# Patient Record
Sex: FEMALE | Race: WHITE | Employment: OTHER | URBAN - METROPOLITAN AREA
[De-identification: names, ages, dates, MRNs, and addresses within clinical notes are randomized per-mention and may not be internally consistent; named-entity substitution may affect disease eponyms.]

---

## 2017-10-06 ENCOUNTER — APPOINTMENT (OUTPATIENT)
Dept: GENERAL RADIOLOGY | Age: 72
DRG: 194 | End: 2017-10-06
Attending: NURSE PRACTITIONER
Payer: MEDICARE

## 2017-10-06 ENCOUNTER — HOSPITAL ENCOUNTER (INPATIENT)
Age: 72
LOS: 1 days | Discharge: HOME | DRG: 194 | End: 2017-10-08
Attending: EMERGENCY MEDICINE | Admitting: INTERNAL MEDICINE
Payer: MEDICARE

## 2017-10-06 ENCOUNTER — APPOINTMENT (OUTPATIENT)
Dept: CT IMAGING | Age: 72
DRG: 194 | End: 2017-10-06
Attending: EMERGENCY MEDICINE
Payer: MEDICARE

## 2017-10-06 DIAGNOSIS — N17.9 AKI (ACUTE KIDNEY INJURY) (HCC): ICD-10-CM

## 2017-10-06 DIAGNOSIS — K44.9 HIATAL HERNIA WITH GERD AND ESOPHAGITIS: ICD-10-CM

## 2017-10-06 DIAGNOSIS — H10.9 CONJUNCTIVITIS, UNSPECIFIED CONJUNCTIVITIS TYPE, UNSPECIFIED LATERALITY: ICD-10-CM

## 2017-10-06 DIAGNOSIS — K21.00 HIATAL HERNIA WITH GERD AND ESOPHAGITIS: ICD-10-CM

## 2017-10-06 DIAGNOSIS — E87.6 HYPOKALEMIA: ICD-10-CM

## 2017-10-06 DIAGNOSIS — R11.0 NAUSEA: ICD-10-CM

## 2017-10-06 DIAGNOSIS — J18.9 COMMUNITY ACQUIRED PNEUMONIA, UNSPECIFIED LATERALITY: ICD-10-CM

## 2017-10-06 DIAGNOSIS — R19.7 DIARRHEA OF PRESUMED INFECTIOUS ORIGIN: ICD-10-CM

## 2017-10-06 DIAGNOSIS — R53.1 WEAKNESS: ICD-10-CM

## 2017-10-06 DIAGNOSIS — J45.20 MILD INTERMITTENT ASTHMA WITHOUT COMPLICATION: ICD-10-CM

## 2017-10-06 DIAGNOSIS — R09.02 HYPOXIA: ICD-10-CM

## 2017-10-06 DIAGNOSIS — E83.39 HYPOPHOSPHATEMIA: ICD-10-CM

## 2017-10-06 LAB
ALBUMIN SERPL-MCNC: 2.8 G/DL (ref 3.2–4.6)
ALBUMIN/GLOB SERPL: 0.6 {RATIO} (ref 1.2–3.5)
ALP SERPL-CCNC: 118 U/L (ref 50–136)
ALT SERPL-CCNC: 21 U/L (ref 12–65)
ANION GAP SERPL CALC-SCNC: 11 MMOL/L (ref 7–16)
ARTERIAL PATENCY WRIST A: POSITIVE
AST SERPL-CCNC: 28 U/L (ref 15–37)
BACTERIA URNS QL MICRO: ABNORMAL /HPF
BASE DEFICIT BLDA-SCNC: 1.2 MMOL/L (ref 0–2)
BASOPHILS # BLD: 0 K/UL (ref 0–0.2)
BASOPHILS NFR BLD: 0 % (ref 0–2)
BDY SITE: ABNORMAL
BILIRUB SERPL-MCNC: 1 MG/DL (ref 0.2–1.1)
BNP SERPL-MCNC: 7 PG/ML
BUN SERPL-MCNC: 12 MG/DL (ref 8–23)
CALCIUM SERPL-MCNC: 9.2 MG/DL (ref 8.3–10.4)
CASTS URNS QL MICRO: ABNORMAL /LPF
CHLORIDE SERPL-SCNC: 98 MMOL/L (ref 98–107)
CO2 SERPL-SCNC: 27 MMOL/L (ref 21–32)
COHGB MFR BLD: 0.5 % (ref 0.5–1.5)
CREAT SERPL-MCNC: 1.15 MG/DL (ref 0.6–1)
CRYSTALS URNS QL MICRO: 0 /LPF
DIFFERENTIAL METHOD BLD: ABNORMAL
DO-HGB BLD-MCNC: 7 % (ref 0–5)
EOSINOPHIL # BLD: 0 K/UL (ref 0–0.8)
EOSINOPHIL NFR BLD: 0 % (ref 0.5–7.8)
EPI CELLS #/AREA URNS HPF: ABNORMAL /HPF
ERYTHROCYTE [DISTWIDTH] IN BLOOD BY AUTOMATED COUNT: 13.6 % (ref 11.9–14.6)
GLOBULIN SER CALC-MCNC: 4.8 G/DL (ref 2.3–3.5)
GLUCOSE SERPL-MCNC: 107 MG/DL (ref 65–100)
HCO3 BLDA-SCNC: 22 MMOL/L (ref 22–26)
HCT VFR BLD AUTO: 40.8 % (ref 35.8–46.3)
HGB BLD-MCNC: 14.1 G/DL (ref 11.7–15.4)
HGB BLDMV-MCNC: 12.6 GM/DL (ref 11.7–15)
IMM GRANULOCYTES # BLD: 0.1 K/UL (ref 0–0.5)
IMM GRANULOCYTES NFR BLD: 0.5 % (ref 0–5)
LACTATE BLD-SCNC: 2 MMOL/L (ref 0.5–1.9)
LYMPHOCYTES # BLD: 1.1 K/UL (ref 0.5–4.6)
LYMPHOCYTES NFR BLD: 10 % (ref 13–44)
MAGNESIUM SERPL-MCNC: 2 MG/DL (ref 1.8–2.4)
MCH RBC QN AUTO: 28.8 PG (ref 26.1–32.9)
MCHC RBC AUTO-ENTMCNC: 34.6 G/DL (ref 31.4–35)
MCV RBC AUTO: 83.4 FL (ref 79.6–97.8)
METHGB MFR BLD: 0.5 % (ref 0–1.5)
MONOCYTES # BLD: 1.6 K/UL (ref 0.1–1.3)
MONOCYTES NFR BLD: 14 % (ref 4–12)
MUCOUS THREADS URNS QL MICRO: ABNORMAL /LPF
NEUTS SEG # BLD: 8.8 K/UL (ref 1.7–8.2)
NEUTS SEG NFR BLD: 76 % (ref 43–78)
OXYHGB MFR BLDA: 91.6 % (ref 94–97)
PCO2 BLDA: 33 MMHG (ref 35–45)
PH BLDA: 7.44 [PH] (ref 7.35–7.45)
PHOSPHATE SERPL-MCNC: 1.6 MG/DL (ref 2.3–3.7)
PLATELET # BLD AUTO: 190 K/UL (ref 150–450)
PMV BLD AUTO: 10.7 FL (ref 10.8–14.1)
PO2 BLDA: 64 MMHG (ref 80–105)
POTASSIUM SERPL-SCNC: 3 MMOL/L (ref 3.5–5.1)
PROT SERPL-MCNC: 7.6 G/DL (ref 6.3–8.2)
RBC # BLD AUTO: 4.89 M/UL (ref 4.05–5.25)
RBC #/AREA URNS HPF: ABNORMAL /HPF
SAO2 % BLD: 93 % (ref 92–98.5)
SERVICE CMNT-IMP: ABNORMAL
SODIUM SERPL-SCNC: 136 MMOL/L (ref 136–145)
TROPONIN I SERPL-MCNC: <0.02 NG/ML (ref 0.02–0.05)
VENTILATION MODE VENT: ABNORMAL
WBC # BLD AUTO: 11.6 K/UL (ref 4.3–11.1)
WBC URNS QL MICRO: ABNORMAL /HPF

## 2017-10-06 PROCEDURE — 74011250636 HC RX REV CODE- 250/636: Performed by: INTERNAL MEDICINE

## 2017-10-06 PROCEDURE — 83735 ASSAY OF MAGNESIUM: CPT | Performed by: NURSE PRACTITIONER

## 2017-10-06 PROCEDURE — 74011000250 HC RX REV CODE- 250: Performed by: EMERGENCY MEDICINE

## 2017-10-06 PROCEDURE — 99285 EMERGENCY DEPT VISIT HI MDM: CPT | Performed by: EMERGENCY MEDICINE

## 2017-10-06 PROCEDURE — 71260 CT THORAX DX C+: CPT

## 2017-10-06 PROCEDURE — 84484 ASSAY OF TROPONIN QUANT: CPT | Performed by: NURSE PRACTITIONER

## 2017-10-06 PROCEDURE — 36600 WITHDRAWAL OF ARTERIAL BLOOD: CPT

## 2017-10-06 PROCEDURE — 96361 HYDRATE IV INFUSION ADD-ON: CPT | Performed by: EMERGENCY MEDICINE

## 2017-10-06 PROCEDURE — 82803 BLOOD GASES ANY COMBINATION: CPT

## 2017-10-06 PROCEDURE — 94640 AIRWAY INHALATION TREATMENT: CPT

## 2017-10-06 PROCEDURE — 74011000258 HC RX REV CODE- 258: Performed by: EMERGENCY MEDICINE

## 2017-10-06 PROCEDURE — 96375 TX/PRO/DX INJ NEW DRUG ADDON: CPT | Performed by: EMERGENCY MEDICINE

## 2017-10-06 PROCEDURE — 74011250636 HC RX REV CODE- 250/636: Performed by: NURSE PRACTITIONER

## 2017-10-06 PROCEDURE — 81003 URINALYSIS AUTO W/O SCOPE: CPT | Performed by: EMERGENCY MEDICINE

## 2017-10-06 PROCEDURE — 85025 COMPLETE CBC W/AUTO DIFF WBC: CPT | Performed by: NURSE PRACTITIONER

## 2017-10-06 PROCEDURE — 74011250636 HC RX REV CODE- 250/636: Performed by: EMERGENCY MEDICINE

## 2017-10-06 PROCEDURE — 80053 COMPREHEN METABOLIC PANEL: CPT | Performed by: NURSE PRACTITIONER

## 2017-10-06 PROCEDURE — 71010 XR CHEST PORT: CPT

## 2017-10-06 PROCEDURE — 74011636320 HC RX REV CODE- 636/320: Performed by: EMERGENCY MEDICINE

## 2017-10-06 PROCEDURE — 93005 ELECTROCARDIOGRAM TRACING: CPT | Performed by: NURSE PRACTITIONER

## 2017-10-06 PROCEDURE — 74011250637 HC RX REV CODE- 250/637: Performed by: EMERGENCY MEDICINE

## 2017-10-06 PROCEDURE — 83880 ASSAY OF NATRIURETIC PEPTIDE: CPT | Performed by: EMERGENCY MEDICINE

## 2017-10-06 PROCEDURE — 83605 ASSAY OF LACTIC ACID: CPT

## 2017-10-06 PROCEDURE — 81015 MICROSCOPIC EXAM OF URINE: CPT | Performed by: EMERGENCY MEDICINE

## 2017-10-06 PROCEDURE — 96365 THER/PROPH/DIAG IV INF INIT: CPT | Performed by: EMERGENCY MEDICINE

## 2017-10-06 PROCEDURE — 74011000250 HC RX REV CODE- 250: Performed by: INTERNAL MEDICINE

## 2017-10-06 PROCEDURE — 84100 ASSAY OF PHOSPHORUS: CPT | Performed by: NURSE PRACTITIONER

## 2017-10-06 PROCEDURE — 99223 1ST HOSP IP/OBS HIGH 75: CPT | Performed by: INTERNAL MEDICINE

## 2017-10-06 RX ORDER — SODIUM CHLORIDE 0.9 % (FLUSH) 0.9 %
10 SYRINGE (ML) INJECTION
Status: COMPLETED | OUTPATIENT
Start: 2017-10-06 | End: 2017-10-06

## 2017-10-06 RX ORDER — SODIUM CHLORIDE 9 MG/ML
1000 INJECTION, SOLUTION INTRAVENOUS CONTINUOUS
Status: DISCONTINUED | OUTPATIENT
Start: 2017-10-06 | End: 2017-10-08

## 2017-10-06 RX ORDER — LEVOFLOXACIN 5 MG/ML
750 INJECTION, SOLUTION INTRAVENOUS
Status: COMPLETED | OUTPATIENT
Start: 2017-10-06 | End: 2017-10-06

## 2017-10-06 RX ORDER — IPRATROPIUM BROMIDE AND ALBUTEROL SULFATE 2.5; .5 MG/3ML; MG/3ML
3 SOLUTION RESPIRATORY (INHALATION)
Status: COMPLETED | OUTPATIENT
Start: 2017-10-06 | End: 2017-10-06

## 2017-10-06 RX ORDER — ERYTHROMYCIN 5 MG/G
OINTMENT OPHTHALMIC
Status: COMPLETED | OUTPATIENT
Start: 2017-10-06 | End: 2017-10-06

## 2017-10-06 RX ADMIN — ERYTHROMYCIN: 5 OINTMENT OPHTHALMIC at 21:56

## 2017-10-06 RX ADMIN — SODIUM CHLORIDE 1000 ML/HR: 900 INJECTION, SOLUTION INTRAVENOUS at 16:48

## 2017-10-06 RX ADMIN — POTASSIUM PHOSPHATE, MONOBASIC AND POTASSIUM PHOSPHATE, DIBASIC: 224; 236 INJECTION, SOLUTION INTRAVENOUS at 23:08

## 2017-10-06 RX ADMIN — LEVOFLOXACIN 750 MG: 5 INJECTION, SOLUTION INTRAVENOUS at 21:06

## 2017-10-06 RX ADMIN — SODIUM CHLORIDE 1000 ML: 900 INJECTION, SOLUTION INTRAVENOUS at 23:54

## 2017-10-06 RX ADMIN — IPRATROPIUM BROMIDE AND ALBUTEROL SULFATE 3 ML: .5; 3 SOLUTION RESPIRATORY (INHALATION) at 19:58

## 2017-10-06 RX ADMIN — IOPAMIDOL 100 ML: 755 INJECTION, SOLUTION INTRAVENOUS at 21:25

## 2017-10-06 RX ADMIN — METHYLPREDNISOLONE SODIUM SUCCINATE 125 MG: 125 INJECTION, POWDER, FOR SOLUTION INTRAMUSCULAR; INTRAVENOUS at 21:06

## 2017-10-06 RX ADMIN — SODIUM CHLORIDE 100 ML: 900 INJECTION, SOLUTION INTRAVENOUS at 21:25

## 2017-10-06 RX ADMIN — Medication 10 ML: at 21:25

## 2017-10-06 NOTE — ED TRIAGE NOTES
Pt arrived ambulatory via POV with her daughter with c/o fatigue for 3 months. Pt was seen at her primary doctor today and was sent here.

## 2017-10-06 NOTE — IP AVS SNAPSHOT
Brian Shah 
 
 
 [address] [facility] [address] [facility] 
[phone] Patient: Dandre Raza MRN: [id number] LNY:4/0/[id number] You are allergic to the following Allergen Reactions Other Medication Rash Equate antibiotic ointment Immunizations Administered for This Admission Name Date Influenza Vaccine (Quad) PF  Deferred () Recent Documentation Height Weight BMI OB Status Smoking Status 1.575 m 56.2 kg 22.68 kg/m2 Postmenopausal Never Smoker Unresulted Labs Order Current Status CULTURE, RESPIRATORY/SPUTUM/BRONCH W GRAM STAIN In process Emergency Contacts Name Discharge Info Relation Home Work Mobile Carlota oDminguez DISCHARGE CAREGIVER [3] Daughter [21] [phone] About your hospitalization You were admitted on:  October 7, 2017 You last received care in the:  [facility] 8 MED SURG You were discharged on:  October 8, 2017 Unit phone number:  [phone] Why you were hospitalized Your primary diagnosis was:  Community Acquired Pneumonia Your diagnoses also included:  Hiatal Hernia With Ronald Lather And Esophagitis, Mild Intermittent Asthma, Hypoxia, Hypokalemia, Álvaro (Acute Kidney Injury) (Hcc), Hypophosphatemia, Weakness, Diarrhea Of Presumed Infectious Origin, Nausea, Cap (Community Acquired Pneumonia), Conjunctivitis Providers Seen During Your Hospitalizations Provider Role Specialty Primary office phone Ajay Mendez MD Attending Provider Emergency Medicine [phone] Amaya Reis MD Attending Provider Pulmonary Disease [phone] Your Primary Care Physician (PCP) Primary Care Physician Office Phone Office Fax Carlota Santillan [phone] Follow-up Information Follow up With Details Comments Contact Info Bert Pollard, [address] [facility] [address] 
[phone] [facility] Schedule an appointment as soon as possible for a visit in 1 day follow up with chest xray in 3 to 4 weeks. [address] [facility] [address] [place] [address] [id number] 
[phone] Talia Garay MD In 1 day Make follow up appt in 2 weeks [address] [facility] [address] [facility] [address] 
[phone] Current Discharge Medication List  
  
START taking these medications Dose & Instructions Dispensing Information Comments Morning Noon Evening Bedtime  
 azithromycin 500 mg Tab Commonly known as:  Harry Both Your last dose was: Your next dose is:    
   
   
 Dose:  500 mg Take 1 Tab by mouth daily for 3 doses. Quantity:  3 Tab Refills:  0  
     
   
   
   
  
 influenza vaccine 2017-18 (3 yrs+)(PF) Syrg injection Commonly known as:  FLUZONE QUAD/FLUARIX QUAD Your last dose was: Your next dose is:    
   
   
 Dose:  0.5 mL  
0.5 mL by IntraMUSCular route PRIOR TO DISCHARGE. Quantity:  0.5 mL Refills:  0 CONTINUE these medications which have NOT CHANGED Dose & Instructions Dispensing Information Comments Morning Noon Evening Bedtime  
 alendronate 70 mg tablet Commonly known as:  FOSAMAX Your last dose was: Your next dose is: Take one tab WEEKLY in AM on an empty stomach with a full glass of water. Do not eat, drink or lay down for 30 min. Quantity:  12 Tab Refills:  3  
     
   
   
   
  
 beclomethasone 80 mcg/actuation Aero Commonly known as:  QVAR Your last dose was: Your next dose is:    
   
   
 Dose:  2 Puff Take 2 Puffs by inhalation two (2) times a day. 1 puff bid. 90 day supply Quantity:  3 Inhaler Refills:  3 BREO ELLIPTA 200-25 mcg/dose inhaler Generic drug:  fluticasone-vilanterol Your last dose was: Your next dose is:    
   
   
 Dose:  1 Puff Take 1 Puff by inhalation daily. Refills:  0  
     
   
   
   
  
 cyanocobalamin 1,000 mcg/mL injection Commonly known as:  VITAMIN B12 Your last dose was: Your next dose is:    
   
   
 Dose:  1000 mcg  
1,000 mcg by IntraMUSCular route once. Refills:  0  
     
   
   
   
  
 FLONASE 50 mcg/actuation nasal spray Generic drug:  fluticasone Your last dose was: Your next dose is:    
   
   
 Dose:  2 Spray 2 Sprays by Both Nostrils route daily. Refills:  0  
     
   
   
   
  
 hydroCHLOROthiazide 25 mg tablet Commonly known as:  HYDRODIURIL Your last dose was: Your next dose is:    
   
   
 Dose:  12.5 mg Take 0.5 Tabs by mouth every morning. 1/2 tab Quantity:  45 Tab Refills:  3 LORazepam 1 mg tablet Commonly known as:  ATIVAN Your last dose was: Your next dose is: TAKE 1 TABLET BY MOUTH TWICE DAILY AS NEEDED FOR ANXIETY Quantity:  180 Tab Refills:  1  
     
   
   
   
  
 metoclopramide HCl 10 mg tablet Commonly known as:  REGLAN Your last dose was: Your next dose is:    
   
   
 1 po before each big meal  
 Quantity:  180 Tab Refills:  3  
     
   
   
   
  
 omeprazole 40 mg capsule Commonly known as:  PRILOSEC Your last dose was: Your next dose is:    
   
   
 Dose:  40 mg Take 1 Cap by mouth two (2) times a day. Quantity:  180 Cap Refills:  3  
     
   
   
   
  
 pravastatin 40 mg tablet Commonly known as:  PRAVACHOL Your last dose was: Your next dose is:    
   
   
 Dose:  40 mg Take 1 Tab by mouth nightly. Quantity:  90 Tab Refills:  3  
     
   
   
   
  
 * venlafaxine-SR 37.5 mg capsule Commonly known as:  EFFEXOR XR Your last dose was: Your next dose is:    
   
   
 Dose:  37.5 mg Take 1 Cap by mouth daily. Quantity:  30 Cap Refills:  11  
     
   
   
   
  
 * venlafaxine-SR 75 mg capsule Commonly known as:  EFFEXOR XR Your last dose was: Your next dose is:    
   
   
 Dose:  75 mg Take 1 Cap by mouth two (2) times a day. Quantity:  60 Cap Refills:  11  
     
   
   
   
  
 verapamil  mg CR tablet Commonly known as:  CALAN-SR Your last dose was: Your next dose is:    
   
   
 Dose:  240 mg Take 1 Tab by mouth every morning. Quantity:  90 Tab Refills:  3 VITAMIN D3 2,000 unit Tab Generic drug:  cholecalciferol (vitamin D3) Your last dose was: Your next dose is: Take  by mouth. Refills:  0  
     
   
   
   
  
 * Notice: This list has 2 medication(s) that are the same as other medications prescribed for you. Read the directions carefully, and ask your doctor or other care provider to review them with you. STOP taking these medications   
 citalopram 40 mg tablet Commonly known as:  Josephine Andrade Where to Get Your Medications These medications were sent to [address] [place] [address] Phone:  [phone]  
  azithromycin 500 mg Tab Information on where to get these meds will be given to you by the nurse or doctor. ! Ask your nurse or doctor about these medications  
  influenza vaccine 2017-18 (3 yrs+)(PF) Syrg injection Discharge Instructions Pinkeye: Care Instructions Your Care Instructions Pinkeye is redness and swelling of the eye surface and the conjunctiva (the lining of the eyelid and the covering of the white part of the eye). Pinkeye is also called conjunctivitis. Pinkeye is often caused by infection with bacteria or a virus. Dry air, allergies, smoke, and chemicals are other common causes. Pinkeye often clears on its own in 7 to 10 days. Antibiotics only help if the pinkeye is caused by bacteria. Pinkeye caused by infection spreads easily. If an allergy or chemical is causing pinkeye, it will not go away unless you can avoid whatever is causing it. Follow-up care is a key part of your treatment and safety. Be sure to make and go to all appointments, and call your doctor if you are having problems. It's also a good idea to know your test results and keep a list of the medicines you take. How can you care for yourself at home? · Wash your hands often. Always wash them before and after you treat pinkeye or touch your eyes or face. · Use moist cotton or a clean, wet cloth to remove crust. Wipe from the inside corner of the eye to the outside. Use a clean part of the cloth for each wipe. · Put cold or warm wet cloths on your eye a few times a day if the eye hurts. · Do not wear contact lenses or eye makeup until the pinkeye is gone. Throw away any eye makeup you were using when you got pinkeye. Clean your contacts and storage case. If you wear disposable contacts, use a new pair when your eye has cleared and it is safe to wear contacts again. · If the doctor gave you antibiotic ointment or eyedrops, use them as directed. Use the medicine for as long as instructed, even if your eye starts looking better soon. Keep the bottle tip clean, and do not let it touch the eye area. · To put in eyedrops or ointment: ¨ Tilt your head back, and pull your lower eyelid down with one finger. ¨ Drop or squirt the medicine inside the lower lid. ¨ Close your eye for 30 to 60 seconds to let the drops or ointment move around. ¨ Do not touch the ointment or dropper tip to your eyelashes or any other surface. · Do not share towels, pillows, or washcloths while you have pinkeye. When should you call for help? Call your doctor now or seek immediate medical care if: · You have pain in your eye, not just irritation on the surface. · You have a change in vision or loss of vision. · You have an increase in discharge from the eye. · Your eye has not started to improve or begins to get worse within 48 hours after you start using antibiotics. · Pinkeye lasts longer than 7 days. Watch closely for changes in your health, and be sure to contact your doctor if you have any problems. Where can you learn more? Go to http://hannah-terra.info/. Enter Y392 in the search box to learn more about \"Pinkeye: Care Instructions. \" Current as of: March 20, 2017 Content Version: 11.3 © 4221-4784 [employer]. Care instructions adapted under license by [employer] (which disclaims liability or warranty for this information). If you have questions about a medical condition or this instruction, always ask your healthcare professional. [place] [address] any warranty or liability for your use of this information. Discharge Orders None [employer] Announcement We are excited to announce that we are making your provider's discharge notes available to you in [employer]. You will see these notes when they are completed and signed by the physician that discharged you from your recent hospital stay. If you have any questions or concerns about any information you see in [employer], please call the Health Information Department where you were seen or reach out to your Primary Care Provider for more information about your plan of care. Introducing [facility]! [employer] introduces [employer] patient portal. Now you can access parts of your medical record, email your doctor's office, and request medication refills online. 1. In your internet browser, go to [email or web address] 2. Click on the First Time User? Click Here link in the Sign In box. You will see the New Member Sign Up page. 3. Enter your [employer] Access Code exactly as it appears below. You will not need to use this code after youve completed the sign-up process. If you do not sign up before the expiration date, you must request a new code. · [employer] Access Code: [facility] Expires: 10/15/2017  8:41 AM 
 
4. Enter the last four digits of your Social Security Number (xxxx) and Date of Birth (mm/dd/yyyy) as indicated and click Submit. You will be taken to the next sign-up page. 5. Create a [employer] ID. This will be your [employer] login ID and cannot be changed, so think of one that is secure and easy to remember. 6. Create a [employer] password. You can change your password at any time. 7. Enter your Password Reset Question and Answer. This can be used at a later time if you forget your password. 8. Enter your e-mail address. You will receive e-mail notification when new information is available in [address] 9. Click Sign Up. You can now view and download portions of your medical record. 10. Click the Download Summary menu link to download a portable copy of your medical information. If you have questions, please visit the Frequently Asked Questions section of the [employer] website. Remember, [employer] is NOT to be used for urgent needs. For medical emergencies, dial 911. Now available from your iPhone and Android! General Information Please provide this summary of care documentation to your next provider. Patient Signature:  ____________________________________________________________ Date:  ____________________________________________________________  
  
Westley Leary Provider Signature:  ____________________________________________________________ Date:  ____________________________________________________________

## 2017-10-06 NOTE — ED TRIAGE NOTES
El Mukherjee is a 71 y.o. female here for weakness. Rapid assessment performed. --- Orders were placed. --- Patient will be room  Signed By: Yuri Nazario NP     October 6, 2017          Ambulatory to ed with complaint of weakness and tired, gets tired after walking just several feet. Cough onset three mos ago. Saw her md today and sent her here. Has also been seen at doctors care twice, given steroids twice.     bp noted low - will start diagnostics at triage and to room

## 2017-10-06 NOTE — IP AVS SNAPSHOT
Argelia Petshilpi 
 
 
 [facility] [address] [facility] 
[phone] Patient: Marlen Davis MRN: [id number] Current Discharge Medication List  
  
START taking these medications Dose & Instructions Dispensing Information Comments Morning Noon Evening Bedtime  
 azithromycin 500 mg Tab Commonly known as:  Cyrena Haven Your last dose was: Your next dose is:    
   
   
 Dose:  500 mg Take 1 Tab by mouth daily for 3 doses. Quantity:  3 Tab Refills:  0  
     
   
   
   
  
 influenza vaccine 2017-18 (3 yrs+)(PF) Syrg injection Commonly known as:  FLUZONE QUAD/FLUARIX QUAD Your last dose was: Your next dose is:    
   
   
 Dose:  0.5 mL  
0.5 mL by IntraMUSCular route PRIOR TO DISCHARGE. Quantity:  0.5 mL Refills:  0 CONTINUE these medications which have NOT CHANGED Dose & Instructions Dispensing Information Comments Morning Noon Evening Bedtime  
 alendronate 70 mg tablet Commonly known as:  FOSAMAX Your last dose was: Your next dose is: Take one tab WEEKLY in AM on an empty stomach with a full glass of water. Do not eat, drink or lay down for 30 min. Quantity:  12 Tab Refills:  3  
     
   
   
   
  
 beclomethasone 80 mcg/actuation Aero Commonly known as:  QVAR Your last dose was: Your next dose is:    
   
   
 Dose:  2 Puff Take 2 Puffs by inhalation two (2) times a day. 1 puff bid. 90 day supply Quantity:  3 Inhaler Refills:  3 BREO ELLIPTA 200-25 mcg/dose inhaler Generic drug:  fluticasone-vilanterol Your last dose was: Your next dose is:    
   
   
 Dose:  1 Puff Take 1 Puff by inhalation daily. Refills:  0  
     
   
   
   
  
 cyanocobalamin 1,000 mcg/mL injection Commonly known as:  VITAMIN B12 Your last dose was: Your next dose is: Dose:  1000 mcg  
1,000 mcg by IntraMUSCular route once. Refills:  0  
     
   
   
   
  
 FLONASE 50 mcg/actuation nasal spray Generic drug:  fluticasone Your last dose was: Your next dose is:    
   
   
 Dose:  2 Spray 2 Sprays by Both Nostrils route daily. Refills:  0  
     
   
   
   
  
 hydroCHLOROthiazide 25 mg tablet Commonly known as:  HYDRODIURIL Your last dose was: Your next dose is:    
   
   
 Dose:  12.5 mg Take 0.5 Tabs by mouth every morning. 1/2 tab Quantity:  45 Tab Refills:  3 LORazepam 1 mg tablet Commonly known as:  ATIVAN Your last dose was: Your next dose is: TAKE 1 TABLET BY MOUTH TWICE DAILY AS NEEDED FOR ANXIETY Quantity:  180 Tab Refills:  1  
     
   
   
   
  
 metoclopramide HCl 10 mg tablet Commonly known as:  REGLAN Your last dose was: Your next dose is:    
   
   
 1 po before each big meal  
 Quantity:  180 Tab Refills:  3  
     
   
   
   
  
 omeprazole 40 mg capsule Commonly known as:  PRILOSEC Your last dose was: Your next dose is:    
   
   
 Dose:  40 mg Take 1 Cap by mouth two (2) times a day. Quantity:  180 Cap Refills:  3  
     
   
   
   
  
 pravastatin 40 mg tablet Commonly known as:  PRAVACHOL Your last dose was: Your next dose is:    
   
   
 Dose:  40 mg Take 1 Tab by mouth nightly. Quantity:  90 Tab Refills:  3  
     
   
   
   
  
 * venlafaxine-SR 37.5 mg capsule Commonly known as:  EFFEXOR XR Your last dose was: Your next dose is:    
   
   
 Dose:  37.5 mg Take 1 Cap by mouth daily. Quantity:  30 Cap Refills:  11  
     
   
   
   
  
 * venlafaxine-SR 75 mg capsule Commonly known as:  EFFEXOR XR Your last dose was: Your next dose is:    
   
   
 Dose:  75 mg Take 1 Cap by mouth two (2) times a day. Quantity:  60 Cap Refills:  11  
     
   
   
   
  
 verapamil  mg CR tablet Commonly known as:  CALAN-SR Your last dose was: Your next dose is:    
   
   
 Dose:  240 mg Take 1 Tab by mouth every morning. Quantity:  90 Tab Refills:  3 VITAMIN D3 2,000 unit Tab Generic drug:  cholecalciferol (vitamin D3) Your last dose was: Your next dose is: Take  by mouth. Refills:  0  
     
   
   
   
  
 * Notice: This list has 2 medication(s) that are the same as other medications prescribed for you. Read the directions carefully, and ask your doctor or other care provider to review them with you. STOP taking these medications   
 citalopram 40 mg tablet Commonly known as:  Celine Oh Where to Get Your Medications These medications were sent to [address] [place] [address] [address] [facility] [address] Phone:  [phone]  
  azithromycin 500 mg Tab Information on where to get these meds will be given to you by the nurse or doctor. ! Ask your nurse or doctor about these medications  
  influenza vaccine 2017-18 (3 yrs+)(PF) Syrg injection

## 2017-10-07 LAB
ANION GAP SERPL CALC-SCNC: 9 MMOL/L (ref 7–16)
BUN SERPL-MCNC: 11 MG/DL (ref 8–23)
CALCIUM SERPL-MCNC: 8.3 MG/DL (ref 8.3–10.4)
CHLORIDE SERPL-SCNC: 109 MMOL/L (ref 98–107)
CO2 SERPL-SCNC: 25 MMOL/L (ref 21–32)
CREAT SERPL-MCNC: 0.65 MG/DL (ref 0.6–1)
ERYTHROCYTE [DISTWIDTH] IN BLOOD BY AUTOMATED COUNT: 13.5 % (ref 11.9–14.6)
GLUCOSE SERPL-MCNC: 133 MG/DL (ref 65–100)
HCT VFR BLD AUTO: 35.1 % (ref 35.8–46.3)
HGB BLD-MCNC: 11.9 G/DL (ref 11.7–15.4)
MCH RBC QN AUTO: 28.1 PG (ref 26.1–32.9)
MCHC RBC AUTO-ENTMCNC: 33.9 G/DL (ref 31.4–35)
MCV RBC AUTO: 82.8 FL (ref 79.6–97.8)
PHOSPHATE SERPL-MCNC: 3.2 MG/DL (ref 2.3–3.7)
PLATELET # BLD AUTO: 170 K/UL (ref 150–450)
PMV BLD AUTO: 10.2 FL (ref 10.8–14.1)
POTASSIUM SERPL-SCNC: 3.3 MMOL/L (ref 3.5–5.1)
PROCALCITONIN SERPL-MCNC: 0.2 NG/ML
RBC # BLD AUTO: 4.24 M/UL (ref 4.05–5.25)
SODIUM SERPL-SCNC: 143 MMOL/L (ref 136–145)
WBC # BLD AUTO: 5.3 K/UL (ref 4.3–11.1)

## 2017-10-07 PROCEDURE — 74011250637 HC RX REV CODE- 250/637: Performed by: INTERNAL MEDICINE

## 2017-10-07 PROCEDURE — 74011250636 HC RX REV CODE- 250/636: Performed by: INTERNAL MEDICINE

## 2017-10-07 PROCEDURE — 85027 COMPLETE CBC AUTOMATED: CPT | Performed by: INTERNAL MEDICINE

## 2017-10-07 PROCEDURE — 80048 BASIC METABOLIC PNL TOTAL CA: CPT | Performed by: INTERNAL MEDICINE

## 2017-10-07 PROCEDURE — 36415 COLL VENOUS BLD VENIPUNCTURE: CPT | Performed by: INTERNAL MEDICINE

## 2017-10-07 PROCEDURE — 84145 PROCALCITONIN (PCT): CPT | Performed by: INTERNAL MEDICINE

## 2017-10-07 PROCEDURE — 74011000258 HC RX REV CODE- 258: Performed by: INTERNAL MEDICINE

## 2017-10-07 PROCEDURE — 65270000029 HC RM PRIVATE

## 2017-10-07 PROCEDURE — 74011000250 HC RX REV CODE- 250: Performed by: INTERNAL MEDICINE

## 2017-10-07 PROCEDURE — 99232 SBSQ HOSP IP/OBS MODERATE 35: CPT | Performed by: INTERNAL MEDICINE

## 2017-10-07 PROCEDURE — 84100 ASSAY OF PHOSPHORUS: CPT | Performed by: INTERNAL MEDICINE

## 2017-10-07 PROCEDURE — 87070 CULTURE OTHR SPECIMN AEROBIC: CPT | Performed by: INTERNAL MEDICINE

## 2017-10-07 RX ORDER — SODIUM CHLORIDE 0.9 % (FLUSH) 0.9 %
5-10 SYRINGE (ML) INJECTION EVERY 8 HOURS
Status: DISCONTINUED | OUTPATIENT
Start: 2017-10-07 | End: 2017-10-08 | Stop reason: HOSPADM

## 2017-10-07 RX ORDER — SODIUM CHLORIDE 9 MG/ML
100 INJECTION, SOLUTION INTRAVENOUS CONTINUOUS
Status: DISCONTINUED | OUTPATIENT
Start: 2017-10-07 | End: 2017-10-08

## 2017-10-07 RX ORDER — MORPHINE SULFATE 2 MG/ML
2 INJECTION, SOLUTION INTRAMUSCULAR; INTRAVENOUS
Status: DISCONTINUED | OUTPATIENT
Start: 2017-10-07 | End: 2017-10-08 | Stop reason: HOSPADM

## 2017-10-07 RX ORDER — PANTOPRAZOLE SODIUM 40 MG/1
40 TABLET, DELAYED RELEASE ORAL
Status: DISCONTINUED | OUTPATIENT
Start: 2017-10-07 | End: 2017-10-08 | Stop reason: HOSPADM

## 2017-10-07 RX ORDER — VENLAFAXINE HYDROCHLORIDE 75 MG/1
75 CAPSULE, EXTENDED RELEASE ORAL 2 TIMES DAILY
Status: DISCONTINUED | OUTPATIENT
Start: 2017-10-07 | End: 2017-10-08 | Stop reason: HOSPADM

## 2017-10-07 RX ORDER — ONDANSETRON 2 MG/ML
4 INJECTION INTRAMUSCULAR; INTRAVENOUS
Status: DISCONTINUED | OUTPATIENT
Start: 2017-10-07 | End: 2017-10-08 | Stop reason: HOSPADM

## 2017-10-07 RX ORDER — ACETAMINOPHEN 325 MG/1
650 TABLET ORAL
Status: DISCONTINUED | OUTPATIENT
Start: 2017-10-07 | End: 2017-10-08 | Stop reason: HOSPADM

## 2017-10-07 RX ORDER — LOPERAMIDE HYDROCHLORIDE 2 MG/1
2 CAPSULE ORAL
Status: DISCONTINUED | OUTPATIENT
Start: 2017-10-07 | End: 2017-10-08 | Stop reason: HOSPADM

## 2017-10-07 RX ORDER — SODIUM CHLORIDE 0.9 % (FLUSH) 0.9 %
5-10 SYRINGE (ML) INJECTION AS NEEDED
Status: DISCONTINUED | OUTPATIENT
Start: 2017-10-07 | End: 2017-10-08 | Stop reason: HOSPADM

## 2017-10-07 RX ORDER — LORAZEPAM 1 MG/1
1 TABLET ORAL 2 TIMES DAILY
Status: DISCONTINUED | OUTPATIENT
Start: 2017-10-07 | End: 2017-10-08 | Stop reason: HOSPADM

## 2017-10-07 RX ORDER — ALBUTEROL SULFATE 0.83 MG/ML
2.5 SOLUTION RESPIRATORY (INHALATION)
Status: DISCONTINUED | OUTPATIENT
Start: 2017-10-07 | End: 2017-10-08 | Stop reason: HOSPADM

## 2017-10-07 RX ORDER — ENOXAPARIN SODIUM 100 MG/ML
40 INJECTION SUBCUTANEOUS EVERY 24 HOURS
Status: DISCONTINUED | OUTPATIENT
Start: 2017-10-07 | End: 2017-10-08 | Stop reason: HOSPADM

## 2017-10-07 RX ORDER — VENLAFAXINE HYDROCHLORIDE 37.5 MG/1
37.5 CAPSULE, EXTENDED RELEASE ORAL
Status: DISCONTINUED | OUTPATIENT
Start: 2017-10-07 | End: 2017-10-08 | Stop reason: HOSPADM

## 2017-10-07 RX ORDER — METOCLOPRAMIDE 10 MG/1
10 TABLET ORAL
Status: DISCONTINUED | OUTPATIENT
Start: 2017-10-07 | End: 2017-10-08 | Stop reason: HOSPADM

## 2017-10-07 RX ORDER — CITALOPRAM 20 MG/1
40 TABLET, FILM COATED ORAL DAILY
Status: DISCONTINUED | OUTPATIENT
Start: 2017-10-07 | End: 2017-10-08 | Stop reason: HOSPADM

## 2017-10-07 RX ADMIN — ENOXAPARIN SODIUM 40 MG: 40 INJECTION SUBCUTANEOUS at 09:12

## 2017-10-07 RX ADMIN — AZITHROMYCIN MONOHYDRATE 500 MG: 500 INJECTION, POWDER, LYOPHILIZED, FOR SOLUTION INTRAVENOUS at 23:34

## 2017-10-07 RX ADMIN — PANTOPRAZOLE SODIUM 40 MG: 40 TABLET, DELAYED RELEASE ORAL at 16:58

## 2017-10-07 RX ADMIN — SULFACETAMIDE SODIUM AND PREDNISOLONE ACETATE 2 DROP: 100; 2 SUSPENSION/ DROPS OPHTHALMIC at 16:58

## 2017-10-07 RX ADMIN — SULFACETAMIDE SODIUM AND PREDNISOLONE ACETATE 2 DROP: 100; 2 SUSPENSION/ DROPS OPHTHALMIC at 22:11

## 2017-10-07 RX ADMIN — LORAZEPAM 1 MG: 1 TABLET ORAL at 09:12

## 2017-10-07 RX ADMIN — METOCLOPRAMIDE HYDROCHLORIDE 10 MG: 10 TABLET ORAL at 10:39

## 2017-10-07 RX ADMIN — Medication 10 ML: at 22:05

## 2017-10-07 RX ADMIN — SULFACETAMIDE SODIUM AND PREDNISOLONE ACETATE 2 DROP: 100; 2 SUSPENSION/ DROPS OPHTHALMIC at 10:38

## 2017-10-07 RX ADMIN — VENLAFAXINE HYDROCHLORIDE 75 MG: 75 CAPSULE, EXTENDED RELEASE ORAL at 09:12

## 2017-10-07 RX ADMIN — VENLAFAXINE HYDROCHLORIDE 37.5 MG: 37.5 CAPSULE, EXTENDED RELEASE ORAL at 03:10

## 2017-10-07 RX ADMIN — VENLAFAXINE HYDROCHLORIDE 37.5 MG: 37.5 CAPSULE, EXTENDED RELEASE ORAL at 22:11

## 2017-10-07 RX ADMIN — SODIUM CHLORIDE 100 ML/HR: 900 INJECTION, SOLUTION INTRAVENOUS at 14:15

## 2017-10-07 RX ADMIN — METOCLOPRAMIDE HYDROCHLORIDE 10 MG: 10 TABLET ORAL at 16:58

## 2017-10-07 RX ADMIN — METOCLOPRAMIDE HYDROCHLORIDE 10 MG: 10 TABLET ORAL at 05:31

## 2017-10-07 RX ADMIN — VENLAFAXINE HYDROCHLORIDE 75 MG: 75 CAPSULE, EXTENDED RELEASE ORAL at 22:11

## 2017-10-07 RX ADMIN — Medication 5 ML: at 15:00

## 2017-10-07 RX ADMIN — CITALOPRAM HYDROBROMIDE 40 MG: 20 TABLET ORAL at 09:12

## 2017-10-07 RX ADMIN — SULFACETAMIDE SODIUM AND PREDNISOLONE ACETATE 2 DROP: 100; 2 SUSPENSION/ DROPS OPHTHALMIC at 23:33

## 2017-10-07 RX ADMIN — PANTOPRAZOLE SODIUM 40 MG: 40 TABLET, DELAYED RELEASE ORAL at 05:31

## 2017-10-07 RX ADMIN — SODIUM CHLORIDE 100 ML/HR: 900 INJECTION, SOLUTION INTRAVENOUS at 03:12

## 2017-10-07 RX ADMIN — Medication 5 ML: at 05:31

## 2017-10-07 RX ADMIN — LORAZEPAM 1 MG: 1 TABLET ORAL at 16:58

## 2017-10-07 RX ADMIN — CEFTRIAXONE SODIUM 2 G: 2 INJECTION, POWDER, FOR SOLUTION INTRAMUSCULAR; INTRAVENOUS at 22:06

## 2017-10-07 NOTE — PROGRESS NOTES
Pt complaint of right eye irritation. Eye red with small amount of drainage. Dr. Delicia Galdamez notified. Eye drops ordered.

## 2017-10-07 NOTE — ED NOTES
TRANSFER - OUT REPORT:    Verbal report given to Kaylen Wilson RN (name) on Luis Enrique aYnes  being transferred to med/surg (unit) for routine progression of care       Report consisted of patients Situation, Background, Assessment and   Recommendations(SBAR). Information from the following report(s) SBAR, Kardex, ED Summary and MAR was reviewed with the receiving nurse. Lines:   Peripheral IV 10/06/17 Right Antecubital (Active)   Site Assessment Clean, dry, & intact 10/6/2017  5:31 PM   Phlebitis Assessment 0 10/6/2017  5:31 PM   Infiltration Assessment 0 10/6/2017  5:31 PM   Dressing Status Clean, dry, & intact 10/6/2017  5:31 PM        Opportunity for questions and clarification was provided.       Patient transported with:   [employer]

## 2017-10-07 NOTE — H&P
HISTORY AND PHYSICAL      Dandre Raza    10/6/2017    Date of Admission:  10/6/2017    PCP:  Dr. Monica Garcia    The patient's chart is reviewed and the patient is discussed with the staff. Dr. Abel Enrique of the [facility] emergency department has requested that [facility] Pulmonary evaluate Ms. Brumfield for admission to the hospital for hypoxia and possible pneumonia. HPI:     YULIANA Harris is a 69-year-old female with report of 3 months of cough, fatigue and shortness of breath despite 2 visits to urgent care for steroids and antibiotics (not clear what abx she took). She is a never smoker, and has previously been evaluated by Dr. Sharif Robbins at the [facility] center [facility]) for asthma, chronic cough. She was previously on Qvar and apparently ran out of this medication and was recently restarted on Breo ellipta LABA/ICS. She also has issues with allergic rhinitis for which she is on Flonase. The patient has a past medical history of hypertension, hyperlipidemia, history of melanoma status post resection, acid reflux with hiatal hernia. Today she reports 3 months of on/off coughing, sputum production, intermittent fevers. Her biggest complaint is profound fatigue which is severe for the last two weeks. Today she felt like she could hardly walk more than 4-5 feet. She also reports two weeks of worsened nausea (without vomiting) despite metoclopromide and diarrhea for the last several days. Yesterday she reports 4 episodes of loose stools and this recurred this morning as well. She has not been eating much for the last 2 weeks due to nausea. In the emergency room today, a room air ABG suggests a PO2 of 64, and O2 sat on room air was 89%. Her BNP is 7 and she does have a mild leukocytosis of 11.6K. Her renal function is slightly worsewith a creatinine of 1.15 up from 0.84. The patient reports subjective fevers, although her temperature today is 99°.   Her K and Phos are also low.    REVIEW OF SYSTEMS:  CONSTITUTIONAL:  There is no chills, night sweats, +weight loss,+ persistent fatigue, no lethargy/hypersomnolence. EYES:  Denies problems with eye pain, erythema, blurred vision, or visual field loss. Left eye redness noted by ER MD and erythromycin ointment placed on eye. ENTM:  Denies history of tinnitus, epistaxis, sore throat, hoarseness, or dysphonia. LYMPH:  Denies swollen glands. CARDIAC:  No chest pain, pressure, discomfort, palpitations, orthopnea, murmurs, or edema. GI:  No dysphagia, heartburn reflux, +nausea, no vomiting, + diarrhea, No abdominal pain, or bleeding. :Denies history of dysuria, hematuria, polyuria, or decreased urine output. MS:  No history of myalgias, arthralgias, bone pain, or muscle cramps. SKIN:  No history of rashes, jaundice, cyanosis, nodules, or ulcers. ENDO:  Negative for heat or cold intolerance. No history of DM. PSYCH:  Negative for nsomnia, hallucinations. + anxiety and has withdrawn from lorazepam in past,   NEURO:  There is no history of AMS, persistent headache, decreased level of consciousness, seizures, or motor or sensory deficits. Has had more frequent headaches lately. Patient Active Problem List   Diagnosis Code    Essential hypertension with goal blood pressure less than 140/90 I10    Hyperlipidemia, unspecified E78.5    Depression with anxiety F41.8    Mild intermittent asthma J45.20    B12 deficiency E53.8    Osteoarthritis of multiple joints M15.9    Hiatal hernia with GERD and esophagitis K44.9, K21.0    Osteoporosis M81.0    Community acquired pneumonia J18.9    Hypoxia R09.02    Hypokalemia E87.6    PEPITO (acute kidney injury) ([facility] [address] N17.9    Hypophosphatemia E83.39    Weakness R53.1    Diarrhea of presumed infectious origin A09    Nausea R11.0    CAP (community acquired pneumonia) J18.9       Prior to Admission Medications   Prescriptions Last Dose Informant Patient Reported? Taking?    LORazepam (ATIVAN) 1 mg tablet   No No   Sig: TAKE 1 TABLET BY MOUTH TWICE DAILY AS NEEDED FOR ANXIETY   alendronate (FOSAMAX) 70 mg tablet   No No   Sig: Take one tab WEEKLY in AM on an empty stomach with a full glass of water. Do not eat, drink or lay down for 30 min. beclomethasone (QVAR) 80 mcg/actuation aero   No No   Sig: Take 2 Puffs by inhalation two (2) times a day. 1 puff bid. 90 day supply   cholecalciferol, vitamin D3, (VITAMIN D3) 2,000 unit tab   Yes No   Sig: Take  by mouth.   citalopram (CELEXA) 40 mg tablet   Yes No   Sig: Take 40 mg by mouth daily. cyanocobalamin (VITAMIN B12) 1,000 mcg/mL injection   Yes No   Si,000 mcg by IntraMUSCular route once. fluticasone (FLONASE) 50 mcg/actuation nasal spray   Yes No   Si Sprays by Both Nostrils route daily. fluticasone-vilanterol (BREO ELLIPTA) 200-25 mcg/dose inhaler   Yes No   Sig: Take 1 Puff by inhalation daily. hydroCHLOROthiazide (HYDRODIURIL) 25 mg tablet   No No   Sig: Take 0.5 Tabs by mouth every morning. 1/2 tab   metoclopramide HCl (REGLAN) 10 mg tablet   No No   Si po before each big meal   omeprazole (PRILOSEC) 40 mg capsule   No No   Sig: Take 1 Cap by mouth two (2) times a day. pravastatin (PRAVACHOL) 40 mg tablet   No No   Sig: Take 1 Tab by mouth nightly. venlafaxine-SR (EFFEXOR XR) 37.5 mg capsule   No No   Sig: Take 1 Cap by mouth daily. venlafaxine-SR (EFFEXOR XR) 75 mg capsule   No No   Sig: Take 1 Cap by mouth two (2) times a day. verapamil ER (CALAN-SR) 240 mg CR tablet   No No   Sig: Take 1 Tab by mouth every morning.       Facility-Administered Medications: None       Past Medical History:   Diagnosis Date    Anemia  approx    2 units transfused    Arthritis     hands, hips    B12 deficiency 2016    Cancer ([facility] [address]     hx of melanoma 1986 R arm, and carcinoma x 2 forehead    Depression with anxiety 2016    Essential hypertension with goal blood pressure less than 140/90 2016    Gastrointestinal disorder     hiatel hernia    Hyperlipidemia, unspecified 8/16/2016    Mild intermittent asthma 8/16/2016    Osteoarthritis of multiple joints 8/16/2016    Osteoporosis 10/25/2016    Pulmonary emphysema ([address] 8/16/2016    Rectal bleed     hiatal hernia ulcerated     Past Surgical History:   Procedure Laterality Date    HX HERNIA REPAIR  2010    helped reflex    HX TUBAL LIGATION  1972     Social History     Social History    Marital status:      Spouse name: N/A    Number of children: N/A    Years of education: N/A     Occupational History    Not on file. Social History Main Topics    Smoking status: Never Smoker    Smokeless tobacco: Never Used    Alcohol use No    Drug use: No    Sexual activity: Yes     Partners: Male     Birth control/ protection: None     Other Topics Concern    Not on file     Social History Narrative    Lives with boyfriend of over 2 decades. She has 3 grown children, 3 grandchildren and 3 great grandchildren. She previously worked as an ; now on social security but was on disability for her nerves. Family History   Problem Relation Age of Onset    Stroke Mother     Diabetes Maternal Aunt     Diabetes Maternal Uncle     No Known Problems Father      didn't have contact with father    Breast Cancer Daughter 28     Allergies   Allergen Reactions    Other Medication Rash     Equate antibiotic ointment       Current Facility-Administered Medications   Medication Dose Route Frequency    0.9% sodium chloride infusion  1,000 mL/hr IntraVENous CONTINUOUS    potassium phosphate 30 mmol in 0.9% sodium chloride 250 mL infusion   IntraVENous ONCE     Current Outpatient Prescriptions   Medication Sig    fluticasone-vilanterol (BREO ELLIPTA) 200-25 mcg/dose inhaler Take 1 Puff by inhalation daily.  fluticasone (FLONASE) 50 mcg/actuation nasal spray 2 Sprays by Both Nostrils route daily.     LORazepam (ATIVAN) 1 mg tablet TAKE 1 TABLET BY MOUTH TWICE DAILY AS NEEDED FOR ANXIETY    omeprazole (PRILOSEC) 40 mg capsule Take 1 Cap by mouth two (2) times a day.  metoclopramide HCl (REGLAN) 10 mg tablet 1 po before each big meal    pravastatin (PRAVACHOL) 40 mg tablet Take 1 Tab by mouth nightly.  hydroCHLOROthiazide (HYDRODIURIL) 25 mg tablet Take 0.5 Tabs by mouth every morning. 1/2 tab    verapamil ER (CALAN-SR) 240 mg CR tablet Take 1 Tab by mouth every morning.  venlafaxine-SR (EFFEXOR XR) 37.5 mg capsule Take 1 Cap by mouth daily.  venlafaxine-SR (EFFEXOR XR) 75 mg capsule Take 1 Cap by mouth two (2) times a day.  cholecalciferol, vitamin D3, (VITAMIN D3) 2,000 unit tab Take  by mouth.  citalopram (CELEXA) 40 mg tablet Take 40 mg by mouth daily.  alendronate (FOSAMAX) 70 mg tablet Take one tab WEEKLY in AM on an empty stomach with a full glass of water. Do not eat, drink or lay down for 30 min.  beclomethasone (QVAR) 80 mcg/actuation aero Take 2 Puffs by inhalation two (2) times a day. 1 puff bid. 90 day supply    cyanocobalamin (VITAMIN B12) 1,000 mcg/mL injection 1,000 mcg by IntraMUSCular route once. Objective:     Vitals:    10/06/17 1912 10/06/17 1959 10/06/17 2057 10/06/17 2214   BP: 93/58  90/52 101/59   Pulse: 94  97 93   Resp:       Temp:       SpO2: 93% 93% 91% 92%   Weight:       Height:           PHYSICAL EXAM     Constitutional:  the patient is well developed and in no acute distress  EENMT:  Sclera clear, pupils equal, oral mucosa moist  Respiratory: CTA with bibasilar crackles. NO WHEEZING  Cardiovascular:  RRR without M,G,R  Gastrointestinal: soft and non-tender; with positive bowel sounds. Musculoskeletal: warm without cyanosis. There is no lower leg edema. Skin:  no jaundice or rashes, no wounds   Neurologic: no gross neuro deficits     Psychiatric:  alert and oriented x 3    CT chest October 6, 2017: Apical centrilobular emphysema, bibasilar infiltrates, hiatal hernia.   No evidence of PE        Recent Labs      10/06/17   1646   WBC  11.6*   HGB  14.1   HCT  40.8   PLT  190     Recent Labs      10/06/17   1646   NA  136   K  3.0*   CL  98   GLU  107*   CO2  27   BUN  12   CREA  1.15*   MG  2.0   PHOS  1.6*   CA  9.2   TROIQ  <0.02*   ALB  2.8*   TBILI  1.0   ALT  21   SGOT  28     Recent Labs      10/06/17   1945   PH  7.44   PCO2  33*   PO2  64*   HCO3  22     No results for input(s): LCAD, LAC in the last 72 hours. Assessment:  (Medical Decision Making)     Hospital Problems  Date Reviewed: 7/26/2017          Codes Class Noted POA    Community acquired pneumonia ICD-10-CM: J18.9  ICD-9-CM: 596  10/6/2017 Unknown    Bibasilar. Continue ceftriaxone/azithro and find out which abx were previously prescribed when the [facility] is open. Hypoxia ICD-10-CM: R09.02  ICD-9-CM: 799.02  10/6/2017 Unknown    Continue supplemental oxygen as needed    Hypokalemia ICD-10-CM: E87.6  ICD-9-CM: 276.8  10/6/2017 Unknown    repleting with K-phos now    PEPITO (acute kidney injury) ([facility] [address] ICD-10-CM: N17.9  ICD-9-CM: 584.9  10/6/2017 Unknown    IVF to continue s/p 1L in ER.  100/h for the next day. Hypophosphatemia ICD-10-CM: E83.39  ICD-9-CM: 275.3  10/6/2017 Unknown    Replete now and recheck tomorrow    Weakness ICD-10-CM: R53.1  ICD-9-CM: 780.79  10/6/2017 Unknown    Suspect due to dehydration, hypokalemia and hypophosphatemia. Diarrhea of presumed infectious origin ICD-10-CM: A09  ICD-9-CM: 009.3  10/6/2017 Unknown    Check c. diff    Nausea ICD-10-CM: R11.0  ICD-9-CM: 787.02  10/6/2017 Unknown    With history of dysmotility and esophagitis. Will increase PPI to bid and use metoclopramide and prn zosyn. If supportive measures not helpful, may need to involve GI.    CAP (community acquired pneumonia) ICD-10-CM: J18.9  ICD-9-CM: 486  10/6/2017 Unknown    Ceftriaxone/azithro as above    Mild intermittent asthma ICD-10-CM: J45.20  ICD-9-CM: 493.90  8/16/2016 Yes    Not wheezing.   Prn bronchodilators available. Holding off on steroids currently. Hiatal hernia with GERD and esophagitis ICD-10-CM: K44.9, K21.0  ICD-9-CM: 553.3, 530.11  8/16/2016 Yes    PPI. Plan:  (Medical Decision Making)     --Will admit for further medical management of CAP  --Supplemental O2   --Respiratory nebulizer treatments  --culture sputum and check c. diff  --ceftriaxone/azithro  --supportive care for nausea and diarrhea:  Send c. Diff and continue PPI/reglan/zofran  --continue lorazepam- patient known to withdraw in past  --will likely need PT to start tomorrow once patient is volume resuscitated. --home antihypertensives held since patient appears hypovolemic presently. May need to restart.  --DVT ppx  --Full code. More than 50% of the time documented was spent in face-to-face contact with the patient and in the care of the patient on the floor/unit where the patient is located.     Donya Herrera MD

## 2017-10-07 NOTE — PROGRESS NOTES
Shift assessment complete. Pt resting in bed. No complaints. Family at bedside. Safety measures in place. Call light in reach.

## 2017-10-07 NOTE — PROGRESS NOTES
Patient and family member oriented to room and call system. Patient alert and oriented, able to voice needs and concerns. Respirations even and unlabored, crackles to lower lobes with auscultation. Patient on room air. Denies any pain or discomfort. Primary Nurse Florencia Fields and Annabella Wise RN performed a dual skin assessment on this patient. No impairment noted. Skin warm dry and intact, no redness or skin breakdown noted. Elbows, sacrum and heels intact, no redness or skin breakdown noted.

## 2017-10-07 NOTE — PROGRESS NOTES
[facility]  Admission Date: 10/6/2017             Daily Progress Note: 10/7/2017    The patient's chart is reviewed and the patient is discussed with the staff. 70 yo WF with hx of asthma and anxiety depression admitted with dyspnea, cough, profound weakness, and loose stools. K and PO4 were very low. Subjective:     Feels  about the same.  States she usually coughs all morning from 3-7 AM but not this AM. Started coughing when asked to take deep breaths for exam.    Current Facility-Administered Medications   Medication Dose Route Frequency    citalopram (CELEXA) tablet 40 mg  40 mg Oral DAILY    LORazepam (ATIVAN) tablet 1 mg  1 mg Oral BID    metoclopramide HCl (REGLAN) tablet 10 mg  10 mg Oral TIDAC    venlafaxine-SR (EFFEXOR-XR) capsule 37.5 mg  37.5 mg Oral QHS    venlafaxine-SR (EFFEXOR-XR) capsule 75 mg  75 mg Oral BID    sodium chloride (NS) flush 5-10 mL  5-10 mL IntraVENous Q8H    sodium chloride (NS) flush 5-10 mL  5-10 mL IntraVENous PRN    cefTRIAXone (ROCEPHIN) 2 g in 0.9% sodium chloride (MBP/ADV) 50 mL  2 g IntraVENous Q24H    azithromycin (ZITHROMAX) 500 mg in 0.9% sodium chloride (MBP/ADV) 250 mL  500 mg IntraVENous Q24H    acetaminophen (TYLENOL) tablet 650 mg  650 mg Oral Q4H PRN    morphine injection 2 mg  2 mg IntraVENous Q4H PRN    albuterol (PROVENTIL VENTOLIN) nebulizer solution 2.5 mg  2.5 mg Nebulization Q4H PRN    enoxaparin (LOVENOX) injection 40 mg  40 mg SubCUTAneous Q24H    ondansetron (ZOFRAN) injection 4 mg  4 mg IntraVENous Q6H PRN    loperamide (IMODIUM) capsule 2 mg  2 mg Oral Q4H PRN    0.9% sodium chloride infusion  100 mL/hr IntraVENous CONTINUOUS    pantoprazole (PROTONIX) tablet 40 mg  40 mg Oral ACB&D    influenza vaccine 2017-18 (3 yrs+)(PF) (FLUZONE QUAD/FLUARIX QUAD) injection 0.5 mL  0.5 mL IntraMUSCular PRIOR TO DISCHARGE    0.9% sodium chloride infusion  1,000 mL/hr IntraVENous CONTINUOUS       Review of Systems    Constitutional: negative for fever, chills, sweats  Cardiovascular: negative for chest pain, palpitations, syncope, edema  Gastrointestinal:  negative for dysphagia, reflux, vomiting, diarrhea, abdominal pain, or melena  Neurologic:  negative for focal weakness, numbness, headache    Objective:     Vitals:    10/07/17 0118 10/07/17 0138 10/07/17 0216 10/07/17 0715   BP: 111/61 104/66 119/81 102/68   Pulse: 90 87 93 85   Resp:   18 18   Temp:   97.9 °F (36.6 °C) 97.7 °F (36.5 °C)   SpO2: 92% 91% 92% 92%   Weight:       Height:         Intake and Output:           Physical Exam:   Constitution:  the patient is well developed and in no acute distress  EENMT:  Sclera clear, pupils equal, oral mucosa moist  Respiratory: clear  Cardiovascular:  RRR without M,G,R  Gastrointestinal: soft and non-tender; with positive bowel sounds. Musculoskeletal: warm without cyanosis. There is no lower leg edema. Skin:  no jaundice or rashes  Neurologic: no gross neuro deficits     Psychiatric:  alert and oriented x 3    CXR:           LAB  No results for input(s): GLUCPOC in the last 72 hours. No lab exists for component: [place]   Recent Labs      10/06/17   1646   WBC  11.6*   HGB  14.1   HCT  40.8   PLT  190     Recent Labs      10/06/17   1646   NA  136   K  3.0*   CL  98   CO2  27   GLU  107*   BUN  12   CREA  1.15*   MG  2.0   CA  9.2   PHOS  1.6*   TROIQ  <0.02*   ALB  2.8*   TBILI  1.0   ALT  21   SGOT  28     Recent Labs      10/06/17   1945   PH  7.44   PCO2  33*   PO2  64*   HCO3  22     No results for input(s): LCAD, LAC in the last 72 hours. Assessment:  (Medical Decision Making)     Hospital Problems  Date Reviewed: 7/26/2017          Codes Class Noted POA    Community acquired pneumonia ICD-10-CM: J18.9  ICD-9-CM: 760  10/6/2017 Unknown    On ABx    Hypoxia ICD-10-CM: R09.02  ICD-9-CM: 799.02  10/6/2017 Unknown    Hyperventilating to have PO2 > 60.     Hypokalemia ICD-10-CM: E87.6  ICD-9-CM: 276.8 10/6/2017 Unknown    Repleting    PEPITO (acute kidney injury) ([facility] [address] ICD-10-CM: N17.9  ICD-9-CM: 584.9  10/6/2017 Unknown    Cr above baseline    Hypophosphatemia ICD-10-CM: E83.39  ICD-9-CM: 275.3  10/6/2017 Unknown    Repleting    Weakness ICD-10-CM: R53.1  ICD-9-CM: 780.79  10/6/2017 Unknown    Likely from electrolyte abnormalities    Diarrhea of presumed infectious origin ICD-10-CM: A09  ICD-9-CM: 009.3  10/6/2017 Unknown        Nausea ICD-10-CM: R11.0  ICD-9-CM: 787.02  10/6/2017 Unknown    Better at present    CAP (community acquired pneumonia) ICD-10-CM: J18.9  ICD-9-CM: 203  10/6/2017 Unknown        Mild intermittent asthma ICD-10-CM: J45.20  ICD-9-CM: 493.90  8/16/2016 Yes    No wheezing at present. Hiatal hernia with GERD and esophagitis ICD-10-CM: K44.9, K21.0  ICD-9-CM: 553.3, 530.11  8/16/2016 Yes              Plan:  (Medical Decision Making)     Continue IV hydration. Replete K/PO4. Check follow up labs. PT to see. Await c. Diff.    --    More than 50% of the time documented was spent in face-to-face contact with the patient and in the care of the patient on the floor/unit where the patient is located.     Cheryl Chavez MD

## 2017-10-08 VITALS
OXYGEN SATURATION: 93 % | SYSTOLIC BLOOD PRESSURE: 118 MMHG | DIASTOLIC BLOOD PRESSURE: 79 MMHG | TEMPERATURE: 97.5 F | HEIGHT: 62 IN | HEART RATE: 99 BPM | BODY MASS INDEX: 22.82 KG/M2 | WEIGHT: 124 LBS | RESPIRATION RATE: 18 BRPM

## 2017-10-08 PROBLEM — H10.9 CONJUNCTIVITIS: Status: ACTIVE | Noted: 2017-10-08

## 2017-10-08 LAB
ANION GAP SERPL CALC-SCNC: 9 MMOL/L (ref 7–16)
ATRIAL RATE: 90 BPM
BUN SERPL-MCNC: 12 MG/DL (ref 8–23)
CALCIUM SERPL-MCNC: 8 MG/DL (ref 8.3–10.4)
CALCULATED P AXIS, ECG09: 55 DEGREES
CALCULATED R AXIS, ECG10: 17 DEGREES
CALCULATED T AXIS, ECG11: 54 DEGREES
CHLORIDE SERPL-SCNC: 114 MMOL/L (ref 98–107)
CO2 SERPL-SCNC: 24 MMOL/L (ref 21–32)
CREAT SERPL-MCNC: 0.76 MG/DL (ref 0.6–1)
DIAGNOSIS, 93000: NORMAL
GLUCOSE SERPL-MCNC: 91 MG/DL (ref 65–100)
P-R INTERVAL, ECG05: 154 MS
PHOSPHATE SERPL-MCNC: 1.5 MG/DL (ref 2.3–3.7)
POTASSIUM SERPL-SCNC: 3.2 MMOL/L (ref 3.5–5.1)
Q-T INTERVAL, ECG07: 384 MS
QRS DURATION, ECG06: 66 MS
QTC CALCULATION (BEZET), ECG08: 469 MS
SODIUM SERPL-SCNC: 147 MMOL/L (ref 136–145)
VENTRICULAR RATE, ECG03: 90 BPM

## 2017-10-08 PROCEDURE — 36415 COLL VENOUS BLD VENIPUNCTURE: CPT | Performed by: INTERNAL MEDICINE

## 2017-10-08 PROCEDURE — 74011250637 HC RX REV CODE- 250/637: Performed by: INTERNAL MEDICINE

## 2017-10-08 PROCEDURE — 74011250636 HC RX REV CODE- 250/636: Performed by: INTERNAL MEDICINE

## 2017-10-08 PROCEDURE — 84100 ASSAY OF PHOSPHORUS: CPT | Performed by: INTERNAL MEDICINE

## 2017-10-08 PROCEDURE — 80048 BASIC METABOLIC PNL TOTAL CA: CPT | Performed by: INTERNAL MEDICINE

## 2017-10-08 PROCEDURE — 99238 HOSP IP/OBS DSCHRG MGMT 30/<: CPT | Performed by: INTERNAL MEDICINE

## 2017-10-08 PROCEDURE — 97161 PT EVAL LOW COMPLEX 20 MIN: CPT

## 2017-10-08 RX ORDER — AZITHROMYCIN 500 MG/1
500 TABLET, FILM COATED ORAL DAILY
Qty: 3 TAB | Refills: 0 | Status: SHIPPED | OUTPATIENT
Start: 2017-10-08 | End: 2017-10-11

## 2017-10-08 RX ORDER — SODIUM,POTASSIUM PHOSPHATES 280-250MG
2 POWDER IN PACKET (EA) ORAL DAILY
Status: DISCONTINUED | OUTPATIENT
Start: 2017-10-08 | End: 2017-10-08 | Stop reason: HOSPADM

## 2017-10-08 RX ORDER — POTASSIUM CHLORIDE 20 MEQ/1
40 TABLET, EXTENDED RELEASE ORAL
Status: COMPLETED | OUTPATIENT
Start: 2017-10-08 | End: 2017-10-08

## 2017-10-08 RX ADMIN — LORAZEPAM 1 MG: 1 TABLET ORAL at 08:23

## 2017-10-08 RX ADMIN — METOCLOPRAMIDE HYDROCHLORIDE 10 MG: 10 TABLET ORAL at 08:23

## 2017-10-08 RX ADMIN — POTASSIUM & SODIUM PHOSPHATES POWDER PACK 280-160-250 MG 2 PACKET: 280-160-250 PACK at 10:21

## 2017-10-08 RX ADMIN — PANTOPRAZOLE SODIUM 40 MG: 40 TABLET, DELAYED RELEASE ORAL at 05:17

## 2017-10-08 RX ADMIN — SULFACETAMIDE SODIUM AND PREDNISOLONE ACETATE 2 DROP: 100; 2 SUSPENSION/ DROPS OPHTHALMIC at 08:24

## 2017-10-08 RX ADMIN — SULFACETAMIDE SODIUM AND PREDNISOLONE ACETATE 2 DROP: 100; 2 SUSPENSION/ DROPS OPHTHALMIC at 03:49

## 2017-10-08 RX ADMIN — POTASSIUM CHLORIDE 40 MEQ: 20 TABLET, EXTENDED RELEASE ORAL at 08:23

## 2017-10-08 RX ADMIN — SODIUM CHLORIDE 100 ML/HR: 900 INJECTION, SOLUTION INTRAVENOUS at 03:49

## 2017-10-08 RX ADMIN — CITALOPRAM HYDROBROMIDE 40 MG: 20 TABLET ORAL at 08:23

## 2017-10-08 RX ADMIN — ENOXAPARIN SODIUM 40 MG: 40 INJECTION SUBCUTANEOUS at 08:24

## 2017-10-08 RX ADMIN — Medication 10 ML: at 05:18

## 2017-10-08 RX ADMIN — VENLAFAXINE HYDROCHLORIDE 75 MG: 75 CAPSULE, EXTENDED RELEASE ORAL at 08:23

## 2017-10-08 NOTE — DISCHARGE SUMMARY
DISCHARGE NOTE    Javy Corey  Admission date:  10/6/2017  Discharge date:  10/8/2017    Admitting Diagnosis:  CAP (community acquired pneumonia)    Discharge Diagnoses:    Hospital Problems  Date Reviewed: 7/26/2017          Codes Class Noted POA    Conjunctivitis ICD-10-CM: H10.9  ICD-9-CM: 372.30  10/8/2017 Unknown    Continue Sulfacetamide drops    * (Principal)Community acquired pneumonia ICD-10-CM: J18.9  ICD-9-CM: 486  10/6/2017 Unknown    Improved    Hypoxia ICD-10-CM: R09.02  ICD-9-CM: 799.02  10/6/2017 Unknown    Resolved    Hypokalemia ICD-10-CM: E87.6  ICD-9-CM: 276.8  10/6/2017 Unknown    Corrected    PEPITO (acute kidney injury) ([facility] [address] ICD-10-CM: N17.9  ICD-9-CM: 584.9  10/6/2017 Unknown    Resolved with IV hydration. Hypophosphatemia ICD-10-CM: E83.39  ICD-9-CM: 275.3  10/6/2017 Unknown    Corrected    Weakness ICD-10-CM: R53.1  ICD-9-CM: 780.79  10/6/2017 Unknown        Diarrhea of presumed infectious origin ICD-10-CM: A09  ICD-9-CM: 009.3  10/6/2017 Unknown    Resolved    Nausea ICD-10-CM: R11.0  ICD-9-CM: 787.02  10/6/2017 Unknown        CAP (community acquired pneumonia) ICD-10-CM: J18.9  ICD-9-CM: 486  10/6/2017 Unknown    Improved, likely from atypical organism    Mild intermittent asthma ICD-10-CM: J45.20  ICD-9-CM: 493.90  8/16/2016 Yes    Controlled    Hiatal hernia with GERD and esophagitis ICD-10-CM: K44.9, K21.0  ICD-9-CM: 553.3, 530.11  8/16/2016 Yes              Consultants: None    Studies/Procedures: CXR; IV hydration      Condition on Discharge:  Improved    Disposition:  Home      Presenting Illness: The pt is a 52-year-old female with report of 3 months of cough, fatigue and shortness of breath despite 2 visits to urgent care for steroids and antibiotics (not clear what abx she took). She is a never smoker, and has previously been evaluated by Dr. Castro Servant Dr. Crow Thomason at the Lung center [facility]) for asthma, chronic cough.   She was previously on Qvar and apparently ran out of this medication and was recently restarted on Breo ellipta LABA/ICS. She also has issues with allergic rhinitis for which she is on Flonase. The patient has a past medical history of hypertension, hyperlipidemia, history of melanoma status post resection, acid reflux with hiatal hernia.     Today she reports 3 months of on/off coughing, sputum production, intermittent fevers. Her biggest complaint is profound fatigue which is severe for the last two weeks. Today she felt like she could hardly walk more than 4-5 feet. She also reports two weeks of worsened nausea (without vomiting) despite metoclopromide and diarrhea for the last several days. Yesterday she reports 4 episodes of loose stools and this recurred this morning as well. She has not been eating much for the last 2 weeks due to nausea.     In the emergency room today, a room air ABG suggests a PO2 of 64, and O2 sat on room air was 89%. Her BNP is 7 and she does have a mild leukocytosis of 11.6K. Her renal function is slightly worsewith a creatinine of 1.15 up from 0.84. The patient reports subjective fevers, although her temperature today is 99°. Her K and Phos are also low. Hospital course: The pt was admitted to the floor and maintained on IV hydration with NSS with resolution of her elevated creatinine. K and PO4 were repleted with improvement in her muscle weakness. Rocephin and Zmax were continued. She produced minimal sputum and it is felt she likely has an atypical pneumonia for which she will complete a 5 day course of Zmax. She had evidence for conjunctivitis and was started on sulfacetamide drops with improvement. She will complete the bottle of medication started in the hospital. She is now improved and breathing comfortably on RA. She will see us back in 3-4 weeks with a CXR and f/u with her PCP in the next 2 weeks.        Physical Exam:   Constitution:  the patient is well developed and in no acute distress  EENMT: Sclera clear, pupils equal, oral mucosa moist  Respiratory: clear  Cardiovascular:  RRR without M,G,R  Gastrointestinal: soft and non-tender; with positive bowel sounds. Musculoskeletal: warm without cyanosis. There is no lower leg edema. Skin:  no jaundice or rashes   Neurologic: no gross neuro deficits     Psychiatric:  alert and oriented x 3      LAB  Recent Labs      10/07/17   0653  10/06/17   1646   WBC  5.3  11.6*   HGB  11.9  14.1   HCT  35.1*  40.8   PLT  170  190     Recent Labs      10/08/17   0704  10/07/17   0653  10/06/17   1646   NA  147*  143  136   K  3.2*  3.3*  3.0*   CL  114*  109*  98   CO2  24  25  27   BUN  12  11  12   CREA  0.76  0.65  1.15*   MG   --    --   2.0   PHOS  1.5*  3.2  1.6*   CA  8.0*  8.3  9.2   TROIQ   --    --   <0.02*   ALB   --    --   2.8*   TBILI   --    --   1.0   ALT   --    --   21   SGOT   --    --   28     Recent Labs      10/06/17   1945   PH  7.44   PCO2  33*   PO2  64*   HCO3  22       Discharge Medications:   Current Discharge Medication List      START taking these medications    Details   influenza vaccine 2017-18, 3 yrs+,,PF, (FLUZONE QUAD/FLUARIX QUAD) syrg injection 0.5 mL by IntraMUSCular route PRIOR TO DISCHARGE. Qty: 0.5 mL, Refills: 0      azithromycin (ZITHROMAX TRI-JEFF) 500 mg tab Take 1 Tab by mouth daily for 3 doses. Qty: 3 Tab, Refills: 0         CONTINUE these medications which have NOT CHANGED    Details   fluticasone-vilanterol (BREO ELLIPTA) 200-25 mcg/dose inhaler Take 1 Puff by inhalation daily. fluticasone (FLONASE) 50 mcg/actuation nasal spray 2 Sprays by Both Nostrils route daily. LORazepam (ATIVAN) 1 mg tablet TAKE 1 TABLET BY MOUTH TWICE DAILY AS NEEDED FOR ANXIETY  Qty: 180 Tab, Refills: 1    Associated Diagnoses: Depression with anxiety      omeprazole (PRILOSEC) 40 mg capsule Take 1 Cap by mouth two (2) times a day.   Qty: 180 Cap, Refills: 3    Associated Diagnoses: Hiatal hernia with GERD and esophagitis metoclopramide HCl (REGLAN) 10 mg tablet 1 po before each big meal  Qty: 180 Tab, Refills: 3    Associated Diagnoses: Hiatal hernia with GERD and esophagitis      pravastatin (PRAVACHOL) 40 mg tablet Take 1 Tab by mouth nightly. Qty: 90 Tab, Refills: 3    Associated Diagnoses: Hyperlipidemia, unspecified      hydroCHLOROthiazide (HYDRODIURIL) 25 mg tablet Take 0.5 Tabs by mouth every morning. 1/2 tab  Qty: 45 Tab, Refills: 3    Associated Diagnoses: Essential hypertension with goal blood pressure less than 140/90      verapamil ER (CALAN-SR) 240 mg CR tablet Take 1 Tab by mouth every morning. Qty: 90 Tab, Refills: 3    Associated Diagnoses: Essential hypertension with goal blood pressure less than 140/90      !! venlafaxine-SR (EFFEXOR XR) 37.5 mg capsule Take 1 Cap by mouth daily. Qty: 30 Cap, Refills: 11      !! venlafaxine-SR (EFFEXOR XR) 75 mg capsule Take 1 Cap by mouth two (2) times a day. Qty: 60 Cap, Refills: 11      cholecalciferol, vitamin D3, (VITAMIN D3) 2,000 unit tab Take  by mouth. alendronate (FOSAMAX) 70 mg tablet Take one tab WEEKLY in AM on an empty stomach with a full glass of water. Do not eat, drink or lay down for 30 min. Qty: 12 Tab, Refills: 3    Associated Diagnoses: Osteoporosis without current pathological fracture, unspecified osteoporosis type      beclomethasone (QVAR) 80 mcg/actuation aero Take 2 Puffs by inhalation two (2) times a day. 1 puff bid. 90 day supply  Qty: 3 Inhaler, Refills: 3    Associated Diagnoses: Mild intermittent asthma without complication      cyanocobalamin (VITAMIN B12) 1,000 mcg/mL injection 1,000 mcg by IntraMUSCular route once. !! - Potential duplicate medications found. Please discuss with provider.       STOP taking these medications       citalopram (CELEXA) 40 mg tablet Comments:   Reason for Stopping:                 Condition on Discharge:  Stable      Followup/Outpt Studies:    --Follow up appointment with [facility] in 3-4 weeks with a CXR. More than 50% of the time documented was spent in face-to-face contact with the patient and in the care of the patient on the floor/unit where the patient is located.     Ace Medrano MD

## 2017-10-08 NOTE — DISCHARGE INSTRUCTIONS
Pinkeye: Care Instructions  Your Care Instructions    Pinkeye is redness and swelling of the eye surface and the conjunctiva (the lining of the eyelid and the covering of the white part of the eye). Pinkeye is also called conjunctivitis. Pinkeye is often caused by infection with bacteria or a virus. Dry air, allergies, smoke, and chemicals are other common causes. Pinkeye often clears on its own in 7 to 10 days. Antibiotics only help if the pinkeye is caused by bacteria. Pinkeye caused by infection spreads easily. If an allergy or chemical is causing pinkeye, it will not go away unless you can avoid whatever is causing it. Follow-up care is a key part of your treatment and safety. Be sure to make and go to all appointments, and call your doctor if you are having problems. It's also a good idea to know your test results and keep a list of the medicines you take. How can you care for yourself at home? · Wash your hands often. Always wash them before and after you treat pinkeye or touch your eyes or face. · Use moist cotton or a clean, wet cloth to remove crust. Wipe from the inside corner of the eye to the outside. Use a clean part of the cloth for each wipe. · Put cold or warm wet cloths on your eye a few times a day if the eye hurts. · Do not wear contact lenses or eye makeup until the pinkeye is gone. Throw away any eye makeup you were using when you got pinkeye. Clean your contacts and storage case. If you wear disposable contacts, use a new pair when your eye has cleared and it is safe to wear contacts again. · If the doctor gave you antibiotic ointment or eyedrops, use them as directed. Use the medicine for as long as instructed, even if your eye starts looking better soon. Keep the bottle tip clean, and do not let it touch the eye area. · To put in eyedrops or ointment:  ¨ Tilt your head back, and pull your lower eyelid down with one finger.   ¨ Drop or squirt the medicine inside the lower lid.  ¨ Close your eye for 30 to 60 seconds to let the drops or ointment move around. ¨ Do not touch the ointment or dropper tip to your eyelashes or any other surface. · Do not share towels, pillows, or washcloths while you have pinkeye. When should you call for help? Call your doctor now or seek immediate medical care if:  · You have pain in your eye, not just irritation on the surface. · You have a change in vision or loss of vision. · You have an increase in discharge from the eye. · Your eye has not started to improve or begins to get worse within 48 hours after you start using antibiotics. · Pinkeye lasts longer than 7 days. Watch closely for changes in your health, and be sure to contact your doctor if you have any problems. Where can you learn more? Go to http://hannah-terra.info/. Enter Y392 in the search box to learn more about \"Pinkeye: Care Instructions. \"  Current as of: March 20, 2017  Content Version: 11.3  © 2691-5924 [employer]. Care instructions adapted under license by [employer] (which disclaims liability or warranty for this information). If you have questions about a medical condition or this instruction, always ask your healthcare professional. [address] any warranty or liability for your use of this information.

## 2017-10-08 NOTE — PROGRESS NOTES
Physical assessment completed, pt alert and oriented, resting in bed, denies pain or distress, IV infusing, family at bedside, call light within reach.

## 2017-10-08 NOTE — PROGRESS NOTES
Pt discharged. IV removed. No questions or concerns. All belongings taken home with patient. Pt taken down via wheelchair and assistant.

## 2017-10-08 NOTE — PROGRESS NOTES
Problem: Mobility Impaired (Adult and Pediatric)  Goal: *Acute Goals and Plan of Care (Insert Text)      PHYSICAL THERAPY: INITIAL ASSESSMENT, DISCHARGE 10/8/2017  INPATIENT: Hospital Day: 3  Payor: [employer] / Plan: [employer] HMO / Product Type: HMO /      NAME/AGE/GENDER: El Mukherjee is a 71 y.o. female              PRIMARY DIAGNOSIS: CAP (community acquired pneumonia) Community acquired pneumonia Community acquired pneumonia        ICD-10: Treatment Diagnosis:       · Generalized Muscle Weakness (M62.81)   Precaution/Allergies: Other medication       ASSESSMENT:      Ms. Carlos Jang presents supine in bed and is agreeable to work with PT. Patient reports she is leaving today and discharge orders are noted in connect care. Patient reports needing to be independent to discharge home. Daughter is concerned with patients balance and endurance. Patient was able to perform all functional mobility with modified independence. She ambulated 250' in the hallway with no assistive device, slower gait speed. She did not demonstrate any need for an assistive device. She is functional and safe for discharge home from PT standpoint. No further acute PT required. This section established at most recent assessment     1. RECOMMENDED REHABILITATION/EQUIPMENT: (at time of discharge pending progress): Due to the probability of continued deficits (see above) this patient will not likely need continued skilled physical therapy after discharge. Equipment:   · None at this time                   HISTORY:   History of Present Injury/Illness (Reason for Referral):  71-year-old female with report of 3 months of cough, fatigue and shortness of breath despite 2 visits to urgent care for steroids and antibiotics (not clear what abx she took). She is a never smoker, and has previously been evaluated by Dr. Hayder Hansen at the Lung center [facility]) for asthma, chronic cough.   She was previously on Qvar and apparently ran out of this medication and was recently restarted on Breo ellipta LABA/ICS. She also has issues with allergic rhinitis for which she is on Flonase. The patient has a past medical history of hypertension, hyperlipidemia, history of melanoma status post resection, acid reflux with hiatal hernia. Today she reports 3 months of on/off coughing, sputum production, intermittent fevers. Her biggest complaint is profound fatigue which is severe for the last two weeks. Today she felt like she could hardly walk more than 4-5 feet. She also reports two weeks of worsened nausea (without vomiting) despite metoclopromide and diarrhea for the last several days. Yesterday she reports 4 episodes of loose stools and this recurred this morning as well. She has not been eating much for the last 2 weeks due to nausea. In the emergency room today, a room air ABG suggests a PO2 of 64, and O2 sat on room air was 89%. Her BNP is 7 and she does have a mild leukocytosis of 11.6K. Her renal function is slightly worsewith a creatinine of 1.15 up from 0.84. The patient reports subjective fevers, although her temperature today is 99°. Her K and Phos are also low. Past Medical History/Comorbidities:   Ms. Jemima Amos  has a past medical history of Anemia (2005 approx); Arthritis; B12 deficiency (8/16/2016); Cancer ([facility] Depression with anxiety (8/16/2016); Essential hypertension with goal blood pressure less than 140/90 (8/16/2016); Gastrointestinal disorder; Hyperlipidemia, unspecified (8/16/2016); Mild intermittent asthma (8/16/2016); Osteoarthritis of multiple joints (8/16/2016); Osteoporosis (10/25/2016); Pulmonary emphysema ([facility] [address] (8/16/2016); and Rectal bleed. Ms. Jemima Amos  has a past surgical history that includes tubal ligation (1972) and hernia repair (2010).   Social History/Living Environment:   Home Environment: Private residence  # Steps to Enter: 4  Rails to Enter: Yes  One/Two Story Residence: One story  Living Alone: No  Support Systems: Spouse/Significant Other/Partner, Family member(s)  Patient Expects to be Discharged to[de-identified] Private residence  Current DME Used/Available at Home: None  Prior Level of Function/Work/Activity:  Independent, lives with boyfriend       Number of Personal Factors/Comorbidities that affect the Plan of Care: 0: LOW COMPLEXITY   EXAMINATION:   Most Recent Physical Functioning:   Gross Assessment:  AROM: Generally decreased, functional  PROM: Generally decreased, functional  Strength: Generally decreased, functional  Sensation: Intact               Posture:  Posture (WDL): Exceptions to WDL  Posture Assessment: Forward head, Rounded shoulders  Balance:  Sitting: Intact  Standing: Impaired  Standing - Static: Good  Standing - Dynamic : Fair Bed Mobility:  Rolling: Modified independent  Supine to Sit: Modified independent  Sit to Supine: Modified independent  Scooting: Modified independent  Wheelchair Mobility:     Transfers:  Sit to Stand: Modified independent  Stand to Sit: Modified independent  Bed to Chair: Modified independent  Gait:     Base of Support: Narrowed  Speed/Yu: Shuffled; Slow  Step Length: Left shortened;Right shortened  Gait Abnormalities: Decreased step clearance  Distance (ft): 250 Feet (ft)  Ambulation - Level of Assistance: Modified independent       Body Structures Involved:  1. Heart  2. Lungs  3. Muscles Body Functions Affected:  1. Cardio  2. Movement Related Activities and Participation Affected:  1. Domestic Life  2. Interpersonal Interactions and Relationships   Number of elements that affect the Plan of Care: 4+: HIGH COMPLEXITY   CLINICAL PRESENTATION:   Presentation: Stable and uncomplicated: LOW COMPLEXITY   CLINICAL DECISION MAKIN [facility] [address] [id number] AM-PAC 6 Clicks   Basic Mobility Inpatient Short Form  How much difficulty does the patient currently have. .. Unable A Lot A Little None   1.   Turning over in bed (including adjusting bedclothes, sheets and blankets)? [ ] 1   [ ] 2   [ ] 3   [X] 4   2. Sitting down on and standing up from a chair with arms ( e.g., wheelchair, bedside commode, etc.)   [ ] 1   [ ] 2   [ ] 3   [X] 4   3. Moving from lying on back to sitting on the side of the bed? [ ] 1   [ ] 2   [ ] 3   [X] 4   How much help from another person does the patient currently need. .. Total A Lot A Little None   4. Moving to and from a bed to a chair (including a wheelchair)? [ ] 1   [ ] 2   [ ] 3   [X] 4   5. Need to walk in hospital room? [ ] 1   [ ] 2   [ ] 3   [X] 4   6. Climbing 3-5 steps with a railing? [ ] 1   [ ] 2   [X] 3   [ ] 4   © 2007, Trustees of [address] [place] [address], under license to [employer]. All rights reserved    Score:  Initial: 23 Most Recent: X (Date: -- )     Interpretation of Tool:  Represents activities that are increasingly more difficult (i.e. Bed mobility, Transfers, Gait).        Score 24 23 22-20 19-15 14-10 9-7 6       Modifier CH CI CJ CK CL CM CN           Payor: [facility] / Plan: [email or web address] HMO / Product Type: HMO /           Use of outcome tool(s) and clinical judgement create a POC that gives a: Clear prediction of patient's progress: LOW COMPLEXITY                 TREATMENT:   (In addition to Assessment/Re-Assessment sessions the following treatments were rendered)   Pre-treatment Symptoms/Complaints:  Concerns about discharge  Pain: Initial:   Pain Intensity 1: 0  Post Session:  0/10      Assessment/Reassessment only, no treatment provided today     Braces/Orthotics/Lines/Etc:   · O2 Device: Room air  Treatment/Session Assessment:    · Response to Treatment:  No further acute PT needs  · Interdisciplinary Collaboration:  · Registered Nurse  · After treatment position/precautions:  · Supine in bed  · Bed/Chair-wheels locked  · Call light within reach  · Family at bedside  · Visitors at bedside  ·   Total Treatment Duration:  PT Patient Time In/Time Out  Time In: 1020  Time Out: [id number] [address] LYDIA Brock

## 2017-10-08 NOTE — PROGRESS NOTES
DISCHARGE SUMMARY from Nurse    The following personal items are in your possession at time of discharge:    Dental Appliances: None        Home Medications: None  Jewelry: Ring (3 yellow rings)  Clothing: At bedside  Other Valuables: None             PATIENT INSTRUCTIONS:    After general anesthesia or intravenous sedation, for 24 hours or while taking prescription Narcotics:  · Limit your activities  · Do not drive and operate hazardous machinery  · Do not make important personal or business decisions  · Do  not drink alcoholic beverages  · If you have not urinated within 8 hours after discharge, please contact your surgeon on call. Report the following to your surgeon:  · Excessive pain, swelling, redness or odor of or around the surgical area  · Temperature over 100.5  · Nausea and vomiting lasting longer than 4 hours or if unable to take medications  · Any signs of decreased circulation or nerve impairment to extremity: change in color, persistent  numbness, tingling, coldness or increase pain  · Any questions        What to do at Home:  Recommended activity: Activity as tolerated. *  Please give a list of your current medications to your Primary Care Provider. *  Please update this list whenever your medications are discontinued, doses are      changed, or new medications (including over-the-counter products) are added. *  Please carry medication information at all times in case of emergency situations. These are general instructions for a healthy lifestyle:    No smoking/ No tobacco products/ Avoid exposure to second hand smoke    Surgeon General's Warning:  Quitting smoking now greatly reduces serious risk to your health.     Obesity, smoking, and sedentary lifestyle greatly increases your risk for illness    A healthy diet, regular physical exercise & weight monitoring are important for maintaining a healthy lifestyle    You may be retaining fluid if you have a history of heart failure or if you experience any of the following symptoms:  Weight gain of 3 pounds or more overnight or 5 pounds in a week, increased swelling in our hands or feet or shortness of breath while lying flat in bed. Please call your doctor as soon as you notice any of these symptoms; do not wait until your next office visit. Recognize signs and symptoms of STROKE:    F-face looks uneven    A-arms unable to move or move unevenly    S-speech slurred or non-existent    T-time-call 911 as soon as signs and symptoms begin-DO NOT go       Back to bed or wait to see if you get better-TIME IS BRAIN. Warning Signs of HEART ATTACK     Call 911 if you have these symptoms:   Chest discomfort. Most heart attacks involve discomfort in the center of the chest that lasts more than a few minutes, or that goes away and comes back. It can feel like uncomfortable pressure, squeezing, fullness, or pain.  Discomfort in other areas of the upper body. Symptoms can include pain or discomfort in one or both arms, the back, neck, jaw, or stomach.  Shortness of breath with or without chest discomfort.  Other signs may include breaking out in a cold sweat, nausea, or lightheadedness. Don't wait more than five minutes to call [address]! Fast action can save your life. Calling 911 is almost always the fastest way to get lifesaving treatment. Emergency Medical Services staff can begin treatment when they arrive  up to an hour sooner than if someone gets to the hospital by car. The discharge information has been reviewed with the patient and daughter. The patient and daughter verbalized understanding. Discharge medications reviewed with the patient and daughter and appropriate educational materials and side effects teaching were provided.

## 2017-10-09 LAB
BACTERIA SPEC CULT: NORMAL
GRAM STN SPEC: NORMAL
SERVICE CMNT-IMP: NORMAL

## 2017-12-05 ENCOUNTER — HOSPITAL ENCOUNTER (OUTPATIENT)
Dept: GENERAL RADIOLOGY | Age: 72
Discharge: HOME | End: 2017-12-05
Payer: MEDICARE

## 2017-12-05 DIAGNOSIS — J18.9 PNEUMONIA DUE TO INFECTIOUS ORGANISM, UNSPECIFIED LATERALITY, UNSPECIFIED PART OF LUNG: ICD-10-CM

## 2017-12-05 PROCEDURE — 71020 XR CHEST PA LAT: CPT

## 2018-01-24 PROBLEM — J18.9 COMMUNITY ACQUIRED PNEUMONIA: Status: RESOLVED | Noted: 2017-10-06 | Resolved: 2018-01-24

## 2018-02-06 ENCOUNTER — HOSPITAL ENCOUNTER (OUTPATIENT)
Dept: CT IMAGING | Age: 73
Discharge: HOME | End: 2018-02-06
Attending: PHYSICIAN ASSISTANT
Payer: MEDICARE

## 2018-02-06 DIAGNOSIS — J18.9 COMMUNITY ACQUIRED PNEUMONIA, UNSPECIFIED LATERALITY: ICD-10-CM

## 2018-02-06 DIAGNOSIS — R93.89 ABNORMAL CHEST CT: ICD-10-CM

## 2018-02-06 PROCEDURE — 71250 CT THORAX DX C-: CPT

## 2018-02-07 NOTE — PROGRESS NOTES
Please call pt and let her know that her CT chest shows that her pneumonia is better. It is not completely cleared up. She will need repeat CT in 3 months. Thank you!

## 2018-09-06 ENCOUNTER — HOSPITAL ENCOUNTER (OUTPATIENT)
Dept: CT IMAGING | Age: 73
Discharge: HOME | End: 2018-09-06
Attending: INTERNAL MEDICINE
Payer: MEDICARE

## 2018-09-06 DIAGNOSIS — R91.8 PULMONARY NODULES: ICD-10-CM

## 2018-09-06 PROCEDURE — 71250 CT THORAX DX C-: CPT

## 2018-09-06 NOTE — PROGRESS NOTES
Please let patient know that the CT scan has normalized after treatment for pneumonia. No further imaging required.   Bing Lantigua MD

## 2018-09-06 NOTE — PROGRESS NOTES
Spoke with the patient in regards to their CT scan results, explained to the patient per Dr. Sabine Rick the CT scan has normalized after treatment for pneumonia and no further imaging is required. Patient understood and did not have any further questions or concerns at this time. // Gennaro UMANZOR

## 2018-09-28 ENCOUNTER — PATIENT OUTREACH (OUTPATIENT)
Dept: CASE MANAGEMENT | Age: 73
End: 2018-09-28

## 2018-09-28 NOTE — PROGRESS NOTES
Medication Adherence Outreach    Date/Time of Call:  9/28/2018   11:20 am    Name of medication and dosage:   * Pravastatin 40 mg 1 every day    Does the patient know the purpose and dosage of medication? * Yes    Are you getting a #30 day or #90 day supply of your medication? * 90 day supply    Are you still taking this medication? If not, why? * Yes    Transportation issues or any problems paying for the medication or other reason? * No   What pharmacy are you using to fill your medication and do you know the last time that you got your medication filled? * [facility]   * Last refill 7/24/2018      Are you having any side effects from taking your medication? * No          Any other questions or concerns expressed by the patient. * No    Comments:     * Discussed medication adherence with Mrs. Brumfield and she states she has no current barriers to prevent her from obtaining or taking her medication.        Call Completed By:  [id number]

## 2018-10-23 ENCOUNTER — HOSPITAL ENCOUNTER (OUTPATIENT)
Dept: MAMMOGRAPHY | Age: 73
Discharge: HOME | End: 2018-10-23
Attending: INTERNAL MEDICINE
Payer: MEDICARE

## 2018-10-23 DIAGNOSIS — Z12.31 ENCOUNTER FOR SCREENING MAMMOGRAM FOR BREAST CANCER: ICD-10-CM

## 2018-10-23 PROCEDURE — 77067 SCR MAMMO BI INCL CAD: CPT

## 2018-11-12 NOTE — PROGRESS NOTES
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 OD. Condition: Complex, unstable. Vision: vision affected. S/p PPV, MP, gas (8/30/18) for [address] by Dr. Giulia Ascencio in [place]. Exam today with total r/d with choroidals. Plan: Discussed diagnosis in great detail with patient. Explained the complexity of patients particular case. Discussed risks of progression. Surgical treatment is recommended to repair the retina PPVx. Discussed treatment options: Gas vs Oil. Solution used will be determined during surgery depending on what is found. Advised patient that he has a significant cataract in the right eye, this may need to be removed during surgery if the view is compromised. Leaving the patient without a lens for now. Patient will need a second surgery in the future when the retina is stable to remove the heavy liquid and to implant an IOL. All questions answered. Educational material provided to patient. RL1. Drops Erx to pharmacy on file. Schedule EMERGENCY surgery TODAY at [facility]. Explained to patient and wife that once the right eye is healed, will need to consider surgery on the left eye to repair the hole. Shift assessment complete. Pt resting in bed. Family at bedside. No complaints. Safety measures in place. Call light in reach.

## 2019-10-31 ENCOUNTER — HOSPITAL ENCOUNTER (OUTPATIENT)
Dept: MAMMOGRAPHY | Age: 74
Discharge: HOME | End: 2019-10-31
Attending: FAMILY MEDICINE
Payer: MEDICARE

## 2019-10-31 DIAGNOSIS — R91.8 PULMONARY NODULES: ICD-10-CM

## 2019-10-31 DIAGNOSIS — R93.89 ABNORMAL CHEST CT: ICD-10-CM

## 2019-10-31 DIAGNOSIS — F41.8 DEPRESSION WITH ANXIETY: ICD-10-CM

## 2019-10-31 DIAGNOSIS — E78.5 HYPERLIPIDEMIA, UNSPECIFIED HYPERLIPIDEMIA TYPE: ICD-10-CM

## 2019-10-31 DIAGNOSIS — K44.9 HIATAL HERNIA WITH GERD AND ESOPHAGITIS: ICD-10-CM

## 2019-10-31 DIAGNOSIS — M15.9 OSTEOARTHRITIS OF MULTIPLE JOINTS, UNSPECIFIED OSTEOARTHRITIS TYPE: ICD-10-CM

## 2019-10-31 DIAGNOSIS — Z12.31 VISIT FOR SCREENING MAMMOGRAM: ICD-10-CM

## 2019-10-31 DIAGNOSIS — E78.2 MIXED HYPERLIPIDEMIA: ICD-10-CM

## 2019-10-31 DIAGNOSIS — M81.0 OSTEOPOROSIS WITHOUT CURRENT PATHOLOGICAL FRACTURE, UNSPECIFIED OSTEOPOROSIS TYPE: ICD-10-CM

## 2019-10-31 DIAGNOSIS — I10 ESSENTIAL HYPERTENSION WITH GOAL BLOOD PRESSURE LESS THAN 140/90: ICD-10-CM

## 2019-10-31 DIAGNOSIS — N28.9 RENAL DYSFUNCTION: ICD-10-CM

## 2019-10-31 DIAGNOSIS — K21.00 HIATAL HERNIA WITH GERD AND ESOPHAGITIS: ICD-10-CM

## 2019-10-31 DIAGNOSIS — R79.89 ELEVATED LFTS: ICD-10-CM

## 2019-10-31 DIAGNOSIS — J45.20 MILD INTERMITTENT ASTHMA WITHOUT COMPLICATION: ICD-10-CM

## 2019-10-31 DIAGNOSIS — E55.9 VITAMIN D DEFICIENCY: ICD-10-CM

## 2019-10-31 DIAGNOSIS — E53.8 B12 DEFICIENCY: ICD-10-CM

## 2019-10-31 PROCEDURE — 77067 SCR MAMMO BI INCL CAD: CPT

## 2019-10-31 PROCEDURE — 77080 DXA BONE DENSITY AXIAL: CPT

## 2019-10-31 NOTE — PROGRESS NOTES
This is a relatively new patient to me; her updated DEXA/bone scan does reveal worsening osteoporosis; is she taking Fosamax at this time? Unfortunately, this patient has had a significant amount of unintentional weight loss since I saw her initially in July; she should have seen gastroenterology this week; please confirm that she saw gastroenterology this week; Does her Fosamax interfere with her swallowing? How long has she been taking Fosamax?   Please send this result note back to me with answers to the above questions; thank you, JF

## 2019-11-01 NOTE — PROGRESS NOTES
.Reviewed lab results and pt verbalized understanding.  Patient states she has not taking any fosamax in over 6 months, and she saw gastroenterology on wednesday

## 2019-11-03 NOTE — PROGRESS NOTES
William Wright, this patient does need to start an osteoporosis medication; however, most osteoporosis medications may interfere with a patient's swallowing; she has been evaluated by gastroenterology (with [facility]) last week; she has had unintentional weight loss; when you have time, please obtain those gastroenterology office/procedure notes for me so I can review; so, due to her worsening osteoporosis and her possible inability to take oral osteoporosis medications, I will place referral to rheumatology and she may benefit from a osteoporosis infusion; referral generated; thank you, JF

## 2020-05-29 ENCOUNTER — HOSPITAL ENCOUNTER (OUTPATIENT)
Dept: LAB | Age: 75
Discharge: HOME | End: 2020-05-29
Payer: MEDICARE

## 2020-05-29 LAB
ALBUMIN SERPL-MCNC: 3.3 G/DL (ref 3.2–4.6)
ALBUMIN/GLOB SERPL: 0.8 {RATIO} (ref 1.2–3.5)
ALP SERPL-CCNC: 125 U/L (ref 50–136)
ALT SERPL-CCNC: 52 U/L (ref 12–65)
ANION GAP SERPL CALC-SCNC: 4 MMOL/L (ref 7–16)
AST SERPL-CCNC: 54 U/L (ref 15–37)
BILIRUB SERPL-MCNC: 0.3 MG/DL (ref 0.2–1.1)
BUN SERPL-MCNC: 15 MG/DL (ref 8–23)
CALCIUM SERPL-MCNC: 9.3 MG/DL (ref 8.3–10.4)
CHLORIDE SERPL-SCNC: 105 MMOL/L (ref 98–107)
CHOLEST SERPL-MCNC: 193 MG/DL
CO2 SERPL-SCNC: 31 MMOL/L (ref 21–32)
CREAT SERPL-MCNC: 0.92 MG/DL (ref 0.6–1)
ERYTHROCYTE [DISTWIDTH] IN BLOOD BY AUTOMATED COUNT: 15.7 % (ref 11.9–14.6)
GLOBULIN SER CALC-MCNC: 4.1 G/DL (ref 2.3–3.5)
GLUCOSE SERPL-MCNC: 87 MG/DL (ref 65–100)
HCT VFR BLD AUTO: 40.7 % (ref 35.8–46.3)
HDLC SERPL-MCNC: 59 MG/DL (ref 40–60)
HDLC SERPL: 3.3 {RATIO}
HGB BLD-MCNC: 12.6 G/DL (ref 11.7–15.4)
LDLC SERPL CALC-MCNC: 92.6 MG/DL
LIPID PROFILE,FLP: ABNORMAL
MCH RBC QN AUTO: 25.6 PG (ref 26.1–32.9)
MCHC RBC AUTO-ENTMCNC: 31 G/DL (ref 31.4–35)
MCV RBC AUTO: 82.7 FL (ref 79.6–97.8)
NRBC # BLD: 0 K/UL (ref 0–0.2)
PLATELET # BLD AUTO: 247 K/UL (ref 150–450)
PMV BLD AUTO: 9.9 FL (ref 9.4–12.3)
POTASSIUM SERPL-SCNC: 3.7 MMOL/L (ref 3.5–5.1)
PROT SERPL-MCNC: 7.4 G/DL (ref 6.3–8.2)
RBC # BLD AUTO: 4.92 M/UL (ref 4.05–5.2)
SODIUM SERPL-SCNC: 140 MMOL/L (ref 136–145)
TRIGL SERPL-MCNC: 207 MG/DL (ref 35–150)
VLDLC SERPL CALC-MCNC: 41.4 MG/DL (ref 6–23)
WBC # BLD AUTO: 9.1 K/UL (ref 4.3–11.1)

## 2020-05-29 PROCEDURE — 85027 COMPLETE CBC AUTOMATED: CPT

## 2020-05-29 PROCEDURE — 82306 VITAMIN D 25 HYDROXY: CPT

## 2020-05-29 PROCEDURE — 80053 COMPREHEN METABOLIC PANEL: CPT

## 2020-05-29 PROCEDURE — 80061 LIPID PANEL: CPT

## 2020-05-29 PROCEDURE — 36415 COLL VENOUS BLD VENIPUNCTURE: CPT

## 2020-05-30 LAB — 25(OH)D3+25(OH)D2 SERPL-MCNC: 49.1 NG/ML (ref 30–100)

## 2020-05-30 NOTE — PROGRESS NOTES
Lab results: She has liver dysfunction; this is concerning; she needs to: Drink mostly water, eliminate all caffeinated drinks, eliminate any Tylenol or generic Tylenol products, and reduce or eliminate any alcohol use; I do recommend a recheck of her liver function in the next 7 to 10 days for a lab visit only; her triglyceride level has worsened and this could be the result of a poor diet/high carbohydrate diet; thank you, ROLANDO

## 2020-07-22 ENCOUNTER — APPOINTMENT (OUTPATIENT)
Dept: GENERAL RADIOLOGY | Age: 75
End: 2020-07-22
Attending: EMERGENCY MEDICINE
Payer: MEDICARE

## 2020-07-22 ENCOUNTER — HOSPITAL ENCOUNTER (EMERGENCY)
Age: 75
Discharge: HOME | End: 2020-07-22
Attending: EMERGENCY MEDICINE
Payer: MEDICARE

## 2020-07-22 VITALS
HEART RATE: 90 BPM | TEMPERATURE: 98.4 F | OXYGEN SATURATION: 97 % | RESPIRATION RATE: 20 BRPM | SYSTOLIC BLOOD PRESSURE: 145 MMHG | HEIGHT: 62 IN | WEIGHT: 127 LBS | BODY MASS INDEX: 23.37 KG/M2 | DIASTOLIC BLOOD PRESSURE: 63 MMHG

## 2020-07-22 DIAGNOSIS — R53.81 MALAISE AND FATIGUE: Primary | ICD-10-CM

## 2020-07-22 DIAGNOSIS — R53.83 MALAISE AND FATIGUE: Primary | ICD-10-CM

## 2020-07-22 LAB
ABO + RH BLD: NORMAL
ALBUMIN SERPL-MCNC: 3.4 G/DL (ref 3.2–4.6)
ALBUMIN/GLOB SERPL: 0.8 {RATIO} (ref 1.2–3.5)
ALP SERPL-CCNC: 102 U/L (ref 50–136)
ALT SERPL-CCNC: 33 U/L (ref 12–65)
ANION GAP SERPL CALC-SCNC: 5 MMOL/L (ref 7–16)
AST SERPL-CCNC: 42 U/L (ref 15–37)
ATRIAL RATE: 104 BPM
BASOPHILS # BLD: 0.1 K/UL (ref 0–0.2)
BASOPHILS NFR BLD: 1 % (ref 0–2)
BILIRUB SERPL-MCNC: 0.3 MG/DL (ref 0.2–1.1)
BLOOD GROUP ANTIBODIES SERPL: NORMAL
BUN SERPL-MCNC: 14 MG/DL (ref 8–23)
CALCIUM SERPL-MCNC: 9.1 MG/DL (ref 8.3–10.4)
CALCULATED P AXIS, ECG09: 67 DEGREES
CALCULATED R AXIS, ECG10: 9 DEGREES
CALCULATED T AXIS, ECG11: 78 DEGREES
CHLORIDE SERPL-SCNC: 109 MMOL/L (ref 98–107)
CO2 SERPL-SCNC: 29 MMOL/L (ref 21–32)
CREAT SERPL-MCNC: 1.11 MG/DL (ref 0.6–1)
DIAGNOSIS, 93000: NORMAL
DIFFERENTIAL METHOD BLD: ABNORMAL
EOSINOPHIL # BLD: 0.3 K/UL (ref 0–0.8)
EOSINOPHIL NFR BLD: 4 % (ref 0.5–7.8)
ERYTHROCYTE [DISTWIDTH] IN BLOOD BY AUTOMATED COUNT: 14.9 % (ref 11.9–14.6)
GLOBULIN SER CALC-MCNC: 4.2 G/DL (ref 2.3–3.5)
GLUCOSE SERPL-MCNC: 108 MG/DL (ref 65–100)
HCT VFR BLD AUTO: 46.7 % (ref 35.8–46.3)
HGB BLD-MCNC: 14.5 G/DL (ref 11.7–15.4)
IMM GRANULOCYTES # BLD AUTO: 0 K/UL (ref 0–0.5)
IMM GRANULOCYTES NFR BLD AUTO: 0 % (ref 0–5)
LIPASE SERPL-CCNC: 108 U/L (ref 73–393)
LYMPHOCYTES # BLD: 1.5 K/UL (ref 0.5–4.6)
LYMPHOCYTES NFR BLD: 21 % (ref 13–44)
MCH RBC QN AUTO: 25.4 PG (ref 26.1–32.9)
MCHC RBC AUTO-ENTMCNC: 31 G/DL (ref 31.4–35)
MCV RBC AUTO: 81.9 FL (ref 79.6–97.8)
MONOCYTES # BLD: 0.8 K/UL (ref 0.1–1.3)
MONOCYTES NFR BLD: 10 % (ref 4–12)
NEUTS SEG # BLD: 4.6 K/UL (ref 1.7–8.2)
NEUTS SEG NFR BLD: 63 % (ref 43–78)
NRBC # BLD: 0 K/UL (ref 0–0.2)
P-R INTERVAL, ECG05: 152 MS
PLATELET # BLD AUTO: 214 K/UL (ref 150–450)
PMV BLD AUTO: 9.7 FL (ref 9.4–12.3)
POTASSIUM SERPL-SCNC: 3.4 MMOL/L (ref 3.5–5.1)
PROT SERPL-MCNC: 7.6 G/DL (ref 6.3–8.2)
Q-T INTERVAL, ECG07: 352 MS
QRS DURATION, ECG06: 72 MS
QTC CALCULATION (BEZET), ECG08: 462 MS
RBC # BLD AUTO: 5.7 M/UL (ref 4.05–5.2)
SODIUM SERPL-SCNC: 143 MMOL/L (ref 136–145)
SPECIMEN EXP DATE BLD: NORMAL
TSH SERPL DL<=0.005 MIU/L-ACNC: 2.61 UIU/ML (ref 0.36–3.74)
VENTRICULAR RATE, ECG03: 104 BPM
WBC # BLD AUTO: 7.3 K/UL (ref 4.3–11.1)

## 2020-07-22 PROCEDURE — 81003 URINALYSIS AUTO W/O SCOPE: CPT

## 2020-07-22 PROCEDURE — 83690 ASSAY OF LIPASE: CPT

## 2020-07-22 PROCEDURE — 80053 COMPREHEN METABOLIC PANEL: CPT

## 2020-07-22 PROCEDURE — 99284 EMERGENCY DEPT VISIT MOD MDM: CPT

## 2020-07-22 PROCEDURE — 71045 X-RAY EXAM CHEST 1 VIEW: CPT

## 2020-07-22 PROCEDURE — 74011250636 HC RX REV CODE- 250/636: Performed by: EMERGENCY MEDICINE

## 2020-07-22 PROCEDURE — 86900 BLOOD TYPING SEROLOGIC ABO: CPT

## 2020-07-22 PROCEDURE — 85025 COMPLETE CBC W/AUTO DIFF WBC: CPT

## 2020-07-22 PROCEDURE — 93005 ELECTROCARDIOGRAM TRACING: CPT | Performed by: EMERGENCY MEDICINE

## 2020-07-22 PROCEDURE — 84443 ASSAY THYROID STIM HORMONE: CPT

## 2020-07-22 RX ORDER — SODIUM CHLORIDE 9 MG/ML
1000 INJECTION, SOLUTION INTRAVENOUS ONCE
Status: COMPLETED | OUTPATIENT
Start: 2020-07-22 | End: 2020-07-22

## 2020-07-22 RX ORDER — ONDANSETRON 8 MG/1
8 TABLET, ORALLY DISINTEGRATING ORAL
Qty: 12 TAB | Refills: 0 | Status: SHIPPED | OUTPATIENT
Start: 2020-07-22 | End: 2020-09-23 | Stop reason: ALTCHOICE

## 2020-07-22 RX ADMIN — SODIUM CHLORIDE 1000 ML: 9 INJECTION, SOLUTION INTRAVENOUS at 10:45

## 2020-07-22 NOTE — ED NOTES
I have reviewed discharge instructions with the patient. The patient verbalized understanding. Patient left ED via Discharge Method: ambulatory to Home with self. Opportunity for questions and clarification provided. Patient given 1 scripts. To continue your aftercare when you leave the hospital, you may receive an automated call from our care team to check in on how you are doing. This is a free service and part of our promise to provide the best care and service to meet your aftercare needs.  If you have questions, or wish to unsubscribe from this service please call [phone]. Thank you for Choosing our [facility] Emergency Department.

## 2020-07-22 NOTE — DISCHARGE INSTRUCTIONS

## 2020-07-22 NOTE — ED TRIAGE NOTES
Pt arrives with complaints of nausea and weakness. States hx of low Hgb and feels similar to when that was. Denies vomiting. States one episode of diarrhea. STates took OTC medications for diarrhea.

## 2020-07-22 NOTE — ED PROVIDER NOTES
Patient presents to the emerge department complaining of generalized malaise and fatigue that is been going on for the past 24 to 48 hours. She denies any fever or chills, she denies any dysuria or hematuria. She does report some nausea but no vomiting. She also reports one episode of diarrhea yesterday. The history is provided by the patient. Fatigue   This is a new problem. The current episode started 2 days ago. The problem has not changed since onset. There was no focality noted. Pertinent negatives include no focal weakness, no loss of sensation, no loss of balance, no slurred speech, no speech difficulty, no memory loss, no movement disorder, no agitation, no visual change, no auditory change, no mental status change, no unresponsiveness and no disorientation. There has been no fever. Pertinent negatives include no shortness of breath, no chest pain, no vomiting, no altered mental status, no confusion, no headaches, no choking, no nausea, no bowel incontinence and no bladder incontinence.  There were no medications administered prior to arrival.        Past Medical History:   Diagnosis Date    Anemia 2005 approx    2 units transfused    Arthritis     hands, hips    B12 deficiency 8/16/2016    Cancer ([facility] [address]     hx of melanoma 1986 R arm, and carcinoma x 2 forehead    Depression with anxiety 8/16/2016    Essential hypertension with goal blood pressure less than 140/90 8/16/2016    Gastrointestinal disorder     hiatel hernia    Hyperlipidemia, unspecified 8/16/2016    Mild intermittent asthma 8/16/2016    Osteoarthritis of multiple joints 8/16/2016    Osteoporosis 10/25/2016    Pulmonary emphysema ([facility] [address] 8/16/2016    Rectal bleed     hiatal hernia ulcerated       Past Surgical History:   Procedure Laterality Date    HX HERNIA REPAIR  2010    helped reflex    HX TUBAL LIGATION  1972         Family History:   Problem Relation Age of Onset    Stroke Mother         intracerebral aneurysm    Diabetes Maternal Aunt     Diabetes Maternal Uncle     No Known Problems Father         didn't have contact with father    Breast Cancer Daughter 28       Social History     Socioeconomic History    Marital status:      Spouse name: Not on file    Number of children: Not on file    Years of education: Not on file    Highest education level: Not on file   Occupational History    Not on file   Social Needs    Financial resource strain: Not on file    Food insecurity     Worry: Not on file     Inability: Not on file    Transportation needs     Medical: Not on file     Non-medical: Not on file   Tobacco Use    Smoking status: Never Smoker    Smokeless tobacco: Never Used   Substance and Sexual Activity    Alcohol use: No    Drug use: No    Sexual activity: Yes     Partners: Male     Birth control/protection: None   Lifestyle    Physical activity     Days per week: Not on file     Minutes per session: Not on file    Stress: Not on file   Relationships    Social connections     Talks on phone: Not on file     Gets together: Not on file     Attends Mandaen service: Not on file     Active member of club or organization: Not on file     Attends meetings of clubs or organizations: Not on file     Relationship status: Not on file    Intimate partner violence     Fear of current or ex partner: Not on file     Emotionally abused: Not on file     Physically abused: Not on file     Forced sexual activity: Not on file   Other Topics Concern    Not on file   Social History Narrative    Lives with boyfriend of over 2 decades. She has 3 grown children, 3 grandchildren and 3 great grandchildren. She previously worked as an ; now on social security but was on disability for her nerves. ALLERGIES: Other medication    Review of Systems   Constitutional: Positive for fatigue. Respiratory: Negative for choking and shortness of breath. Cardiovascular: Negative for chest pain. Gastrointestinal: Negative for bowel incontinence, nausea and vomiting. Genitourinary: Negative for bladder incontinence. Neurological: Negative for focal weakness, speech difficulty, headaches and loss of balance. Psychiatric/Behavioral: Negative for agitation, confusion and memory loss. All other systems reviewed and are negative. Vitals:    07/22/20 0950 07/22/20 1015 07/22/20 1020   BP: 107/77 114/81    Pulse: (!) 104  99   Resp: 18  15   Temp: 98.4 °F (36.9 °C)     SpO2: 95%  96%   Weight: 57.6 kg (127 lb)     Height: 5' 2\" (1.575 m)              Physical Exam  Vitals signs and nursing note reviewed. Constitutional:       General: She is not in acute distress. Appearance: Normal appearance. She is not ill-appearing, toxic-appearing or diaphoretic. HENT:      Head: Normocephalic and atraumatic. Nose: Nose normal.      Mouth/Throat:      Mouth: Mucous membranes are dry. Pharynx: Oropharynx is clear. Eyes:      Extraocular Movements: Extraocular movements intact. Conjunctiva/sclera: Conjunctivae normal.      Pupils: Pupils are equal, round, and reactive to light. Neck:      Musculoskeletal: Normal range of motion and neck supple. Cardiovascular:      Rate and Rhythm: Normal rate and regular rhythm. Pulmonary:      Effort: Pulmonary effort is normal.      Breath sounds: Normal breath sounds. Abdominal:      General: There is no distension. Palpations: Abdomen is soft. Tenderness: There is no abdominal tenderness. There is no guarding or rebound. Musculoskeletal: Normal range of motion. Skin:     General: Skin is warm and dry. Capillary Refill: Capillary refill takes less than 2 seconds. Neurological:      General: No focal deficit present. Mental Status: She is alert and oriented to person, place, and time. Mental status is at baseline.    Psychiatric:         Mood and Affect: Mood normal.         Behavior: Behavior normal.         Thought Content:  Thought content normal.          MDM  Number of Diagnoses or Management Options     Amount and/or Complexity of Data Reviewed  Clinical lab tests: ordered and reviewed  Tests in the radiology section of CPT®: reviewed  Independent visualization of images, tracings, or specimens: yes    Risk of Complications, Morbidity, and/or Mortality  Presenting problems: moderate  Diagnostic procedures: moderate  Management options: moderate    Patient Progress  Patient progress: stable         Procedures

## 2020-07-23 ENCOUNTER — PATIENT OUTREACH (OUTPATIENT)
Dept: CASE MANAGEMENT | Age: 75
End: 2020-07-23

## 2020-07-23 NOTE — PROGRESS NOTES
RNCM attempted to reach pt regarding ED RANDY, no answer. RNCM left vm w/ contact information. Will continue attempts to reach pt.

## 2020-07-24 ENCOUNTER — PATIENT OUTREACH (OUTPATIENT)
Dept: CASE MANAGEMENT | Age: 75
End: 2020-07-24

## 2020-07-24 NOTE — PROGRESS NOTES
RNCM 2nd unsuccessful attempt to reach pt, no answer RNCM left vm. Will close RANDY as pt is unable to be reached at this time.

## 2020-07-24 NOTE — PROGRESS NOTES
Patient contacted regarding recent discharge and COVID-19 risk. Ambulatory Care Manager contacted the patient by telephone to perform post discharge assessment. Verified name and  with patient as identifiers. Patient has following risk factors of: asthma. ACM reviewed discharge instructions, medical action plan and red flags related to discharge diagnosis. Reviewed and educated them on any new and changed medications related to discharge diagnosis. Advised obtaining a 90-day supply of all daily and as-needed medications. Advance Care Planning:   Does patient have an Advance Directive: decision makers updated     Education provided regarding infection prevention, and signs and symptoms of COVID-19 and when to seek medical attention with patient who verbalized understanding. Discussed exposure protocols and quarantine from [address] you at higher risk for severe illness  and given an opportunity for questions and concerns. The patient agrees to contact the COVID-19 hotline [phone] or PCP office for questions related to their healthcare. CTN/ACM provided contact information for future reference. From CDC: Are you at higher risk for severe illness?  Wash your hands often.  Avoid close contact (6 feet, which is about two arm lengths) with people who are sick.  Put distance between yourself and other people if COVID-19 is spreading in your community.  Clean and disinfect frequently touched surfaces.  Avoid all cruise travel and non-essential air travel.  Call your healthcare professional if you have concerns about COVID-19 and your underlying condition or if you are sick. For more information on steps you can take to protect yourself, see CDC's How to Protect Yourself      Patient/family/caregiver given information for [facility] and agrees to enroll no      Plan for follow-up call in 7-14 days based on severity of symptoms and risk factors.

## 2020-08-11 ENCOUNTER — PATIENT OUTREACH (OUTPATIENT)
Dept: CASE MANAGEMENT | Age: 75
End: 2020-08-11

## 2020-08-11 NOTE — PROGRESS NOTES
Patient resolved from Transition of Care episode on 8/11/20. Discussed COVID-19 related testing which was not done at this time. Test results were not done. Patient/family has been provided the following resources and education related to COVID-19:                         Signs, symptoms and red flags related to COVID-19            CDC exposure and quarantine guidelines            [facility] exposure contact - [phone]            Contact for their local Department of Health                 Patient currently reports that the following symptoms have improved:  pain or aching joints, nausea and diarrhea. No further outreach scheduled with this CTN/ACM/LPN/HC/ MA. Episode of Care resolved. Patient has this CTN/ACM/LPN/HC/MA contact information if future needs arise.

## 2020-09-28 ENCOUNTER — HOSPITAL ENCOUNTER (OUTPATIENT)
Dept: CT IMAGING | Age: 75
Discharge: HOME | End: 2020-09-28
Attending: FAMILY MEDICINE
Payer: MEDICARE

## 2020-09-28 DIAGNOSIS — Z12.31 SCREENING MAMMOGRAM, ENCOUNTER FOR: ICD-10-CM

## 2020-09-28 DIAGNOSIS — K21.00 HIATAL HERNIA WITH GERD AND ESOPHAGITIS: ICD-10-CM

## 2020-09-28 DIAGNOSIS — N28.9 RENAL DYSFUNCTION: ICD-10-CM

## 2020-09-28 DIAGNOSIS — R06.09 CHRONIC DYSPNEA: ICD-10-CM

## 2020-09-28 DIAGNOSIS — F41.8 DEPRESSION WITH ANXIETY: ICD-10-CM

## 2020-09-28 DIAGNOSIS — E78.5 HYPERLIPIDEMIA, UNSPECIFIED HYPERLIPIDEMIA TYPE: ICD-10-CM

## 2020-09-28 DIAGNOSIS — J45.20 MILD INTERMITTENT ASTHMA WITHOUT COMPLICATION: ICD-10-CM

## 2020-09-28 DIAGNOSIS — E78.2 MIXED HYPERLIPIDEMIA: ICD-10-CM

## 2020-09-28 DIAGNOSIS — K44.9 HIATAL HERNIA WITH GERD AND ESOPHAGITIS: ICD-10-CM

## 2020-09-28 DIAGNOSIS — H53.8 BLURRED VISION: ICD-10-CM

## 2020-09-28 DIAGNOSIS — E53.8 B12 DEFICIENCY: ICD-10-CM

## 2020-09-28 DIAGNOSIS — R79.89 ELEVATED LFTS: ICD-10-CM

## 2020-09-28 DIAGNOSIS — E55.9 VITAMIN D DEFICIENCY: ICD-10-CM

## 2020-09-28 DIAGNOSIS — M81.0 OSTEOPOROSIS WITHOUT CURRENT PATHOLOGICAL FRACTURE, UNSPECIFIED OSTEOPOROSIS TYPE: ICD-10-CM

## 2020-09-28 DIAGNOSIS — M15.9 OSTEOARTHRITIS OF MULTIPLE JOINTS, UNSPECIFIED OSTEOARTHRITIS TYPE: ICD-10-CM

## 2020-09-28 DIAGNOSIS — M81.0 OSTEOPOROSIS, UNSPECIFIED OSTEOPOROSIS TYPE, UNSPECIFIED PATHOLOGICAL FRACTURE PRESENCE: ICD-10-CM

## 2020-09-28 DIAGNOSIS — R91.8 PULMONARY NODULES: ICD-10-CM

## 2020-09-28 DIAGNOSIS — R05.3 CHRONIC COUGH: ICD-10-CM

## 2020-09-28 DIAGNOSIS — R93.89 ABNORMAL CHEST CT: ICD-10-CM

## 2020-09-28 DIAGNOSIS — Z12.4 ENCOUNTER FOR PAPANICOLAOU SMEAR FOR CERVICAL CANCER SCREENING: ICD-10-CM

## 2020-09-28 DIAGNOSIS — I10 ESSENTIAL HYPERTENSION WITH GOAL BLOOD PRESSURE LESS THAN 140/90: ICD-10-CM

## 2020-09-28 PROCEDURE — 71250 CT THORAX DX C-: CPT

## 2020-09-28 NOTE — PROGRESS NOTES
Her chest CT is negative per radiologist with exception of a hiatal hernia, also noted a gallstone;  the cause of her chronic cough for the past 6 months is unclear; she does have an upcoming appoint with pulmonology on October 12th; that is a very important appointment; also, when is her upcoming appointment with [facility]?   Please let me know if we need to re-refer her to GI; please send this result note back to me; thank you, Dr. Trevino

## 2020-10-12 PROBLEM — R05.3 CHRONIC COUGH: Status: ACTIVE | Noted: 2020-10-12

## 2021-05-22 ENCOUNTER — HOSPITAL ENCOUNTER (OUTPATIENT)
Dept: MAMMOGRAPHY | Age: 76
Discharge: HOME | End: 2021-05-22
Attending: FAMILY MEDICINE
Payer: MEDICARE

## 2021-05-22 DIAGNOSIS — M81.0 OSTEOPOROSIS WITHOUT CURRENT PATHOLOGICAL FRACTURE, UNSPECIFIED OSTEOPOROSIS TYPE: ICD-10-CM

## 2021-05-22 DIAGNOSIS — R91.8 PULMONARY NODULES: ICD-10-CM

## 2021-05-22 DIAGNOSIS — J45.20 MILD INTERMITTENT ASTHMA WITHOUT COMPLICATION: ICD-10-CM

## 2021-05-22 DIAGNOSIS — E55.9 VITAMIN D DEFICIENCY: ICD-10-CM

## 2021-05-22 DIAGNOSIS — F41.8 DEPRESSION WITH ANXIETY: ICD-10-CM

## 2021-05-22 DIAGNOSIS — E78.2 MIXED HYPERLIPIDEMIA: ICD-10-CM

## 2021-05-22 DIAGNOSIS — Z13.89 SCREENING FOR HEMATURIA OR PROTEINURIA: ICD-10-CM

## 2021-05-22 DIAGNOSIS — Z12.4 ENCOUNTER FOR PAPANICOLAOU SMEAR FOR CERVICAL CANCER SCREENING: ICD-10-CM

## 2021-05-22 DIAGNOSIS — N28.9 RENAL DYSFUNCTION: ICD-10-CM

## 2021-05-22 DIAGNOSIS — Z23 ENCOUNTER FOR IMMUNIZATION: ICD-10-CM

## 2021-05-22 DIAGNOSIS — Z12.31 SCREENING MAMMOGRAM, ENCOUNTER FOR: ICD-10-CM

## 2021-05-22 DIAGNOSIS — Z12.11 SCREEN FOR COLON CANCER: ICD-10-CM

## 2021-05-22 DIAGNOSIS — I10 ESSENTIAL HYPERTENSION WITH GOAL BLOOD PRESSURE LESS THAN 140/90: ICD-10-CM

## 2021-05-22 PROCEDURE — 77067 SCR MAMMO BI INCL CAD: CPT

## 2021-05-24 NOTE — PROGRESS NOTES
Mammogram results, per radiologist: \"IMPRESSION  1. Heterogeneously dense breasts without evolving changes suggestive of  malignancy. Therefore, routine followup is recommended with screening mammogram  in 1 year unless clinically indicated sooner. A reminder letter will be  Scheduled. \"  Thank you,  [facility]

## 2021-05-28 ENCOUNTER — HOSPITAL ENCOUNTER (OUTPATIENT)
Dept: LAB | Age: 76
Discharge: HOME | End: 2021-05-28
Payer: MEDICARE

## 2021-05-28 DIAGNOSIS — R05.3 CHRONIC COUGH: ICD-10-CM

## 2021-05-28 DIAGNOSIS — J45.41 MODERATE PERSISTENT ASTHMA WITH ACUTE EXACERBATION: ICD-10-CM

## 2021-05-28 LAB
BASOPHILS # BLD: 0.1 K/UL (ref 0–0.2)
BASOPHILS NFR BLD: 1 % (ref 0–2)
DIFFERENTIAL METHOD BLD: ABNORMAL
EOSINOPHIL # BLD: 2.8 K/UL (ref 0–0.8)
EOSINOPHIL NFR BLD: 31 % (ref 0.5–7.8)
ERYTHROCYTE [DISTWIDTH] IN BLOOD BY AUTOMATED COUNT: 15.5 % (ref 11.9–14.6)
HCT VFR BLD AUTO: 43.3 % (ref 35.8–46.3)
HGB BLD-MCNC: 13.4 G/DL (ref 11.7–15.4)
IMM GRANULOCYTES # BLD AUTO: 0 K/UL (ref 0–0.5)
IMM GRANULOCYTES NFR BLD AUTO: 0 % (ref 0–5)
LYMPHOCYTES # BLD: 2.2 K/UL (ref 0.5–4.6)
LYMPHOCYTES NFR BLD: 25 % (ref 13–44)
MCH RBC QN AUTO: 24.3 PG (ref 26.1–32.9)
MCHC RBC AUTO-ENTMCNC: 30.9 G/DL (ref 31.4–35)
MCV RBC AUTO: 78.4 FL (ref 79.6–97.8)
MONOCYTES # BLD: 0.8 K/UL (ref 0.1–1.3)
MONOCYTES NFR BLD: 10 % (ref 4–12)
NEUTS SEG # BLD: 2.9 K/UL (ref 1.7–8.2)
NEUTS SEG NFR BLD: 33 % (ref 43–78)
NRBC # BLD: 0 K/UL (ref 0–0.2)
PLATELET # BLD AUTO: 284 K/UL (ref 150–450)
PMV BLD AUTO: 10.1 FL (ref 9.4–12.3)
RBC # BLD AUTO: 5.52 M/UL (ref 4.05–5.2)
WBC # BLD AUTO: 8.8 K/UL (ref 4.3–11.1)

## 2021-05-28 PROCEDURE — 36415 COLL VENOUS BLD VENIPUNCTURE: CPT

## 2021-05-28 PROCEDURE — 85025 COMPLETE CBC W/AUTO DIFF WBC: CPT

## 2021-05-28 PROCEDURE — 82784 ASSAY IGA/IGD/IGG/IGM EACH: CPT

## 2021-05-28 PROCEDURE — 82785 ASSAY OF IGE: CPT

## 2021-05-31 LAB
IGA SERPL-MCNC: 150 MG/DL (ref 85–499)
IGG SERPL-MCNC: 972 MG/DL (ref 610–1616)
IGM SERPL-MCNC: 122 MG/DL (ref 35–242)

## 2021-06-02 LAB — IGE SERPL-ACNC: 794 IU/ML (ref 6–495)

## 2021-06-03 PROBLEM — J45.51 SEVERE PERSISTENT ASTHMA WITH ACUTE EXACERBATION: Status: ACTIVE | Noted: 2021-05-28

## 2021-06-21 ENCOUNTER — HOSPITAL ENCOUNTER (EMERGENCY)
Age: 76
Discharge: HOME | End: 2021-06-21
Attending: STUDENT IN AN ORGANIZED HEALTH CARE EDUCATION/TRAINING PROGRAM
Payer: MEDICARE

## 2021-06-21 ENCOUNTER — APPOINTMENT (OUTPATIENT)
Dept: CT IMAGING | Age: 76
End: 2021-06-21
Attending: STUDENT IN AN ORGANIZED HEALTH CARE EDUCATION/TRAINING PROGRAM
Payer: MEDICARE

## 2021-06-21 ENCOUNTER — APPOINTMENT (OUTPATIENT)
Dept: GENERAL RADIOLOGY | Age: 76
End: 2021-06-21
Attending: STUDENT IN AN ORGANIZED HEALTH CARE EDUCATION/TRAINING PROGRAM
Payer: MEDICARE

## 2021-06-21 VITALS
OXYGEN SATURATION: 97 % | RESPIRATION RATE: 16 BRPM | TEMPERATURE: 98.4 F | SYSTOLIC BLOOD PRESSURE: 163 MMHG | HEIGHT: 62 IN | WEIGHT: 128 LBS | HEART RATE: 84 BPM | DIASTOLIC BLOOD PRESSURE: 86 MMHG | BODY MASS INDEX: 23.55 KG/M2

## 2021-06-21 DIAGNOSIS — H81.10 BENIGN PAROXYSMAL POSITIONAL VERTIGO, UNSPECIFIED LATERALITY: Primary | ICD-10-CM

## 2021-06-21 LAB
ALBUMIN SERPL-MCNC: 3.3 G/DL (ref 3.2–4.6)
ALBUMIN/GLOB SERPL: 0.8 {RATIO} (ref 1.2–3.5)
ALP SERPL-CCNC: 86 U/L (ref 50–136)
ALT SERPL-CCNC: 25 U/L (ref 12–65)
ANION GAP SERPL CALC-SCNC: 4 MMOL/L (ref 7–16)
AST SERPL-CCNC: 23 U/L (ref 15–37)
BASOPHILS # BLD: 0.1 K/UL (ref 0–0.2)
BASOPHILS NFR BLD: 1 % (ref 0–2)
BILIRUB SERPL-MCNC: 0.4 MG/DL (ref 0.2–1.1)
BUN SERPL-MCNC: 11 MG/DL (ref 8–23)
CALCIUM SERPL-MCNC: 10.1 MG/DL (ref 8.3–10.4)
CHLORIDE SERPL-SCNC: 107 MMOL/L (ref 98–107)
CO2 SERPL-SCNC: 32 MMOL/L (ref 21–32)
CREAT SERPL-MCNC: 1.05 MG/DL (ref 0.6–1)
DIFFERENTIAL METHOD BLD: ABNORMAL
EOSINOPHIL # BLD: 0.9 K/UL (ref 0–0.8)
EOSINOPHIL NFR BLD: 15 % (ref 0.5–7.8)
ERYTHROCYTE [DISTWIDTH] IN BLOOD BY AUTOMATED COUNT: 15.9 % (ref 11.9–14.6)
GLOBULIN SER CALC-MCNC: 3.9 G/DL (ref 2.3–3.5)
GLUCOSE SERPL-MCNC: 101 MG/DL (ref 65–100)
HCT VFR BLD AUTO: 38.2 % (ref 35.8–46.3)
HGB BLD-MCNC: 12 G/DL (ref 11.7–15.4)
IMM GRANULOCYTES # BLD AUTO: 0 K/UL (ref 0–0.5)
IMM GRANULOCYTES NFR BLD AUTO: 0 % (ref 0–5)
LYMPHOCYTES # BLD: 2 K/UL (ref 0.5–4.6)
LYMPHOCYTES NFR BLD: 35 % (ref 13–44)
MCH RBC QN AUTO: 24.4 PG (ref 26.1–32.9)
MCHC RBC AUTO-ENTMCNC: 31.4 G/DL (ref 31.4–35)
MCV RBC AUTO: 77.8 FL (ref 79.6–97.8)
MONOCYTES # BLD: 0.5 K/UL (ref 0.1–1.3)
MONOCYTES NFR BLD: 8 % (ref 4–12)
NEUTS SEG # BLD: 2.5 K/UL (ref 1.7–8.2)
NEUTS SEG NFR BLD: 42 % (ref 43–78)
NRBC # BLD: 0 K/UL (ref 0–0.2)
PLATELET # BLD AUTO: 237 K/UL (ref 150–450)
PMV BLD AUTO: 10.2 FL (ref 9.4–12.3)
POTASSIUM SERPL-SCNC: 3.9 MMOL/L (ref 3.5–5.1)
PROT SERPL-MCNC: 7.2 G/DL (ref 6.3–8.2)
RBC # BLD AUTO: 4.91 M/UL (ref 4.05–5.2)
SODIUM SERPL-SCNC: 143 MMOL/L (ref 136–145)
WBC # BLD AUTO: 5.9 K/UL (ref 4.3–11.1)

## 2021-06-21 PROCEDURE — 99204 OFFICE O/P NEW MOD 45 MIN: CPT | Performed by: PSYCHIATRY & NEUROLOGY

## 2021-06-21 PROCEDURE — 74011250636 HC RX REV CODE- 250/636: Performed by: STUDENT IN AN ORGANIZED HEALTH CARE EDUCATION/TRAINING PROGRAM

## 2021-06-21 PROCEDURE — 70450 CT HEAD/BRAIN W/O DYE: CPT

## 2021-06-21 PROCEDURE — 99285 EMERGENCY DEPT VISIT HI MDM: CPT

## 2021-06-21 PROCEDURE — 85025 COMPLETE CBC W/AUTO DIFF WBC: CPT

## 2021-06-21 PROCEDURE — 93005 ELECTROCARDIOGRAM TRACING: CPT | Performed by: STUDENT IN AN ORGANIZED HEALTH CARE EDUCATION/TRAINING PROGRAM

## 2021-06-21 PROCEDURE — 71045 X-RAY EXAM CHEST 1 VIEW: CPT

## 2021-06-21 PROCEDURE — 80053 COMPREHEN METABOLIC PANEL: CPT

## 2021-06-21 RX ORDER — SODIUM CHLORIDE 0.9 % (FLUSH) 0.9 %
5-10 SYRINGE (ML) INJECTION EVERY 8 HOURS
Status: DISCONTINUED | OUTPATIENT
Start: 2021-06-21 | End: 2021-06-21 | Stop reason: HOSPADM

## 2021-06-21 RX ORDER — AMOXICILLIN AND CLAVULANATE POTASSIUM 875; 125 MG/1; MG/1
1 TABLET, FILM COATED ORAL 2 TIMES DAILY
Qty: 14 TABLET | Refills: 0 | Status: SHIPPED | OUTPATIENT
Start: 2021-06-21 | End: 2021-07-27 | Stop reason: CLARIF

## 2021-06-21 RX ORDER — SODIUM CHLORIDE 0.9 % (FLUSH) 0.9 %
5-10 SYRINGE (ML) INJECTION AS NEEDED
Status: DISCONTINUED | OUTPATIENT
Start: 2021-06-21 | End: 2021-06-21 | Stop reason: HOSPADM

## 2021-06-21 RX ORDER — MECLIZINE HYDROCHLORIDE 25 MG/1
50 TABLET ORAL
Status: COMPLETED | OUTPATIENT
Start: 2021-06-21 | End: 2021-06-21

## 2021-06-21 RX ORDER — CLOPIDOGREL BISULFATE 75 MG/1
75 TABLET ORAL DAILY
Qty: 30 TABLET | Refills: 1 | Status: ON HOLD | OUTPATIENT
Start: 2021-06-21 | End: 2021-11-18

## 2021-06-21 RX ORDER — MECLIZINE HYDROCHLORIDE 25 MG/1
25 TABLET ORAL
Qty: 20 TABLET | Refills: 1 | Status: SHIPPED | OUTPATIENT
Start: 2021-06-21 | End: 2021-07-28

## 2021-06-21 RX ADMIN — MECLIZINE HYDROCHLORIDE 50 MG: 25 TABLET ORAL at 14:25

## 2021-06-21 NOTE — DISCHARGE INSTRUCTIONS
Take the medication prescribed as directed. Arrange follow-up with the provider listed for recheck. Return for worsening symptoms, concerns or questions.

## 2021-06-21 NOTE — ED NOTES
I have reviewed discharge instructions with the patient and spouse. The patient and spouse verbalized understanding. Patient left ED via Discharge Method: ambulatory to Home with spouse. Opportunity for questions and clarification provided. Patient given 3 scripts. To continue your aftercare when you leave the hospital, you may receive an automated call from our care team to check in on how you are doing. This is a free service and part of our promise to provide the best care and service to meet your aftercare needs.  If you have questions, or wish to unsubscribe from this service please call [phone]. Thank you for Choosing our [facility] Emergency Department.

## 2021-06-21 NOTE — ED TRIAGE NOTES
Patient arrives to ED pov from home. Patient complains of dizziness that started x 2 days ago. Patient describes it as \"the room is spinning\". Patient states she is not dizzy while sitting down. Patient reports that the dizziness happens when she stands up.

## 2021-06-21 NOTE — ED PROVIDER NOTES
28-year-old female patient presents to the emergency department with 2 days of intermittent dizziness. Patient describes this dizziness as feeling as though the room spins. She states it is worsened when she moves or rapidly turns her head. She feels as though she needs to balance herself on objects to keep from falling. Patient states is completely resolved at rest.  She denies changes in vision, numbness tingling or difficulty swallowing. She denies previous similar symptoms in the past.  She reports no diagnosed with vertigo in the past.  Patient denies any ear pain or pressure and reports no sinus pressure or congestion. She denies associated fever or chills reports no chest pain pressure or tightness. There is no associated shortness of breath. She reports normal bowel and bladder habits as of late. Describes a very dull, intermittent headache at the occiput.            Past Medical History:   Diagnosis Date    Anemia 2005 approx    2 units transfused    Arthritis     hands, hips    B12 deficiency 8/16/2016    Cancer ([facility] [address]     hx of melanoma 1986 R arm, and carcinoma x 2 forehead    Depression with anxiety 8/16/2016    Essential hypertension with goal blood pressure less than 140/90 8/16/2016    Gastrointestinal disorder     hiatel hernia    Hyperlipidemia, unspecified 8/16/2016    Menopause     @ 51    Mild intermittent asthma 8/16/2016    Osteoarthritis of multiple joints 8/16/2016    Osteoporosis 10/25/2016    Pulmonary emphysema ([address] 8/16/2016    Rectal bleed     hiatal hernia ulcerated       Past Surgical History:   Procedure Laterality Date    HX HERNIA REPAIR  2010    helped reflex    HX TUBAL LIGATION  1972         Family History:   Problem Relation Age of Onset    Stroke Mother         intracerebral aneurysm    Diabetes Maternal Aunt     Diabetes Maternal Uncle     No Known Problems Father         didn't have contact with father    Breast Cancer Daughter 28       Social History     Socioeconomic History    Marital status:      Spouse name: Not on file    Number of children: Not on file    Years of education: Not on file    Highest education level: Not on file   Occupational History    Not on file   Tobacco Use    Smoking status: Never Smoker    Smokeless tobacco: Never Used   Substance and Sexual Activity    Alcohol use: No    Drug use: No    Sexual activity: Yes     Partners: Male     Birth control/protection: None   Other Topics Concern    Not on file   Social History Narrative    Lives with boyfriend of over 2 decades. She has 3 grown children, 3 grandchildren and 3 great grandchildren. She previously worked as an ; now on social security but was on disability for her nerves. Social Determinants of Health     Financial Resource Strain:     Difficulty of Paying Living Expenses:    Food Insecurity:     Worried About Running Out of Food in the Last Year:     [address] in the Last Year:    Transportation Needs:     Lack of Transportation (Medical):  Lack of Transportation (Non-Medical):    Physical Activity:     Days of Exercise per Week:     Minutes of Exercise per Session:    Stress:     Feeling of Stress :    Social Connections:     Frequency of Communication with Friends and Family:     Frequency of Social Gatherings with Friends and Family:     Attends [employer] Services:     Active Member of Clubs or Organizations:     Attends Club or Organization Meetings:     Marital Status:    Intimate Partner Violence:     Fear of Current or Ex-Partner:     Emotionally Abused:     Physically Abused:     Sexually Abused: ALLERGIES: Other medication    Review of Systems   Constitutional: Negative for chills, diaphoresis and fever. HENT: Negative for congestion, sneezing and sore throat. Eyes: Negative for visual disturbance. Respiratory: Negative for cough, chest tightness, shortness of breath and wheezing. Cardiovascular: Negative for chest pain and leg swelling. Gastrointestinal: Negative for abdominal pain, blood in stool, diarrhea, nausea and vomiting. Endocrine: Negative for polyuria. Genitourinary: Negative for difficulty urinating, dysuria, flank pain, hematuria and urgency. Musculoskeletal: Negative for back pain, myalgias, neck pain and neck stiffness. Skin: Negative for color change and rash. Neurological: Positive for dizziness and headaches. Negative for syncope, speech difficulty, weakness, light-headedness and numbness. Psychiatric/Behavioral: Negative for behavioral problems. All other systems reviewed and are negative. Vitals:    06/21/21 1141   BP: (!) 145/90   Pulse: 93   Resp: 16   Temp: 98.4 °F (36.9 °C)   SpO2: 97%   Weight: 58.1 kg (128 lb)   Height: 5' 2\" (1.575 m)            Physical Exam  Vitals and nursing note reviewed. Constitutional:       General: She is not in acute distress. Appearance: She is well-developed. She is not diaphoretic. Comments: Alert and oriented to person place and time. No acute distress, speaks in clear, fluid sentences. HENT:      Head: Normocephalic and atraumatic. Right Ear: External ear normal.      Left Ear: External ear normal.      Nose: Nose normal.   Eyes:      Pupils: Pupils are equal, round, and reactive to light. Cardiovascular:      Rate and Rhythm: Normal rate and regular rhythm. Heart sounds: Normal heart sounds. No murmur heard. No friction rub. No gallop. Pulmonary:      Effort: Pulmonary effort is normal. No respiratory distress. Breath sounds: Normal breath sounds. No stridor. No decreased breath sounds, wheezing, rhonchi or rales. Chest:      Chest wall: No tenderness. Abdominal:      General: There is no distension. Palpations: Abdomen is soft. There is no mass. Tenderness: There is no abdominal tenderness. There is no guarding or rebound. Hernia: No hernia is present. Musculoskeletal:         General: No tenderness or deformity. Normal range of motion. Cervical back: Normal range of motion. Skin:     General: Skin is warm and dry. Neurological:      General: No focal deficit present. Mental Status: She is alert and oriented to person, place, and time. GCS: GCS eye subscore is 4. GCS verbal subscore is 5. GCS motor subscore is 6. Cranial Nerves: No cranial nerve deficit. Sensory: Sensation is intact. Motor: Motor function is intact. Coordination: Coordination is intact. Comments: Patient's neuro exam is unremarkable. No appreciable nystagmus, normal finger-to-nose, rapid alternating movements appear normal as well. MDM  Number of Diagnoses or Management Options  Benign paroxysmal positional vertigo, unspecified laterality  Diagnosis management comments: Patient reports intermittent rotational dizziness worsened with movement and turning of head. Symptoms and story are consistent with benign paroxysmal positional vertigo. Patient does report a mild headache. Will attempt to improve dizziness with meclizine here and obtain CT imaging. Labs thus far and chest x-ray are unremarkable. Amount and/or Complexity of Data Reviewed  Clinical lab tests: ordered and reviewed  Tests in the radiology section of CPT®: ordered and reviewed  Tests in the medicine section of CPT®: ordered and reviewed  Discuss the patient with other providers: yes (Per Dr. Michelle Moseley start Plavix and discharged home with meclizine. Outpatient follow-up. MRI and inpatient evaluation not indicated.   Peripheral vertigo.)  Independent visualization of images, tracings, or specimens: yes    Risk of Complications, Morbidity, and/or Mortality  Presenting problems: moderate  Diagnostic procedures: low  Management options: moderate    Patient Progress  Patient progress: stable    ED Course as of Jun 21 1545   Mon Jun 21, 2021   1446 Ambulated patient at bedside, states symptoms have improved. Discussed lab results, CT imaging and x-ray findings with patient. Symptoms are consistent with benign paroxysmal positional vertigo. I will consult neurology as a precaution for further discussion of patient's case. [BR]   [phone] with neurology, will evaluate at this time.     [BR]      ED Course User Index  [BR] Ángela Winslow DO       Procedures

## 2021-06-21 NOTE — CONSULTS
Consult    Patient: Ramandeep Lew MRN: [id number]  SSN: [id number]    YOB: 1945  Age: 77 y.o. Sex: female      Subjective:      Ramandeep Lew is a 77 y.o. female who is being seen for    Is acute evaluation of vertigo. Patient noted the onset of his equilibration last week and has had it since that time she will intermittently have episodes lightheadedness and dizziness and a tendency to go one way or the other she is noted that this is movements precipitated and that it is precipitated by moving her head.   No prior viral illness no hearing loss of an acute nature no associated neurologic phenomena and denies diplopia    Past Medical History:   Diagnosis Date    Anemia 2005 approx    2 units transfused    Arthritis     hands, hips    B12 deficiency 8/16/2016    Cancer ([facility] [address]     hx of melanoma 1986 R arm, and carcinoma x 2 forehead    Depression with anxiety 8/16/2016    Essential hypertension with goal blood pressure less than 140/90 8/16/2016    Gastrointestinal disorder     hiatel hernia    Hyperlipidemia, unspecified 8/16/2016    Menopause     @ 49    Mild intermittent asthma 8/16/2016    Osteoarthritis of multiple joints 8/16/2016    Osteoporosis 10/25/2016    Pulmonary emphysema ([facility] [address] 8/16/2016    Rectal bleed     hiatal hernia ulcerated     Past Surgical History:   Procedure Laterality Date    HX HERNIA REPAIR  2010    helped reflex    HX TUBAL LIGATION  1972      Family History   Problem Relation Age of Onset    Stroke Mother         intracerebral aneurysm    Diabetes Maternal Aunt     Diabetes Maternal Uncle     No Known Problems Father         didn't have contact with father    Breast Cancer Daughter 28     Social History     Tobacco Use    Smoking status: Never Smoker    Smokeless tobacco: Never Used   Substance Use Topics    Alcohol use: No      Current Facility-Administered Medications   Medication Dose Route Frequency Provider Last Rate Last Admin    sodium chloride (NS) flush 5-10 mL  5-10 mL IntraVENous Q8H Naveen Sharpe R,         sodium chloride (NS) flush 5-10 mL  5-10 mL IntraVENous PRN Jesus Doing, DO         Current Outpatient Medications   Medication Sig Dispense Refill    amoxicillin-clavulanate (Augmentin) 875-125 mg per tablet Take 1 Tablet by mouth two (2) times a day. 14 Tablet 0    meclizine (ANTIVERT) 25 mg tablet Take 1 Tablet by mouth three (3) times daily as needed for Dizziness or Nausea for up to 20 doses. 20 Tablet 1    verapamil ER (CALAN-SR) 240 mg CR tablet TAKE 1 TABLET BY MOUTH EVERY MORNING 90 Tablet 0    predniSONE (DELTASONE) 10 mg tablet Take 4 tabs x 3 days then 3 tabs x 3 days then 2 tabs x 3 days then 1 tab x 3 days then stop. 30 Tablet 0    LORazepam (ATIVAN) 1 mg tablet Take 1/2 to 1 tab PO Q 12 hours PRN anxiety, use sparingly 60 Tab 0    calcium-cholecalciferol, d3, (CALCIUM 600 + D) 600-125 mg-unit tab Take 1 Tab by mouth daily.  fluticasone-umeclidin-vilanter (Trelegy Ellipta) 200-62.5-25 mcg dsdv Take 1 Puff by inhalation daily. 1 Inhaler 11    azithromycin (ZITHROMAX) 250 mg tablet Take 1 Tab by mouth every Monday, Wednesday, Friday. 14 Tab 5    pravastatin (PRAVACHOL) 20 mg tablet Take 1 Tab by mouth nightly. 90 Tab 0    montelukast (SINGULAIR) 10 mg tablet TAKE 1 TABLET BY MOUTH AT BEDTIME DAILY 90 Tab 0    venlafaxine-SR (Effexor XR) 37.5 mg capsule Take 1 Cap by mouth daily. 90 Cap 0    venlafaxine-SR (Effexor XR) 75 mg capsule Take 1 Cap by mouth two (2) times a day. 180 Cap 0    albuterol (PROVENTIL VENTOLIN) 2.5 mg /3 mL (0.083 %) nebu 3 mL by Nebulization route four (4) times daily. Diagnosis--J45.20 60 Each 11    cetirizine (ZYRTEC) 10 mg tablet Take 1 Tab by mouth daily. 30 Tab 11    albuterol (Ventolin HFA) 90 mcg/actuation inhaler Take 2 Puffs by inhalation four (4) times daily.  1 Inhaler 11    hydroCHLOROthiazide (HYDRODIURIL) 25 mg tablet Take 0.5 Tabs by mouth every morning. 1/2 tab 135 Tab 0    omeprazole (PRILOSEC) 40 mg capsule Take 1 Cap by mouth two (2) times a day. 60 Cap 11    cholecalciferol, vitamin D3, (VITAMIN D3) 50,000 unit tablet 1 tab po once weekly 4 Tab 0        Allergies   Allergen Reactions    Other Medication Rash     Equate antibiotic ointment       Review of Systems:  Review of systems  General reports normal energy. Sleep wake cycle appetite  ENT no sinus congestion no epistaxis no unilateral hearing loss no swallowing difficulty  Pulmonarydenies shortness of breath, no orthopnea, no wheezing, no productive sputum no hemoptysis  Cardiac denies chest pain palpitations peripheral edema  GI weight is stable she does report intermittent GI symptomatology she is status post a plication procedure for esophageal hernia continues has symptoms of reflux and has a GI appointment upcoming  Joints no inflammation no hip pain or shoulder pain no inflammation. No new onset back pain  Skin no rash or ecchymosis  Neuro no syncope vertigo diplopia dysarthria dysphagia difficulty with balance or coordination unilateral weakness   no nocturia. Urinary control is normal.  Psychiatric no mood disorder no ami no depression no outbursts or belligerent behavior          Objective:     Vitals:    06/21/21 1141 06/21/21 1344 06/21/21 1347 06/21/21 1354   BP: (!) 145/90 (!) 142/91 (!) 142/95    Pulse: 93   75   Resp: 16   12   Temp: 98.4 °F (36.9 °C)      SpO2: 97%   98%   Weight: 128 lb (58.1 kg)      Height: 5' 2\" (1.575 m)           Physical Exam:  The patient is alert cooperative and oriented. No evident skin lesions no evidence of excessive bruising no trauma  Patient looks well-hydrated. Does not appear chronically ill. Cranial nerve examination normal visual fields. Normal random eye movements. No ptosis. No lid lag there is no ptosis.   Head impulse test is nonlocalizing positive [place]le with symptoms to the right greater than left  Face moves strongly symmetrically and equally  Speech is normal  Motor  Upper extremities  Normal bulk strength tone and symmetric arm swing  Lower extremities  Normal bulk strength tone  Deep tendon reflexes 1+ and symmetric at biceps brachioradialis and triceps  Casual gait is normal with no evidence of ataxia  Fine motor coordination is normal in the hands bilaterally    Peripheral sensation light touch normal  There are no carotid bruits  Vital signs are reviewed      Assessment:   Would agree with Dr. 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 There are no brainstem signs. Hospital Problems  Date Reviewed: 5/28/2021    None          Plan:     Would set up vestibular rehabilitation as an outpatient, and to cover the possibility of ischemic disease with platelet inhibitor. I would normally listen situation choose low-dose aspirin but secondary to the GI considerations will advise 75 mg Plavix. He is to see GI within 2 weeks.   If there is any difficulty with this approach this can be addressed at that time    Follow-up with either ENT or neurology    Signed By: Idania Velásquez MD     June 21, 2021

## 2021-06-22 ENCOUNTER — TRANSCRIBE ORDER (OUTPATIENT)
Dept: SCHEDULING | Age: 76
End: 2021-06-22

## 2021-06-22 DIAGNOSIS — K21.9 GERD WITHOUT ESOPHAGITIS: Primary | ICD-10-CM

## 2021-06-22 DIAGNOSIS — R19.5 OTHER FECAL ABNORMALITIES: ICD-10-CM

## 2021-06-22 DIAGNOSIS — K59.09 OTHER CONSTIPATION: ICD-10-CM

## 2021-06-22 DIAGNOSIS — R05.9 COUGH: ICD-10-CM

## 2021-06-22 DIAGNOSIS — R13.10 DYSPHAGIA: ICD-10-CM

## 2021-06-22 LAB
ATRIAL RATE: 86 BPM
CALCULATED P AXIS, ECG09: 42 DEGREES
CALCULATED R AXIS, ECG10: -6 DEGREES
CALCULATED T AXIS, ECG11: 21 DEGREES
DIAGNOSIS, 93000: NORMAL
P-R INTERVAL, ECG05: 152 MS
Q-T INTERVAL, ECG07: 382 MS
QRS DURATION, ECG06: 74 MS
QTC CALCULATION (BEZET), ECG08: 457 MS
VENTRICULAR RATE, ECG03: 86 BPM

## 2021-06-25 ENCOUNTER — HOSPITAL ENCOUNTER (OUTPATIENT)
Dept: GENERAL RADIOLOGY | Age: 76
Discharge: HOME | End: 2021-06-25
Attending: NURSE PRACTITIONER
Payer: MEDICARE

## 2021-06-25 VITALS — BODY MASS INDEX: 23.55 KG/M2 | HEIGHT: 62 IN | WEIGHT: 128 LBS

## 2021-06-25 DIAGNOSIS — K21.9 GERD WITHOUT ESOPHAGITIS: ICD-10-CM

## 2021-06-25 DIAGNOSIS — R13.10 DYSPHAGIA: ICD-10-CM

## 2021-06-25 DIAGNOSIS — K59.09 OTHER CONSTIPATION: ICD-10-CM

## 2021-06-25 DIAGNOSIS — R19.5 OTHER FECAL ABNORMALITIES: ICD-10-CM

## 2021-06-25 DIAGNOSIS — R05.9 COUGH: ICD-10-CM

## 2021-06-25 PROCEDURE — 74011000250 HC RX REV CODE- 250: Performed by: NURSE PRACTITIONER

## 2021-06-25 PROCEDURE — 74220 X-RAY XM ESOPHAGUS 1CNTRST: CPT

## 2021-06-25 RX ADMIN — BARIUM SULFATE 700 MG: 700 TABLET ORAL at 10:05

## 2021-06-25 RX ADMIN — BARIUM SULFATE 355 ML: 0.6 SUSPENSION ORAL at 10:05

## 2021-06-25 RX ADMIN — BARIUM SULFATE 135 ML: 980 POWDER, FOR SUSPENSION ORAL at 10:04

## 2021-06-25 RX ADMIN — ANTACID/ANTIFLATULENT 4 G: 380; 550; 10; 10 GRANULE, EFFERVESCENT ORAL at 10:04

## 2021-07-29 ENCOUNTER — ANESTHESIA EVENT (OUTPATIENT)
Dept: ENDOSCOPY | Age: 76
End: 2021-07-29
Payer: MEDICARE

## 2021-07-29 RX ORDER — SODIUM CHLORIDE 0.9 % (FLUSH) 0.9 %
5-40 SYRINGE (ML) INJECTION AS NEEDED
Status: CANCELLED | OUTPATIENT
Start: 2021-07-29

## 2021-07-29 RX ORDER — SODIUM CHLORIDE 0.9 % (FLUSH) 0.9 %
5-40 SYRINGE (ML) INJECTION EVERY 8 HOURS
Status: CANCELLED | OUTPATIENT
Start: 2021-07-29

## 2021-07-29 RX ORDER — SODIUM CHLORIDE, SODIUM LACTATE, POTASSIUM CHLORIDE, CALCIUM CHLORIDE 600; 310; 30; 20 MG/100ML; MG/100ML; MG/100ML; MG/100ML
100 INJECTION, SOLUTION INTRAVENOUS CONTINUOUS
Status: CANCELLED | OUTPATIENT
Start: 2021-07-29

## 2021-07-30 ENCOUNTER — ANESTHESIA (OUTPATIENT)
Dept: ENDOSCOPY | Age: 76
End: 2021-07-30
Payer: MEDICARE

## 2021-07-30 ENCOUNTER — HOSPITAL ENCOUNTER (OUTPATIENT)
Age: 76
Setting detail: OUTPATIENT SURGERY
Discharge: HOME | End: 2021-07-30
Attending: SURGERY | Admitting: SURGERY
Payer: MEDICARE

## 2021-07-30 VITALS
WEIGHT: 124 LBS | DIASTOLIC BLOOD PRESSURE: 76 MMHG | HEART RATE: 87 BPM | OXYGEN SATURATION: 95 % | RESPIRATION RATE: 16 BRPM | BODY MASS INDEX: 22.82 KG/M2 | HEIGHT: 62 IN | TEMPERATURE: 97.5 F | SYSTOLIC BLOOD PRESSURE: 126 MMHG

## 2021-07-30 DIAGNOSIS — R19.5 HEME + STOOL: ICD-10-CM

## 2021-07-30 DIAGNOSIS — K57.30 PANCOLONIC DIVERTICULOSIS: ICD-10-CM

## 2021-07-30 PROCEDURE — 76060000032 HC ANESTHESIA 0.5 TO 1 HR: Performed by: SURGERY

## 2021-07-30 PROCEDURE — 74011000250 HC RX REV CODE- 250: Performed by: NURSE ANESTHETIST, CERTIFIED REGISTERED

## 2021-07-30 PROCEDURE — 74011250636 HC RX REV CODE- 250/636: Performed by: STUDENT IN AN ORGANIZED HEALTH CARE EDUCATION/TRAINING PROGRAM

## 2021-07-30 PROCEDURE — 2709999900 HC NON-CHARGEABLE SUPPLY: Performed by: SURGERY

## 2021-07-30 PROCEDURE — 74011250636 HC RX REV CODE- 250/636: Performed by: NURSE ANESTHETIST, CERTIFIED REGISTERED

## 2021-07-30 PROCEDURE — 76040000025: Performed by: SURGERY

## 2021-07-30 PROCEDURE — G0105 COLORECTAL SCRN; HI RISK IND: HCPCS | Performed by: SURGERY

## 2021-07-30 RX ORDER — LIDOCAINE HYDROCHLORIDE 20 MG/ML
INJECTION, SOLUTION EPIDURAL; INFILTRATION; INTRACAUDAL; PERINEURAL AS NEEDED
Status: DISCONTINUED | OUTPATIENT
Start: 2021-07-30 | End: 2021-07-30 | Stop reason: HOSPADM

## 2021-07-30 RX ORDER — SODIUM CHLORIDE, SODIUM LACTATE, POTASSIUM CHLORIDE, CALCIUM CHLORIDE 600; 310; 30; 20 MG/100ML; MG/100ML; MG/100ML; MG/100ML
100 INJECTION, SOLUTION INTRAVENOUS CONTINUOUS
Status: DISCONTINUED | OUTPATIENT
Start: 2021-07-30 | End: 2021-07-30 | Stop reason: HOSPADM

## 2021-07-30 RX ORDER — PROPOFOL 10 MG/ML
INJECTION, EMULSION INTRAVENOUS AS NEEDED
Status: DISCONTINUED | OUTPATIENT
Start: 2021-07-30 | End: 2021-07-30 | Stop reason: HOSPADM

## 2021-07-30 RX ADMIN — LIDOCAINE HYDROCHLORIDE 60 MG: 20 INJECTION, SOLUTION EPIDURAL; INFILTRATION; INTRACAUDAL; PERINEURAL at 08:57

## 2021-07-30 RX ADMIN — PROPOFOL 60 MG: 10 INJECTION, EMULSION INTRAVENOUS at 08:57

## 2021-07-30 RX ADMIN — SODIUM CHLORIDE, POTASSIUM CHLORIDE, SODIUM LACTATE AND CALCIUM CHLORIDE 100 ML/HR: 600; 310; 30; 20 INJECTION, SOLUTION INTRAVENOUS at 08:25

## 2021-07-30 RX ADMIN — PROPOFOL 90 MG: 10 INJECTION, EMULSION INTRAVENOUS at 09:06

## 2021-07-30 RX ADMIN — PROPOFOL 50 MG: 10 INJECTION, EMULSION INTRAVENOUS at 09:10

## 2021-07-30 NOTE — H&P
History and Physical      Patient: Nestor Murdock    Physician: Sonny Brantley MD    Referring Physician: Shereen Quesada MD    Chief Complaint: For colonoscopy    History of Present Illness: Pt presents for colonoscopy. No prior colo. On plavix x 1 month due to dizziness, per neurology. Has had colo previously, she says probably more than 5 years ago, due to rectal bleeding which was apparently due to bleeding from a hiatal hernia. She presents today due to blood in her stool noted on GYN evaluation. History:  Past Medical History:   Diagnosis Date    Anemia 2005 approx    2 units transfused    Arthritis     hands, hips    B12 deficiency     Cancer ([facility] [address]     hx of melanoma 1986 R arm, and carcinoma x 2 forehead    COVID-19 vaccine series completed 02/2021    PT TO BRING CARD DOS    Depression with anxiety 8/16/2016    Essential hypertension with goal blood pressure less than 140/90     controlled with med    Gastrointestinal disorder     hiatel hernia    Hyperlipidemia, unspecified 8/16/2016    Menopause     @ 49    Mild intermittent asthma 8/16/2016    Osteoarthritis of multiple joints 8/16/2016    Osteoporosis 10/25/2016    Pulmonary emphysema ([facility] [address] 08/16/2016    daily inhalers    Rectal bleed     Vertigo      Past Surgical History:   Procedure Laterality Date    HX HERNIA REPAIR  2010    helped reflex    HX TUBAL LIGATION  1972      Social History     Socioeconomic History    Marital status:      Spouse name: Not on file    Number of children: Not on file    Years of education: Not on file    Highest education level: Not on file   Tobacco Use    Smoking status: Never Smoker    Smokeless tobacco: Never Used    Tobacco comment: 2nd hand smoke x 33 yrs   Vaping Use    Vaping Use: Never used   Substance and Sexual Activity    Alcohol use: No    Drug use: No   Social History Narrative    Lives with boyfriend of over 2 decades.  She has 3 grown children, 3 grandchildren and 3 great grandchildren. She previously worked as an ; now on social security but was on disability for her nerves. Social Determinants of Health     Financial Resource Strain:     Difficulty of Paying Living Expenses:    Food Insecurity:     Worried About Running Out of Food in the Last Year:     [address] in the Last Year:    Transportation Needs:     Lack of Transportation (Medical):  Lack of Transportation (Non-Medical):    Physical Activity:     Days of Exercise per Week:     Minutes of Exercise per Session:    Stress:     Feeling of Stress :    Social Connections:     Frequency of Communication with Friends and Family:     Frequency of Social Gatherings with Friends and Family:     Attends [employer] Services:     Active Member of Clubs or Organizations:     Attends Club or Organization Meetings:     Marital Status:       Family History   Problem Relation Age of Onset    Stroke Mother         intracerebral aneurysm    Diabetes Maternal Aunt     Diabetes Maternal Uncle     No Known Problems Father         didn't have contact with father    Breast Cancer Daughter 28       Medications:   Prior to Admission medications    Medication Sig Start Date End Date Taking? Authorizing Provider   LORazepam (ATIVAN) 1 mg tablet Take 1/2 to 1 tab PO Q 12 hours PRN anxiety, use sparingly  Patient taking differently: Take 0.5 mg by mouth two (2) times a day. Take 1/2 to 1 tab PO Q 12 hours PRN anxiety, use sparingly--PT STATES TAKES 2 TIMES DAILY 7/12/21  Yes Ramone Singh MD   venlafaxine-SR (Effexor XR) 37.5 mg capsule Take 1 Capsule by mouth daily. Patient taking differently: Take 37.5 mg by mouth nightly. 7/12/21  Yes Ramone Singh MD   venlafaxine-SR (Effexor XR) 75 mg capsule Take 1 Capsule by mouth two (2) times a day.  7/12/21  Yes Ramone Singh MD   pravastatin (PRAVACHOL) 20 mg tablet TAKE 1 TABLET BY MOUTH EVERY NIGHT 7/2/21  Yes Lane Mcintyre MD   montelukast (SINGULAIR) 10 mg tablet TAKE 1 TABLET BY MOUTH DAILY AT BEDTIME 7/2/21  Yes Lane Mcintyre MD   verapamil ER (CALAN-SR) 240 mg CR tablet TAKE 1 TABLET BY MOUTH EVERY MORNING 6/15/21  Yes Robert Romero MD   calcium-cholecalciferol, d3, (CALCIUM 600 + D) 600-125 mg-unit tab Take 1 Tab by mouth daily. Yes Provider, Historical   fluticasone-umeclidin-vilanter (Trelegy Ellipta) 200-62.5-25 mcg dsdv Take 1 Puff by inhalation daily. 4/12/21  Yes Garcia Antony MD   azithromycin [facility]) 250 mg tablet Take 1 Tab by mouth every Monday, Wednesday, Friday. 4/12/21  Yes Garcia Antony MD   albuterol (PROVENTIL VENTOLIN) 2.5 mg /3 mL (0.083 %) nebu 3 mL by Nebulization route four (4) times daily. Diagnosis--J45.20  Patient taking differently: 2.5 mg by Nebulization route every six (6) hours as needed. Diagnosis--J45.20 3/2/21  Yes Lenka Lezama NP   cetirizine (ZYRTEC) 10 mg tablet Take 1 Tab by mouth daily. 1/12/21  Yes Lenka Lezama NP   albuterol (Ventolin HFA) 90 mcg/actuation inhaler Take 2 Puffs by inhalation four (4) times daily. Patient taking differently: Take 2 Puffs by inhalation every six (6) hours as needed. 1/12/21  Yes Lenka Lezama NP   hydroCHLOROthiazide (HYDRODIURIL) 25 mg tablet Take 0.5 Tabs by mouth every morning. 1/2 tab 10/22/20  Yes Robert Romero MD   omeprazole (PRILOSEC) 40 mg capsule Take 1 Cap by mouth two (2) times a day. 10/20/20  Yes Garcia Antony MD   clopidogreL (Plavix) 75 mg tab Take 1 Tablet by mouth daily. 6/21/21   Gatocarolyne Looney MD       Allergies: Allergies   Allergen Reactions    Other Medication Rash     Equate antibiotic ointment       Physical Exam:     Vital Signs:   Visit Vitals  BP (!) 151/73   Pulse 90   Temp 97.9 °F (36.6 °C)   Resp 16   Ht 5' 2\" (1.575 m)   Wt 124 lb (56.2 kg)   SpO2 96%   Breastfeeding No   BMI 22.68 kg/m²     .     General: no distress      Heart: regular   Lungs: unlabored Abdominal: soft   Neurological: Grossly normal        Findings/Diagnosis: Screening for colorectal cancer, heme (+) stool    Plan of Care/Planned Procedure: Colonoscopy, possible polypectomy. Pt/designee has reviewed the colonoscopy information sheet. Any questions have been discussed. They agree to proceed.       Signed:  Jeffry Madrigal MD   7/30/2021

## 2021-07-30 NOTE — ANESTHESIA POSTPROCEDURE EVALUATION
Procedure(s):  COLONOSCOPY/BMI 23. total IV anesthesia    Anesthesia Post Evaluation      Multimodal analgesia: multimodal analgesia used between 6 hours prior to anesthesia start to PACU discharge  Patient location during evaluation: bedside  Patient participation: complete - patient participated  Level of consciousness: awake and alert  Pain management: adequate  Airway patency: patent  Anesthetic complications: no  Cardiovascular status: acceptable  Respiratory status: acceptable  Hydration status: acceptable  Post anesthesia nausea and vomiting:  controlled  Final Post Anesthesia Temperature Assessment:  Normothermia (36.0-37.5 degrees C)      INITIAL Post-op Vital signs:   Vitals Value Taken Time   /58 07/30/21 0953   Temp     Pulse 98 07/30/21 1015   Resp 16 07/30/21 0948   SpO2 96 % 07/30/21 0954   Vitals shown include unvalidated device data.

## 2021-07-30 NOTE — ANESTHESIA PREPROCEDURE EVALUATION
Relevant Problems   RESPIRATORY SYSTEM   (+) Severe persistent asthma with acute exacerbation      NEUROLOGY   (+) Depression with anxiety      CARDIOVASCULAR   (+) Essential hypertension with goal blood pressure less than 140/90      GASTROINTESTINAL   (+) Hiatal hernia with GERD and esophagitis       Anesthetic History   No history of anesthetic complications            Review of Systems / Medical History  Patient summary reviewed, nursing notes reviewed and pertinent labs reviewed    Pulmonary            Asthma : poorly controlled       Neuro/Psych         Psychiatric history    Comments: Seen recently for vertigo by neurologist Cardiovascular    Hypertension          Hyperlipidemia    Exercise tolerance: >4 METS     GI/Hepatic/Renal     GERD: well controlled           Endo/Other        Arthritis     Other Findings            Physical Exam    Airway  Mallampati: III  TM Distance: 4 - 6 cm  Neck ROM: normal range of motion   Mouth opening: Normal     Cardiovascular  Regular rate and rhythm,  S1 and S2 normal,  no murmur, click, rub, or gallop             Dental    Dentition: Full upper dentures     Pulmonary    Rhonchi:bilateral             Abdominal         Other Findings            Anesthetic Plan    ASA: 3  Anesthesia type: total IV anesthesia          Induction: Intravenous  Anesthetic plan and risks discussed with: Patient      plavix held 2 days.

## 2021-07-30 NOTE — DISCHARGE INSTRUCTIONS
Gastrointestinal Colonoscopy/Flexible Sigmoidoscopy - Lower Exam Discharge Instructions  1. Call Dr. Norm Stallings office for any problems or questions. 2. Contact the doctors office for follow up appointment as directed  3. Medication may cause drowsiness for several hours, therefore:  · Do not drive or operate machinery for reminder of the day. · No alcohol today. · Do not make any important or legal decisions for 24 hours. · Do not sign any legal documents for 24 hours. 4. Ordinarily, you may resume regular diet and activity after exam unless otherwise specified by your physician. 5. Because of air put into your colon during exam, you may experience some abdominal distension, relieved by the passage of gas, for several hours. 6. Contact your physician if you have any of the following:  · Excessive amount of bleeding - large amount when having a bowel movement. Occasional specks of blood with bowel movement would not be unusual.  · Severe abdominal pain  · Fever or Chills    Any additional instructions:    1. Repeat in ten years.

## 2021-07-30 NOTE — PROCEDURES
Procedure in Detail:  Informed consent was obtained for the procedure. The patient was placed in the left lateral decubitus position and sedation was induced by anesthesia. The scope was inserted into the rectum and advanced under direct vision to the cecum, which was identified by the ileocecal valve and appendiceal orifice. The quality of the colonic preparation was good. A careful inspection was made as the colonoscope was withdrawn, including a retroflexed view of the rectum; findings and interventions are described below. Appropriate photodocumentation was obtained. Findings:   Rectum:   Normal  Sigmoid:     - Excavated lesions:     - Diverticulosis  Descending Colon:     - Excavated lesions:     - Diverticulum  Transverse Colon:   Normal  Ascending Colon:     - Excavated lesions:     - Diverticulosis  Cecum:   Normal          Specimens: No specimens were collected. Complications: None; patient tolerated the procedure well. \    EBL - none    Recommendations:   1. No explanation for recent heme (+) stool  2. Resume plavix today; long term use per PCP/neuro  3. Repeat colonoscopy for screening in 10 years, IF overall health warrants.      Signed By: Stuart Argueta MD                        July 30, 2021

## 2021-08-05 ENCOUNTER — HOSPITAL ENCOUNTER (OUTPATIENT)
Dept: LAB | Age: 76
Discharge: HOME | End: 2021-08-05
Payer: MEDICARE

## 2021-08-05 DIAGNOSIS — E55.9 VITAMIN D DEFICIENCY: ICD-10-CM

## 2021-08-05 DIAGNOSIS — D50.8 OTHER IRON DEFICIENCY ANEMIA: ICD-10-CM

## 2021-08-05 LAB
25(OH)D3 SERPL-MCNC: 26.1 NG/ML (ref 30–100)
ALBUMIN SERPL-MCNC: 3.4 G/DL (ref 3.2–4.6)
ALBUMIN/GLOB SERPL: 0.9 {RATIO} (ref 1.2–3.5)
ALP SERPL-CCNC: 82 U/L (ref 50–136)
ALT SERPL-CCNC: 23 U/L (ref 12–65)
ANION GAP SERPL CALC-SCNC: 4 MMOL/L (ref 7–16)
AST SERPL-CCNC: 31 U/L (ref 15–37)
BASOPHILS # BLD: 0.1 K/UL (ref 0–0.2)
BASOPHILS NFR BLD: 1 % (ref 0–2)
BILIRUB SERPL-MCNC: 0.3 MG/DL (ref 0.2–1.1)
BUN SERPL-MCNC: 6 MG/DL (ref 8–23)
CALCIUM SERPL-MCNC: 9.6 MG/DL (ref 8.3–10.4)
CHLORIDE SERPL-SCNC: 106 MMOL/L (ref 98–107)
CO2 SERPL-SCNC: 30 MMOL/L (ref 21–32)
CREAT SERPL-MCNC: 0.9 MG/DL (ref 0.6–1)
DIFFERENTIAL METHOD BLD: ABNORMAL
EOSINOPHIL # BLD: 1.8 K/UL (ref 0–0.8)
EOSINOPHIL NFR BLD: 19 % (ref 0.5–7.8)
ERYTHROCYTE [DISTWIDTH] IN BLOOD BY AUTOMATED COUNT: 15.1 % (ref 11.9–14.6)
FERRITIN SERPL-MCNC: 6 NG/ML (ref 8–388)
GLOBULIN SER CALC-MCNC: 3.9 G/DL (ref 2.3–3.5)
GLUCOSE SERPL-MCNC: 105 MG/DL (ref 65–100)
HCT VFR BLD AUTO: 38.3 % (ref 35.8–46.3)
HGB BLD-MCNC: 11.9 G/DL (ref 11.7–15.4)
IMM GRANULOCYTES # BLD AUTO: 0 K/UL (ref 0–0.5)
IMM GRANULOCYTES NFR BLD AUTO: 0 % (ref 0–5)
LYMPHOCYTES # BLD: 2.5 K/UL (ref 0.5–4.6)
LYMPHOCYTES NFR BLD: 27 % (ref 13–44)
MCH RBC QN AUTO: 24.3 PG (ref 26.1–32.9)
MCHC RBC AUTO-ENTMCNC: 31.1 G/DL (ref 31.4–35)
MCV RBC AUTO: 78.2 FL (ref 79.6–97.8)
MONOCYTES # BLD: 0.7 K/UL (ref 0.1–1.3)
MONOCYTES NFR BLD: 8 % (ref 4–12)
NEUTS SEG # BLD: 4.2 K/UL (ref 1.7–8.2)
NEUTS SEG NFR BLD: 45 % (ref 43–78)
NRBC # BLD: 0 K/UL (ref 0–0.2)
PLATELET # BLD AUTO: 243 K/UL (ref 150–450)
PMV BLD AUTO: 9.9 FL (ref 9.4–12.3)
POTASSIUM SERPL-SCNC: 3.6 MMOL/L (ref 3.5–5.1)
PROT SERPL-MCNC: 7.3 G/DL (ref 6.3–8.2)
RBC # BLD AUTO: 4.9 M/UL (ref 4.05–5.2)
SODIUM SERPL-SCNC: 140 MMOL/L (ref 136–145)
VIT B12 SERPL-MCNC: 574 PG/ML (ref 193–986)
WBC # BLD AUTO: 9.3 K/UL (ref 4.3–11.1)

## 2021-08-05 PROCEDURE — 82784 ASSAY IGA/IGD/IGG/IGM EACH: CPT

## 2021-08-05 PROCEDURE — 86334 IMMUNOFIX E-PHORESIS SERUM: CPT

## 2021-08-05 PROCEDURE — 80053 COMPREHEN METABOLIC PANEL: CPT

## 2021-08-05 PROCEDURE — 36415 COLL VENOUS BLD VENIPUNCTURE: CPT

## 2021-08-05 PROCEDURE — 82607 VITAMIN B-12: CPT

## 2021-08-05 PROCEDURE — 82728 ASSAY OF FERRITIN: CPT

## 2021-08-05 PROCEDURE — 85025 COMPLETE CBC W/AUTO DIFF WBC: CPT

## 2021-08-05 PROCEDURE — 82306 VITAMIN D 25 HYDROXY: CPT

## 2021-08-06 PROBLEM — S22.080A COMPRESSION FRACTURE OF T12 VERTEBRA (HCC): Status: ACTIVE | Noted: 2020-04-21

## 2021-08-06 PROBLEM — J31.0 CHRONIC RHINITIS: Status: ACTIVE | Noted: 2017-10-03

## 2021-08-09 LAB
ALBUMIN SERPL ELPH-MCNC: 3.35 G/DL (ref 3.2–5.6)
ALBUMIN/GLOB SERPL: 1 {RATIO} (ref 1.2–3.5)
ALPHA1 GLOB SERPL ELPH-MCNC: 0.22 G/DL (ref 0.1–0.4)
ALPHA2 GLOB SERPL ELPH-MCNC: 0.99 G/DL (ref 0.4–1.2)
B-GLOBULIN SERPL QL ELPH: 1.19 G/DL (ref 0.6–1.3)
GAMMA GLOB MFR SERPL ELPH: 0.94 G/DL (ref 0.5–1.6)
IGA SERPL-MCNC: 136 MG/DL (ref 85–499)
IGG SERPL-MCNC: 832 MG/DL (ref 610–1616)
IGM SERPL-MCNC: 103 MG/DL (ref 35–242)
M PROTEIN SERPL ELPH-MCNC: ABNORMAL G/DL
PROT PATTERN SERPL ELPH-IMP: ABNORMAL
PROT PATTERN SPEC IFE-IMP: ABNORMAL
PROT SERPL-MCNC: 6.7 G/DL (ref 6.3–8.2)

## 2021-08-18 ENCOUNTER — HOSPITAL ENCOUNTER (OUTPATIENT)
Dept: INFUSION THERAPY | Age: 76
Discharge: HOME | End: 2021-08-18
Payer: MEDICARE

## 2021-08-18 VITALS
SYSTOLIC BLOOD PRESSURE: 138 MMHG | OXYGEN SATURATION: 95 % | RESPIRATION RATE: 18 BRPM | DIASTOLIC BLOOD PRESSURE: 82 MMHG | HEART RATE: 87 BPM | TEMPERATURE: 98 F

## 2021-08-18 DIAGNOSIS — D50.8 OTHER IRON DEFICIENCY ANEMIAS: Primary | ICD-10-CM

## 2021-08-18 PROCEDURE — 74011250636 HC RX REV CODE- 250/636: Performed by: INTERNAL MEDICINE

## 2021-08-18 PROCEDURE — 96365 THER/PROPH/DIAG IV INF INIT: CPT

## 2021-08-18 RX ORDER — SODIUM CHLORIDE 9 MG/ML
25 INJECTION, SOLUTION INTRAVENOUS CONTINUOUS
Status: DISCONTINUED | OUTPATIENT
Start: 2021-08-18 | End: 2021-08-19 | Stop reason: HOSPADM

## 2021-08-18 RX ORDER — DIPHENHYDRAMINE HYDROCHLORIDE 50 MG/ML
25 INJECTION, SOLUTION INTRAMUSCULAR; INTRAVENOUS AS NEEDED
Status: DISCONTINUED | OUTPATIENT
Start: 2021-08-18 | End: 2021-08-19 | Stop reason: HOSPADM

## 2021-08-18 RX ORDER — HYDROCORTISONE SODIUM SUCCINATE 100 MG/2ML
100 INJECTION, POWDER, FOR SOLUTION INTRAMUSCULAR; INTRAVENOUS AS NEEDED
Status: DISCONTINUED | OUTPATIENT
Start: 2021-08-18 | End: 2021-08-19 | Stop reason: HOSPADM

## 2021-08-18 RX ORDER — SODIUM CHLORIDE 0.9 % (FLUSH) 0.9 %
10 SYRINGE (ML) INJECTION AS NEEDED
Status: DISCONTINUED | OUTPATIENT
Start: 2021-08-18 | End: 2021-08-19 | Stop reason: HOSPADM

## 2021-08-18 RX ADMIN — SODIUM CHLORIDE 25 ML/HR: 900 INJECTION, SOLUTION INTRAVENOUS at 14:45

## 2021-08-18 RX ADMIN — FERRIC CARBOXYMALTOSE INJECTION 750 MG: 50 INJECTION, SOLUTION INTRAVENOUS at 14:58

## 2021-08-18 RX ADMIN — Medication 10 ML: at 14:30

## 2021-08-18 NOTE — PROGRESS NOTES
Pt. Discharged ambulatory. Tolerated infusion well. No distress noted. To call physician with any problems or concerns. Understanding voiced. To return to Infusions on 8/25/21.

## 2021-08-25 ENCOUNTER — HOSPITAL ENCOUNTER (OUTPATIENT)
Dept: INFUSION THERAPY | Age: 76
Discharge: HOME | End: 2021-08-25
Payer: MEDICARE

## 2021-08-25 VITALS
DIASTOLIC BLOOD PRESSURE: 95 MMHG | HEART RATE: 97 BPM | RESPIRATION RATE: 16 BRPM | OXYGEN SATURATION: 94 % | TEMPERATURE: 97.6 F | SYSTOLIC BLOOD PRESSURE: 141 MMHG

## 2021-08-25 DIAGNOSIS — D50.8 OTHER IRON DEFICIENCY ANEMIAS: Primary | ICD-10-CM

## 2021-08-25 PROCEDURE — 96365 THER/PROPH/DIAG IV INF INIT: CPT

## 2021-08-25 PROCEDURE — 74011250636 HC RX REV CODE- 250/636: Performed by: INTERNAL MEDICINE

## 2021-08-25 RX ORDER — SODIUM CHLORIDE 9 MG/ML
25 INJECTION, SOLUTION INTRAVENOUS CONTINUOUS
Status: DISCONTINUED | OUTPATIENT
Start: 2021-08-25 | End: 2021-08-26 | Stop reason: HOSPADM

## 2021-08-25 RX ADMIN — SODIUM CHLORIDE 25 ML/HR: 900 INJECTION, SOLUTION INTRAVENOUS at 14:51

## 2021-08-25 RX ADMIN — FERRIC CARBOXYMALTOSE INJECTION 750 MG: 50 INJECTION, SOLUTION INTRAVENOUS at 15:19

## 2021-08-25 NOTE — PROGRESS NOTES
Arrived to the [facility]. Iron completed. Patient tolerated well. Observed patient x 30 minutes, no reactions. Any issues or concerns during appointment: None. Patient aware of next infusion appointment on 9/3 (date) at 1330 (time). Patient aware of next lab and [facility] office visit on 9/3 (date) at [phone] (time). Discharged ambulatory in stable condition.

## 2021-09-03 ENCOUNTER — HOSPITAL ENCOUNTER (OUTPATIENT)
Dept: INFUSION THERAPY | Age: 76
Discharge: HOME | End: 2021-09-03
Payer: MEDICARE

## 2021-09-03 ENCOUNTER — HOSPITAL ENCOUNTER (OUTPATIENT)
Dept: LAB | Age: 76
Discharge: HOME | End: 2021-09-03
Payer: MEDICARE

## 2021-09-03 DIAGNOSIS — D50.8 OTHER IRON DEFICIENCY ANEMIAS: Primary | ICD-10-CM

## 2021-09-03 DIAGNOSIS — D50.8 OTHER IRON DEFICIENCY ANEMIAS: ICD-10-CM

## 2021-09-03 LAB
25(OH)D3 SERPL-MCNC: 113.5 NG/ML (ref 30–100)
ALBUMIN SERPL-MCNC: 3.6 G/DL (ref 3.2–4.6)
ALBUMIN/GLOB SERPL: 0.9 {RATIO} (ref 1.2–3.5)
ALP SERPL-CCNC: 112 U/L (ref 50–136)
ALT SERPL-CCNC: 38 U/L (ref 12–65)
ANION GAP SERPL CALC-SCNC: 4 MMOL/L (ref 7–16)
AST SERPL-CCNC: 39 U/L (ref 15–37)
BASOPHILS # BLD: 0.1 K/UL (ref 0–0.2)
BASOPHILS NFR BLD: 1 % (ref 0–2)
BILIRUB SERPL-MCNC: 0.4 MG/DL (ref 0.2–1.1)
BUN SERPL-MCNC: 9 MG/DL (ref 8–23)
CALCIUM SERPL-MCNC: 10 MG/DL (ref 8.3–10.4)
CHLORIDE SERPL-SCNC: 107 MMOL/L (ref 98–107)
CO2 SERPL-SCNC: 28 MMOL/L (ref 21–32)
CREAT SERPL-MCNC: 1 MG/DL (ref 0.6–1)
DIFFERENTIAL METHOD BLD: ABNORMAL
EOSINOPHIL # BLD: 1.2 K/UL (ref 0–0.8)
EOSINOPHIL NFR BLD: 16 % (ref 0.5–7.8)
ERYTHROCYTE [DISTWIDTH] IN BLOOD BY AUTOMATED COUNT: 18.2 % (ref 11.9–14.6)
GLOBULIN SER CALC-MCNC: 3.9 G/DL (ref 2.3–3.5)
GLUCOSE SERPL-MCNC: 91 MG/DL (ref 65–100)
HCT VFR BLD AUTO: 43.1 % (ref 35.8–46.3)
HGB BLD-MCNC: 13.6 G/DL (ref 11.7–15.4)
IMM GRANULOCYTES # BLD AUTO: 0 K/UL (ref 0–0.5)
IMM GRANULOCYTES NFR BLD AUTO: 0 % (ref 0–5)
LYMPHOCYTES # BLD: 2 K/UL (ref 0.5–4.6)
LYMPHOCYTES NFR BLD: 29 % (ref 13–44)
MCH RBC QN AUTO: 25.3 PG (ref 26.1–32.9)
MCHC RBC AUTO-ENTMCNC: 31.6 G/DL (ref 31.4–35)
MCV RBC AUTO: 80.3 FL (ref 79.6–97.8)
MONOCYTES # BLD: 0.6 K/UL (ref 0.1–1.3)
MONOCYTES NFR BLD: 8 % (ref 4–12)
NEUTS SEG # BLD: 3.3 K/UL (ref 1.7–8.2)
NEUTS SEG NFR BLD: 46 % (ref 43–78)
NRBC # BLD: 0 K/UL (ref 0–0.2)
PLATELET # BLD AUTO: 232 K/UL (ref 150–450)
PMV BLD AUTO: 9.9 FL (ref 9.4–12.3)
POTASSIUM SERPL-SCNC: 3.6 MMOL/L (ref 3.5–5.1)
PROT SERPL-MCNC: 7.5 G/DL (ref 6.3–8.2)
RBC # BLD AUTO: 5.37 M/UL (ref 4.05–5.2)
SODIUM SERPL-SCNC: 139 MMOL/L (ref 136–145)
WBC # BLD AUTO: 7.1 K/UL (ref 4.3–11.1)

## 2021-09-03 PROCEDURE — 74011250636 HC RX REV CODE- 250/636: Performed by: INTERNAL MEDICINE

## 2021-09-03 PROCEDURE — 85025 COMPLETE CBC W/AUTO DIFF WBC: CPT

## 2021-09-03 PROCEDURE — 80053 COMPREHEN METABOLIC PANEL: CPT

## 2021-09-03 PROCEDURE — 82306 VITAMIN D 25 HYDROXY: CPT

## 2021-09-03 PROCEDURE — 36415 COLL VENOUS BLD VENIPUNCTURE: CPT

## 2021-09-03 PROCEDURE — 96365 THER/PROPH/DIAG IV INF INIT: CPT

## 2021-09-03 RX ORDER — SODIUM CHLORIDE 9 MG/ML
25 INJECTION, SOLUTION INTRAVENOUS CONTINUOUS
Status: DISCONTINUED | OUTPATIENT
Start: 2021-09-03 | End: 2021-09-04 | Stop reason: HOSPADM

## 2021-09-03 RX ORDER — SODIUM CHLORIDE 0.9 % (FLUSH) 0.9 %
10 SYRINGE (ML) INJECTION AS NEEDED
Status: DISCONTINUED | OUTPATIENT
Start: 2021-09-03 | End: 2021-09-04 | Stop reason: HOSPADM

## 2021-09-03 RX ADMIN — FERRIC CARBOXYMALTOSE INJECTION 750 MG: 50 INJECTION, SOLUTION INTRAVENOUS at 14:20

## 2021-09-03 RX ADMIN — Medication 10 ML: at 15:15

## 2021-09-03 RX ADMIN — Medication 10 ML: at 14:20

## 2021-09-03 RX ADMIN — SODIUM CHLORIDE 25 ML/HR: 900 INJECTION, SOLUTION INTRAVENOUS at 14:40

## 2021-09-03 NOTE — PROGRESS NOTES
Arrived to the [facility]. Injectafer completed. Patient tolerated well. Any issues or concerns during appointment: none. Discharged ambulatory.

## 2021-09-18 ENCOUNTER — APPOINTMENT (OUTPATIENT)
Dept: GENERAL RADIOLOGY | Age: 76
End: 2021-09-18
Attending: EMERGENCY MEDICINE
Payer: MEDICARE

## 2021-09-18 ENCOUNTER — HOSPITAL ENCOUNTER (EMERGENCY)
Age: 76
Discharge: HOME | End: 2021-09-18
Attending: EMERGENCY MEDICINE
Payer: MEDICARE

## 2021-09-18 VITALS
OXYGEN SATURATION: 96 % | HEART RATE: 122 BPM | HEIGHT: 62 IN | DIASTOLIC BLOOD PRESSURE: 74 MMHG | TEMPERATURE: 98.7 F | SYSTOLIC BLOOD PRESSURE: 126 MMHG | WEIGHT: 110 LBS | BODY MASS INDEX: 20.24 KG/M2 | RESPIRATION RATE: 16 BRPM

## 2021-09-18 DIAGNOSIS — J43.9 PULMONARY EMPHYSEMA, UNSPECIFIED EMPHYSEMA TYPE (HCC): Primary | ICD-10-CM

## 2021-09-18 LAB
COVID-19 RAPID TEST, COVR: NOT DETECTED
SOURCE, COVRS: NORMAL

## 2021-09-18 PROCEDURE — 87635 SARS-COV-2 COVID-19 AMP PRB: CPT

## 2021-09-18 PROCEDURE — 74011000250 HC RX REV CODE- 250: Performed by: PHYSICIAN ASSISTANT

## 2021-09-18 PROCEDURE — 99283 EMERGENCY DEPT VISIT LOW MDM: CPT

## 2021-09-18 PROCEDURE — 74011250637 HC RX REV CODE- 250/637: Performed by: PHYSICIAN ASSISTANT

## 2021-09-18 PROCEDURE — 94640 AIRWAY INHALATION TREATMENT: CPT

## 2021-09-18 PROCEDURE — 94664 DEMO&/EVAL PT USE INHALER: CPT

## 2021-09-18 PROCEDURE — 71046 X-RAY EXAM CHEST 2 VIEWS: CPT

## 2021-09-18 RX ORDER — IPRATROPIUM BROMIDE AND ALBUTEROL SULFATE 2.5; .5 MG/3ML; MG/3ML
3 SOLUTION RESPIRATORY (INHALATION)
Status: COMPLETED | OUTPATIENT
Start: 2021-09-18 | End: 2021-09-18

## 2021-09-18 RX ORDER — ACETAMINOPHEN 500 MG
1000 TABLET ORAL
Status: COMPLETED | OUTPATIENT
Start: 2021-09-18 | End: 2021-09-18

## 2021-09-18 RX ADMIN — ACETAMINOPHEN 1000 MG: 500 TABLET ORAL at 22:34

## 2021-09-18 RX ADMIN — IPRATROPIUM BROMIDE AND ALBUTEROL SULFATE 3 ML: .5; 3 SOLUTION RESPIRATORY (INHALATION) at 21:56

## 2021-09-18 NOTE — ED TRIAGE NOTES
Pt ambulatory to triage. Pt states she has cough x 1 years, SOB, fatigue, bodyaches, lower back pain, leg pain x 1 week. Pt reports fever and runny nose as well. Tested for COVID last week but did not have s/sx and test was negative. Denies loss of taste or smell. Decreased appetite x 3 weeks. Denies tobacco use.  Pt states coughing up light green phlegm but seems to be that way for about a year

## 2021-09-19 NOTE — DISCHARGE INSTRUCTIONS
Continue at home medications and nebulizers. Call your pulmonologist with primary care physician Monday morning to arrange follow-up appointment.   Return to ER symptoms worsen

## 2021-09-19 NOTE — ED NOTES
I have reviewed discharge instructions with the patient. The patient verbalized understanding. Patient left ED via Discharge Method: wheelchair to Home with family. Opportunity for questions and clarification provided. Patient given 0 scripts. To continue your aftercare when you leave the hospital, you may receive an automated call from our care team to check in on how you are doing. This is a free service and part of our promise to provide the best care and service to meet your aftercare needs.  If you have questions, or wish to unsubscribe from this service please call [phone]. Thank you for Choosing our [address] [place] [address] Emergency Department.

## 2021-09-19 NOTE — ED PROVIDER NOTES
Patient to ER complaint of continued cough short of breath fatigue for a year lower back pain and leg pain body aches for the past week, she had negative Covid test last week but says symptoms have increased. Does have a history of emphysema and is on at home nebulizers. She has a pulmonologist.  Denies any vomiting no diarrhea    The history is provided by the patient. Cough  This is a new problem. The current episode started more than 1 week ago. The problem occurs constantly. The problem has been gradually worsening. The cough is productive of sputum. There has been no fever. Associated symptoms include rhinorrhea and myalgias. She has tried nebulizers for the symptoms. The treatment provided mild relief. She is not a smoker. Her past medical history is significant for emphysema and asthma.         Past Medical History:   Diagnosis Date    Anemia 2005 approx    2 units transfused    Arthritis     hands, hips    B12 deficiency     Cancer (HCC)     hx of melanoma 1986 R arm, and carcinoma x 2 forehead    COVID-19 vaccine series completed 02/2021    PT TO BRING CARD DOS    Depression with anxiety 8/16/2016    Essential hypertension with goal blood pressure less than 140/90     controlled with med    Gastrointestinal disorder     hiatel hernia    Hyperlipidemia, unspecified 8/16/2016    Menopause     @ 51    Mild intermittent asthma 8/16/2016    Osteoarthritis of multiple joints 8/16/2016    Osteoporosis 10/25/2016    Pulmonary emphysema ([address] 08/16/2016    daily inhalers    Rectal bleed     Vertigo        Past Surgical History:   Procedure Laterality Date    COLONOSCOPY N/A 7/30/2021    COLONOSCOPY/BMI 23 performed by Daniel Ron MD at [facility] HI RISK IND  7/30/2021         HX HERNIA REPAIR  2010    helped reflex    HX TUBAL LIGATION  1972         Family History:   Problem Relation Age of Onset    Stroke Mother         intracerebral aneurysm    Diabetes Maternal Aunt     Diabetes Maternal Uncle     No Known Problems Father         didn't have contact with father    Breast Cancer Daughter 28    Hypertension Maternal Grandmother        Social History     Socioeconomic History    Marital status:      Spouse name: Not on file    Number of children: Not on file    Years of education: Not on file    Highest education level: Not on file   Occupational History    Not on file   Tobacco Use    Smoking status: Never Smoker    Smokeless tobacco: Never Used    Tobacco comment: 2nd hand smoke x 33 yrs   Vaping Use    Vaping Use: Never used   Substance and Sexual Activity    Alcohol use: No    Drug use: No    Sexual activity: Not on file   Other Topics Concern    Not on file   Social History Narrative    Lives with boyfriend of over 2 decades. She has 3 grown children, 3 grandchildren and 3 great grandchildren. She previously worked as an ; now on social security but was on disability for her nerves. Social Determinants of Health     Financial Resource Strain: Medium Risk    Difficulty of Paying Living Expenses: Somewhat hard   Food Insecurity: Food Insecurity Present    Worried About Running Out of Food in the Last Year: Sometimes true    Carlyn of Food in the Last Year: Sometimes true   Transportation Needs: No Transportation Needs    Lack of Transportation (Medical): No    Lack of Transportation (Non-Medical):  No   Physical Activity:     Days of Exercise per Week:     Minutes of Exercise per Session:    Stress:     Feeling of Stress :    Social Connections:     Frequency of Communication with Friends and Family:     Frequency of Social Gatherings with Friends and Family:     Attends [employer] Services:     Active Member of Clubs or Organizations:     Attends Club or Organization Meetings:     Marital Status:    Intimate Partner Violence:     Fear of Current or Ex-Partner:     Emotionally Abused:     Physically Abused:     Sexually Abused: ALLERGIES: Other medication    Review of Systems   HENT: Positive for rhinorrhea. Respiratory: Positive for cough. Musculoskeletal: Positive for myalgias. All other systems reviewed and are negative. Vitals:    09/18/21 1826 09/18/21 2156   BP: 126/74    Pulse: (!) 122    Resp: 16    Temp: 98.7 °F (37.1 °C)    SpO2: 95% 96%   Weight: 49.9 kg (110 lb)    Height: 5' 2\" (1.575 m)             Physical Exam  Vitals and nursing note reviewed. Constitutional:       General: She is not in acute distress. Appearance: Normal appearance. She is well-developed. She is not diaphoretic. Comments: Thin   HENT:      Head: Normocephalic and atraumatic. Left Ear: External ear normal.   Eyes:      Pupils: Pupils are equal, round, and reactive to light. Cardiovascular:      Rate and Rhythm: Normal rate and regular rhythm. Pulmonary:      Effort: Pulmonary effort is normal.      Breath sounds: Wheezing present. Comments: Mild external wheezes throughout  Abdominal:      General: Bowel sounds are normal.      Palpations: Abdomen is soft. Musculoskeletal:         General: No swelling. Normal range of motion. Cervical back: Normal range of motion and neck supple. Skin:     General: Skin is warm. Neurological:      General: No focal deficit present. Mental Status: She is alert and oriented to person, place, and time. Psychiatric:         Mood and Affect: Mood normal.         Behavior: Behavior normal.          MDM  Number of Diagnoses or Management Options  Diagnosis management comments: Chest x-ray is normal, patient given DuoNeb in ER, rapid Covid done  Covid negative patient feeling better after DuoNeb, patient given Tylenol for body aches. Stressed to patient need to follow-up with pulmonary for her continued respiratory issues and to again get possible infusions.   Primary care for routine recheck       Amount and/or Complexity of Data Reviewed  Clinical lab tests: ordered and reviewed  Tests in the radiology section of CPT®: ordered and reviewed  Review and summarize past medical records: yes    Risk of Complications, Morbidity, and/or Mortality  Presenting problems: moderate  Diagnostic procedures: moderate  Management options: low    Patient Progress  Patient progress: improved         Procedures

## 2021-10-25 ENCOUNTER — HOSPITAL ENCOUNTER (OUTPATIENT)
Dept: LAB | Age: 76
Discharge: HOME | End: 2021-10-25
Payer: MEDICARE

## 2021-10-25 DIAGNOSIS — D50.8 OTHER IRON DEFICIENCY ANEMIAS: ICD-10-CM

## 2021-10-25 DIAGNOSIS — E55.9 VITAMIN D DEFICIENCY: ICD-10-CM

## 2021-10-25 LAB
25(OH)D3 SERPL-MCNC: 58.3 NG/ML (ref 30–100)
ALBUMIN SERPL-MCNC: 3.4 G/DL (ref 3.2–4.6)
ALBUMIN/GLOB SERPL: 0.9 {RATIO} (ref 1.2–3.5)
ALP SERPL-CCNC: 135 U/L (ref 50–136)
ALT SERPL-CCNC: 41 U/L (ref 12–65)
ANION GAP SERPL CALC-SCNC: 7 MMOL/L (ref 7–16)
AST SERPL-CCNC: 32 U/L (ref 15–37)
BASOPHILS # BLD: 0.1 K/UL (ref 0–0.2)
BASOPHILS NFR BLD: 1 % (ref 0–2)
BILIRUB SERPL-MCNC: 0.4 MG/DL (ref 0.2–1.1)
BUN SERPL-MCNC: 9 MG/DL (ref 8–23)
CALCIUM SERPL-MCNC: 8.4 MG/DL (ref 8.3–10.4)
CHLORIDE SERPL-SCNC: 102 MMOL/L (ref 98–107)
CO2 SERPL-SCNC: 30 MMOL/L (ref 21–32)
CREAT SERPL-MCNC: 0.9 MG/DL (ref 0.6–1)
DIFFERENTIAL METHOD BLD: ABNORMAL
EOSINOPHIL # BLD: 1.3 K/UL (ref 0–0.8)
EOSINOPHIL NFR BLD: 16 % (ref 0.5–7.8)
ERYTHROCYTE [DISTWIDTH] IN BLOOD BY AUTOMATED COUNT: 19.9 % (ref 11.9–14.6)
FERRITIN SERPL-MCNC: 941 NG/ML (ref 8–388)
GLOBULIN SER CALC-MCNC: 4 G/DL (ref 2.3–3.5)
GLUCOSE SERPL-MCNC: 117 MG/DL (ref 65–100)
HCT VFR BLD AUTO: 45.3 % (ref 35.8–46.3)
HGB BLD-MCNC: 14.7 G/DL (ref 11.7–15.4)
IMM GRANULOCYTES # BLD AUTO: 0 K/UL (ref 0–0.5)
IMM GRANULOCYTES NFR BLD AUTO: 0 % (ref 0–5)
LYMPHOCYTES # BLD: 2.2 K/UL (ref 0.5–4.6)
LYMPHOCYTES NFR BLD: 29 % (ref 13–44)
MCH RBC QN AUTO: 27.7 PG (ref 26.1–32.9)
MCHC RBC AUTO-ENTMCNC: 32.5 G/DL (ref 31.4–35)
MCV RBC AUTO: 85.3 FL (ref 79.6–97.8)
MONOCYTES # BLD: 0.6 K/UL (ref 0.1–1.3)
MONOCYTES NFR BLD: 8 % (ref 4–12)
NEUTS SEG # BLD: 3.5 K/UL (ref 1.7–8.2)
NEUTS SEG NFR BLD: 46 % (ref 43–78)
NRBC # BLD: 0 K/UL (ref 0–0.2)
PLATELET # BLD AUTO: 182 K/UL (ref 150–450)
PMV BLD AUTO: 9.6 FL (ref 9.4–12.3)
POTASSIUM SERPL-SCNC: 3 MMOL/L (ref 3.5–5.1)
PROT SERPL-MCNC: 7.4 G/DL (ref 6.3–8.2)
RBC # BLD AUTO: 5.31 M/UL (ref 4.05–5.2)
SODIUM SERPL-SCNC: 139 MMOL/L (ref 136–145)
WBC # BLD AUTO: 7.7 K/UL (ref 4.3–11.1)

## 2021-10-25 PROCEDURE — 82306 VITAMIN D 25 HYDROXY: CPT

## 2021-10-25 PROCEDURE — 36415 COLL VENOUS BLD VENIPUNCTURE: CPT

## 2021-10-25 PROCEDURE — 85025 COMPLETE CBC W/AUTO DIFF WBC: CPT

## 2021-10-25 PROCEDURE — 80053 COMPREHEN METABOLIC PANEL: CPT

## 2021-10-25 PROCEDURE — 82728 ASSAY OF FERRITIN: CPT

## 2021-11-11 PROBLEM — S22.070A COMPRESSION FRACTURE OF T9 VERTEBRA (HCC): Status: ACTIVE | Noted: 2021-10-18

## 2021-11-17 ENCOUNTER — APPOINTMENT (OUTPATIENT)
Dept: GENERAL RADIOLOGY | Age: 76
DRG: 871 | End: 2021-11-17
Attending: EMERGENCY MEDICINE
Payer: MEDICARE

## 2021-11-17 ENCOUNTER — APPOINTMENT (OUTPATIENT)
Dept: CT IMAGING | Age: 76
DRG: 871 | End: 2021-11-17
Attending: EMERGENCY MEDICINE
Payer: MEDICARE

## 2021-11-17 ENCOUNTER — HOSPITAL ENCOUNTER (INPATIENT)
Age: 76
LOS: 7 days | Discharge: HOME HEALTH | DRG: 871 | End: 2021-11-24
Attending: EMERGENCY MEDICINE | Admitting: FAMILY MEDICINE
Payer: MEDICARE

## 2021-11-17 DIAGNOSIS — D64.9 ANEMIA, UNSPECIFIED TYPE: ICD-10-CM

## 2021-11-17 DIAGNOSIS — A41.9 SEPSIS, DUE TO UNSPECIFIED ORGANISM, UNSPECIFIED WHETHER ACUTE ORGAN DYSFUNCTION PRESENT (HCC): ICD-10-CM

## 2021-11-17 DIAGNOSIS — D62 ACUTE BLOOD LOSS ANEMIA: ICD-10-CM

## 2021-11-17 DIAGNOSIS — R77.8 ELEVATED TROPONIN: ICD-10-CM

## 2021-11-17 DIAGNOSIS — K52.9 COLITIS: Primary | ICD-10-CM

## 2021-11-17 DIAGNOSIS — I10 PRIMARY HYPERTENSION: Chronic | ICD-10-CM

## 2021-11-17 DIAGNOSIS — I95.9 HYPOTENSION, UNSPECIFIED HYPOTENSION TYPE: ICD-10-CM

## 2021-11-17 PROBLEM — J45.51 SEVERE PERSISTENT ASTHMA WITH ACUTE EXACERBATION: Chronic | Status: ACTIVE | Noted: 2021-05-28

## 2021-11-17 PROBLEM — R65.21 SEVERE SEPSIS WITH SEPTIC SHOCK (HCC): Status: ACTIVE | Noted: 2021-11-17

## 2021-11-17 PROBLEM — K52.3 COLITIS, INDETERMINATE: Status: ACTIVE | Noted: 2021-11-17

## 2021-11-17 PROBLEM — J45.50 SEVERE PERSISTENT ASTHMA WITHOUT COMPLICATION: Status: ACTIVE | Noted: 2021-05-28

## 2021-11-17 PROBLEM — S22.070A COMPRESSION FRACTURE OF T9 VERTEBRA (HCC): Chronic | Status: ACTIVE | Noted: 2021-10-18

## 2021-11-17 LAB
ALBUMIN SERPL-MCNC: 1.2 G/DL (ref 3.2–4.6)
ALBUMIN/GLOB SERPL: 0.6 {RATIO} (ref 1.2–3.5)
ALP SERPL-CCNC: 140 U/L (ref 50–136)
ALT SERPL-CCNC: 19 U/L (ref 12–65)
ANION GAP SERPL CALC-SCNC: 15 MMOL/L (ref 7–16)
APPEARANCE UR: ABNORMAL
AST SERPL-CCNC: 32 U/L (ref 15–37)
BACTERIA URNS QL MICRO: 0 /HPF
BASOPHILS # BLD: 0.1 K/UL (ref 0–0.2)
BASOPHILS NFR BLD: 1 % (ref 0–2)
BILIRUB SERPL-MCNC: 0.5 MG/DL (ref 0.2–1.1)
BILIRUB UR QL: ABNORMAL
BUN SERPL-MCNC: 11 MG/DL (ref 8–23)
CALCIUM SERPL-MCNC: 7.2 MG/DL (ref 8.3–10.4)
CASTS URNS QL MICRO: 0 /LPF
CHLORIDE SERPL-SCNC: 111 MMOL/L (ref 98–107)
CO2 SERPL-SCNC: 16 MMOL/L (ref 21–32)
COLOR UR: ABNORMAL
CREAT SERPL-MCNC: 0.52 MG/DL (ref 0.6–1)
CRYSTALS URNS QL MICRO: 0 /LPF
DIFFERENTIAL METHOD BLD: ABNORMAL
EOSINOPHIL # BLD: 0.2 K/UL (ref 0–0.8)
EOSINOPHIL NFR BLD: 2 % (ref 0.5–7.8)
EPI CELLS #/AREA URNS HPF: ABNORMAL /HPF
ERYTHROCYTE [DISTWIDTH] IN BLOOD BY AUTOMATED COUNT: 17.2 % (ref 11.9–14.6)
GLOBULIN SER CALC-MCNC: 2 G/DL (ref 2.3–3.5)
GLUCOSE SERPL-MCNC: 126 MG/DL (ref 65–100)
GLUCOSE UR STRIP.AUTO-MCNC: NEGATIVE MG/DL
HCT VFR BLD AUTO: 27.5 % (ref 35.8–46.3)
HCT VFR BLD AUTO: 48.2 % (ref 35.8–46.3)
HGB BLD-MCNC: 15.9 G/DL (ref 11.7–15.4)
HGB BLD-MCNC: 8.6 G/DL (ref 11.7–15.4)
HGB UR QL STRIP: NEGATIVE
HISTORY CHECKED?,CKHIST: NORMAL
IMM GRANULOCYTES # BLD AUTO: 0 K/UL (ref 0–0.5)
IMM GRANULOCYTES NFR BLD AUTO: 0 % (ref 0–5)
KETONES UR QL STRIP.AUTO: 15 MG/DL
LACTATE SERPL-SCNC: 3.4 MMOL/L (ref 0.4–2)
LACTATE SERPL-SCNC: 4 MMOL/L (ref 0.4–2)
LACTATE SERPL-SCNC: 6.4 MMOL/L (ref 0.4–2)
LEUKOCYTE ESTERASE UR QL STRIP.AUTO: ABNORMAL
LIPASE SERPL-CCNC: 63 U/L (ref 73–393)
LYMPHOCYTES # BLD: 1.7 K/UL (ref 0.5–4.6)
LYMPHOCYTES NFR BLD: 16 % (ref 13–44)
MCH RBC QN AUTO: 28.8 PG (ref 26.1–32.9)
MCHC RBC AUTO-ENTMCNC: 31.3 G/DL (ref 31.4–35)
MCV RBC AUTO: 92 FL (ref 79.6–97.8)
MONOCYTES # BLD: 0.5 K/UL (ref 0.1–1.3)
MONOCYTES NFR BLD: 5 % (ref 4–12)
MUCOUS THREADS URNS QL MICRO: ABNORMAL /LPF
NEUTS SEG # BLD: 8.3 K/UL (ref 1.7–8.2)
NEUTS SEG NFR BLD: 76 % (ref 43–78)
NITRITE UR QL STRIP.AUTO: POSITIVE
NRBC # BLD: 0 K/UL (ref 0–0.2)
PH UR STRIP: 5.5 [PH] (ref 5–9)
PLATELET # BLD AUTO: 138 K/UL (ref 150–450)
PMV BLD AUTO: 11.2 FL (ref 9.4–12.3)
POTASSIUM SERPL-SCNC: 3.7 MMOL/L (ref 3.5–5.1)
PROCALCITONIN SERPL-MCNC: 0.07 NG/ML (ref 0–0.49)
PROT SERPL-MCNC: 3.2 G/DL (ref 6.3–8.2)
PROT UR STRIP-MCNC: 100 MG/DL
RBC # BLD AUTO: 2.99 M/UL (ref 4.05–5.2)
RBC #/AREA URNS HPF: ABNORMAL /HPF
SODIUM SERPL-SCNC: 142 MMOL/L (ref 136–145)
SP GR UR REFRACTOMETRY: 1.02 (ref 1–1.02)
TROPONIN-HIGH SENSITIVITY: 193 PG/ML (ref 0–14)
TROPONIN-HIGH SENSITIVITY: 24.7 PG/ML (ref 0–14)
UROBILINOGEN UR QL STRIP.AUTO: 2 EU/DL (ref 0.2–1)
WBC # BLD AUTO: 10.8 K/UL (ref 4.3–11.1)
WBC URNS QL MICRO: ABNORMAL /HPF

## 2021-11-17 PROCEDURE — 87150 DNA/RNA AMPLIFIED PROBE: CPT

## 2021-11-17 PROCEDURE — 83690 ASSAY OF LIPASE: CPT

## 2021-11-17 PROCEDURE — 83605 ASSAY OF LACTIC ACID: CPT

## 2021-11-17 PROCEDURE — 99285 EMERGENCY DEPT VISIT HI MDM: CPT

## 2021-11-17 PROCEDURE — 87040 BLOOD CULTURE FOR BACTERIA: CPT

## 2021-11-17 PROCEDURE — 30233N1 TRANSFUSION OF NONAUTOLOGOUS RED BLOOD CELLS INTO PERIPHERAL VEIN, PERCUTANEOUS APPROACH: ICD-10-PCS | Performed by: EMERGENCY MEDICINE

## 2021-11-17 PROCEDURE — 96366 THER/PROPH/DIAG IV INF ADDON: CPT

## 2021-11-17 PROCEDURE — 70450 CT HEAD/BRAIN W/O DYE: CPT

## 2021-11-17 PROCEDURE — P9016 RBC LEUKOCYTES REDUCED: HCPCS

## 2021-11-17 PROCEDURE — 94762 N-INVAS EAR/PLS OXIMTRY CONT: CPT

## 2021-11-17 PROCEDURE — 71045 X-RAY EXAM CHEST 1 VIEW: CPT

## 2021-11-17 PROCEDURE — 86923 COMPATIBILITY TEST ELECTRIC: CPT

## 2021-11-17 PROCEDURE — 02HV33Z INSERTION OF INFUSION DEVICE INTO SUPERIOR VENA CAVA, PERCUTANEOUS APPROACH: ICD-10-PCS | Performed by: EMERGENCY MEDICINE

## 2021-11-17 PROCEDURE — 85025 COMPLETE CBC W/AUTO DIFF WBC: CPT

## 2021-11-17 PROCEDURE — 93005 ELECTROCARDIOGRAM TRACING: CPT | Performed by: EMERGENCY MEDICINE

## 2021-11-17 PROCEDURE — 87205 SMEAR GRAM STAIN: CPT

## 2021-11-17 PROCEDURE — 84484 ASSAY OF TROPONIN QUANT: CPT

## 2021-11-17 PROCEDURE — 96365 THER/PROPH/DIAG IV INF INIT: CPT

## 2021-11-17 PROCEDURE — 96367 TX/PROPH/DG ADDL SEQ IV INF: CPT

## 2021-11-17 PROCEDURE — 65270000029 HC RM PRIVATE

## 2021-11-17 PROCEDURE — 87076 CULTURE ANAEROBE IDENT EACH: CPT

## 2021-11-17 PROCEDURE — 71275 CT ANGIOGRAPHY CHEST: CPT

## 2021-11-17 PROCEDURE — 74011000250 HC RX REV CODE- 250

## 2021-11-17 PROCEDURE — 80047 BASIC METABLC PNL IONIZED CA: CPT

## 2021-11-17 PROCEDURE — 80053 COMPREHEN METABOLIC PANEL: CPT

## 2021-11-17 PROCEDURE — 96375 TX/PRO/DX INJ NEW DRUG ADDON: CPT

## 2021-11-17 PROCEDURE — 75810000455 HC PLCMT CENT VENOUS CATH LVL 2 5182

## 2021-11-17 PROCEDURE — 81001 URINALYSIS AUTO W/SCOPE: CPT

## 2021-11-17 PROCEDURE — 36430 TRANSFUSION BLD/BLD COMPNT: CPT

## 2021-11-17 PROCEDURE — 74011000636 HC RX REV CODE- 636: Performed by: EMERGENCY MEDICINE

## 2021-11-17 PROCEDURE — 87186 SC STD MICRODIL/AGAR DIL: CPT

## 2021-11-17 PROCEDURE — 74011250636 HC RX REV CODE- 250/636: Performed by: EMERGENCY MEDICINE

## 2021-11-17 PROCEDURE — 85018 HEMOGLOBIN: CPT

## 2021-11-17 PROCEDURE — 84145 PROCALCITONIN (PCT): CPT

## 2021-11-17 PROCEDURE — 87086 URINE CULTURE/COLONY COUNT: CPT

## 2021-11-17 PROCEDURE — 81015 MICROSCOPIC EXAM OF URINE: CPT

## 2021-11-17 PROCEDURE — 74011000258 HC RX REV CODE- 258: Performed by: EMERGENCY MEDICINE

## 2021-11-17 PROCEDURE — 86900 BLOOD TYPING SEROLOGIC ABO: CPT

## 2021-11-17 RX ORDER — HYDROCHLOROTHIAZIDE 25 MG/1
12.5 TABLET ORAL DAILY
Status: DISCONTINUED | OUTPATIENT
Start: 2021-11-18 | End: 2021-11-24 | Stop reason: HOSPADM

## 2021-11-17 RX ORDER — SODIUM CHLORIDE 0.9 % (FLUSH) 0.9 %
5-10 SYRINGE (ML) INJECTION AS NEEDED
Status: DISCONTINUED | OUTPATIENT
Start: 2021-11-17 | End: 2021-11-24 | Stop reason: HOSPADM

## 2021-11-17 RX ORDER — ALBUTEROL SULFATE 90 UG/1
2 AEROSOL, METERED RESPIRATORY (INHALATION)
Status: DISCONTINUED | OUTPATIENT
Start: 2021-11-17 | End: 2021-11-24 | Stop reason: HOSPADM

## 2021-11-17 RX ORDER — ONDANSETRON 2 MG/ML
4 INJECTION INTRAMUSCULAR; INTRAVENOUS
Status: COMPLETED | OUTPATIENT
Start: 2021-11-17 | End: 2021-11-17

## 2021-11-17 RX ORDER — POLYETHYLENE GLYCOL 3350 17 G/17G
17 POWDER, FOR SOLUTION ORAL DAILY PRN
Status: DISCONTINUED | OUTPATIENT
Start: 2021-11-17 | End: 2021-11-24 | Stop reason: HOSPADM

## 2021-11-17 RX ORDER — SODIUM CHLORIDE 0.9 % (FLUSH) 0.9 %
5-40 SYRINGE (ML) INJECTION EVERY 8 HOURS
Status: DISCONTINUED | OUTPATIENT
Start: 2021-11-18 | End: 2021-11-24 | Stop reason: HOSPADM

## 2021-11-17 RX ORDER — SODIUM CHLORIDE 0.9 % (FLUSH) 0.9 %
10 SYRINGE (ML) INJECTION
Status: COMPLETED | OUTPATIENT
Start: 2021-11-17 | End: 2021-11-17

## 2021-11-17 RX ORDER — SODIUM CHLORIDE 0.9 % (FLUSH) 0.9 %
5-10 SYRINGE (ML) INJECTION EVERY 8 HOURS
Status: DISCONTINUED | OUTPATIENT
Start: 2021-11-17 | End: 2021-11-24 | Stop reason: HOSPADM

## 2021-11-17 RX ORDER — SUCRALFATE 1 G/1
1 TABLET ORAL
Status: DISCONTINUED | OUTPATIENT
Start: 2021-11-17 | End: 2021-11-24 | Stop reason: HOSPADM

## 2021-11-17 RX ORDER — CLOPIDOGREL BISULFATE 75 MG/1
75 TABLET ORAL DAILY
Status: DISCONTINUED | OUTPATIENT
Start: 2021-11-18 | End: 2021-11-18

## 2021-11-17 RX ORDER — MORPHINE SULFATE 4 MG/ML
4 INJECTION INTRAVENOUS
Status: COMPLETED | OUTPATIENT
Start: 2021-11-17 | End: 2021-11-17

## 2021-11-17 RX ORDER — VERAPAMIL HYDROCHLORIDE 240 MG/1
240 TABLET, FILM COATED, EXTENDED RELEASE ORAL
Status: DISCONTINUED | OUTPATIENT
Start: 2021-11-18 | End: 2021-11-24 | Stop reason: HOSPADM

## 2021-11-17 RX ORDER — ONDANSETRON 2 MG/ML
4 INJECTION INTRAMUSCULAR; INTRAVENOUS
Status: DISCONTINUED | OUTPATIENT
Start: 2021-11-17 | End: 2021-11-22

## 2021-11-17 RX ORDER — VANCOMYCIN HYDROCHLORIDE
1250 ONCE
Status: COMPLETED | OUTPATIENT
Start: 2021-11-17 | End: 2021-11-17

## 2021-11-17 RX ORDER — LANOLIN ALCOHOL/MO/W.PET/CERES
3 CREAM (GRAM) TOPICAL
Status: DISCONTINUED | OUTPATIENT
Start: 2021-11-18 | End: 2021-11-24 | Stop reason: HOSPADM

## 2021-11-17 RX ORDER — SODIUM CHLORIDE 0.9 % (FLUSH) 0.9 %
5-40 SYRINGE (ML) INJECTION AS NEEDED
Status: DISCONTINUED | OUTPATIENT
Start: 2021-11-17 | End: 2021-11-24 | Stop reason: HOSPADM

## 2021-11-17 RX ORDER — NOREPINEPHRINE BITARTRATE/D5W 4MG/250ML
4 PLASTIC BAG, INJECTION (ML) INTRAVENOUS
Status: DISCONTINUED | OUTPATIENT
Start: 2021-11-17 | End: 2021-11-18

## 2021-11-17 RX ORDER — SODIUM CHLORIDE 9 MG/ML
250 INJECTION, SOLUTION INTRAVENOUS AS NEEDED
Status: DISCONTINUED | OUTPATIENT
Start: 2021-11-17 | End: 2021-11-24 | Stop reason: HOSPADM

## 2021-11-17 RX ORDER — ONDANSETRON 8 MG/1
4 TABLET, ORALLY DISINTEGRATING ORAL
Status: DISCONTINUED | OUTPATIENT
Start: 2021-11-17 | End: 2021-11-18 | Stop reason: SDUPTHER

## 2021-11-17 RX ORDER — NOREPINEPHRINE BITARTRATE/D5W 4MG/250ML
PLASTIC BAG, INJECTION (ML) INTRAVENOUS
Status: COMPLETED
Start: 2021-11-17 | End: 2021-11-17

## 2021-11-17 RX ORDER — ACETAMINOPHEN 325 MG/1
650 TABLET ORAL
Status: DISCONTINUED | OUTPATIENT
Start: 2021-11-17 | End: 2021-11-24 | Stop reason: HOSPADM

## 2021-11-17 RX ORDER — ACETAMINOPHEN 650 MG/1
650 SUPPOSITORY RECTAL
Status: DISCONTINUED | OUTPATIENT
Start: 2021-11-17 | End: 2021-11-24 | Stop reason: HOSPADM

## 2021-11-17 RX ORDER — HYDROCODONE BITARTRATE AND ACETAMINOPHEN 5; 325 MG/1; MG/1
1 TABLET ORAL
Status: DISCONTINUED | OUTPATIENT
Start: 2021-11-17 | End: 2021-11-24 | Stop reason: HOSPADM

## 2021-11-17 RX ORDER — CALCIUM CARBONATE/VITAMIN D3 250-3.125
1 TABLET ORAL DAILY
Status: DISCONTINUED | OUTPATIENT
Start: 2021-11-18 | End: 2021-11-24 | Stop reason: HOSPADM

## 2021-11-17 RX ORDER — OLANZAPINE 5 MG/1
5 TABLET, ORALLY DISINTEGRATING ORAL
Status: DISCONTINUED | OUTPATIENT
Start: 2021-11-17 | End: 2021-11-24 | Stop reason: HOSPADM

## 2021-11-17 RX ORDER — ONDANSETRON 8 MG/1
4 TABLET, ORALLY DISINTEGRATING ORAL
Status: DISCONTINUED | OUTPATIENT
Start: 2021-11-17 | End: 2021-11-22

## 2021-11-17 RX ORDER — SODIUM CHLORIDE 0.9 % (FLUSH) 0.9 %
10 SYRINGE (ML) INJECTION
Status: DISCONTINUED | OUTPATIENT
Start: 2021-11-17 | End: 2021-11-17

## 2021-11-17 RX ORDER — VENLAFAXINE HYDROCHLORIDE 75 MG/1
75 CAPSULE, EXTENDED RELEASE ORAL 2 TIMES DAILY
Status: DISCONTINUED | OUTPATIENT
Start: 2021-11-18 | End: 2021-11-24 | Stop reason: HOSPADM

## 2021-11-17 RX ADMIN — ONDANSETRON 4 MG: 2 INJECTION INTRAMUSCULAR; INTRAVENOUS at 16:15

## 2021-11-17 RX ADMIN — DIATRIZOATE MEGLUMINE AND DIATRIZOATE SODIUM 15 ML: 660; 100 LIQUID ORAL; RECTAL at 16:20

## 2021-11-17 RX ADMIN — SODIUM CHLORIDE 100 ML: 900 INJECTION, SOLUTION INTRAVENOUS at 18:49

## 2021-11-17 RX ADMIN — PIPERACILLIN SODIUM AND TAZOBACTAM SODIUM 4.5 G: 4; .5 INJECTION, POWDER, LYOPHILIZED, FOR SOLUTION INTRAVENOUS at 17:10

## 2021-11-17 RX ADMIN — SODIUM CHLORIDE, SODIUM LACTATE, POTASSIUM CHLORIDE, AND CALCIUM CHLORIDE 1000 ML: 600; 310; 30; 20 INJECTION, SOLUTION INTRAVENOUS at 17:43

## 2021-11-17 RX ADMIN — Medication 10 ML: at 18:49

## 2021-11-17 RX ADMIN — METHYLPREDNISOLONE SODIUM SUCCINATE 125 MG: 125 INJECTION, POWDER, FOR SOLUTION INTRAMUSCULAR; INTRAVENOUS at 16:16

## 2021-11-17 RX ADMIN — SODIUM CHLORIDE, SODIUM LACTATE, POTASSIUM CHLORIDE, AND CALCIUM CHLORIDE 1000 ML: 600; 310; 30; 20 INJECTION, SOLUTION INTRAVENOUS at 18:43

## 2021-11-17 RX ADMIN — VANCOMYCIN HYDROCHLORIDE 1250 MG: 10 INJECTION, POWDER, LYOPHILIZED, FOR SOLUTION INTRAVENOUS at 17:30

## 2021-11-17 RX ADMIN — Medication 4 MCG/MIN: at 18:07

## 2021-11-17 RX ADMIN — NOREPINEPHRINE-DEXTROSE IV SOLUTION 4 MG/250ML-5% 4 MCG/MIN: 4-5/250 SOLUTION at 18:07

## 2021-11-17 RX ADMIN — SODIUM CHLORIDE, SODIUM LACTATE, POTASSIUM CHLORIDE, AND CALCIUM CHLORIDE 1000 ML: 600; 310; 30; 20 INJECTION, SOLUTION INTRAVENOUS at 16:17

## 2021-11-17 RX ADMIN — IOPAMIDOL 100 ML: 755 INJECTION, SOLUTION INTRAVENOUS at 18:48

## 2021-11-17 RX ADMIN — MORPHINE SULFATE 4 MG: 4 INJECTION INTRAVENOUS at 16:16

## 2021-11-17 NOTE — ED PROVIDER NOTES
Patient with a history of hypertension and COPD. Has had constipation for the past week. Very hard stools. Went to the restroom and became lightheaded in the restroom while attempting to have a bowel movement. Came out and had a syncopal episode on the couch. EMS was called out for unresponsiveness. Found patient hypotensive at 62 systolic. She was also hypoxic in the mid 80s.  2 treatments were given with no steroids. Oxygen on arrival was 97% on room air. Patient still complains of diffuse abdominal pain and constipation. Denies any chest pain. Some nausea no vomiting. No dysuria. Has had decreased p.o. intake due to the constipation and abdominal pain for the past 5 days. The history is provided by the patient. No  was used. Hypotension   This is a new problem. The current episode started less than 1 hour ago. The problem has been gradually improving. Associated symptoms include unresponsiveness and weakness. Pertinent negatives include no numbness. Mental status baseline is normal.  Her past medical history is significant for hypertension and COPD.         Past Medical History:   Diagnosis Date    Anemia 2005 approx    2 units transfused    Arthritis     hands, hips    B12 deficiency     Cancer (HCC)     hx of melanoma 1986 R arm, and carcinoma x 2 forehead    COVID-19 vaccine series completed 02/2021    PT TO BRING CARD DOS    Depression with anxiety 8/16/2016    Essential hypertension with goal blood pressure less than 140/90     controlled with med    Gastrointestinal disorder     hiatel hernia    Hyperlipidemia, unspecified 8/16/2016    Menopause     @ 49    Mild intermittent asthma 8/16/2016    Osteoarthritis of multiple joints 8/16/2016    Osteoporosis 10/25/2016    Pulmonary emphysema ([facility] [address] 08/16/2016    daily inhalers    Rectal bleed     Vertigo        Past Surgical History:   Procedure Laterality Date    COLONOSCOPY N/A 7/30/2021    COLONOSCOPY/BMI 23 performed by Gricelda Best MD at [facility]; HI RISK IND  7/30/2021         HX HERNIA REPAIR  2010    helped reflex    HX TUBAL LIGATION  1972         Family History:   Problem Relation Age of Onset    Stroke Mother         intracerebral aneurysm    Diabetes Maternal Aunt     Diabetes Maternal Uncle     No Known Problems Father         didn't have contact with father    Breast Cancer Daughter 28    Hypertension Maternal Grandmother        Social History     Socioeconomic History    Marital status:      Spouse name: Not on file    Number of children: Not on file    Years of education: Not on file    Highest education level: Not on file   Occupational History    Not on file   Tobacco Use    Smoking status: Never Smoker    Smokeless tobacco: Never Used    Tobacco comment: 2nd hand smoke x 33 yrs   Vaping Use    Vaping Use: Never used   Substance and Sexual Activity    Alcohol use: No    Drug use: No    Sexual activity: Not on file   Other Topics Concern    Not on file   Social History Narrative    Lives with boyfriend of over 2 decades. She has 3 grown children, 3 grandchildren and 3 great grandchildren. She previously worked as an ; now on social security but was on disability for her nerves. Social Determinants of Health     Financial Resource Strain: Medium Risk    Difficulty of Paying Living Expenses: Somewhat hard   Food Insecurity: Food Insecurity Present    Worried About Running Out of Food in the Last Year: Sometimes true    Carlyn of Food in the Last Year: Sometimes true   Transportation Needs: No Transportation Needs    Lack of Transportation (Medical): No    Lack of Transportation (Non-Medical):  No   Physical Activity:     Days of Exercise per Week: Not on file    Minutes of Exercise per Session: Not on file   Stress:     Feeling of Stress : Not on file   Social Connections:     Frequency of Communication with Friends and Family: Not on file    Frequency of Social Gatherings with Friends and Family: Not on file    Attends Hinduism Services: Not on file    Active Member of Clubs or Organizations: Not on file    Attends Club or Organization Meetings: Not on file    Marital Status: Not on file   Intimate Partner Violence:     Fear of Current or Ex-Partner: Not on file    Emotionally Abused: Not on file    Physically Abused: Not on file    Sexually Abused: Not on file   Housing Stability:     Unable to Pay for Housing in the Last Year: Not on file    Number of [place] in the Last Year: Not on file    Unstable Housing in the Last Year: Not on file         ALLERGIES: Other medication    Review of Systems   Constitutional: Negative for chills and fever. HENT: Negative for rhinorrhea and sore throat. Eyes: Negative for pain and redness. Respiratory: Positive for shortness of breath and wheezing. Negative for chest tightness. Cardiovascular: Negative for chest pain and leg swelling. Gastrointestinal: Positive for abdominal pain and constipation. Negative for diarrhea, nausea and vomiting. Genitourinary: Negative for dysuria and hematuria. Musculoskeletal: Negative for back pain, gait problem, neck pain and neck stiffness. Skin: Negative for color change and rash. Neurological: Positive for syncope, weakness and light-headedness. Negative for numbness and headaches. Vitals:    11/17/21 1552   BP: (!) 100/58   Pulse: (!) 105   Resp: 18   SpO2: 97%   Weight: 52.2 kg (115 lb)   Height: 5' 2\" (1.575 m)            Physical Exam  Constitutional:       Appearance: Normal appearance. She is well-developed. HENT:      Head: Normocephalic and atraumatic. Eyes:      Extraocular Movements: Extraocular movements intact. Pupils: Pupils are equal, round, and reactive to light. Cardiovascular:      Rate and Rhythm: Regular rhythm. Tachycardia present.    Pulmonary:      Effort: Pulmonary effort is normal. No respiratory distress. Breath sounds: Normal breath sounds. No wheezing. Abdominal:      Palpations: Abdomen is soft. Tenderness: There is abdominal tenderness (diffuse). Comments: Bowel Sounds quiet. Musculoskeletal:         General: No swelling. Normal range of motion. Cervical back: Normal range of motion and neck supple. Skin:     General: Skin is warm and dry. Neurological:      General: No focal deficit present. Mental Status: She is alert and oriented to person, place, and time. MDM  Number of Diagnoses or Management Options  Diagnosis management comments: Patient's blood pressure remained low around 70 systolic. Central line started for pressor support. Patient's blood pressure improved. CT shows colitis. Patient has sepsis and antibiotics and fluids started. Lactic of 6. Will admit to the intensivist.       Amount and/or Complexity of Data Reviewed  Clinical lab tests: ordered and reviewed  Tests in the radiology section of CPT®: ordered and reviewed  Tests in the medicine section of CPT®: ordered and reviewed    Patient Progress  Patient progress: stable         Central Line    Date/Time: 11/17/2021 6:29 PM  Performed by: Pravin Castrejon MD  Authorized by: Pravin Castrejon MD     Consent:     Consent obtained:  Verbal    Consent given by:  Patient (family)    Risks discussed:  Incorrect placement  Pre-procedure details:     Hand hygiene: Hand hygiene performed prior to insertion      Sterile barrier technique: All elements of maximal sterile technique followed      Skin preparation:  2% chlorhexidine    Skin preparation agent: Skin preparation agent completely dried prior to procedure    Anesthesia (see MAR for exact dosages):      Anesthesia method:  Local infiltration    Local anesthetic:  Lidocaine 1% w/o epi  Procedure details:     Location:  R internal jugular    Patient position:  Reverse Trendelenburg    Procedural supplies: Triple lumen    Catheter size:  7 Fr    Landmarks identified: yes      Ultrasound guidance: yes      Sterile ultrasound techniques: Sterile gel and sterile probe covers were used      Number of attempts:  1    Successful placement: yes    Post-procedure details:     Post-procedure:  Dressing applied and line sutured    Assessment:  Blood return through all ports    Patient tolerance of procedure: Tolerated well, no immediate complications  Bedside US    Date/Time: 11/17/2021 6:42 PM  Performed by: Ruby Goldsmith MD  Authorized by: Ruby Goldsmith MD     Verbal consent obtained: Yes    Given by:  Patient  Performed by:   Attending  Type of procedure:  FAST (modified fas)  Indications:  Abdominal pain and hypotension  Hepatorenal:  Adequate (no fluid collection. )  Hepatorenal free fluid: absent    Critical Care  Performed by: Ruby Goldsmith MD  Authorized by: Ruby Goldsmith MD     Critical care provider statement:     Critical care time (minutes):  100    Critical care time was exclusive of:  Separately billable procedures and treating other patients    Critical care was necessary to treat or prevent imminent or life-threatening deterioration of the following conditions:  Cardiac failure, circulatory failure, sepsis and shock    Critical care was time spent personally by me on the following activities:  Blood draw for specimens, development of treatment plan with patient or surrogate, discussions with consultants, evaluation of patient's response to treatment, examination of patient, interpretation of cardiac output measurements, obtaining history from patient or surrogate, ordering and performing treatments and interventions, ordering and review of laboratory studies, ordering and review of radiographic studies, pulse oximetry, re-evaluation of patient's condition, review of old charts and vascular access procedures    I assumed direction of critical care for this patient from another provider in my specialty: no            EKG: nonspecific ST and T waves changes, sinus tachycardia. Rate 103. CTA CHEST ABD PELV W WO CONT (Preliminary result)  Result time 11/17/21 19:53:13  ED Interpretation by Diego Macdonald MD (11/17/21 19:53:13, [facility], Radiology)    No large hematoma. Left colitis, most marked in the sigmoid--? Diverticulitis vs Other Inflammatory vs. Ischemic (though BLANCHE is patent). Extensive atherosclerosis w/o aortic dissection or large-vessel occlusion. Moderate HH w/dilated, contrast-filled esophagus--?stricture vs mass. Gallstones. Final report t/f from IR.                        CT HEAD WO CONT (Final result)  Result time 11/17/21 19:21:40  Final result by Diego Macdonald MD (11/17/21 19:21:40)                Impression:      1.  EXTENSIVE SMALL VESSEL ISCHEMIC DISEASE WITH NO ACUTE INTRACRANIAL   ABNORMALITY IDENTIFIED AT NONCONTRAST CT.     2. EXTENSIVE SINUSITIS, MOST MARKED IN THE LEFT MAXILLARY SINUS. Dionna Benoit Narrative:    NONCONTRAST HEAD CT     CLINICAL HISTORY:  Headache with hypotension. TECHNIQUE:  Axial images were obtained with spiral technique.  Radiation dose   reduction was achieved using one or all of the following techniques: automated   exposure control, weight-based dosing, iterative reconstruction. COMPARISON:  June 21, 2021. REPORT:   Standard noncontrast head CT demonstrates no definite intracranial   mass effect, hemorrhage, or evidence of acute geographic infarction.  Extensive   white matter hypodensities are most consistent with small vessel ischemic   disease.  The ventricles are normal in size and configuration, accounting for   the patient's age. Hernán Fuelling is extensive mucous membrane thickening of the left   maxillary antrum and more mild thickening in the right maxillary antrum,   multiple bilateral ethmoid air cells, and the left frontal sinus.  Orbits  and   other paranasal sinuses are clear where imaged.  Bone windows demonstrate no   definite fracture or destruction.                       XR CHEST PORT (Final result)  Result time 11/17/21 18:45:56  Final result by Hallie Syed MD (11/17/21 18:45:56)                Impression:    No evidence of complication post line placement             Narrative:    Chest X-ray     INDICATION: Central line placement     A portable AP view of the chest was obtained. FINDINGS: Right IJ central line is in place with the tip in the right atrium. There is no pneumothorax.  Background interstitial prominence appears chronic. No developing focal infiltrate.  The heart size is normal.  The bony thorax is   intact.                         XR CHEST PORT (Final result)  Result time 11/17/21 16:29:27  Final result by Mitra Brandt MD (11/17/21 16:29:27)                Impression:    Negative for acute change. Narrative:    CHEST X-RAY, one view. INDICATION:  Abdominal pain  and cough. Hypertension. TECHNIQUE:  AP portable semiupright view. COMPARISON: 18 and September 2021     FINDINGS:   Lungs: are clear. Costophrenic angles: are sharp. Heart size: is normal.   Pulmonary vasculature: is unremarkable. Aorta: Unremarkable. Included portion of the upper abdomen: is unremarkable. Bones: No gross bony lesions. Other: None.                   Results Include:    Recent Results (from the past 24 hour(s))   EKG, 12 LEAD, INITIAL    Collection Time: 11/17/21  3:50 PM   Result Value Ref Range    Ventricular Rate 103 BPM    Atrial Rate 110 BPM    P-R Interval 154 ms    QRS Duration 74 ms    Q-T Interval 376 ms    QTC Calculation (Bezet) 492 ms    Calculated P Axis 84 degrees    Calculated R Axis 58 degrees    Calculated T Axis 76 degrees    Diagnosis       !! AGE AND GENDER SPECIFIC ECG ANALYSIS !!   Sinus tachycardia  Nonspecific ST abnormality  Abnormal ECG  No previous ECGs available     LACTIC ACID    Collection Time: 11/17/21  5:24 PM   Result Value Ref Range    Lactic acid 6.4 (HH) 0.4 - 2.0 MMOL/L   CBC WITH AUTOMATED DIFF    Collection Time: 11/17/21  5:48 PM   Result Value Ref Range    WBC 10.8 4.3 - 11.1 K/uL    RBC 2.99 (L) 4.05 - 5.2 M/uL    HGB 8.6 (L) 11.7 - 15.4 g/dL    HCT 27.5 (L) 35.8 - 46.3 %    MCV 92.0 79.6 - 97.8 FL    MCH 28.8 26.1 - 32.9 PG    MCHC 31.3 (L) 31.4 - 35.0 g/dL    RDW 17.2 (H) 11.9 - 14.6 %    PLATELET 360 (L) 199 - 450 K/uL    MPV 11.2 9.4 - 12.3 FL    ABSOLUTE NRBC 0.00 0.0 - 0.2 K/uL    DF AUTOMATED      NEUTROPHILS 76 43 - 78 %    LYMPHOCYTES 16 13 - 44 %    MONOCYTES 5 4.0 - 12.0 %    EOSINOPHILS 2 0.5 - 7.8 %    BASOPHILS 1 0.0 - 2.0 %    IMMATURE GRANULOCYTES 0 0.0 - 5.0 %    ABS. NEUTROPHILS 8.3 (H) 1.7 - 8.2 K/UL    ABS. LYMPHOCYTES 1.7 0.5 - 4.6 K/UL    ABS. MONOCYTES 0.5 0.1 - 1.3 K/UL    ABS. EOSINOPHILS 0.2 0.0 - 0.8 K/UL    ABS. BASOPHILS 0.1 0.0 - 0.2 K/UL    ABS. IMM. GRANS. 0.0 0.0 - 0.5 K/UL   METABOLIC PANEL, COMPREHENSIVE    Collection Time: 11/17/21  5:48 PM   Result Value Ref Range    Sodium 142 136 - 145 mmol/L    Potassium 3.7 3.5 - 5.1 mmol/L    Chloride 111 (H) 98 - 107 mmol/L    CO2 16 (L) 21 - 32 mmol/L    Anion gap 15 7 - 16 mmol/L    Glucose 126 (H) 65 - 100 mg/dL    BUN 11 8 - 23 MG/DL    Creatinine 0.52 (L) 0.6 - 1.0 MG/DL    GFR est AA >60 >60 ml/min/1.73m2    GFR est non-AA >60 >60 ml/min/1.73m2    Calcium 7.2 (L) 8.3 - 10.4 MG/DL    Bilirubin, total 0.5 0.2 - 1.1 MG/DL    ALT (SGPT) 19 12 - 65 U/L    AST (SGOT) 32 15 - 37 U/L    Alk.  phosphatase 140 (H) 50 - 136 U/L    Protein, total 3.2 (L) 6.3 - 8.2 g/dL    Albumin 1.2 (L) 3.2 - 4.6 g/dL    Globulin 2.0 (L) 2.3 - 3.5 g/dL    A-G Ratio 0.6 (L) 1.2 - 3.5     PROCALCITONIN    Collection Time: 11/17/21  5:48 PM   Result Value Ref Range    Procalcitonin 0.07 0.00 - 0.49 ng/mL   TROPONIN-HIGH SENSITIVITY    Collection Time: 11/17/21  5:48 PM   Result Value Ref Range    Troponin-High Sensitivity 24.7 (H) 0 - 14 pg/mL   LIPASE    Collection Time: 11/17/21  5:48 PM   Result Value Ref Range    Lipase 63 (L) 73 - 393 U/L   TYPE & SCREEN    Collection Time: 11/17/21  6:35 PM   Result Value Ref Range    Crossmatch Expiration 11/20/2021,2359     ABO/Rh(D) O POSITIVE     Antibody screen NEG     Comment BLOOD READY CALLED ER AT 1934 11.17.21     Unit number [id number]     Blood component type RC LR     Unit division 00     Status of unit ALLOCATED     Crossmatch result Compatible    RBC, ALLOCATE    Collection Time: 11/17/21  6:45 PM   Result Value Ref Range    HISTORY CHECKED?  Historical check performed

## 2021-11-17 NOTE — ED TRIAGE NOTES
Pt arrives via [facility]. EMS was called out for an unresponsive person. Upon EMS arrival pt was found to be awake and alert. Family reported pt was in the bathroom attempting to have a BM and came out from the bathroom and collapsed on the cough. EMS found pt to be hypotensive at 62 systolic and hypoxic on RA at mid 80's. Pt was also had wheezing bilaterally in all fields. EMS administered x2 breathing tx for a total of 10mg albuterol and 500 mcg Atrovent. EMS also administered 1L NS. Pt complains of abdominal pain and a cough. Denies CP.

## 2021-11-18 ENCOUNTER — APPOINTMENT (OUTPATIENT)
Dept: CT IMAGING | Age: 76
DRG: 871 | End: 2021-11-18
Attending: FAMILY MEDICINE
Payer: MEDICARE

## 2021-11-18 ENCOUNTER — APPOINTMENT (OUTPATIENT)
Dept: GENERAL RADIOLOGY | Age: 76
DRG: 871 | End: 2021-11-18
Attending: FAMILY MEDICINE
Payer: MEDICARE

## 2021-11-18 ENCOUNTER — ANESTHESIA EVENT (OUTPATIENT)
Dept: ENDOSCOPY | Age: 76
DRG: 871 | End: 2021-11-18
Payer: MEDICARE

## 2021-11-18 ENCOUNTER — ANESTHESIA (OUTPATIENT)
Dept: ENDOSCOPY | Age: 76
DRG: 871 | End: 2021-11-18
Payer: MEDICARE

## 2021-11-18 PROBLEM — E43 SEVERE PROTEIN-CALORIE MALNUTRITION (HCC): Status: ACTIVE | Noted: 2021-11-18

## 2021-11-18 PROBLEM — B95.5 STREPTOCOCCAL BACTEREMIA: Status: ACTIVE | Noted: 2021-11-18

## 2021-11-18 PROBLEM — R78.81 STREPTOCOCCAL BACTEREMIA: Status: ACTIVE | Noted: 2021-11-18

## 2021-11-18 PROBLEM — N39.0 UTI (URINARY TRACT INFECTION): Status: ACTIVE | Noted: 2021-11-18

## 2021-11-18 LAB
ABO + RH BLD: NORMAL
ATRIAL RATE: 110 BPM
BASOPHILS # BLD: 0 K/UL (ref 0–0.2)
BASOPHILS NFR BLD: 0 % (ref 0–2)
BLD PROD TYP BPU: NORMAL
BLOOD BANK CMNT PATIENT-IMP: NORMAL
BLOOD GROUP ANTIBODIES SERPL: NORMAL
BPU ID: NORMAL
BUN BLD-MCNC: 14 MG/DL (ref 8–26)
CALCULATED P AXIS, ECG09: 84 DEGREES
CALCULATED R AXIS, ECG10: 58 DEGREES
CALCULATED T AXIS, ECG11: 76 DEGREES
CHLORIDE BLD-SCNC: 105 MMOL/L (ref 98–107)
CO2 BLD-SCNC: 21.3 MMOL/L (ref 21–32)
COVID-19 RAPID TEST, COVR: NOT DETECTED
CREAT BLD-MCNC: 0.62 MG/DL (ref 0.8–1.5)
CROSSMATCH RESULT,%XM: NORMAL
DIAGNOSIS, 93000: NORMAL
DIFFERENTIAL METHOD BLD: ABNORMAL
EOSINOPHIL # BLD: 0 K/UL (ref 0–0.8)
EOSINOPHIL NFR BLD: 0 % (ref 0.5–7.8)
ERYTHROCYTE [DISTWIDTH] IN BLOOD BY AUTOMATED COUNT: 17.1 % (ref 11.9–14.6)
GLUCOSE BLD STRIP.AUTO-MCNC: 119 MG/DL (ref 65–100)
GLUCOSE BLD-MCNC: 179 MG/DL (ref 65–100)
HCT VFR BLD AUTO: 48.4 % (ref 35.8–46.3)
HGB BLD-MCNC: 15.9 G/DL (ref 11.7–15.4)
IMM GRANULOCYTES # BLD AUTO: 0.1 K/UL (ref 0–0.5)
IMM GRANULOCYTES NFR BLD AUTO: 1 % (ref 0–5)
INTERPRETATION: ABNORMAL
LACTATE SERPL-SCNC: 1.8 MMOL/L (ref 0.4–2)
LYMPHOCYTES # BLD: 0.6 K/UL (ref 0.5–4.6)
LYMPHOCYTES NFR BLD: 4 % (ref 13–44)
MCH RBC QN AUTO: 27.6 PG (ref 26.1–32.9)
MCHC RBC AUTO-ENTMCNC: 32.9 G/DL (ref 31.4–35)
MCV RBC AUTO: 83.9 FL (ref 79.6–97.8)
MONOCYTES # BLD: 0.6 K/UL (ref 0.1–1.3)
MONOCYTES NFR BLD: 4 % (ref 4–12)
NEUTS SEG # BLD: 13.4 K/UL (ref 1.7–8.2)
NEUTS SEG NFR BLD: 91 % (ref 43–78)
NRBC # BLD: 0 K/UL (ref 0–0.2)
P-R INTERVAL, ECG05: 154 MS
PLATELET # BLD AUTO: 141 K/UL (ref 150–450)
PMV BLD AUTO: 10.4 FL (ref 9.4–12.3)
POTASSIUM BLD-SCNC: 3 MMOL/L (ref 3.5–5.1)
Q-T INTERVAL, ECG07: 376 MS
QRS DURATION, ECG06: 74 MS
QTC CALCULATION (BEZET), ECG08: 492 MS
RBC # BLD AUTO: 5.77 M/UL (ref 4.05–5.2)
SERVICE CMNT-IMP: ABNORMAL
SODIUM BLD-SCNC: 143 MMOL/L (ref 136–145)
SOURCE, COVRS: NORMAL
SPECIMEN EXP DATE BLD: NORMAL
STATUS OF UNIT,%ST: NORMAL
STREPTOCOCCUS , STPSP: DETECTED
TROPONIN-HIGH SENSITIVITY: 624 PG/ML (ref 0–14)
UNIT DIVISION, %UDIV: 0
VENTRICULAR RATE, ECG03: 103 BPM
WBC # BLD AUTO: 14.7 K/UL (ref 4.3–11.1)

## 2021-11-18 PROCEDURE — 36592 COLLECT BLOOD FROM PICC: CPT

## 2021-11-18 PROCEDURE — 83605 ASSAY OF LACTIC ACID: CPT

## 2021-11-18 PROCEDURE — 94640 AIRWAY INHALATION TREATMENT: CPT

## 2021-11-18 PROCEDURE — 74011250636 HC RX REV CODE- 250/636: Performed by: FAMILY MEDICINE

## 2021-11-18 PROCEDURE — 76040000025: Performed by: INTERNAL MEDICINE

## 2021-11-18 PROCEDURE — 97165 OT EVAL LOW COMPLEX 30 MIN: CPT

## 2021-11-18 PROCEDURE — 99221 1ST HOSP IP/OBS SF/LOW 40: CPT | Performed by: SURGERY

## 2021-11-18 PROCEDURE — C9113 INJ PANTOPRAZOLE SODIUM, VIA: HCPCS | Performed by: FAMILY MEDICINE

## 2021-11-18 PROCEDURE — 74011250636 HC RX REV CODE- 250/636: Performed by: INTERNAL MEDICINE

## 2021-11-18 PROCEDURE — 85025 COMPLETE CBC W/AUTO DIFF WBC: CPT

## 2021-11-18 PROCEDURE — 77010033678 HC OXYGEN DAILY

## 2021-11-18 PROCEDURE — 74011250636 HC RX REV CODE- 250/636: Performed by: NURSE ANESTHETIST, CERTIFIED REGISTERED

## 2021-11-18 PROCEDURE — 87177 OVA AND PARASITES SMEARS: CPT

## 2021-11-18 PROCEDURE — 99223 1ST HOSP IP/OBS HIGH 75: CPT | Performed by: INTERNAL MEDICINE

## 2021-11-18 PROCEDURE — 74011250637 HC RX REV CODE- 250/637: Performed by: PHYSICIAN ASSISTANT

## 2021-11-18 PROCEDURE — 86580 TB INTRADERMAL TEST: CPT | Performed by: FAMILY MEDICINE

## 2021-11-18 PROCEDURE — 74011250637 HC RX REV CODE- 250/637: Performed by: FAMILY MEDICINE

## 2021-11-18 PROCEDURE — 74011000250 HC RX REV CODE- 250: Performed by: NURSE ANESTHETIST, CERTIFIED REGISTERED

## 2021-11-18 PROCEDURE — 97535 SELF CARE MNGMENT TRAINING: CPT

## 2021-11-18 PROCEDURE — 76060000031 HC ANESTHESIA FIRST 0.5 HR: Performed by: INTERNAL MEDICINE

## 2021-11-18 PROCEDURE — 70450 CT HEAD/BRAIN W/O DYE: CPT

## 2021-11-18 PROCEDURE — 74011000258 HC RX REV CODE- 258: Performed by: FAMILY MEDICINE

## 2021-11-18 PROCEDURE — 87324 CLOSTRIDIUM AG IA: CPT

## 2021-11-18 PROCEDURE — 94760 N-INVAS EAR/PLS OXIMETRY 1: CPT

## 2021-11-18 PROCEDURE — 97530 THERAPEUTIC ACTIVITIES: CPT

## 2021-11-18 PROCEDURE — 87045 FECES CULTURE AEROBIC BACT: CPT

## 2021-11-18 PROCEDURE — 0DJ08ZZ INSPECTION OF UPPER INTESTINAL TRACT, VIA NATURAL OR ARTIFICIAL OPENING ENDOSCOPIC: ICD-10-PCS | Performed by: INTERNAL MEDICINE

## 2021-11-18 PROCEDURE — 87635 SARS-COV-2 COVID-19 AMP PRB: CPT

## 2021-11-18 PROCEDURE — 2709999900 HC NON-CHARGEABLE SUPPLY: Performed by: INTERNAL MEDICINE

## 2021-11-18 PROCEDURE — 82962 GLUCOSE BLOOD TEST: CPT

## 2021-11-18 PROCEDURE — 2709999900 HC NON-CHARGEABLE SUPPLY

## 2021-11-18 PROCEDURE — 74018 RADEX ABDOMEN 1 VIEW: CPT

## 2021-11-18 PROCEDURE — 84484 ASSAY OF TROPONIN QUANT: CPT

## 2021-11-18 PROCEDURE — 97161 PT EVAL LOW COMPLEX 20 MIN: CPT

## 2021-11-18 PROCEDURE — 74011000250 HC RX REV CODE- 250: Performed by: FAMILY MEDICINE

## 2021-11-18 PROCEDURE — 65270000029 HC RM PRIVATE

## 2021-11-18 PROCEDURE — 94664 DEMO&/EVAL PT USE INHALER: CPT

## 2021-11-18 RX ORDER — SODIUM CHLORIDE, SODIUM LACTATE, POTASSIUM CHLORIDE, CALCIUM CHLORIDE 600; 310; 30; 20 MG/100ML; MG/100ML; MG/100ML; MG/100ML
1000 INJECTION, SOLUTION INTRAVENOUS CONTINUOUS
Status: DISCONTINUED | OUTPATIENT
Start: 2021-11-18 | End: 2021-11-18 | Stop reason: HOSPADM

## 2021-11-18 RX ORDER — BUDESONIDE AND FORMOTEROL FUMARATE DIHYDRATE 160; 4.5 UG/1; UG/1
2 AEROSOL RESPIRATORY (INHALATION)
Status: DISCONTINUED | OUTPATIENT
Start: 2021-11-18 | End: 2021-11-24 | Stop reason: HOSPADM

## 2021-11-18 RX ORDER — MORPHINE SULFATE 2 MG/ML
1 INJECTION, SOLUTION INTRAMUSCULAR; INTRAVENOUS ONCE
Status: COMPLETED | OUTPATIENT
Start: 2021-11-18 | End: 2021-11-18

## 2021-11-18 RX ORDER — METOCLOPRAMIDE HYDROCHLORIDE 5 MG/ML
5 INJECTION INTRAMUSCULAR; INTRAVENOUS EVERY 6 HOURS
Status: COMPLETED | OUTPATIENT
Start: 2021-11-18 | End: 2021-11-19

## 2021-11-18 RX ORDER — PROPOFOL 10 MG/ML
INJECTION, EMULSION INTRAVENOUS AS NEEDED
Status: DISCONTINUED | OUTPATIENT
Start: 2021-11-18 | End: 2021-11-18 | Stop reason: HOSPADM

## 2021-11-18 RX ORDER — VENLAFAXINE HYDROCHLORIDE 37.5 MG/1
37.5 CAPSULE, EXTENDED RELEASE ORAL
Status: DISCONTINUED | OUTPATIENT
Start: 2021-11-18 | End: 2021-11-24 | Stop reason: HOSPADM

## 2021-11-18 RX ORDER — LIDOCAINE HYDROCHLORIDE 20 MG/ML
INJECTION, SOLUTION EPIDURAL; INFILTRATION; INTRACAUDAL; PERINEURAL AS NEEDED
Status: DISCONTINUED | OUTPATIENT
Start: 2021-11-18 | End: 2021-11-18 | Stop reason: HOSPADM

## 2021-11-18 RX ADMIN — Medication 3 MG: at 00:38

## 2021-11-18 RX ADMIN — SUCRALFATE 1 G: 1 TABLET ORAL at 22:24

## 2021-11-18 RX ADMIN — PIPERACILLIN SODIUM AND TAZOBACTAM SODIUM 3.38 G: 3; .375 INJECTION, POWDER, LYOPHILIZED, FOR SOLUTION INTRAVENOUS at 08:49

## 2021-11-18 RX ADMIN — Medication 10 ML: at 05:54

## 2021-11-18 RX ADMIN — CEFTRIAXONE SODIUM 2 G: 2 INJECTION, POWDER, FOR SOLUTION INTRAMUSCULAR; INTRAVENOUS at 14:00

## 2021-11-18 RX ADMIN — Medication 10 ML: at 13:54

## 2021-11-18 RX ADMIN — BUDESONIDE AND FORMOTEROL FUMARATE DIHYDRATE 2 PUFF: 160; 4.5 AEROSOL RESPIRATORY (INHALATION) at 20:35

## 2021-11-18 RX ADMIN — SUCRALFATE 1 G: 1 TABLET ORAL at 00:38

## 2021-11-18 RX ADMIN — CALCIUM CARBONATE-CHOLECALCIFEROL TAB 250 MG-125 UNIT 1 TABLET: 250-125 TAB at 08:50

## 2021-11-18 RX ADMIN — PROPOFOL 20 MG: 10 INJECTION, EMULSION INTRAVENOUS at 15:14

## 2021-11-18 RX ADMIN — PROPOFOL 100 MCG/KG/MIN: 10 INJECTION, EMULSION INTRAVENOUS at 15:12

## 2021-11-18 RX ADMIN — VERAPAMIL HYDROCHLORIDE 240 MG: 240 TABLET, FILM COATED, EXTENDED RELEASE ORAL at 05:49

## 2021-11-18 RX ADMIN — PANTOPRAZOLE SODIUM 40 MG: 40 INJECTION, POWDER, FOR SOLUTION INTRAVENOUS at 22:26

## 2021-11-18 RX ADMIN — HYDROCODONE BITARTRATE AND ACETAMINOPHEN 1 TABLET: 5; 325 TABLET ORAL at 00:38

## 2021-11-18 RX ADMIN — NITROGLYCERIN 1 INCH: 20 OINTMENT TOPICAL at 17:16

## 2021-11-18 RX ADMIN — Medication 10 ML: at 00:39

## 2021-11-18 RX ADMIN — TIOTROPIUM BROMIDE INHALATION SPRAY 2 PUFF: 3.12 SPRAY, METERED RESPIRATORY (INHALATION) at 07:05

## 2021-11-18 RX ADMIN — PIPERACILLIN SODIUM AND TAZOBACTAM SODIUM 3.38 G: 3; .375 INJECTION, POWDER, LYOPHILIZED, FOR SOLUTION INTRAVENOUS at 00:38

## 2021-11-18 RX ADMIN — SODIUM CHLORIDE, SODIUM LACTATE, POTASSIUM CHLORIDE, AND CALCIUM CHLORIDE 1000 ML: 600; 310; 30; 20 INJECTION, SOLUTION INTRAVENOUS at 14:47

## 2021-11-18 RX ADMIN — MORPHINE SULFATE 1 MG: 2 INJECTION, SOLUTION INTRAMUSCULAR; INTRAVENOUS at 02:25

## 2021-11-18 RX ADMIN — SUCRALFATE 1 G: 1 TABLET ORAL at 12:22

## 2021-11-18 RX ADMIN — ONDANSETRON 4 MG: 2 INJECTION INTRAMUSCULAR; INTRAVENOUS at 00:37

## 2021-11-18 RX ADMIN — VENLAFAXINE HYDROCHLORIDE 75 MG: 75 CAPSULE, EXTENDED RELEASE ORAL at 17:16

## 2021-11-18 RX ADMIN — Medication 3 MG: at 22:24

## 2021-11-18 RX ADMIN — BUDESONIDE AND FORMOTEROL FUMARATE DIHYDRATE 2 PUFF: 160; 4.5 AEROSOL RESPIRATORY (INHALATION) at 07:05

## 2021-11-18 RX ADMIN — Medication 10 ML: at 22:36

## 2021-11-18 RX ADMIN — Medication 10 ML: at 00:38

## 2021-11-18 RX ADMIN — ONDANSETRON 4 MG: 8 TABLET, ORALLY DISINTEGRATING ORAL at 08:58

## 2021-11-18 RX ADMIN — PROPOFOL 40 MG: 10 INJECTION, EMULSION INTRAVENOUS at 15:11

## 2021-11-18 RX ADMIN — TUBERCULIN PURIFIED PROTEIN DERIVATIVE 5 UNITS: 5 INJECTION, SOLUTION INTRADERMAL at 00:49

## 2021-11-18 RX ADMIN — HYDROCHLOROTHIAZIDE 12.5 MG: 25 TABLET ORAL at 05:48

## 2021-11-18 RX ADMIN — PIPERACILLIN SODIUM AND TAZOBACTAM SODIUM 3.38 G: 3; .375 INJECTION, POWDER, LYOPHILIZED, FOR SOLUTION INTRAVENOUS at 17:16

## 2021-11-18 RX ADMIN — ACETAMINOPHEN 650 MG: 325 TABLET ORAL at 05:45

## 2021-11-18 RX ADMIN — SUCRALFATE 1 G: 1 TABLET ORAL at 17:16

## 2021-11-18 RX ADMIN — SUCRALFATE 1 G: 1 TABLET ORAL at 08:50

## 2021-11-18 RX ADMIN — CLOPIDOGREL BISULFATE 75 MG: 75 TABLET ORAL at 08:50

## 2021-11-18 RX ADMIN — PANTOPRAZOLE SODIUM 40 MG: 40 INJECTION, POWDER, FOR SOLUTION INTRAVENOUS at 08:50

## 2021-11-18 RX ADMIN — SODIUM CHLORIDE, SODIUM LACTATE, POTASSIUM CHLORIDE, AND CALCIUM CHLORIDE: 600; 310; 30; 20 INJECTION, SOLUTION INTRAVENOUS at 15:18

## 2021-11-18 RX ADMIN — METOCLOPRAMIDE HYDROCHLORIDE 5 MG: 5 INJECTION INTRAMUSCULAR; INTRAVENOUS at 17:17

## 2021-11-18 RX ADMIN — VENLAFAXINE HYDROCHLORIDE 75 MG: 75 CAPSULE, EXTENDED RELEASE ORAL at 08:50

## 2021-11-18 RX ADMIN — LIDOCAINE HYDROCHLORIDE 60 MG: 20 INJECTION, SOLUTION EPIDURAL; INFILTRATION; INTRACAUDAL; PERINEURAL at 15:09

## 2021-11-18 RX ADMIN — VENLAFAXINE HYDROCHLORIDE 37.5 MG: 37.5 CAPSULE, EXTENDED RELEASE ORAL at 22:24

## 2021-11-18 NOTE — PROGRESS NOTES
TRANSFER - IN REPORT:    Verbal report received from [address] [place] [address] on [facility] [employer]  being received from 310 for ordered procedure      Report consisted of patients Situation, Background, Assessment and   Recommendations(SBAR). Information from the following report(s) SBAR, Intake/Output, MAR, Recent Results and Med Rec Status was reviewed with the receiving nurse. Opportunity for questions and clarification was provided.

## 2021-11-18 NOTE — PROGRESS NOTES
Dual skin assessment completed on admission       11/18/21 0010   Skin Integumentary   Skin Integumentary (WDL) WDL    Pressure  Injury Documentation No Pressure Injury Noted-Pressure Ulcer Prevention Initiated   Skin Color Appropriate for ethnicity   Skin Condition/Temp Dry; Warm   Skin Integrity Intact   Turgor Epidermis thin w/ loss of subcut tissue   Hair Growth Sparce   Nails WDL   Varicosities Absent     Sacrum intact. Heels intact. No skin abnormality noted.

## 2021-11-18 NOTE — H&P (VIEW-ONLY)
[facility] Consult Note       Primary GI Physician: new    Referring Provider:  Rayshawn Shook MD    Consult Date:  11/18/2021    Admit Date:  11/17/2021    Chief Complaint:  Melena, abnormal CT esophagus    Subjective:     History of Present Illness:  Patient is a 77 y.o. female with PMH anxiety, osteoporosis, anemia, hiatal hernia, seen  seen in consultation at the request of Dr. Joi Barboza for evaluation of melena. She was admitted 11/17 by the hospitalist service after presenting the the ED via EMS with abdominal pain and syncope. She was found hypotensive and hypoxic on arrival, with AMS. CT head with no acute findings, excepting sinusitis. CT C/A/P with left sided colitis, most marked in the sigmoid, as well as a moderate hiatal hernia with a contrast filled esophagus - stricture vs mass, along with gallstones. Surgery has been consulted for colonic findings, in turn placing gi consult for esophageal findings. Lactic acid was 6.4 on arrival, down to 1.8 today. HS-troponin now at 624. WBC today at 14.7, hgb 15.9, platelet 984. Patient is seen today with assistance of family at bedside. Although she initially required vasopressors, she quickly weaned off and was transferred to the floor. She reports worsening dysphagia to solids and pills, often having to cough up a food bolus. She recalls remote hx of hiatal hernia repair; records reveal laparoscopic repair of hiatal hernia with fundoplication in 3109 with Dr Rachele Joe. She has been on Prilosec as an outpatient. She has had some loose stools since admission and reports her normal bowel pattern is irregular, sometimes loose and sometimes constipated. She has been told she has had blood in her stools here but is unaware of the color. Patient has followed with heme/onc, Dr Emily Childs, for chronic iron deficient anemia. She was last seen by Dr Emily Childs late October after receiving [facility] 8/2021; hgb at that time was 14.7.   Colonoscopy with Dr Vikas Mcgregor 7/30/21 for heme + stool - pan-diverticulosis, no source of heme + stool found; repeat exam recommended for 10 years. Patient did not want to pursue EGD at that time. She [facility] by [facility] earlier this year for dysphagia with barium swallow obtained in evaluation. Exam revealed evidence of a hiatal hernia and esophageal stricture; patient canceled follow-up appt. CT C/A/P 11/17/21 - Wet Read  Nori Howell MD on 11/17/2021  7:44 PM ([facility], Radiology)   No large hematoma. left colitis, most marked in the sigmoid--? Diverticulitis vs Other Inflammatory vs. Ischemic (though BLANCHE is patent). Extensive atherosclerosis w/o aortic dissection or large-vessel occlusion. Moderate HH w/dilated, contrast-filled esophagus--?stricture vs mass. Gallstones. Final report t/f from IR. PMH:  Past Medical History:   Diagnosis Date    Anemia 2005 approx    2 units transfused    Arthritis     hands, hips    B12 deficiency     Cancer ([address]     hx of melanoma 1986 R arm, and carcinoma x 2 forehead    COVID-19 vaccine series completed 02/2021    PT TO BRING CARD DOS    Depression with anxiety 8/16/2016    Essential hypertension with goal blood pressure less than 140/90     controlled with med    Gastrointestinal disorder     hiatel hernia    Hyperlipidemia, unspecified 8/16/2016    Menopause     @ 51    Mild intermittent asthma 8/16/2016    Osteoarthritis of multiple joints 8/16/2016    Osteoporosis 10/25/2016    Pulmonary emphysema ([address] 08/16/2016    daily inhalers    Rectal bleed     Vertigo        PSH:  Past Surgical History:   Procedure Laterality Date    COLONOSCOPY N/A 7/30/2021    COLONOSCOPY/BMI 23 performed by Carlos Cervantes MD at [facility]; HI RISK IND  7/30/2021         HX HERNIA REPAIR  2010    helped reflex    HX TUBAL LIGATION  1972       Allergies:   Allergies   Allergen Reactions    Other Medication Rash     Equate antibiotic ointment       Home Medications:  Prior to Admission medications    Medication Sig Start Date End Date Taking? Authorizing Provider   HYDROcodone-acetaminophen (NORCO) 5-325 mg per tablet 1 Tablet every six (6) hours as needed. 11/9/21  Yes Provider, Historical   venlafaxine-SR (Effexor XR) 75 mg capsule Take 1 Capsule by mouth two (2) times a day. 8/8/21  Yes Van Solo MD   venlafaxine-SR (Effexor XR) 37.5 mg capsule Take 1 Capsule by mouth nightly. 8/8/21  Yes Van Solo MD   hydroCHLOROthiazide (HYDRODIURIL) 12.5 mg tablet Take 1 Tablet by mouth daily. 8/6/21  Yes Van Solo MD   verapamil ER (CALAN-SR) 240 mg CR tablet TAKE 1 TABLET BY MOUTH EVERY MORNING 8/6/21  Yes Van Solo MD   pravastatin (PRAVACHOL) 20 mg tablet Take 1 Tablet by mouth daily. Patient taking differently: Take 20 mg by mouth nightly. 8/6/21  Yes Van Solo MD   fluticasone-umeclidin-vilanter (Trelegy Ellipta) 135-13.7-82 mcg dsdv Take 1 Puff by inhalation daily. 4/12/21  Yes Bobbi Pedroza MD   omeprazole (PRILOSEC) 40 mg capsule Take 1 Cap by mouth two (2) times a day. 10/20/20  Yes Bobbi Pedroza MD   potassium chloride (KLOR-CON) 10 mEq tablet Take 1 Tablet by mouth daily. 11/12/21   Van Solo MD   ondansetron (ZOFRAN ODT) 4 mg disintegrating tablet Take 4 mg by mouth. 11/9/21   Provider, Historical   LORazepam (ATIVAN) 1 mg tablet Take 1/2 to 1 tab PO Q 12 hours PRN anxiety, use sparingly  Patient not taking: Reported on 11/18/2021 9/15/21   Van Solo MD   calcium-cholecalciferol, d3, (CALCIUM 600 + D) 600-125 mg-unit tab Take 1 Tab by mouth daily. Patient not taking: Reported on 11/18/2021    Provider, Historical   cetirizine (ZYRTEC) 10 mg tablet Take 1 Tab by mouth daily. Patient not taking: Reported on 11/18/2021 1/12/21   Aurora Gonzalez NP   albuterol (Ventolin HFA) 90 mcg/actuation inhaler Take 2 Puffs by inhalation four (4) times daily.   Patient taking differently: Take 2 Puffs by inhalation every six (6) hours as needed.  1/12/21   India Carrillo NP       [facility] Medications:  Current Facility-Administered Medications   Medication Dose Route Frequency    budesonide-formoteroL (SYMBICORT) 160-4.5 mcg/actuation HFA inhaler 2 Puff  2 Puff Inhalation BID RT    And    tiotropium bromide (SPIRIVA RESPIMAT) 2.5 mcg /actuation  2 Puff Inhalation DAILY    venlafaxine-SR (EFFEXOR-XR) capsule 37.5 mg  37.5 mg Oral QHS    sodium chloride (NS) flush 5-10 mL  5-10 mL IntraVENous Q8H    sodium chloride (NS) flush 5-10 mL  5-10 mL IntraVENous PRN    NOREPINephrine (LEVOPHED) 4 mg in 5% dextrose 250 mL infusion  4 mcg/min IntraVENous TITRATE    0.9% sodium chloride infusion 250 mL  250 mL IntraVENous PRN    albuterol (PROVENTIL HFA, VENTOLIN HFA, PROAIR HFA) inhaler 2 Puff  2 Puff Inhalation Q6H PRN    calcium-vitamin D (OS-ZACHARIAH +D3) 250 mg-125 unit per tablet 1 Tablet  1 Tablet Oral DAILY    hydroCHLOROthiazide (HYDRODIURIL) tablet 12.5 mg  12.5 mg Oral DAILY    HYDROcodone-acetaminophen (NORCO) 5-325 mg per tablet 1 Tablet  1 Tablet Oral Q6H PRN    pantoprazole (PROTONIX) 40 mg in 0.9% sodium chloride 10 mL injection  40 mg IntraVENous Q12H    venlafaxine-SR (EFFEXOR-XR) capsule 75 mg  75 mg Oral BID    verapamil ER (CALAN-SR) tablet 240 mg  240 mg Oral DAILY WITH BREAKFAST    sodium chloride (NS) flush 5-40 mL  5-40 mL IntraVENous Q8H    sodium chloride (NS) flush 5-40 mL  5-40 mL IntraVENous PRN    acetaminophen (TYLENOL) tablet 650 mg  650 mg Oral Q6H PRN    Or    acetaminophen (TYLENOL) suppository 650 mg  650 mg Rectal Q6H PRN    polyethylene glycol (MIRALAX) packet 17 g  17 g Oral DAILY PRN    ondansetron (ZOFRAN ODT) tablet 4 mg  4 mg Oral Q8H PRN    Or    ondansetron (ZOFRAN) injection 4 mg  4 mg IntraVENous Q6H PRN    piperacillin-tazobactam (ZOSYN) 3.375 g in 0.9% sodium chloride (MBP/ADV) 100 mL MBP  3.375 g IntraVENous Q8H    melatonin tablet 3 mg  3 mg Oral QHS    OLANZapine (ZyPREXA zydis) disintegrating tablet 5 mg  5 mg Oral QHS PRN    sucralfate (CARAFATE) tablet 1 g  1 g Oral AC&HS    tuberculin injection 5 Units  5 Units IntraDERMal ONCE       Social History:  Social History     Tobacco Use    Smoking status: Never Smoker    Smokeless tobacco: Never Used    Tobacco comment: 2nd hand smoke x 33 yrs   Substance Use Topics    Alcohol use: No           Family History:  Family History   Problem Relation Age of Onset    Stroke Mother         intracerebral aneurysm    Diabetes Maternal Aunt     Diabetes Maternal Uncle     No Known Problems Father         didn't have contact with father    Breast Cancer Daughter 28    Hypertension Maternal Grandmother        Review of Systems:  A detailed 10 system ROS is obtained, with pertinent positives as listed above. All others are negative. Diet:  NPO    Objective:     Physical Exam:  Vitals:  Visit Vitals  BP (!) 151/94 (BP Patient Position: At rest)   Pulse 99   Temp 97.8 °F (36.6 °C)   Resp 16   Ht 5' 2\" (1.575 m)   Wt 53.3 kg (117 lb 8.9 oz)   SpO2 93%   BMI 21.50 kg/m²     Gen:  Pt is alert, cooperative, no acute distress  Skin:  Extremities and face reveal no rashes. HEENT: Sclerae anicteric. Extra-occular muscles are intact. No oral ulcers. No abnormal pigmentation of the lips. The neck is supple. Cardiovascular: Regular rate and rhythm. No murmurs, gallops, or rubs. Respiratory:  Comfortable breathing with no accessory muscle use. Clear but diminished breath sounds anteriorly with no wheezes, rales, or rhonchi. GI:  Abdomen nondistended, soft, and mildly tender throughout abdomen with no rebound or guarding. Active bowel sounds. No enlargement of the liver or spleen. No masses palpable. Rectal:  Deferred  Musculoskeletal:  No pitting edema of the lower legs. Neurological:  Gross memory appears intact. Patient is alert and oriented.   Psychiatric:  Mood appears appropriate with judgement intact. Lymphatic:  No cervical or supraclavicular adenopathy. Laboratory:    Recent Labs     11/18/21  0245 11/17/21  2316 11/17/21  1748   WBC 14.7*  --  10.8   HGB 15.9* 15.9* 8.6*   HCT 48.4* 48.2* 27.5*   *  --  138*   MCV 83.9  --  92.0   NA  --   --  142   K  --   --  3.7   CL  --   --  111*   CO2  --   --  16*   BUN  --   --  11   CREA  --   --  0.52*   CA  --   --  7.2*   GLU  --   --  126*   AP  --   --  140*   AST  --   --  32   ALT  --   --  19   TBILI  --   --  0.5   ALB  --   --  1.2*   TP  --   --  3.2*   LPSE  --   --  63*          Assessment:     Principal Problem:    Severe sepsis with septic shock ([facility]) (11/17/2021)    Active Problems:    Acute blood loss anemia (11/17/2021)      Primary hypertension (8/16/2016)      Overview: Goal < 140/80      Hiatal hernia with GERD and esophagitis (8/16/2016)      Osteoporosis (10/25/2016)      Severe persistent asthma without complication (1/19/6803)      Gastroesophageal reflux disease with esophagitis (11/1/2015)      Mood disorder ([facility] (11/1/2015)      Compression fracture of T9 vertebra ([facility]) (10/18/2021)      Body mass index (BMI) of 21.0 to 21.9 in adult (11/17/2021)      Colitis, indeterminate (11/17/2021)        Plan:     76 yo female is seen today for evaluation of dysphagia with abnormal CT esophagus - stricture vs mass, obtained on admission. She presented to the ED 11/17 with abdominal pain, hypotension, and hypoxia, quickly improving with supportive care. She required Levophed for a short period. Imaging with abnormal esophagus as well as a left sided colitis, for which surgery was consulted. She has been placed on antibx for presumed diverticulitis; colonoscopy with Dr Aracely Branch this summer noted diverticulosis but was otherwise unremarkable. Patient was seen earlier in the year in our office for dysphagia with barium swallow at that time which revealed hiatal hernia and an esophageal narrowing.   She did not keep recommended follow-up to discuss EGD but today reports continued dysphagia to pills and solids. HH repair with  Nissen reviewed from 2010 with Dr Narciso Petty. She has had a chronic anemia, followed by heme/onc, with stable hgb this admission following Injectafer in August.    1.  NPO  2. EGD with possible dilation today for complaints of dysphagia, abnormal imaging of esophagus, and question of blood in stools  3. Agree with antibx per surgery for left sided colitis  4. If diarrhea persists, will obtain stool studies to evaluate (she reports her baseline fluctuates between constipation and diarrhea)  5. Further recommendations to be made based on findings of above     Patient is seen and examined in collaboration with Dr. Mulugeta Howe. Assessment and plan as per Dr. Mulugeta Howe.   Prashanth Keller=kate

## 2021-11-18 NOTE — ED NOTES
TRANSFER - OUT REPORT:    Verbal report given to [facility] RN(name) on Miguel Gillespie  being transferred to 3rd floor(unit) for routine progression of care       Report consisted of patients Situation, Background, Assessment and   Recommendations(SBAR). Information from the following report(s) SBAR was reviewed with the receiving nurse. Lines:   Peripheral IV 11/17/21 Proximal; Right; Posterior Forearm (Active)       Peripheral IV 11/17/21 Right Hand (Active)       Peripheral IV 11/17/21 Left Forearm (Active)       Peripheral IV 11/17/21 Right  (Active)   Site Assessment Clean, dry, & intact 11/17/21 2020   Phlebitis Assessment 0 11/17/21 2020   Infiltration Assessment 0 11/17/21 2020   Dressing Status Clean, dry, & intact 11/17/21 2020        Opportunity for questions and clarification was provided.       Patient transported with:   Registered Nurse

## 2021-11-18 NOTE — PROGRESS NOTES
Pt presented with acute abdominal pain and syncope. She was found to be in septic shock and was initially placed on pressors. After resuscitation her shock stabilized. CT demonstrated acute left-sided colitis with indeterminate cause. Has a PMHx of osteoporosis with multiple vertebral fracture, mood disorder with anxiety and depression and hiatal hernia with gastroparesis. Pt has scheduled EGD today. CM met with pt for assessment and dcp. Pt lives with her  in a trailer. Reports that her  helps her with getting into the shower due to her vertebral fracture but is otherwise independent with her ADLs. Denies DME use. Denies HH/STR. PT/OT was consulted and recommended HH. Pt was receptive and requested [facility]. CM will send referral for RN/PT/OT; f/u at discharge to verify receipt. Therapy also recommended a RW; was placed at her bedside and writer faxed paperwork to [employer] for [employer]. Pt still drives. Declined completing HCPOA documents, reported that her daughter \"does all my stuff for me anyway. \" Pt is on 2L O2 currently and denies home oxygen use. PCP confirmed. Insurance verified. Able to afford her meds. Care Management Interventions  PCP Verified by CM: Yes Ty Pen)  Mode of Transport at Discharge:  Other (see comment) (Family)  Transition of Care Consult (CM Consult): Home Health, Discharge Planning ([facility])  Discharge Durable Medical Equipment: Yes (-[facility])  Physical Therapy Consult: Yes  Occupational Therapy Consult: Yes  Speech Therapy Consult: No  Support Systems: Spouse/Significant Other, Child(kaleb), Other Family Member(s), Friend/Neighbor, [employer]  Confirm Follow Up Transport: Family  The Plan for Transition of Care is Related to the Following Treatment Goals : Return home and back to her baseline  Discharge Location  Discharge Placement: Home with home health

## 2021-11-18 NOTE — PROGRESS NOTES
TRANSFER - OUT REPORT:    Verbal report given to Severo Traore on [facility]  being transferred to GI Lab(unit) for ordered procedure       Report consisted of patients Situation, Background, Assessment and   Recommendations(SBAR). Information from the following report(s) SBAR, Kardex, Intake/Output, MAR and Recent Results was reviewed with the receiving nurse. Lines:   Triple Lumen 11/17/21 Anterior; Right Neck (Active)   Central Line Being Utilized Yes 11/18/21 1231   Criteria for Appropriate Use Hemodynamically unstable, requiring monitoring lines, vasopressors, or volume resuscitation 11/18/21 1231   Site Assessment Clean, dry, & intact 11/18/21 1231   Infiltration Assessment 0 11/18/21 1231   Affected Extremity/Extremities Color distal to insertion site pink (or appropriate for race) 11/18/21 1231   Date of Last Dressing Change 11/17/21 11/18/21 1231   Dressing Status Clean, dry, & intact 11/18/21 1231   Dressing Type Disk with Chlorhexadine gluconate (CHG); Transparent 11/18/21 1231   Action Taken Other (comment) 11/18/21 1045   Proximal Hub Color/Line Status White; Patent; Flushed 11/18/21 1231   Positive Blood Return (Medial Site) Yes 11/18/21 1231   Medial Hub Color/Line Status Blue; Patent; Flushed 11/18/21 1231   Positive Blood Return (Lateral Site) Yes 11/18/21 1231   Distal Hub Color/Line Status Brown; Patent;  Flushed 11/18/21 1231   Positive Blood Return (Site #3) Yes 11/18/21 1231   Alcohol Cap Used No 11/18/21 1231       Quad Lumen (Active)       Peripheral IV 11/17/21 Proximal; Right; Posterior Forearm (Active)   Site Assessment Clean, dry, & intact 11/18/21 1400   Phlebitis Assessment 0 11/18/21 1400   Infiltration Assessment 0 11/18/21 1400   Dressing Status Clean, dry, & intact 11/18/21 1400   Dressing Type Transparent 11/18/21 1400   Hub Color/Line Status Flushed 11/18/21 1400   Alcohol Cap Used No 11/18/21 1400       Peripheral IV 11/17/21 Left Forearm (Active)   Site Assessment Clean, dry, & intact 11/18/21 1231   Phlebitis Assessment 0 11/18/21 1231   Infiltration Assessment 0 11/18/21 1231   Dressing Status Clean, dry, & intact 11/18/21 1231   Dressing Type Tape; Transparent 11/18/21 1231   Hub Color/Line Status Patent; Flushed 11/18/21 1231   Alcohol Cap Used No 11/18/21 1231        Opportunity for questions and clarification was provided.       Patient transported with:   Monitor

## 2021-11-18 NOTE — PROGRESS NOTES
Pt  and -110s. Notified Dr. Catherine [place] of pt's BP. Per MD lu to give AM PO verapamil dose and hydrodiuril dose now.

## 2021-11-18 NOTE — PROGRESS NOTES
Surgery  Consult dictated. Assessment: Sigmoid diverticulitis with melena  Plan: 1. Antibiotics  2. GI consult  3.  Will follow   Kati Flor MD.

## 2021-11-18 NOTE — PROGRESS NOTES
Patient complaining of blurry vision and states she feels like she did when her syncopal episode happened before coming to the hospital. BP is 148/63, , , O2 sat 96. Dr. Wandy Jim notified. CT of the head ordered stat. Pt transported with this RN and Charge nurse to CT.

## 2021-11-18 NOTE — CONSULTS
Comprehensive Nutrition Assessment    Type and Reason for Visit: Initial, Positive nutrition screen, Consult  Best Practice Alert for Malnutrition Screening Tool: Recently Lost Weight Without Trying: Yes, If Yes, How Much Weight Loss: 2-13 lbs, Eating Poorly Due to Decreased Appetite: Yes  Unintentional weight loss (Hospitalist)    Nutrition Recommendations/Plan:    Continue NPO. Diet advancement per MD.     Malnutrition Assessment:  Malnutrition Status: Severe malnutrition  Context: Acute illness  Findings of clinical characteristics of malnutrition:   Energy Intake:  Unable to assess (pt reports 1-1.5months poor po, worsened 1-1.5 weeks PTA)  Weight Loss:  7.0 - Greater than 5% over 1 month (128lbs at ortho office visit 10/18; -10% x1 month)     Body Fat Loss:  7 - Moderate body fat loss, Orbital, Triceps   Muscle Mass Loss:  7 - Moderate muscle mass loss, Calf, Thigh (quadriceps) (mild temporal wasting)  Fluid Accumulation:  Unable to assess,     Strength:  Not performed     Nutrition Assessment:   Nutrition History: Pt reports poor po intake for 1-1.5 months PTA due to loss of appetite consuming 2 meals/day with occasional snacks. Po intake worsened over the past 1-1.5 weeks PTA due to nausea with intake of 3-4 bites of 2 meals per day and occasional intake of jello. Pt states her daughter provided her with Boost last week. Pt reports drinking Boost last week and yesterday; however, intake of supplement not specified. Pt reports constipation 1 week PTA per H&P. Pt reports having a BM every 2-3 days varying from hard/solid stools to loose. Pt reports UBW of 128lbs which is confirmed with chart review of ortho office visit on 10/18. Over the past year, pt has weighed between 124-128lbs with declining wt noted over the past month. Nutrition Background: Pt with PMH notable for HTN, COPD, HLD, hiatal hernia s/p Nissen 2010, and gastroparesis.  Pt presents after syncopal event at home after attempting to have a BM. CTAP in ED showing descending colon/sigmoid colitis, contrast filled and distended esophagus to the level of the hiatal hernia at the GE junction, esophagus with possible obstructing mass or stricture, and gallstone. Pt is admitted for severe sepsis with septic shock, colitis, and acute blood loss anemia. Daily Update:  Pt seen sitting in chair at time of visit. Pt NPO today. Per GI note, EGD today with possible dilation due to complaints of dysphagia, abnormal imaging of esophagus, and question of blood in stools. Noted surgery consulted. Pt states having BM since admission reporting blood in stool. Pt reports taking pain medications at home PTA. Noted [place] on med rec hx.     Nutrition Related Findings:   (11/18) NPO      Current Nutrition Therapies:  DIET NPO    Current Intake:   Average Meal Intake: NPO        Anthropometric Measures:  Height: 5' 2\" (157.5 cm)  Current Body Wt: 51.9 kg (114 lb 6.7 oz) (11/18), Weight source: Standing scale  BMI: 20.9, Underweight (BMI less than 22) age over 72  Admission Body Weight: 117 lb 8.1 oz (11/17; bed scale)  Ideal Body Weight (lbs) (Calculated): 110 lbs (50 kg), 104 %  Usual Body Wt: 58.1 kg (128 lb) (10/18; ortho office visit), Percent weight change: -10.6          Edema: No data recorded   Estimated Daily Nutrient Needs:  Energy (kcal/day): 4745-1561 (Kcal/kg (25-30), Weight Used: Current (51.9kg))  Protein (g/day): 52-62 (1-1.2gm/kg) Weight Used: (Current (51.9kg))  Fluid (ml/day):   (1 ml/kcal)    Nutrition Diagnosis:   · Inadequate oral intake related to altered GI function as evidenced by NPO or clear liquid status due to medical condition (poor po intake PTA per pt)    · Severe malnutrition, In context of acute illness or injury related to inadequate protein-energy intake as evidenced by  (criteria provided per malnutrition assessment above)    Nutrition Interventions:   Food and/or Nutrient Delivery: Continue NPO     Coordination of Nutrition Care: Continue to monitor while inpatient  Plan of Care discussed with Amilcar Gomes RN    Goals: Active Goal: Tolerate diet advancement by RD follow up. Nutrition Monitoring and Evaluation:      Food/Nutrient Intake Outcomes: Diet advancement/tolerance  Physical Signs/Symptoms Outcomes: Biochemical data, GI status, Weight    Discharge Planning:     Too soon to determine    Electronically signed by Dora Reis MS, RDN, LD 11/18/2021 at 12:04 PM  Contact: [phone]

## 2021-11-18 NOTE — PROGRESS NOTES
There is a high probability of acute organ impairment or life-threatening deterioration in the patient's condition from: Acute blood loss anemia, severe sepsis  Critical care interventions: Fluid resuscitation, blood transfusion, central line  Total critical care time spent: 46 minutes. Time is indicative of direct patient attendance at bedside and on the patient's floor nearby. Includes time spent at bedside performing history and exam, performing chart review, discussing findings and treatment plan with patient and/or family, discussing patient with consultants and colleagues, ordering and reviewing pertinent laboratory and radiographic evaluations, and discussing patient with nursing staff. Time excludes procedures.

## 2021-11-18 NOTE — ANESTHESIA PREPROCEDURE EVALUATION
Anesthetic History   No history of anesthetic complications            Review of Systems / Medical History  Patient summary reviewed and pertinent labs reviewed    Pulmonary            Asthma : poorly controlled       Neuro/Psych         Psychiatric history    Comments: Seen recently for vertigo by neurologist Cardiovascular    Hypertension          Hyperlipidemia    Exercise tolerance: >4 METS  Comments: Pt with ONEAL and CP that has been constant for the last several weeks since a compression fx. Also noted to have elevated troponin (100->600s). Cardiology consulted and think supply demand mismatch from sepsis/hypotension in the setting of underlying CAD, but currently not a candidate for LHC with ongoing GIB. Considered moderate CV risk. GI/Hepatic/Renal     GERD: well controlled      Hiatal hernia    Comments: Esophageal stricture vs. Mass. Dysphagia for several days. Endo/Other        Arthritis     Other Findings   Comments: Acute GIB although HgB is 15.9. Hypotensive and syncopal with SpO2 found to be in the 70s. Septic shock. Initially on pressors, but has stabilized. Lactate 6 but now down to 1.8. Physical Exam    Airway  Mallampati: III  TM Distance: 4 - 6 cm  Neck ROM: normal range of motion   Mouth opening: Normal     Cardiovascular      Rate: abnormal        Comments: Sinus tachycardia Dental    Dentition: Edentulous     Pulmonary    Rhonchi:bilateral             Abdominal         Other Findings            Anesthetic Plan    ASA: 4, emergent  Anesthesia type: total IV anesthesia          Induction: Intravenous  Anesthetic plan and risks discussed with: Patient      plavix held 2 days.

## 2021-11-18 NOTE — CONSULTS
[address]    Name:  Nanda Philip  MR#:  [id number]  :  1945  ACCOUNT #:  [de-identified]  DATE OF SERVICE:  2021      HISTORY OF PRESENT ILLNESS:  This is a 67-year-old female who was admitted on  by the Hospitalist service with abdominal pain and after a syncopal episode. She was found to be hypotensive and hypoxic on arrival with a lactic acid level of 6.4. The high sensitivity troponin was elevated. Her white count was 14.7. She was admitted with altered mental status, but when I came to see her in room 310 at the [facility], she was awake, alert, oriented, sitting in the chair with a Foreman catheter in place. At this point, she reports that she has had four black tarry-colored bowel movements since admission. I asked that a GI consult be placed for the melena. She apparently had a colonoscopy on 2021 with pandiverticulosis found. She had a CT scan done that showed left colitis, extensive atherosclerosis, moderate hiatal hernia with dilated contrast-filled esophagus, gallstones. The General Surgery consult was placed for the sigmoid diverticulitis. At this point, the patient reports abdominal pain of 1-2/10. She said she feels much better since being hydrated. PAST MEDICAL HISTORY:  1. Anemia. 2.  Arthritis. 3.  Melanoma. 4.  Hypertension. 5.  Hyperlipidemia. 6.  Emphysema. SURGERIES:  1. Hiatal hernia repair. 2.  Tubal ligation. ALLERGIES:  NO KNOWN DRUG ALLERGIES. MEDICATIONS:  1. Effexor. 2.  Calan. 3.  Pravachol. 4.  Trelegy inhaler. 5.  Klor-Con. 6.  Zofran. 7.  Ativan. 8.  Zyrtec. 9.  Ventolin. SOCIAL HISTORY:  She was exposed to secondhand smoke for over 30 years. FAMILY HISTORY:  Mother had a stroke. Daughter had breast cancer. Maternal grandmother had hypertension.     PHYSICAL EXAMINATION:  GENERAL:  This is a well-developed, well-nourished elderly white female, presently awake, alert, oriented, able to answer all questions appropriately. VITAL SIGNS:  Pulse of 99, BP is 151/94, temp is 97.8. HEART:  Borderline sinus tachycardia. S1, S2 normal.  LUNGS:  Clear. ABDOMEN:  Soft. Very mild left lower quadrant tenderness. No rebound. No guarding. No masses appreciated. LABORATORY DATA:  White count 14.7, H and H 15.9/48.4, platelet count of 304. Lactic acid down to 1.8 at this point. Sodium 143, potassium 3, chloride 105, bicarb 21, glucose 179, BUN 14, creatinine 0.62. The patient's UA showed moderate bilirubin, nitrites positive, leukocyte esterase small. ASSESSMENT:  1. Sigmoid diverticulitis. 2.  Hiatal hernia. 3.  Melena. PLAN:  1. Antibiotics with Zosyn for her diverticulitis. 2.  GI consult for the melena. 3.  We will follow along with no surgery necessary at this point.       Priyanka Steel MD      [id number]  D:  11/18/2021 10:50  T:  11/18/2021 13:14  JOB #:  [id number]

## 2021-11-18 NOTE — ANESTHESIA POSTPROCEDURE EVALUATION
Procedure(s):  ESOPHAGOGASTRODUODENOSCOPY (EGD).     total IV anesthesia    Anesthesia Post Evaluation      Multimodal analgesia: multimodal analgesia used between 6 hours prior to anesthesia start to PACU discharge  Patient location during evaluation: bedside  Patient participation: complete - patient participated  Level of consciousness: awake  Pain management: adequate  Airway patency: patent  Anesthetic complications: no  Cardiovascular status: acceptable  Respiratory status: spontaneous ventilation and acceptable  Hydration status: acceptable  Post anesthesia nausea and vomiting:  none      INITIAL Post-op Vital signs:   Vitals Value Taken Time   /66 11/18/21 1531   Temp     Pulse 110 11/18/21 1531   Resp 18 11/18/21 1531   SpO2 99 % 11/18/21 1531

## 2021-11-18 NOTE — ROUTINE PROCESS
Verbal bedside report given to Javad Garcia oncoming RN. Patient's situation, background, assessment and recommendations provided. Opportunity for questions provided. Oncoming RN assumed care of patient.

## 2021-11-18 NOTE — PROGRESS NOTES
ACUTE PHYSICAL THERAPY GOALS:  (Developed with and agreed upon by patient and/or caregiver. )  LTG:  (1.)Ms. Annabella Jacob will move from supine to sit and sit to supine , scoot up and down and roll side to side in bed with MODIFIED INDEPENDENCE within 7 treatment day(s). (2.)Ms. Annabella Jacob will transfer from bed to chair and chair to bed with MODIFIED INDEPENDENCE using the least restrictive device within 7 treatment day(s). (3.)Ms. Annabella Jacob will ambulate with MODIFIED INDEPENDENCE for 250 feet with the least restrictive device within 7 treatment day(s). (4.)Ms. Annabella Jacob will participate in therapeutic activity/exercises x 24 minutes for increased activity tolerance within 7 treatment days.     ________________________________________________________________________________________________          PHYSICAL THERAPY ASSESSMENT: Initial Assessment and AM PT Treatment Day # 1      Jenifer Scott is a 77 y.o. female   PRIMARY DIAGNOSIS: Severe sepsis with septic shock (HCC)  Severe sepsis with septic shock (HCC) [A41.9, R65.21]  Procedure(s) (LRB):  ESOPHAGOGASTRODUODENOSCOPY (EGD) (N/A)  ESOPHAGEAL DILATION ROOM 310 *Waiting Cardio Clearance (N/A)     Reason for Referral:    ICD-10: Treatment Diagnosis: Generalized Muscle Weakness (M62.81)  INPATIENT: Payor: [facility] MEDICARE / Plan: [employer] / Product Type: Managed Care Medicare /     ASSESSMENT:     REHAB RECOMMENDATIONS:   Recommendation to date pending progress:  [address] [place] [address]  Equipment:    To Be Determined     PRIOR LEVEL OF FUNCTION:  (Prior to Hospitalization) INITIAL/CURRENT LEVEL OF FUNCTION:  (Most Recently Demonstrated)   Bed Mobility:   Independent  Sit to Stand:   Independent  Transfers:   Independent  Gait/Mobility:   Modified Independent Bed Mobility:   Minimal Assistance  Sit to Stand:   Minimal Assistance  Transfers:   Minimal Assistance  Gait/Mobility:   Minimal Assistance     ASSESSMENT:  Ms. Thuan Green presents to PT with [facility] AROM and decreased strength in B LEs. Pt also demonstrated fair standing balance and decreased activity tolerance today. She required Tomas for transfers and ambulation. Pt demonstrated decreased gait speed as well but tolerated room air with post treatment SpO2 of 92%. Ms. Thuan Green could benefit from skilled PT as she is currently functioning below her baseline. SUBJECTIVE:   Ms. Thuan Green states, \"My  does. \"    SOCIAL HISTORY/LIVING ENVIRONMENT: Lives with spouse who she sometimes uses for support when ambulating  Home Environment: Private residence  One/Two Story Residence: One story  Living Alone: No  Support Systems: Spouse/Significant Other, Child(kaleb)  OBJECTIVE:     PAIN: VITAL SIGNS: LINES/DRAINS:   Pre Treatment: Pain Screen  Pain Scale 1: Numeric (0 - 10)  Pain Intensity 1: 2  Pain Onset 1: ongoing  Pain Location 1: Abdomen  Post Treatment: 2/10 Vital Signs  O2 Sat (%): 92 %  O2 Device: None (Room air) Foreman Catheter and IV  O2 Device: Nasal cannula     GROSS EVALUATION:  B LE Within Functional Limits Abnormal/ Functional Abnormal/ Non-Functional (see comments) Not Tested Comments:   AROM [x] [] [] []    PROM [] [] [] []    Strength [] [x] [] []    Balance [] [x] [] [] Fair standing   Posture [] [] [] []    Sensation [] [] [] []    Coordination [] [] [] []    Tone [] [] [] []    Edema [] [] [] []    Activity Tolerance [] [x] [] [] Appeared fatigued with ambulating in room    [] [] [] []      COGNITION/  PERCEPTION: Intact Impaired   (see comments) Comments:   Orientation [x] []    Vision [] []    Hearing [] []    Command Following [x] []    Safety Awareness [x] []     [] []      MOBILITY: I Mod I S SBA CGA Min Mod Max Total  NT x2 Comments:   Bed Mobility    Rolling [] [] [] [] [] [x] [] [] [] [] []    Supine to Sit [] [] [] [] [] [x] [] [] [] [] []    Scooting [] [] [] [] [] [] [] [] [] [] []    Sit to Supine [] [] [] [] [] [] [] [] [] [] []    Transfers    Sit to Stand [] [] [] [] [] [x] [] [] [] [] []    Bed to Chair [] [] [] [] [] [x] [] [] [] [] []    Stand to Sit [] [] [] [] [] [x] [] [] [] [] []    I=Independent, Mod I=Modified Independent, S=Supervision, SBA=Standby Assistance, CGA=Contact Guard Assistance,   Min=Minimal Assistance, Mod=Moderate Assistance, Max=Maximal Assistance, Total=Total Assistance, NT=Not Tested  GAIT: I Mod I S SBA CGA Min Mod Max Total  NT x2 Comments:   Level of Assistance [] [] [] [] [] [x] [] [] [] [] []    Distance 20 ft    DME HHA    Gait Quality Decreased gait speed    Weightbearing Status N/A     I=Independent, Mod I=Modified Independent, S=Supervision, SBA=Standby Assistance, CGA=Contact Guard Assistance,   Min=Minimal Assistance, Mod=Moderate Assistance, Max=Maximal Assistance, Total=Total Assistance, NT=Not Tested    [facility] [employer] [facility] Form       How much difficulty does the patient currently have. .. Unable A Lot A Little None   1. Turning over in bed (including adjusting bedclothes, sheets and blankets)? [] 1   [] 2   [] 3   [x] 4   2. Sitting down on and standing up from a chair with arms ( e.g., wheelchair, bedside commode, etc.)   [] 1   [] 2   [x] 3   [] 4   3. Moving from lying on back to sitting on the side of the bed? [] 1   [] 2   [x] 3   [] 4   How much help from another person does the patient currently need. .. Total A Lot A Little None   4. Moving to and from a bed to a chair (including a wheelchair)? [] 1   [] 2   [x] 3   [] 4   5. Need to walk in hospital room? [] 1   [] 2   [x] 3   [] 4   6. Climbing 3-5 steps with a railing? [] 1   [] 2   [x] 3   [] 4   © 2007, Trustees of [facility] [employer], under license to [employer].  All rights reserved     Score:  Initial: 19 Most Recent: X (Date: -- )    Interpretation of Tool:  Represents activities that are increasingly more difficult (i.e. Bed mobility, Transfers, Gait).    PLAN:   FREQUENCY/DURATION: PT Plan of Care: 3 times/week for duration of hospital stay or until stated goals are met, whichever comes first.    PROBLEM LIST:   (Skilled intervention is medically necessary to address:)  1. Decreased Activity Tolerance  2. Decreased Balance  3. Decreased Gait Ability  4. Decreased Strength   INTERVENTIONS PLANNED:   (Benefits and precautions of physical therapy have been discussed with the patient.)  1. Therapeutic Activity  2. Therapeutic Exercise/HEP  3. Neuromuscular Re-education  4. Gait Training  5. Manual Therapy  6. Education     TREATMENT:     EVALUATION: Low Complexity : (Untimed Charge)    TREATMENT:   ($$ Therapeutic Activity: 8-22 mins    )  Therapeutic Activity (12 Minutes): Therapeutic activity included Transfer Training, Ambulation on level ground and Standing balance to improve functional Mobility, Strength and Activity tolerance.     TREATMENT GRID:  N/A    AFTER TREATMENT POSITION/PRECAUTIONS:  Alarm Activated, Chair, Needs within reach and RN notified    INTERDISCIPLINARY COLLABORATION:  RN/PCT, PT/PTA and OT/[place]    TOTAL TREATMENT DURATION:  PT Patient Time In/Time Out  Time In: 0926  Time Out: [address] [facility] Jamee Noyola DPT

## 2021-11-18 NOTE — PROCEDURES
ESOPHAGOGASTRODUODENOSCOPY        DATE of PROCEDURE: 11/18/2021    PT NAME: Dennis Berger  [id number]    PHYSICIAN:  Camron Amado MD    MEDICATION:  MAC    INSTRUMENT: GIFQ    PROCEDURE:  EGD diagnostic (incomplete due to retained food in stomach)    INDICATIONS: Dysphagia, Melena, Abnormal CT with possible stricture vs. mass in distal esophagus     FINDINGS:  OROPHARYNX: Cords and pyriform recesses normal.  ESOPHAGUS: The esophagus was lined with prior contrast that patient drank so the view of the mucosa were limited. STOMACH: There is a moderate amount of retained food in the stomach so the procedure was incomplete and terminated. ASSESSMENT:  1. Retained Food in Stomach  2. Incomplete Exam    PLAN:  1. Okay for clear liquid diet today. Keep patient NPO after MN  2. Will plan for repeat EGD tomorrow.     Camron Amado MD  [facility]

## 2021-11-18 NOTE — H&P
Hospitalist History and Physical   Admit Date:  2021  3:47 PM   Name:  Bebeto Manzano   Age:  77 y.o. Sex:  female  :  1945   MRN:  [id number]   Room:  02/    Presenting Complaint: Hypotension    Reason(s) for Admission: Severe sepsis with septic shock ([facility] [A41.9, R65.21]     History of Present Illness:   Bebeto Manzano is a 77 y.o. female with medical history of osteoporosis with multiple vertebral fracture, mood disorder with anxiety and depression, hiatal hernia with gastroparesis who presented with acute abdominal pain and syncope. She was found to be in septic shock and was initially placed on pressors. A central line was placed. After resuscitation her shock stabilized. CT demonstrated acute left-sided colitis with indeterminate cause. She had been in her usual state of health when she attempted to have a bowel movement. She had been constipated and was struggling. She began to feel very weak. Upon returning to her dining room she passed out where she was found by her . He immediately called EMS who found her to be hypotensive and hypoxic. She was brought to the emergency department and obtunded state. She reports no recent illness but does report diffuse abdominal pain worse on the left. She reported nausea and constipation without vomiting. She had no changes in her urine. She has not been able to eat and is in fact lost over 10 pounds she has a chronic cough but has not been bothering her. She does have pain in her back due to the vertebral fracture. She denies any other symptoms at this time. Review of Systems:  10 systems reviewed and negative except as noted in HPI. Assessment & Plan:   * Severe sepsis with septic shock [facility])  Admission with septic shock maps less than 60, briefly on pressors in the emergency department, stabilized after fluid resuscitation. CT concerning for diverticulitis versus ischemic bowel.   -Vancomycin, Zosyn  -Consult general surgery in the morning    Colitis, indeterminate  FOBT +; ischemic versus diverticulitis;    Acute blood loss anemia  Given PRBC 1 you in emergency department  -Transfuse below 7    Compression fracture of T9 vertebra (HCC)  -norco 5  -PT, OT, PPD    Mood disorder (HCC)  Anxiety and depression, previously treated with benzodiazepines concomitantly treating pain with opioids  -Venlafaxine  -Use caution with benzodiazepines  -Sleep orders    Body mass index (BMI) of 21.0 to 21.9 in adult  Represents greater than 15 pound weight loss  -Nutrition consult    Gastroesophageal reflux disease with esophagitis  -pantoprazole, carafate    Severe persistent asthma without complication  -albuterol, trellegy    Osteoporosis  Noted     Hiatal hernia with GERD and esophagitis  Noted    Primary hypertension  -verapamil        Dispo/Discharge Planning:     Short term rehab in 3-4 days    Diet: No diet orders on file NPO  VTE ppx: SCDs  Code status: Prior    Hospital Problems as of 11/17/2021 Date Reviewed: 11/17/2021          Codes Class Noted - Resolved POA    * (Principal) Severe sepsis with septic shock ([facility] ICD-10-CM: A41.9, R65.21  ICD-9-CM: 038.9, 995.92, 785.52  11/17/2021 - Present Yes        Colitis, indeterminate ICD-10-CM: K52.3  ICD-9-CM: 558.9  11/17/2021 - Present Unknown        Acute blood loss anemia ICD-10-CM: D62  ICD-9-CM: 285.1  11/17/2021 - Present Yes        Compression fracture of T9 vertebra (HCC) (Chronic) ICD-10-CM: O06.041Y  ICD-9-CM: 805.2  10/18/2021 - Present Yes        Mood disorder ([facility] ICD-10-CM: F39  ICD-9-CM: 296.90  11/1/2015 - Present Yes        Body mass index (BMI) of 21.0 to 21.9 in adult ICD-10-CM: Z68.21  ICD-9-CM: V85.1  11/17/2021 - Present Yes        Severe persistent asthma without complication UPY-63-QX: X91.48  ICD-9-CM: 493.90  5/28/2021 - Present Yes        Osteoporosis ICD-10-CM: M81.0  ICD-9-CM: 733.00  10/25/2016 - Present Yes        Primary hypertension (Chronic) ICD-10-CM: I10  ICD-9-CM: 401.9  8/16/2016 - Present Yes    Overview Signed 11/17/2021  9:48 PM by Dileep Enriquez MD     Goal < 140/80             Hiatal hernia with GERD and esophagitis ICD-10-CM: K44.9, K21.00  ICD-9-CM: 553.3, 530.11  8/16/2016 - Present Yes        Gastroesophageal reflux disease with esophagitis (Chronic) ICD-10-CM: K21.00  ICD-9-CM: 530.11  11/1/2015 - Present Yes              Past History:  Past Medical History:   Diagnosis Date    Anemia 2005 approx    2 units transfused    Arthritis     hands, hips    B12 deficiency     Cancer ([facility]     hx of melanoma 1986 R arm, and carcinoma x 2 forehead    COVID-19 vaccine series completed 02/2021    PT TO BRING CARD DOS    Depression with anxiety 8/16/2016    Essential hypertension with goal blood pressure less than 140/90     controlled with med    Gastrointestinal disorder     hiatel hernia    Hyperlipidemia, unspecified 8/16/2016    Menopause     @ 51    Mild intermittent asthma 8/16/2016    Osteoarthritis of multiple joints 8/16/2016    Osteoporosis 10/25/2016    Pulmonary emphysema ([facility] 08/16/2016    daily inhalers    Rectal bleed     Vertigo      Past Surgical History:   Procedure Laterality Date    COLONOSCOPY N/A 7/30/2021    COLONOSCOPY/BMI 23 performed by Bonnita Felty, MD at [address] [place] [address]; HI RISK IND  7/30/2021         HX HERNIA REPAIR  2010    helped reflex    HX TUBAL LIGATION  1972      Allergies   Allergen Reactions    Other Medication Rash     Equate antibiotic ointment      Social History     Tobacco Use    Smoking status: Never Smoker    Smokeless tobacco: Never Used    Tobacco comment: 2nd hand smoke x 33 yrs   Substance Use Topics    Alcohol use: No      Family History   Problem Relation Age of Onset    Stroke Mother         intracerebral aneurysm    Diabetes Maternal Aunt     Diabetes Maternal Uncle     No Known Problems Father         didn't have contact with father  Breast Cancer Daughter 28    Hypertension Maternal Grandmother       Family history reviewed and negative except as otherwise noted. Immunization History   Administered Date(s) Administered    COVID-19, Moderna, Primary or Immunocompromised Series, MRNA, PF, 100mcg/0.5mL 01/01/2021, 02/02/2021    Influenza High Dose Vaccine PF 01/01/2016, 01/15/2018, 11/27/2018, 11/01/2020    Influenza Vaccine 09/19/2016, 01/01/2018    Influenza Vaccine (Tri) Adjuvanted (>65 Yrs FLUAD TRI 64434) 11/22/2019    Pneumococcal Conjugate (PCV-13) 09/19/2016    Pneumococcal Polysaccharide (PPSV-23) 02/12/2018    Pneumococcal Vaccine (Unspecified Type) 08/11/2012    Tdap 05/19/2021     Prior to Admit Medications:  Current Outpatient Medications   Medication Instructions    albuterol (PROVENTIL VENTOLIN) 2.5 mg, Nebulization, 4 TIMES DAILY, Diagnosis--J45.20    albuterol (Ventolin HFA) 90 mcg/actuation inhaler 2 Puffs, Inhalation, 4 TIMES DAILY    azithromycin (ZITHROMAX) 250 mg, Oral, 3 TIMES A WEEK (MON,WED & FRI)    calcium-cholecalciferol, d3, (CALCIUM 600 + D) 600-125 mg-unit tab 1 Tablet, Oral, DAILY    cetirizine (ZYRTEC) 10 mg, Oral, DAILY    clopidogreL (PLAVIX) 75 mg, Oral, DAILY    ergocalciferol (ERGOCALCIFEROL) 50,000 Units, Oral, DAILY    fluticasone-umeclidin-vilanter (Trelegy Ellipta) 200-62.5-25 mcg dsdv 1 Puff, Inhalation, DAILY    hydroCHLOROthiazide (HYDRODIURIL) 12.5 mg, Oral, DAILY    HYDROcodone-acetaminophen (NORCO) 5-325 mg per tablet No dose, route, or frequency recorded.  LORazepam (ATIVAN) 1 mg tablet Take 1/2 to 1 tab PO Q 12 hours PRN anxiety, use sparingly    meclizine (ANTIVERT) 25 mg tablet Take 1 tablet 3 times daily as needed    montelukast (SINGULAIR) 10 mg tablet TAKE 1 TABLET BY MOUTH DAILY AT BEDTIME    omeprazole (PRILOSEC) 40 mg, Oral, 2 TIMES DAILY    ondansetron (ZOFRAN ODT) 4 mg disintegrating tablet No dose, route, or frequency recorded.     potassium chloride (KLOR-CON) 10 mEq tablet 10 mEq, Oral, DAILY    pravastatin (PRAVACHOL) 20 mg, Oral, DAILY    venlafaxine-SR (EFFEXOR XR) 75 mg, Oral, 2 TIMES DAILY    venlafaxine-SR (EFFEXOR XR) 37.5 mg, Oral, EVERY BEDTIME    verapamil ER (CALAN-SR) 240 mg CR tablet TAKE 1 TABLET BY MOUTH EVERY MORNING       Objective:     Patient Vitals for the past 24 hrs:   Temp Pulse Resp BP SpO2   11/17/21 2136 -- (!) 115 14 (!) 146/83 95 %   11/17/21 2121 -- (!) 117 26 137/83 95 %   11/17/21 2106 -- (!) 115 12 (!) 148/85 95 %   11/17/21 2053 -- (!) 117 (!) 34 128/71 94 %   11/17/21 2041 98.3 °F (36.8 °C) (!) 115 12 131/72 94 %   11/17/21 2038 -- (!) 118 (!) 34 131/72 94 %   11/17/21 2025 98.2 °F (36.8 °C) (!) 118 12 126/72 92 %   11/17/21 2016 98.2 °F (36.8 °C) (!) 117 12 124/69 93 %   11/17/21 2013 98.2 °F (36.8 °C) (!) 117 12 124/69 93 %   11/17/21 2010 -- (!) 119 24 124/69 92 %   11/17/21 1955 -- (!) 121 16 -- 93 %   11/17/21 1941 98.7 °F (37.1 °C) -- -- -- --   11/17/21 1940 -- (!) 119 25 115/66 92 %   11/17/21 1930 97.5 °F (36.4 °C) -- -- -- --   11/17/21 1821 -- (!) 117 24 (!) 99/53 99 %   11/17/21 1812 -- (!) 113 13 (!) 83/48 91 %   11/17/21 1801 -- (!) 107 25 (!) 61/40 (!) 89 %   11/17/21 1752 -- (!) 109 -- (!) 71/37 92 %   11/17/21 1732 -- (!) 111 -- (!) 78/52 92 %   11/17/21 1721 -- (!) 111 -- (!) 92/51 92 %   11/17/21 1714 -- (!) 112 -- (!) 90/42 95 %   11/17/21 1711 -- (!) 113 -- (!) 79/41 (!) 83 %   11/17/21 1702 -- (!) 111 26 (!) 79/41 --   11/17/21 1648 -- (!) 108 21 (!) 73/43 92 %   11/17/21 1637 -- 100 23 (!) 65/32 96 %   11/17/21 1632 -- (!) 103 28 (!) 78/47 --   11/17/21 1616 -- (!) 103 (!) 69 (!) 105/58 95 %   11/17/21 1552 (!) 94.4 °F (34.7 °C) (!) 105 18 (!) 100/58 97 %     Oxygen Therapy  O2 Sat (%): 95 % (11/17/21 2136)  Pulse via Oximetry: 116 beats per minute (11/17/21 2136)  O2 Device: None (Room air) (11/17/21 1552)    Estimated body mass index is 21.03 kg/m² as calculated from the following:    Height as of this encounter: 5' 2\" (1.575 m). Weight as of this encounter: 52.2 kg (115 lb). Intake/Output Summary (Last 24 hours) at 11/17/2021 2202  Last data filed at 11/17/2021 2021  Gross per 24 hour   Intake 310 ml   Output --   Net 310 ml       Blood pressure (!) 146/83, pulse (!) 115, temperature 98.3 °F (36.8 °C), resp. rate 14, height 5' 2\" (1.575 m), weight 52.2 kg (115 lb), SpO2 95 %. Physical Exam  Vitals and nursing note reviewed. Constitutional:       General: She is not in acute distress. Appearance: Normal appearance. Comments: pleasant   HENT:      Head: Normocephalic. Eyes:      Extraocular Movements: Extraocular movements intact. Cardiovascular:      Rate and Rhythm: Normal rate and regular rhythm. Pulses:           Radial pulses are 2+ on the left side. Heart sounds: No murmur heard. Pulmonary:      Effort: Pulmonary effort is normal. No respiratory distress. Breath sounds: No wheezing or rhonchi. Abdominal:      General: Abdomen is flat. Bowel sounds are normal. There is no distension. Tenderness: There is abdominal tenderness in the epigastric area and left lower quadrant. There is no guarding or rebound. Musculoskeletal:         General: No deformity. Cervical back: No rigidity. Right lower leg: No edema. Left lower leg: No edema. Skin:     General: Skin is warm and dry. Neurological:      General: No focal deficit present. Mental Status: She is alert and oriented to person, place, and time.    Psychiatric:         Mood and Affect: Mood normal.         Behavior: Behavior normal.         I have reviewed ordered lab tests and independently visualized imaging below:    Last 24hr Labs:  Recent Results (from the past 24 hour(s))   EKG, 12 LEAD, INITIAL    Collection Time: 11/17/21  3:50 PM   Result Value Ref Range    Ventricular Rate 103 BPM    Atrial Rate 110 BPM    P-R Interval 154 ms    QRS Duration 74 ms    Q-T Interval 376 ms    QTC Calculation (Bezet) 492 ms    Calculated P Axis 84 degrees    Calculated R Axis 58 degrees    Calculated T Axis 76 degrees    Diagnosis       !! AGE AND GENDER SPECIFIC ECG ANALYSIS !! Sinus tachycardia  Nonspecific ST abnormality  Abnormal ECG  No previous ECGs available     URINALYSIS W/ RFLX MICROSCOPIC    Collection Time: 11/17/21  5:20 PM   Result Value Ref Range    Color RED      Appearance CLOUDY      Specific gravity 1.021 1.001 - 1.023      pH (UA) 5.5 5.0 - 9.0      Protein 100 (A) NEG mg/dL    Glucose Negative mg/dL    Ketone 15 (A) NEG mg/dL    Bilirubin MODERATE (A) NEG      Blood Negative NEG      Urobilinogen 2.0 (H) 0.2 - 1.0 EU/dL    Nitrites Positive (A) NEG      Leukocyte Esterase SMALL (A) NEG     URINE MICROSCOPIC    Collection Time: 11/17/21  5:20 PM   Result Value Ref Range    WBC 5-10 0 /hpf    RBC 3-5 0 /hpf    Epithelial cells 5-10 0 /hpf    Bacteria 0 0 /hpf    Casts 0 0 /lpf    Crystals, urine 0 0 /LPF    Mucus 2+ (H) 0 /lpf   LACTIC ACID    Collection Time: 11/17/21  5:24 PM   Result Value Ref Range    Lactic acid 6.4 (HH) 0.4 - 2.0 MMOL/L   CBC WITH AUTOMATED DIFF    Collection Time: 11/17/21  5:48 PM   Result Value Ref Range    WBC 10.8 4.3 - 11.1 K/uL    RBC 2.99 (L) 4.05 - 5.2 M/uL    HGB 8.6 (L) 11.7 - 15.4 g/dL    HCT 27.5 (L) 35.8 - 46.3 %    MCV 92.0 79.6 - 97.8 FL    MCH 28.8 26.1 - 32.9 PG    MCHC 31.3 (L) 31.4 - 35.0 g/dL    RDW 17.2 (H) 11.9 - 14.6 %    PLATELET 171 (L) 154 - 450 K/uL    MPV 11.2 9.4 - 12.3 FL    ABSOLUTE NRBC 0.00 0.0 - 0.2 K/uL    DF AUTOMATED      NEUTROPHILS 76 43 - 78 %    LYMPHOCYTES 16 13 - 44 %    MONOCYTES 5 4.0 - 12.0 %    EOSINOPHILS 2 0.5 - 7.8 %    BASOPHILS 1 0.0 - 2.0 %    IMMATURE GRANULOCYTES 0 0.0 - 5.0 %    ABS. NEUTROPHILS 8.3 (H) 1.7 - 8.2 K/UL    ABS. LYMPHOCYTES 1.7 0.5 - 4.6 K/UL    ABS. MONOCYTES 0.5 0.1 - 1.3 K/UL    ABS. EOSINOPHILS 0.2 0.0 - 0.8 K/UL    ABS. BASOPHILS 0.1 0.0 - 0.2 K/UL    ABS. IMM.  GRANS. 0.0 0.0 - 0.5 K/UL METABOLIC PANEL, COMPREHENSIVE    Collection Time: 11/17/21  5:48 PM   Result Value Ref Range    Sodium 142 136 - 145 mmol/L    Potassium 3.7 3.5 - 5.1 mmol/L    Chloride 111 (H) 98 - 107 mmol/L    CO2 16 (L) 21 - 32 mmol/L    Anion gap 15 7 - 16 mmol/L    Glucose 126 (H) 65 - 100 mg/dL    BUN 11 8 - 23 MG/DL    Creatinine 0.52 (L) 0.6 - 1.0 MG/DL    GFR est AA >60 >60 ml/min/1.73m2    GFR est non-AA >60 >60 ml/min/1.73m2    Calcium 7.2 (L) 8.3 - 10.4 MG/DL    Bilirubin, total 0.5 0.2 - 1.1 MG/DL    ALT (SGPT) 19 12 - 65 U/L    AST (SGOT) 32 15 - 37 U/L    Alk. phosphatase 140 (H) 50 - 136 U/L    Protein, total 3.2 (L) 6.3 - 8.2 g/dL    Albumin 1.2 (L) 3.2 - 4.6 g/dL    Globulin 2.0 (L) 2.3 - 3.5 g/dL    A-G Ratio 0.6 (L) 1.2 - 3.5     PROCALCITONIN    Collection Time: 11/17/21  5:48 PM   Result Value Ref Range    Procalcitonin 0.07 0.00 - 0.49 ng/mL   TROPONIN-HIGH SENSITIVITY    Collection Time: 11/17/21  5:48 PM   Result Value Ref Range    Troponin-High Sensitivity 24.7 (H) 0 - 14 pg/mL   LIPASE    Collection Time: 11/17/21  5:48 PM   Result Value Ref Range    Lipase 63 (L) 73 - 393 U/L   TYPE & SCREEN    Collection Time: 11/17/21  6:35 PM   Result Value Ref Range    Crossmatch Expiration 11/20/2021,2359     ABO/Rh(D) O POSITIVE     Antibody screen NEG     Comment BLOOD READY CALLED ER AT 1934 11.17.21     Unit number [id number]     Blood component type RC LR     Unit division 00     Status of unit ISSUED     Crossmatch result Compatible    RBC, ALLOCATE    Collection Time: 11/17/21  6:45 PM   Result Value Ref Range    HISTORY CHECKED?  Historical check performed    LACTIC ACID    Collection Time: 11/17/21  7:38 PM   Result Value Ref Range    Lactic acid 4.0 (HH) 0.4 - 2.0 MMOL/L   TROPONIN-HIGH SENSITIVITY    Collection Time: 11/17/21  7:38 PM   Result Value Ref Range    Troponin-High Sensitivity 193.0 (HH) 0 - 14 pg/mL       All Micro Results     Procedure Component Value Units Date/Time    BLOOD CULTURE [[id number]] Collected: 11/17/21 1651    Order Status: Completed Specimen: Blood Updated: 11/17/21 1716    BLOOD CULTURE [[id number]] Collected: 11/17/21 1607    Order Status: Completed Specimen: Blood Updated: 11/17/21 1628          Other Studies:  CT HEAD WO CONT    Result Date: 11/17/2021  NONCONTRAST HEAD CT CLINICAL HISTORY:  Headache with hypotension. TECHNIQUE:  Axial images were obtained with spiral technique. Radiation dose reduction was achieved using one or all of the following techniques: automated exposure control, weight-based dosing, iterative reconstruction. COMPARISON:  June 21, 2021. REPORT:   Standard noncontrast head CT demonstrates no definite intracranial mass effect, hemorrhage, or evidence of acute geographic infarction. Extensive white matter hypodensities are most consistent with small vessel ischemic disease. The ventricles are normal in size and configuration, accounting for the patient's age. There is extensive mucous membrane thickening of the left maxillary antrum and more mild thickening in the right maxillary antrum, multiple bilateral ethmoid air cells, and the left frontal sinus. Orbits  and other paranasal sinuses are clear where imaged. Bone windows demonstrate no definite fracture or destruction. 1.  EXTENSIVE SMALL VESSEL ISCHEMIC DISEASE WITH NO ACUTE INTRACRANIAL ABNORMALITY IDENTIFIED AT NONCONTRAST CT. 2. EXTENSIVE SINUSITIS, MOST MARKED IN THE LEFT MAXILLARY SINUS. .     XR CHEST PORT    Result Date: 11/17/2021  Chest X-ray INDICATION: Central line placement A portable AP view of the chest was obtained. FINDINGS: Right IJ central line is in place with the tip in the right atrium. There is no pneumothorax. Background interstitial prominence appears chronic. No developing focal infiltrate. The heart size is normal.  The bony thorax is intact. No evidence of complication post line placement     XR CHEST PORT    Result Date: 11/17/2021  CHEST X-RAY, one view. INDICATION:  Abdominal pain  and cough. Hypertension. TECHNIQUE:  AP portable semiupright view. COMPARISON: 18 and September 2021 FINDINGS: Lungs: are clear. Costophrenic angles: are sharp. Heart size: is normal. Pulmonary vasculature: is unremarkable. Aorta: Unremarkable. Included portion of the upper abdomen: is unremarkable. Bones: No gross bony lesions. Other: None. Negative for acute change.        Medications Administered     diatrizoate lacy-diatrizoat sod (MD-GASTROVIEW,GASTROGRAFIN) 66-10 % contrast solution 15 mL     Admin Date  11/17/2021 Action  Given Dose  15 mL Route  Oral Administered By  Rayna Cannon RN          iopamidoL (ISOVUE-370) 76 % injection 100 mL     Admin Date  11/17/2021 Action  Given Dose  100 mL Route  IntraVENous Administered By  Cindy Leon L, RT, R, CT          lactated ringers bolus infusion 1,000 mL     Admin Date  11/17/2021 Action  New Bag Dose  1,000 mL Rate  1,000 mL/hr Route  IntraVENous Administered By  Rayna Cannon RN           Admin Date  11/17/2021 Action  New Bag Dose  1,000 mL Rate  4,000 mL/hr Route  IntraVENous Administered By  Rayna Cannon RN           Admin Date  11/17/2021 Action  New Bag Dose  1,000 mL Rate  4,000 mL/hr Route  IntraVENous Administered By  Rayna Cannon RN          methylPREDNISolone (PF) (Solu-MEDROL) injection 125 mg     Admin Date  11/17/2021 Action  Given Dose  125 mg Route  IntraVENous Administered By  Rayna Cannon RN          morphine injection 4 mg     Admin Date  11/17/2021 Action  Given Dose  4 mg Route  IntraVENous Administered By  Rayna Cannon RN          NOREPINephrine (LEVOPHED) 4 mg in 5% dextrose 250 mL infusion     Admin Date  11/17/2021 Action  New Bag Dose  4 mcg/min Rate  15 mL/hr Route  IntraVENous Administered By  Rayna Cannon RN           Admin Date  11/17/2021 Action  Rate Change Dose  5 mcg/min Rate  18.8 mL/hr Route  IntraVENous Administered By  Rayna Cannon RN Admin Date  11/17/2021 Action  Rate Change Dose  4 mcg/min Rate  15 mL/hr Route  IntraVENous Administered By  Debo Fajardo RN           Admin Date  11/17/2021 Action  Rate Change Dose  3 mcg/min Rate  11.3 mL/hr Route  IntraVENous Administered By  Marquis Mary RN           Admin Date  11/17/2021 Action  Rate Change Dose  2 mcg/min Rate  7.5 mL/hr Route  IntraVENous Administered By  Debo Fajardo RN          ondansetron [facility]) injection 4 mg     Admin Date  11/17/2021 Action  Given Dose  4 mg Route  IntraVENous Administered By  Yisel Perez RN          piperacillin-tazobactam (ZOSYN) 4.5 g in 0.9% sodium chloride (MBP/ADV) 100 mL MBP     Admin Date  11/17/2021 Action  New Bag Dose  4.5 g Rate  200 mL/hr Route  IntraVENous Administered By  Yisel Perez RN          saline peripheral flush soln 10 mL     Admin Date  11/17/2021 Action  Given Dose  10 mL Route  InterCATHeter Administered By  Maine PERRY, RT, R, CT          sodium chloride 0.9 % bolus infusion 100 mL     Admin Date  11/17/2021 Action  New Bag Dose  100 mL Route  IntraVENous Administered By  Joselin Cormier, RT, R, CT          vancomycin (VANCOCIN) 1250 mg in  ml infusion     Admin Date  11/17/2021 Action  New Bag Dose  1,250 mg Rate  125 mL/hr Route  IntraVENous Administered By  Yisel Perez RN                Signed:  Joel Cárdenas MD

## 2021-11-18 NOTE — H&P
[facility] Cardiology Initial Cardiac Evaluation                 Date of  Admission: 11/17/2021  3:47 PM     Primary Care Physician: Yeny Seay MD  Primary Cardiologist: None  Referring Physician: Dr Ana Brooks  Attending Physician: Dr Ramos Filter: elevated troponin       Erin Dasilva is a 77 y.o. female admitted for Severe sepsis with septic shock ([facility] [address] [A41.9, R65.21]. She has a h/o CHEVY, osteoporosis with multiple vertebral fracture, mood disorder with anxiety and depression, hiatal hernia with gastroparesis and dysphagia and asthma. She has no f/h of CAD, no h/o tobacco use, has been vaccinated for Covid x 2. She had constipation, hadn't been eating or drinking with no BM x 4 days, the day of admission 11-17 went to try to have a BM but couldn't, walked to the living room, got up and then remembers waking on the living room floor having lost consciousness. Not much memory of surrounding events. She had a compression fracture 6 weeks ago and since then has had increased ONEAL w chest tightness. She attributed these symptoms to anxiety. The symptoms improved w taking slow deep breaths. She has had more ONEAL, particularly with exertion, occasionally w chest tightness 1/10, not radiating, at times w weakness and diaphoresis, no nausea. Symptoms last a minute or two but at baseline she has a mild constant dyspnea and chest tightness. She denies palpitations, dizziness, LE edema, has been losing weight unintentionally. EMS was called after syncope and pt was found to be hypotensive and hypoxic. CT head showed small vessel disease. CTA chest/abd/pelvis showed possible colitis vs diverticulitis, esophgeal stricture vs mass, and extensive atherosclerosis. She was admitted for further management. Hydrated. Hgb dropped to 7 and transfused 1 unit PRB cells. Seen by surgery and GI, on antibiotics. Lactic acid 6.4, WBC 14, , K 3.7, cr . 52, bp initially low now 151/94 and home BP meds restarted. EKG ST w PACs w NSST/T wave changes. On 2L O2 by NC.  GI planning to do EGD. HS trop increased from 24 to 624. Chest tightness and dyspnea unchanged during hospitalization, abdominal pain better but having hematochezia.        FH: f/h of CAD  Soc: no h/o tobacco use  COVID: vaccinated for Covid x 2    Patient Active Problem List   Diagnosis Code    Primary hypertension I10    Hyperlipidemia, unspecified E78.5    Depression with anxiety F41.8    B12 deficiency E53.8    Osteoarthritis of multiple joints M15.9    Hiatal hernia with GERD and esophagitis K44.9, K21.00    Osteoporosis M81.0    Chronic cough R05.3    Severe persistent asthma without complication S42.18    Other iron deficiency anemias D50.8    Hiatal hernia K44.9    Gastroparesis K31.84    Gastroesophageal reflux disease with esophagitis K21.00    Compression fracture of T12 vertebra ([facility]) S22.080A    Chronic rhinitis J31.0    Mood disorder ([facility]) F39    Compression fracture of T9 vertebra ([facility]) S22.070A    Severe sepsis with septic shock ([facility]) A41.9, R65.21    Body mass index (BMI) of 21.0 to 21.9 in adult Z68.21    Colitis, indeterminate K52.3    Acute blood loss anemia D62       Past Medical History:   Diagnosis Date    Anemia 2005 approx    2 units transfused    Arthritis     hands, hips    B12 deficiency     Cancer (HCC)     hx of melanoma 1986 R arm, and carcinoma x 2 forehead    COVID-19 vaccine series completed 02/2021    PT TO BRING CARD DOS    Depression with anxiety 8/16/2016    Essential hypertension with goal blood pressure less than 140/90     controlled with med    Gastrointestinal disorder     hiatel hernia    Hyperlipidemia, unspecified 8/16/2016    Menopause     @ 51    Mild intermittent asthma 8/16/2016    Osteoarthritis of multiple joints 8/16/2016    Osteoporosis 10/25/2016    Pulmonary emphysema ([address] 08/16/2016    daily inhalers    Rectal bleed     Vertigo       Past Surgical History:   Procedure Laterality Date    COLONOSCOPY N/A 7/30/2021    COLONOSCOPY/BMI 23 performed by Flako Ivey MD at [address]; HI RISK IND  7/30/2021         HX HERNIA REPAIR  2010    helped reflex    HX TUBAL LIGATION  1972     Allergies   Allergen Reactions    Other Medication Rash     Equate antibiotic ointment      Family History   Problem Relation Age of Onset    Stroke Mother         intracerebral aneurysm    Diabetes Maternal Aunt     Diabetes Maternal Uncle     No Known Problems Father         didn't have contact with father    Breast Cancer Daughter 28    Hypertension Maternal Grandmother       Social History     Tobacco Use    Smoking status: Never Smoker    Smokeless tobacco: Never Used    Tobacco comment: 2nd hand smoke x 33 yrs   Substance Use Topics    Alcohol use: No        Current Facility-Administered Medications   Medication Dose Route Frequency    budesonide-formoteroL (SYMBICORT) 160-4.5 mcg/actuation HFA inhaler 2 Puff  2 Puff Inhalation BID RT    And    tiotropium bromide (SPIRIVA RESPIMAT) 2.5 mcg /actuation  2 Puff Inhalation DAILY    venlafaxine-SR (EFFEXOR-XR) capsule 37.5 mg  37.5 mg Oral QHS    sodium chloride (NS) flush 5-10 mL  5-10 mL IntraVENous Q8H    sodium chloride (NS) flush 5-10 mL  5-10 mL IntraVENous PRN    NOREPINephrine (LEVOPHED) 4 mg in 5% dextrose 250 mL infusion  4 mcg/min IntraVENous TITRATE    0.9% sodium chloride infusion 250 mL  250 mL IntraVENous PRN    albuterol (PROVENTIL HFA, VENTOLIN HFA, PROAIR HFA) inhaler 2 Puff  2 Puff Inhalation Q6H PRN    calcium-vitamin D (OS-ZACHARIAH +D3) 250 mg-125 unit per tablet 1 Tablet  1 Tablet Oral DAILY    hydroCHLOROthiazide (HYDRODIURIL) tablet 12.5 mg  12.5 mg Oral DAILY    HYDROcodone-acetaminophen (NORCO) 5-325 mg per tablet 1 Tablet  1 Tablet Oral Q6H PRN    pantoprazole (PROTONIX) 40 mg in 0.9% sodium chloride 10 mL injection  40 mg IntraVENous Q12H    venlafaxine-SR (EFFEXOR-XR) capsule 75 mg  75 mg Oral BID    verapamil ER (CALAN-SR) tablet 240 mg  240 mg Oral DAILY WITH BREAKFAST    sodium chloride (NS) flush 5-40 mL  5-40 mL IntraVENous Q8H    sodium chloride (NS) flush 5-40 mL  5-40 mL IntraVENous PRN    acetaminophen (TYLENOL) tablet 650 mg  650 mg Oral Q6H PRN    Or    acetaminophen (TYLENOL) suppository 650 mg  650 mg Rectal Q6H PRN    polyethylene glycol (MIRALAX) packet 17 g  17 g Oral DAILY PRN    ondansetron (ZOFRAN ODT) tablet 4 mg  4 mg Oral Q8H PRN    Or    ondansetron (ZOFRAN) injection 4 mg  4 mg IntraVENous Q6H PRN    piperacillin-tazobactam (ZOSYN) 3.375 g in 0.9% sodium chloride (MBP/ADV) 100 mL MBP  3.375 g IntraVENous Q8H    melatonin tablet 3 mg  3 mg Oral QHS    OLANZapine (ZyPREXA zydis) disintegrating tablet 5 mg  5 mg Oral QHS PRN    sucralfate (CARAFATE) tablet 1 g  1 g Oral AC&HS    tuberculin injection 5 Units  5 Units IntraDERMal ONCE       Review of Symptoms:  General: + weight loss,  + weakness, no fever or chills  Skin: no rashes, lumps, or other skin changes  HEENT: no headache, dizziness, lightheadedness, vision changes, hearing changes, tinnitus, vertigo, sinus pressure/pain, bleeding gums, sore throat, or hoarseness  Neck: no swollen glands, goiter, pain or stiffness  Respiratory: no cough, sputum, hemoptysis, + dyspnea, wheezing  Cardiovascular: + as per HPI  Gastrointestinal: + constipation, abdominal pain   Urinary: no frequency, urgency , hematuria, burning/pain with urination, recent flank pain, polyuria, nocturia, or difficulty urinating  Peripheral Vascular: no claudication, leg cramps, prior DVTs, swelling of calves, legs, or feet, color change, or swelling with redness or tenderness  Musculoskeletal: no muscle or joint pain/stiffness, joint swelling, erythema of joints, or back pain  Psychiatric: no depression or excessive stress  Neurological: no sensory or motor loss, seizures, syncope, tremors, numbness, no dementia  Hematologic: + anemia  Endocrine: no thyroid problems, heat or cold intolerance, excessive sweating, polyuria, polydipsia, no  diabetes. Physical Exam  Vitals:    11/18/21 0750 11/18/21 0929 11/18/21 1027 11/18/21 1133   BP: (!) 151/94      Pulse: 99      Resp: 16      Temp: 97.8 °F (36.6 °C)      SpO2: 93% 96%     Weight:   51.9 kg (114 lb 6.4 oz)    Height:    5' 2\" (1.575 m)       Physical Exam:  General: Well Developed, nourished but frail appearing, 2L O2 by NC, appears stated age   Head: normocephalic atraumatic   ENT: pupils equal and round, no abnormalities noted  Neck: supple, no JVD, no carotid bruits  Heart: S1S2 with RRR   Lungs: Clear throughout auscultation bilaterally without adventitious sounds  Abd: mildly distended, LLQ guarding, + BS  Ext: warm, no edema  Skin: warm and dry  Psychiatric: Normal mood and affect  Neurologic: Alert and oriented X 3      Cardiographics    Telemetry: ST      Labs:   Recent Labs     11/18/21  0245 11/17/21  2316 11/17/21  1748 11/17/21  1748   NA  --   --   --  142   K  --   --   --  3.7   BUN  --   --   --  11   CREA  --   --   --  0.52*   GLU  --   --   --  126*   WBC 14.7*  --   --  10.8   HGB 15.9* 15.9*   < > 8.6*   HCT 48.4* 48.2*   < > 27.5*   *  --   --  138*    < > = values in this interval not displayed.         Assessment/Plan:     Assessment:   Severe sepsis with septic shock ([facility] [address]- w colitis, zosyn, surgery/GI following    Acute blood loss anemia (11/17/2021)- stable s/p 1 unit PRB cells, now w hematochezia, GI and surgery following     Primary hypertension - calan, hctz    Severe persistent asthma without complication (4/77/1357)    Compression fracture of T9 vertebra (HCC) (10/18/2021)    Body mass index (BMI) of 21.0 to 21.9 in adult (11/17/2021)    Elevated troponin- probably supply demand mismatch due to hypotension/dehydration and sepsis on admission, however, new ONEAL with chest tightness for a few weeks (she thought was anxiety) and CTA showing extensive atherosclerosis, will place nitro paste, not candidate for LHC at this time due to active GI bleeding    Thank you very much for this referral. We appreciate the opportunity to participate in this patient's care. We will follow along with above stated plan.     Jose Huitron PA-C  Consulting MD: Viktoriya Cardona

## 2021-11-18 NOTE — PROGRESS NOTES
ACUTE OT GOALS:  (Developed with and agreed upon by patient and/or caregiver.)  1. Patient will complete total body bathing and dressing with SET UP and adaptive equipment as needed. 2. Patient will complete toileting with SUPERVISION. 3. Patient will complete grooming ADL standing at sink with SUPERVISION. 4. Patient will tolerate 25 minutes of OT treatment with 1-2 rest breaks to increase activity tolerance for ADLs. 5. Patient will complete functional transfers with SUPERVISION and adaptive equipment as needed. 6. Patient will tolerate 10 minutes BUE exercises to increase strength for safe, functional transfers.      Timeframe: 7 visits     OCCUPATIONAL THERAPY ASSESSMENT: Initial Assessment and Daily Note OT Treatment Day # 1    Patricia Block is a 77 y.o. female   PRIMARY DIAGNOSIS: Severe sepsis with septic shock (HCC)  Severe sepsis with septic shock (HCC) [A41.9, R65.21]  Procedure(s) (LRB):  ESOPHAGOGASTRODUODENOSCOPY (EGD) (N/A)  ESOPHAGEAL DILATION ROOM 310 *COVID test pending (N/A)     Reason for Referral:    ICD-10: Treatment Diagnosis: Generalized Muscle Weakness (M62.81)  Difficulty in walking, Not elsewhere classified (R26.2)  History of falling (Z91.81)  INPATIENT: Payor: [facility] MEDICARE / Plan: [employer] / Product Type: [employer] Medicare /   ASSESSMENT:     REHAB RECOMMENDATIONS:   Recommendation to date pending progress:  [address] [place] [address] Therapy  Equipment:    Rolling Walker     PRIOR LEVEL OF FUNCTION:  (Prior to Hospitalization)  INITIAL/CURRENT LEVEL OF FUNCTION:  (Based on today's evaluation)   Bathing:   Modified Independent  Dressing:   Independent  Feeding/Grooming:   Independent  Toileting:   Independent  Functional Mobility:   Modified Independent uses  HHA Bathing:   Minimal Assistance  Dressing:   Minimal Assistance  Feeding/Grooming:   Contact Guard Assistance  Toileting:   Minimal Assistance  Functional Mobility:   Minimal Assistance x 2 HHA     ASSESSMENT:  Ms. Jack is a 76 y/o female presents s/p collapsing fall at home in her bathroom and now with sepsis. Pt is currently on 2L O2 NC and was able to be weaned to RA with sats >90% throughout evaluation. Today pt presents with decreased activity tolerance, balance and mobility impacting ADLs. Pt required min A x2 HHA for functional mobility of household distances due to being unsteady on feet. Pt would benefit from RW, however refused to try to use it for walking today. Pt completed other grooming ADLs standing at sink with CGA in grid below. Pt is currently functioning below baseline and would benefit from skilled OT services to address OT goals and plan of care. Rec HHOT at d/c.      SUBJECTIVE:   Ms. Jack states, \"I don't want to use the walker. \"    SOCIAL HISTORY/LIVING ENVIRONMENT: lives with , one level home, 3 JAMIA,  assists with shower transfers and ambulation, has SPC but does not use it  Home Environment: Private residence  One/Two Story Residence: One story  Living Alone: No  Support Systems: Spouse/Significant Other, Child(kaleb)    OBJECTIVE:     PAIN: VITAL SIGNS: LINES/DRAINS:   Pre Treatment: Pain Screen  Pain Scale 1: Numeric (0 - 10)  Pain Intensity 1: 2  Pain Onset 1: ongoing  Pain Location 1: Abdomen  Pain Orientation 1: Right  Pain Description 1: Aching  Pain Intervention(s) 1: Ambulation/Increased Activity; Repositioned;  Emotional support  Post Treatment: no c/o pain Vital Signs  O2 Sat (%): 96 %  O2 Device: None (Room air) Foreman Catheter and IV  O2 Device: None (Room air)     GROSS EVALUATION:  BUE Within Functional Limits Abnormal/ Functional Abnormal/ Non-Functional (see comments) Not Tested Comments:   AROM [x] [] [] []    PROM [] [] [] [x]    Strength [] [x] [] [] Generally weak   Balance [] [x] [] [] Min A x2 HHA, good sitting balance EOB   Posture [x] [] [] []    Sensation [] [] [] [x] Coordination [] [] [] [x]    Tone [] [] [] [x]    Edema [] [] [] [x]    Activity Tolerance [] [x] [] [] Weak and fatigued after ambulating household distances    [] [] [] []      COGNITION/  PERCEPTION: Intact Impaired   (see comments) Comments:   Orientation [x] []    Vision [x] []    Hearing [x] []    Judgment/ Insight [] [x] Decreased insight into deficits   Attention [x] []    Memory [x] []    Command Following [x] []    Emotional Regulation [x] []     [] []      ACTIVITIES OF DAILY LIVING: I Mod I S SBA CGA Min Mod Max Total NT Comments   BASIC ADLs:              Bathing/ Showering [] [] [] [] [] [] [] [] [] [x]    Toileting [] [] [] [] [] [] [] [] [] [x]    Dressing [] [] [] [] [] [] [] [] [] [x]    Feeding [] [] [] [] [] [] [] [] [] [x]    Grooming [] [] [] [] [x] [] [] [] [] [] Washing hands and face standing at sink   Personal Device Care [] [] [] [] [] [] [] [] [] [x]    Functional Mobility [] [] [] [] [] [x] [] [] [] [] x2 HHA   I=Independent, Mod I=Modified Independent, S=Supervision, SBA=Standby Assistance, CGA=Contact Guard Assistance,   Min=Minimal Assistance, Mod=Moderate Assistance, Max=Maximal Assistance, Total=Total Assistance, NT=Not Tested    MOBILITY: I Mod I S SBA CGA Min Mod Max Total  NT x2 Comments:   Supine to sit [] [] [] [] [x] [] [] [] [] [] []    Sit to supine [] [] [] [] [] [] [] [] [] [x] []    Sit to stand [] [] [] [] [] [x] [] [] [] [] [x] HHA   Bed to chair [] [] [] [] [] [x] [] [] [] [] [x] HHA   I=Independent, Mod I=Modified Independent, S=Supervision, SBA=Standby Assistance, CGA=Contact Guard Assistance,   Min=Minimal Assistance, Mod=Moderate Assistance, Max=Maximal Assistance, Total=Total Assistance, NT=Not [facility] 6 Clicks   Daily Activity Inpatient Short Form        How much help from another person does the patient currently need. .. Total A Lot A Little None   1. Putting on and taking off regular lower body clothing?    [] 1   [x] 2   [] 3 [] 4   2. Bathing (including washing, rinsing, drying)? [] 1   [x] 2   [] 3   [] 4   3. Toileting, which includes using toilet, bedpan or urinal?   [] 1   [] 2   [x] 3   [] 4   4. Putting on and taking off regular upper body clothing? [] 1   [] 2   [x] 3   [] 4   5. Taking care of personal grooming such as brushing teeth? [] 1   [] 2   [] 3   [x] 4   6. Eating meals? [] 1   [] 2   [] 3   [x] 4   © 2007, Trustees of [facility], under license to [employer]. All rights reserved     Score:  Initial: 18 Most Recent: X (Date: -- )   Interpretation of Tool:  Represents activities that are increasingly more difficult (i.e. Bed mobility, Transfers, Gait). PLAN:   FREQUENCY/DURATION: OT Plan of Care: 3 times/week for duration of hospital stay or until stated goals are met, whichever comes first.    PROBLEM LIST:   (Skilled intervention is medically necessary to address:)  1. Decreased ADL/Functional Activities  2. Decreased Activity Tolerance  3. Decreased Balance  4. Decreased Gait Ability  5. Decreased Strength  6. Decreased Transfer Abilities   INTERVENTIONS PLANNED:   (Benefits and precautions of occupational therapy have been discussed with the patient.)  1. Self Care Training  2. Therapeutic Activity  3. Therapeutic Exercise/HEP  4. Neuromuscular Re-education  5. Education     TREATMENT:     EVALUATION: Low Complexity : (Untimed Charge)    TREATMENT:   ($$ Self Care/Home Management: 8-22 mins    )  Co-Treatment PT/OT necessary due to patient's decreased overall endurance/tolerance levels, as well as need for high level skilled assistance to complete functional transfers/mobility and functional tasks  Self Care (8 Minutes): Self care including Grooming and functional transfers in prep for ADLs and ambulating household distances to increase independence and decrease level of assistance required.     TREATMENT GRID:  N/A    AFTER TREATMENT POSITION/PRECAUTIONS:  Alarm Activated, Chair, Needs within regi, RN notified and MD at bedside    INTERDISCIPLINARY COLLABORATION:  RN/PCT, PT/PTA and OT/NEWTON    TOTAL TREATMENT DURATION:  OT Patient Time In/Time Out  Time In: 0929  Time Out: SANDRINE Coronel

## 2021-11-18 NOTE — ROUTINE PROCESS
Bedside and Verbal shift change report given to self (oncoming nurse) by  Edouard Cai RN (offgoing nurse). Report included the following information SBAR, Kardex, Intake/Output, MAR, and Recent Results.

## 2021-11-18 NOTE — PROGRESS NOTES
[facility] Hospitalist Note     Admit Date:  2021  3:47 PM   Name:  Delmer Martinez   Age:  77 y.o.  :  1945   MRN:  [id number]   PCP:  Michael Kim MD  Treatment Team: Attending Provider: Ronald Merchant MD; Consulting Provider: Soo Blackmon MD; Primary Nurse: Fernandez Calloway Occupational Therapist: Alejandro Morel, OT; Physical Therapist: Art Jett PT, DPT; Care Manager: Krissy Nunez [facility] [place] Consulting Provider: Ben Gould MD; Utilization Review: Viktoriya Salazar; Consulting Provider: Adolfo Joshi MD; Consulting Provider: Gianna Fontenot MD    HPI/Subjective:   Delmer Martinez is a 77 y.o. female with medical history of osteoporosis with multiple vertebral fracture, mood disorder with anxiety and depression, hiatal hernia with gastroparesis who presented with acute abdominal pain and syncope. She was found to be in septic shock and was initially placed on pressors. A central line was placed. After resuscitation her shock stabilized. CT demonstrated acute left-sided colitis with indeterminate cause. UA suggestive of UTI. Blood culture growing Streptococcus species. Patient started on empiric antibiotics. Surgery consulted and recommend no surgical intervention at this time. GI consulted and patient scheduled for EGD today. : Patient is seen at the bedside. Reports she is feeling little better today. Denies chest pain, palpitation, vomiting. Endorses some nausea and shortness of breath. Abdominal pain improving. Reports diarrhea for couple of days.     Assessment and Plan:     Severe sepsis with septic shock: Resolved  Initially requiring pressor, stabilized after fluid resuscitation  Now off pressor  CT concerning for diverticulitis versus ischemic bowel  UA concerning for UTI  Urine and blood culture ordered  Started on vancomycin/Zosyn    Acute left-sided colitis:  Stool occult positive  Surgery consulted and recommend no surgical intervention at this time. Recommend GI consult for melena  Continue Zosyn    UTI:  UA suggestive of UTI  Add on urine culture  Continue ceftriaxone    Streptococcus bacteremia:  Blood culture growing gram-positive cocci, ID PCR showed Streptococcus species(not agalactiae, pyogenes no pneumonia) discontinue vancomycin and start patient on ceftriaxone. Repeat blood culture tomorrow a.m. Melena:  GI consulted and recommend EGD today  N.p.o. for now  H&H monitoring  Continue Protonix    Acute blood loss anemia:  Likely due to GI bleed  Status post 1 unit PRBC transfusion in the ED  Continue to monitor  Transfuse if hemoglobin less than 7    Compression fracture of T9 vertebra:  Norco as needed  PT/OT    Mood disorder:  History of anxiety and depression, previously treated with benzodiazepine concomitantly treating pain with opioids  Continue venlafaxine  Use caution with benzodiazepine    GERD:  Continue PPI Carafate    Severe persistent asthma:  Continue albuterol and Trelegy    Hypertension:  Continue Verampamil    Discharge planning:  To be determined    DVT ppx ordered  Code status:  Full  Estimated LOS:  Greater than 2 midnights  Risk:  high    Hospital Problems as of 11/18/2021 Date Reviewed: 11/17/2021          Codes Class Noted - Resolved POA    Acute blood loss anemia ICD-10-CM: D62  ICD-9-CM: 285.1  11/17/2021 - Present Yes        * (Principal) Severe sepsis with septic shock ([facility] [address] ICD-10-CM: A41.9, R65.21  ICD-9-CM: 038.9, 995.92, 785.52  11/17/2021 - Present Yes        Body mass index (BMI) of 21.0 to 21.9 in adult ICD-10-CM: Z68.21  ICD-9-CM: V85.1  11/17/2021 - Present Yes        Colitis, indeterminate ICD-10-CM: K52.3  ICD-9-CM: 558.9  11/17/2021 - Present Unknown        Compression fracture of T9 vertebra (HCC) (Chronic) ICD-10-CM: E65.052D  ICD-9-CM: 805.2  10/18/2021 - Present Yes        Severe persistent asthma without complication FXX-27-FP: V94.70  ICD-9-CM: 493.90  5/28/2021 - Present Yes        Osteoporosis ICD-10-CM: M81.0  ICD-9-CM: 733.00  10/25/2016 - Present Yes        Primary hypertension (Chronic) ICD-10-CM: I10  ICD-9-CM: 401.9  8/16/2016 - Present Yes    Overview Signed 11/17/2021  9:48 PM by Nena Fermin MD     Goal < 140/80             Hiatal hernia with GERD and esophagitis ICD-10-CM: K44.9, K21.00  ICD-9-CM: 553.3, 530.11  8/16/2016 - Present Yes        Gastroesophageal reflux disease with esophagitis (Chronic) ICD-10-CM: K21.00  ICD-9-CM: 530.11  11/1/2015 - Present Yes        Mood disorder ([facility] [address] ICD-10-CM: F39  ICD-9-CM: 296.90  11/1/2015 - Present Yes                10 systems reviewed and negative except as noted in HPI.   Past Medical History:   Diagnosis Date    Anemia 2005 approx    2 units transfused    Arthritis     hands, hips    B12 deficiency     Cancer ([facility] [address]     hx of melanoma 1986 R arm, and carcinoma x 2 forehead    COVID-19 vaccine series completed 02/2021    PT TO BRING CARD DOS    Depression with anxiety 8/16/2016    Essential hypertension with goal blood pressure less than 140/90     controlled with med    Gastrointestinal disorder     hiatel hernia    Hyperlipidemia, unspecified 8/16/2016    Menopause     @ 51    Mild intermittent asthma 8/16/2016    Osteoarthritis of multiple joints 8/16/2016    Osteoporosis 10/25/2016    Pulmonary emphysema ([facility] [address] 08/16/2016    daily inhalers    Rectal bleed     Vertigo       Past Surgical History:   Procedure Laterality Date    COLONOSCOPY N/A 7/30/2021    COLONOSCOPY/BMI 23 performed by Ginny Figueroa MD at [facility]; HI RISK IND  7/30/2021         HX HERNIA REPAIR  2010    helped reflex    HX TUBAL LIGATION  1972      Allergies   Allergen Reactions    Other Medication Rash     Equate antibiotic ointment      Social History     Tobacco Use    Smoking status: Never Smoker    Smokeless tobacco: Never Used    Tobacco comment: 2nd hand smoke x 33 yrs   Substance Use Topics    Alcohol use: No      Family History   Problem Relation Age of Onset    Stroke Mother         intracerebral aneurysm    Diabetes Maternal Aunt     Diabetes Maternal Uncle     No Known Problems Father         didn't have contact with father    Breast Cancer Daughter 28    Hypertension Maternal Grandmother       Family history reviewed and noncontributory. Immunization History   Administered Date(s) Administered    COVID-19, Moderna, Primary or Immunocompromised Series, MRNA, PF, 100mcg/0.5mL 2021, 2021    Influenza High Dose Vaccine PF 2016, 01/15/2018, 2018, 2020    Influenza Vaccine 2016, 2018    Influenza Vaccine (Tri) Adjuvanted (>65 Yrs FLUAD TRI 00099) 2019    Pneumococcal Conjugate (PCV-13) 2016    Pneumococcal Polysaccharide (PPSV-23) 2018    Pneumococcal Vaccine (Unspecified Type) 2012    TB Skin Test (PPD) Intradermal 2021    Tdap 2021     PTA Medications:  Prior to Admission Medications   Prescriptions Last Dose Informant Patient Reported? Taking? HYDROcodone-acetaminophen (NORCO) 5-325 mg per tablet 2021 at Unknown time  Yes Yes   Si Tablet every six (6) hours as needed. LORazepam (ATIVAN) 1 mg tablet Not Taking at Unknown time  No No   Sig: Take 1/2 to 1 tab PO Q 12 hours PRN anxiety, use sparingly   Patient not taking: Reported on 2021   albuterol (Ventolin HFA) 90 mcg/actuation inhaler   No No   Sig: Take 2 Puffs by inhalation four (4) times daily. Patient taking differently: Take 2 Puffs by inhalation every six (6) hours as needed. calcium-cholecalciferol, d3, (CALCIUM 600 + D) 600-125 mg-unit tab Not Taking at Unknown time  Yes No   Sig: Take 1 Tab by mouth daily. Patient not taking: Reported on 2021   cetirizine (ZYRTEC) 10 mg tablet Not Taking at Unknown time  No No   Sig: Take 1 Tab by mouth daily.    Patient not taking: Reported on 2021 fluticasone-umeclidin-vilanter (Trelegy Ellipta) 200-62.5-25 mcg dsdv 11/16/2021 at Unknown time  No Yes   Sig: Take 1 Puff by inhalation daily. hydroCHLOROthiazide (HYDRODIURIL) 12.5 mg tablet 11/16/2021 at Unknown time  No Yes   Sig: Take 1 Tablet by mouth daily. omeprazole (PRILOSEC) 40 mg capsule 11/16/2021 at Unknown time  No Yes   Sig: Take 1 Cap by mouth two (2) times a day. ondansetron (ZOFRAN ODT) 4 mg disintegrating tablet   Yes No   Sig: Take 4 mg by mouth.   potassium chloride (KLOR-CON) 10 mEq tablet   No No   Sig: Take 1 Tablet by mouth daily. pravastatin (PRAVACHOL) 20 mg tablet 11/16/2021 at Unknown time  No Yes   Sig: Take 1 Tablet by mouth daily. Patient taking differently: Take 20 mg by mouth nightly. venlafaxine-SR (Effexor XR) 37.5 mg capsule 11/16/2021 at Unknown time  No Yes   Sig: Take 1 Capsule by mouth nightly. venlafaxine-SR (Effexor XR) 75 mg capsule 11/16/2021 at Unknown time  No Yes   Sig: Take 1 Capsule by mouth two (2) times a day.    verapamil ER (CALAN-SR) 240 mg CR tablet 11/16/2021 at Unknown time  No Yes   Sig: TAKE 1 TABLET BY MOUTH EVERY MORNING      Facility-Administered Medications: None       Objective:     Patient Vitals for the past 24 hrs:   Temp Pulse Resp BP SpO2   11/18/21 0929 -- -- -- -- 96 %   11/18/21 0750 97.8 °F (36.6 °C) 99 16 (!) 151/94 93 %   11/18/21 0706 -- -- -- -- 96 %   11/18/21 0626 -- -- -- (!) 136/106 --   11/18/21 0542 -- -- -- (!) 162/118 --   11/18/21 0528 -- 100 -- (!) 163/112 --   11/18/21 0500 98.5 °F (36.9 °C) 95 16 (!) 160/101 98 %   11/18/21 0010 -- -- -- (!) 148/80 --   11/17/21 2357 98.6 °F (37 °C) 98 16 (!) 155/100 96 %   11/17/21 2303 98.5 °F (36.9 °C) (!) 112 14 (!) 175/87 97 %   11/17/21 2300 -- (!) 111 16 (!) 182/92 97 %   11/17/21 2231 98.5 °F (36.9 °C) -- -- -- --   11/17/21 2231 -- (!) 113 14 (!) 173/98 97 %   11/17/21 2225 -- -- -- -- 97 %   11/17/21 2218 -- -- -- (!) 181/101 --   11/17/21 2203 -- -- -- (!) 162/98 -- 11/17/21 2201 -- (!) 112 16 -- 97 %   11/17/21 2136 -- (!) 115 14 (!) 146/83 95 %   11/17/21 2121 -- (!) 117 26 137/83 95 %   11/17/21 2106 -- (!) 115 12 (!) 148/85 95 %   11/17/21 2053 -- (!) 117 (!) 34 128/71 94 %   11/17/21 2041 98.3 °F (36.8 °C) (!) 115 12 131/72 94 %   11/17/21 2038 -- (!) 118 (!) 34 131/72 94 %   11/17/21 2025 98.2 °F (36.8 °C) (!) 118 12 126/72 92 %   11/17/21 2016 98.2 °F (36.8 °C) (!) 117 12 124/69 93 %   11/17/21 2013 98.2 °F (36.8 °C) (!) 117 12 124/69 93 %   11/17/21 2010 -- (!) 119 24 124/69 92 %   11/17/21 1955 -- (!) 121 16 -- 93 %   11/17/21 1941 98.7 °F (37.1 °C) -- -- -- --   11/17/21 1940 -- (!) 119 25 115/66 92 %   11/17/21 1930 97.5 °F (36.4 °C) -- -- -- --   11/17/21 1821 -- (!) 117 24 (!) 99/53 99 %   11/17/21 1812 -- (!) 113 13 (!) 83/48 91 %   11/17/21 1801 -- (!) 107 25 (!) 61/40 (!) 89 %   11/17/21 1752 -- (!) 109 -- (!) 71/37 92 %   11/17/21 1732 -- (!) 111 -- (!) 78/52 92 %   11/17/21 1721 -- (!) 111 -- (!) 92/51 92 %   11/17/21 1714 -- (!) 112 -- (!) 90/42 95 %   11/17/21 1711 -- (!) 113 -- (!) 79/41 (!) 83 %   11/17/21 1702 -- (!) 111 26 (!) 79/41 --   11/17/21 1648 -- (!) 108 21 (!) 73/43 92 %   11/17/21 1637 -- 100 23 (!) 65/32 96 %   11/17/21 1632 -- (!) 103 28 (!) 78/47 --   11/17/21 1616 -- (!) 103 (!) 69 (!) 105/58 95 %   11/17/21 1552 (!) 94.4 °F (34.7 °C) (!) 105 18 (!) 100/58 97 %     Oxygen Therapy  O2 Sat (%): 96 % (11/18/21 0929)  Pulse via Oximetry: 101 beats per minute (11/18/21 0706)  O2 Device: Nasal cannula (11/18/21 1231)  O2 Flow Rate (L/min): 2 l/min (11/18/21 1231)    Estimated body mass index is 20.92 kg/m² as calculated from the following:    Height as of this encounter: 5' 2\" (1.575 m). Weight as of this encounter: 51.9 kg (114 lb 6.4 oz).       Intake/Output Summary (Last 24 hours) at 11/18/2021 1336  Last data filed at 11/18/2021 1231  Gross per 24 hour   Intake 720 ml   Output 203 ml   Net 517 ml       *Note that automatically entered I/Os may not be accurate; dependent on patient compliance with collection and accurate  by assistants. Physical Exam:  General:    Alert. Ill looking  Eyes:   Normal sclerae. Extraocular movements intact. HENT:  Normocephalic, atraumatic. Moist mucous membranes  CV:   RRR. No m/r/g. Lungs:  CTAB. No wheezing, rhonchi, or rales. Abdomen: Soft, left lower quadrant tenderness, nondistended. Extremities: Warm and dry. No cyanosis or edema. Neurologic: CN II-XII grossly intact. Sensation intact. Skin:     No rashes or jaundice. Normal coloration  Psych:  Normal mood and affect. I reviewed the labs, imaging, EKGs, telemetry, and other studies done this admission. Data Reviewed:   Recent Results (from the past 24 hour(s))   EKG, 12 LEAD, INITIAL    Collection Time: 11/17/21  3:50 PM   Result Value Ref Range    Ventricular Rate 103 BPM    Atrial Rate 110 BPM    P-R Interval 154 ms    QRS Duration 74 ms    Q-T Interval 376 ms    QTC Calculation (Bezet) 492 ms    Calculated P Axis 84 degrees    Calculated R Axis 58 degrees    Calculated T Axis 76 degrees    Diagnosis       !! AGE AND GENDER SPECIFIC ECG ANALYSIS !!   Sinus tachycardia  Nonspecific ST abnormality  Abnormal ECG  No previous ECGs available  Confirmed by Alphonso Hamilton MD (), NABEEL TRONCOSO ([id number]) on 11/18/2021 7:38:48 AM     CULTURE, BLOOD    Collection Time: 11/17/21  4:07 PM    Specimen: Blood   Result Value Ref Range    Special Requests: RIGHT  FOREARM        Culture result: NO GROWTH AFTER 14 HOURS     CULTURE, BLOOD    Collection Time: 11/17/21  4:51 PM    Specimen: Blood   Result Value Ref Range    Special Requests: NO SPECIAL REQUESTS  LEFT  FOREARM        GRAM STAIN GRAM POS COCCI IN CHAINS      GRAM STAIN        RESULTS VERIFIED, PHONED TO AND READ BACK BY Melba Lundborg @ 12:14 11/18/2021 SH/AMM    GRAM STAIN ANAEROBIC BOTTLE POSITIVE      Culture result: CULTURE IN PROGRESS,FURTHER UPDATES TO FOLLOW      Culture result: Refer to Blood Culture ID Panel Accession  [id number]      Culture result:        RESULTS VERIFIED, PHONED TO AND READ BACK BY  ELISEO CHO @12:14 11/18/21 /AMM     BLOOD CULTURE ID PANEL    Collection Time: 11/17/21  4:51 PM    Specimen: Blood   Result Value Ref Range    Streptococcus Detected (A) NOTDET      INTERPRETATION        Gram positive cocci. Identified by realtime PCR as Streptococcus species (not agalactiae, pyogenes, or pneumoniae). URINALYSIS W/ RFLX MICROSCOPIC    Collection Time: 11/17/21  5:20 PM   Result Value Ref Range    Color RED      Appearance CLOUDY      Specific gravity 1.021 1.001 - 1.023      pH (UA) 5.5 5.0 - 9.0      Protein 100 (A) NEG mg/dL    Glucose Negative mg/dL    Ketone 15 (A) NEG mg/dL    Bilirubin MODERATE (A) NEG      Blood Negative NEG      Urobilinogen 2.0 (H) 0.2 - 1.0 EU/dL    Nitrites Positive (A) NEG      Leukocyte Esterase SMALL (A) NEG     URINE MICROSCOPIC    Collection Time: 11/17/21  5:20 PM   Result Value Ref Range    WBC 5-10 0 /hpf    RBC 3-5 0 /hpf    Epithelial cells 5-10 0 /hpf    Bacteria 0 0 /hpf    Casts 0 0 /lpf    Crystals, urine 0 0 /LPF    Mucus 2+ (H) 0 /lpf   LACTIC ACID    Collection Time: 11/17/21  5:24 PM   Result Value Ref Range    Lactic acid 6.4 (HH) 0.4 - 2.0 MMOL/L   CBC WITH AUTOMATED DIFF    Collection Time: 11/17/21  5:48 PM   Result Value Ref Range    WBC 10.8 4.3 - 11.1 K/uL    RBC 2.99 (L) 4.05 - 5.2 M/uL    HGB 8.6 (L) 11.7 - 15.4 g/dL    HCT 27.5 (L) 35.8 - 46.3 %    MCV 92.0 79.6 - 97.8 FL    MCH 28.8 26.1 - 32.9 PG    MCHC 31.3 (L) 31.4 - 35.0 g/dL    RDW 17.2 (H) 11.9 - 14.6 %    PLATELET 348 (L) 361 - 450 K/uL    MPV 11.2 9.4 - 12.3 FL    ABSOLUTE NRBC 0.00 0.0 - 0.2 K/uL    DF AUTOMATED      NEUTROPHILS 76 43 - 78 %    LYMPHOCYTES 16 13 - 44 %    MONOCYTES 5 4.0 - 12.0 %    EOSINOPHILS 2 0.5 - 7.8 %    BASOPHILS 1 0.0 - 2.0 %    IMMATURE GRANULOCYTES 0 0.0 - 5.0 %    ABS. NEUTROPHILS 8.3 (H) 1.7 - 8.2 K/UL    ABS. LYMPHOCYTES 1.7 0.5 - 4.6 K/UL    ABS. MONOCYTES 0.5 0.1 - 1.3 K/UL    ABS. EOSINOPHILS 0.2 0.0 - 0.8 K/UL    ABS. BASOPHILS 0.1 0.0 - 0.2 K/UL    ABS. IMM. GRANS. 0.0 0.0 - 0.5 K/UL   METABOLIC PANEL, COMPREHENSIVE    Collection Time: 11/17/21  5:48 PM   Result Value Ref Range    Sodium 142 136 - 145 mmol/L    Potassium 3.7 3.5 - 5.1 mmol/L    Chloride 111 (H) 98 - 107 mmol/L    CO2 16 (L) 21 - 32 mmol/L    Anion gap 15 7 - 16 mmol/L    Glucose 126 (H) 65 - 100 mg/dL    BUN 11 8 - 23 MG/DL    Creatinine 0.52 (L) 0.6 - 1.0 MG/DL    GFR est AA >60 >60 ml/min/1.73m2    GFR est non-AA >60 >60 ml/min/1.73m2    Calcium 7.2 (L) 8.3 - 10.4 MG/DL    Bilirubin, total 0.5 0.2 - 1.1 MG/DL    ALT (SGPT) 19 12 - 65 U/L    AST (SGOT) 32 15 - 37 U/L    Alk.  phosphatase 140 (H) 50 - 136 U/L    Protein, total 3.2 (L) 6.3 - 8.2 g/dL    Albumin 1.2 (L) 3.2 - 4.6 g/dL    Globulin 2.0 (L) 2.3 - 3.5 g/dL    A-G Ratio 0.6 (L) 1.2 - 3.5     PROCALCITONIN    Collection Time: 11/17/21  5:48 PM   Result Value Ref Range    Procalcitonin 0.07 0.00 - 0.49 ng/mL   TROPONIN-HIGH SENSITIVITY    Collection Time: 11/17/21  5:48 PM   Result Value Ref Range    Troponin-High Sensitivity 24.7 (H) 0 - 14 pg/mL   LIPASE    Collection Time: 11/17/21  5:48 PM   Result Value Ref Range    Lipase 63 (L) 73 - 393 U/L   POC CHEM8    Collection Time: 11/17/21  6:15 PM   Result Value Ref Range    Sodium (POC) 143 136 - 145 mmol/L    Potassium (POC) 3.0 (L) 3.5 - 5.1 mmol/L    Chloride (POC) 105 98 - 107 mmol/L    CO2 (POC) 21.3 21 - 32 mmol/L    Glucose (POC) 179 (H) 65 - 100 mg/dL    BUN (POC) 14 8 - 26 mg/dL    Creatinine (POC) 0.62 (L) 0.8 - 1.5 mg/dL    GFRAA, POC >60 >60 ml/min/1.73m2    GFRNA, POC >60 >60 ml/min/1.73m2   TYPE & SCREEN    Collection Time: 11/17/21  6:35 PM   Result Value Ref Range    Crossmatch Expiration 11/20/2021,2359     ABO/Rh(D) O POSITIVE     Antibody screen NEG     Comment BLOOD READY CALLED ER AT 1934 11.17.21     Unit number [id number]     Blood component type [facility]     Unit division 00     Status of unit TRANSFUSED     Crossmatch result Compatible    RBC, ALLOCATE    Collection Time: 11/17/21  6:45 PM   Result Value Ref Range    HISTORY CHECKED? Historical check performed    LACTIC ACID    Collection Time: 11/17/21  7:38 PM   Result Value Ref Range    Lactic acid 4.0 (HH) 0.4 - 2.0 MMOL/L   TROPONIN-HIGH SENSITIVITY    Collection Time: 11/17/21  7:38 PM   Result Value Ref Range    Troponin-High Sensitivity 193.0 (HH) 0 - 14 pg/mL   HGB & HCT    Collection Time: 11/17/21 11:16 PM   Result Value Ref Range    HGB 15.9 (H) 11.7 - 15.4 g/dL    HCT 48.2 (H) 35.8 - 46.3 %   LACTIC ACID    Collection Time: 11/17/21 11:16 PM   Result Value Ref Range    Lactic acid 3.4 (H) 0.4 - 2.0 MMOL/L   TROPONIN-HIGH SENSITIVITY    Collection Time: 11/18/21  2:45 AM   Result Value Ref Range    Troponin-High Sensitivity 624.0 (HH) 0 - 14 pg/mL   CBC WITH AUTOMATED DIFF    Collection Time: 11/18/21  2:45 AM   Result Value Ref Range    WBC 14.7 (H) 4.3 - 11.1 K/uL    RBC 5.77 (H) 4.05 - 5.2 M/uL    HGB 15.9 (H) 11.7 - 15.4 g/dL    HCT 48.4 (H) 35.8 - 46.3 %    MCV 83.9 79.6 - 97.8 FL    MCH 27.6 26.1 - 32.9 PG    MCHC 32.9 31.4 - 35.0 g/dL    RDW 17.1 (H) 11.9 - 14.6 %    PLATELET 736 (L) 483 - 450 K/uL    MPV 10.4 9.4 - 12.3 FL    ABSOLUTE NRBC 0.00 0.0 - 0.2 K/uL    DF AUTOMATED      NEUTROPHILS 91 (H) 43 - 78 %    LYMPHOCYTES 4 (L) 13 - 44 %    MONOCYTES 4 4.0 - 12.0 %    EOSINOPHILS 0 (L) 0.5 - 7.8 %    BASOPHILS 0 0.0 - 2.0 %    IMMATURE GRANULOCYTES 1 0.0 - 5.0 %    ABS. NEUTROPHILS 13.4 (H) 1.7 - 8.2 K/UL    ABS. LYMPHOCYTES 0.6 0.5 - 4.6 K/UL    ABS. MONOCYTES 0.6 0.1 - 1.3 K/UL    ABS. EOSINOPHILS 0.0 0.0 - 0.8 K/UL    ABS. BASOPHILS 0.0 0.0 - 0.2 K/UL    ABS. IMM.  GRANS. 0.1 0.0 - 0.5 K/UL   LACTIC ACID    Collection Time: 11/18/21  2:45 AM   Result Value Ref Range    Lactic acid 1.8 0.4 - 2.0 MMOL/L   COVID-19 RAPID TEST    Collection Time: 11/18/21 11:54 AM   Result Value Ref Range    Specimen source Nasopharyngeal      COVID-19 rapid test Not detected NOTD         All Micro Results     Procedure Component Value Units Date/Time    CULTURE, STOOL [[id number]] Collected: 11/18/21 1154    Order Status: Completed Specimen: Stool Updated: 11/18/21 1316    C. DIFFICILE AG & TOXIN A/B [[id number]] Collected: 11/18/21 1154    Order Status: Completed Specimen: Stool Updated: 11/18/21 1258    OVA & PARASITES, STOOL [[id number]] Collected: 11/18/21 1154    Order Status: Completed Specimen: Feces from Stool Updated: 11/18/21 1257    BLOOD CULTURE [[id number]] Collected: 11/17/21 1651    Order Status: Completed Specimen: Blood Updated: 11/18/21 1238     Special Requests: --        NO SPECIAL REQUESTS  LEFT  FOREARM       GRAM STAIN GRAM POS COCCI IN CHAINS               RESULTS VERIFIED, PHONED TO AND READ BACK BY Delmi Wallace @ 12:14 11/18/2021 /AM            ANAEROBIC BOTTLE POSITIVE        Culture result:       [place] [address] [place] [id number] Rd UPDATES TO FOLLOW                  Refer to Blood Culture ID Panel Accession  [id number]              RESULTS VERIFIED, PHONED TO AND READ BACK BY  ELISEO CHO @12:14 11/18/21 /AM      BLOOD CULTURE ID PANEL [[id number]]  (Abnormal) Collected: 11/17/21 1651    Order Status: Completed Specimen: Blood Updated: 11/18/21 1219     Streptococcus Detected        Comment: RESULTS VERIFIED, PHONED TO AND READ BACK BY  ELISEO CHO @ 3286 11/18/2021 /[facility]           INTERPRETATION       Gram positive cocci. Identified by realtime PCR as Streptococcus species (not agalactiae, pyogenes, or pneumoniae).            Comment: Consider discontinuation of IV vancomycin and using an anti-streptococcal beta-lactam (ceftriaxone/cefotaxime (peds))       COVID-19 RAPID TEST [[id number]] Collected: 11/18/21 1154    Order Status: Completed Specimen: Nasopharyngeal Updated: 11/18/21 1216     Specimen source Nasopharyngeal        COVID-19 rapid test Not detected        Comment:      The specimen is NEGATIVE for SARS-CoV-2, the novel coronavirus associated with COVID-19. A negative result does not rule out COVID-19. This test has been authorized by the FDA under an Emergency Use Authorization (EUA) for use by authorized laboratories.         Fact sheet for Healthcare Providers: [employer] Systemsc[employer]nz  Fact sheet for Patients: Click [employer].nz       Methodology: Isothermal Nucleic Acid Amplification         BLOOD CULTURE [[id number]] Collected: 11/17/21 1605    Order Status: Completed Specimen: Blood Updated: 11/18/21 0729     Special Requests: --        RIGHT  FOREARM       Culture result: NO GROWTH AFTER 14 HOURS             Current Facility-Administered Medications   Medication Dose Route Frequency    budesonide-formoteroL (SYMBICORT) 160-4.5 mcg/actuation HFA inhaler 2 Puff  2 Puff Inhalation BID RT    And    tiotropium bromide (SPIRIVA RESPIMAT) 2.5 mcg /actuation  2 Puff Inhalation DAILY    venlafaxine-SR (EFFEXOR-XR) capsule 37.5 mg  37.5 mg Oral QHS    nitroglycerin (NITROBID) 2 % ointment 1 Inch  1 Inch Topical BID    cefTRIAXone (ROCEPHIN) 2 g in 0.9% sodium chloride (MBP/ADV) 50 mL MBP  2 g IntraVENous Q24H    sodium chloride (NS) flush 5-10 mL  5-10 mL IntraVENous Q8H    sodium chloride (NS) flush 5-10 mL  5-10 mL IntraVENous PRN    NOREPINephrine (LEVOPHED) 4 mg in 5% dextrose 250 mL infusion  4 mcg/min IntraVENous TITRATE    0.9% sodium chloride infusion 250 mL  250 mL IntraVENous PRN    albuterol (PROVENTIL HFA, VENTOLIN HFA, PROAIR HFA) inhaler 2 Puff  2 Puff Inhalation Q6H PRN    calcium-vitamin D (OS-ZACHARIAH +D3) 250 mg-125 unit per tablet 1 Tablet  1 Tablet Oral DAILY    hydroCHLOROthiazide (HYDRODIURIL) tablet 12.5 mg  12.5 mg Oral DAILY    HYDROcodone-acetaminophen (NORCO) 5-325 mg per tablet 1 Tablet  1 Tablet Oral Q6H PRN    pantoprazole (PROTONIX) 40 mg in 0.9% sodium chloride 10 mL injection  40 mg IntraVENous Q12H    venlafaxine-SR (EFFEXOR-XR) capsule 75 mg  75 mg Oral BID    verapamil ER (CALAN-SR) tablet 240 mg  240 mg Oral DAILY WITH BREAKFAST    sodium chloride (NS) flush 5-40 mL  5-40 mL IntraVENous Q8H    sodium chloride (NS) flush 5-40 mL  5-40 mL IntraVENous PRN    acetaminophen (TYLENOL) tablet 650 mg  650 mg Oral Q6H PRN    Or    acetaminophen (TYLENOL) suppository 650 mg  650 mg Rectal Q6H PRN    polyethylene glycol (MIRALAX) packet 17 g  17 g Oral DAILY PRN    ondansetron (ZOFRAN ODT) tablet 4 mg  4 mg Oral Q8H PRN    Or    ondansetron (ZOFRAN) injection 4 mg  4 mg IntraVENous Q6H PRN    piperacillin-tazobactam (ZOSYN) 3.375 g in 0.9% sodium chloride (MBP/ADV) 100 mL MBP  3.375 g IntraVENous Q8H    melatonin tablet 3 mg  3 mg Oral QHS    OLANZapine (ZyPREXA zydis) disintegrating tablet 5 mg  5 mg Oral QHS PRN    sucralfate (CARAFATE) tablet 1 g  1 g Oral AC&HS    tuberculin injection 5 Units  5 Units IntraDERMal ONCE       Other Studies:  No results found for this visit on 11/17/21. XR ABD (KUB)    Result Date: 11/18/2021  Clinical history: Abdominal pain TECHNIQUE: AP supine abdominal x-ray COMPARISON: Yesterday. FINDINGS: There is normal bowel gas pattern. There is residual oral contrast within the colon. A Foreman catheter is noted. Surrounding bones are intact. 1. Normal/nonobstructive bowel gas pattern. CT HEAD WO CONT    Result Date: 11/17/2021  NONCONTRAST HEAD CT CLINICAL HISTORY:  Headache with hypotension. TECHNIQUE:  Axial images were obtained with spiral technique. Radiation dose reduction was achieved using one or all of the following techniques: automated exposure control, weight-based dosing, iterative reconstruction. COMPARISON:  June 21, 2021. REPORT:   Standard noncontrast head CT demonstrates no definite intracranial mass effect, hemorrhage, or evidence of acute geographic infarction.   Extensive white matter hypodensities are most consistent with small vessel ischemic disease. The ventricles are normal in size and configuration, accounting for the patient's age. There is extensive mucous membrane thickening of the left maxillary antrum and more mild thickening in the right maxillary antrum, multiple bilateral ethmoid air cells, and the left frontal sinus. Orbits  and other paranasal sinuses are clear where imaged. Bone windows demonstrate no definite fracture or destruction. 1.  EXTENSIVE SMALL VESSEL ISCHEMIC DISEASE WITH NO ACUTE INTRACRANIAL ABNORMALITY IDENTIFIED AT NONCONTRAST CT. 2. EXTENSIVE SINUSITIS, MOST MARKED IN THE LEFT MAXILLARY SINUS. Lilo Amaya CTA CHEST ABD PELV W WO CONT    Result Date: 11/18/2021  Title:  CT arteriogram of the chest, abdomen, and pelvis. Indication:  No indication given. Hypotension. Anemia. Technique: Axial images of the chest, abdomen and pelvis were obtained after the administration of intravenous iodinated contrast media. Contrast was used to best identify the arterial structures. Images were reviewed on a separate, free standing, three-dimensional workstation as per the referring physicians request.  All CT scans at this facility are performed using dose reduction/dose modulation techniques, as appropriate the performed exam, including the following: Automated Exposure Control; Adjustment of the mA and/or kV according to patient size (this includes techniques or standardized protocols for targeted exams where dose is matched to indication/reason for exam); and Use of Iterative Reconstruction Technique. Comparison: Chest CT 9/28/2020. Findings: CHEST Lungs:  Dependent atelectasis. A bronchus in the anterior RIGHT upper lobe is filled with soft tissue density. Mediastinum:  Stable, nonenlarged, lymph nodes since 2018 Thyroid Gland:  Heterogeneous ABDOMEN Gallbladder:  Large calcified gallstone Liver:  Unremarkable. Spleen:  Unremarkable Pancreas:  Unremarkable Kidneys:  Symmetric nephrograms. Calcification in the periphery of the LEFT kidney is an unusual location for a kidney stone. Perhaps this represents sequela of previous focal pyelonephritis. Adrenal Glands:  Unremarkable Esophagus: Contrast filled and distended consistent with obstruction at the level of the GE junction. Stomach:  Large hiatal hernia Small intestine:  Unremarkable Colon: Thickened wall in the redundant descending and sigmoid colon. There are a few, scattered, air-filled diverticula. No abscess. PELVIS Bladder:  Decompressed by Foreman catheter  Uterus/Adnexa:  Unremarkable MUSCULOSKELETAL:  T12, wedge-shaped, vertebral compression fracture has been present since at least 2020 but appears to have progressed in the interval. VASCULAR Thoracic Aorta: Atherosclerotic disease, patent Great Arteries:  Atherosclerotic disease, patent. Central veins: RIGHT internal jugular vein catheter. Abdominal Aorta: Atherosclerotic disease, patent Iliac Arteries:  Atherosclerotic disease, patent Femoral Arteries:  Patent Celiac Artery:  Mild atherosclerotic disease, patent. Patent splenic artery with calcification and a bifurcation point, no aneurysm identified. Patent hepatic artery. Superior mesenteric Artery:  Patent Inferior mesenteric Artery:  Patent Renal Arteries:  Grade stenosis of the RIGHT renal artery secondary to calcified plaque at the origin. High-grade stenosis of the LEFT renal artery secondary to calcified plaque at the origin. Atherosclerotic disease. Patent mesenteric arteries. Descending colon/sigmoid colitis. No abscess. Contrast filled and distended esophagus to the level of the hiatal hernia at the GE junction. Recommend further evaluation with endoscopy as there may well be an obstructing mass or stricture in this area. Gallstone. Terminal RIGHT upper lobe bronchus is filled with soft tissue density. While this most likely represents mucus plugging, an endobronchial lesion cannot be excluded.   Recommend follow-up CT scan of the chest in 3 months if an interval bronchoscopy has not been performed. T12 vertebral compression fracture has progressed since 9/28/2020. Does this patient have focal tenderness? Further evaluation with MRI is suggested. XR CHEST PORT    Result Date: 11/17/2021  Chest X-ray INDICATION: Central line placement A portable AP view of the chest was obtained. FINDINGS: Right IJ central line is in place with the tip in the right atrium. There is no pneumothorax. Background interstitial prominence appears chronic. No developing focal infiltrate. The heart size is normal.  The bony thorax is intact. No evidence of complication post line placement     XR CHEST PORT    Result Date: 11/17/2021  CHEST X-RAY, one view. INDICATION:  Abdominal pain  and cough. Hypertension. TECHNIQUE:  AP portable semiupright view. COMPARISON: 18 and September 2021 FINDINGS: Lungs: are clear. Costophrenic angles: are sharp. Heart size: is normal. Pulmonary vasculature: is unremarkable. Aorta: Unremarkable. Included portion of the upper abdomen: is unremarkable. Bones: No gross bony lesions. Other: None. Negative for acute change. [unfilled]       Part of this note was written by using a voice dictation software and the note has been proof read but may still contain some grammatical/other typographical errors.     Signed:  Demetrius Chatman MD

## 2021-11-18 NOTE — ASSESSMENT & PLAN NOTE
Admission with septic shock maps less than 60, briefly on pressors in the emergency department, stabilized after fluid resuscitation. CT concerning for diverticulitis versus ischemic bowel.   -Vancomycin, Zosyn  -Consult general surgery in the morning

## 2021-11-18 NOTE — PROGRESS NOTES
TRANSFER - OUT REPORT:    Verbal report given to Javad Garcia on Tracie Su  being transferred to [facility] for routine progression of care       Report consisted of patients Situation, Background, Assessment and   Recommendations(SBAR). Information from the following report(s) SBAR was reviewed with the receiving nurse. Lines:   Triple Lumen 11/17/21 Anterior; Right Neck (Active)   Central Line Being Utilized Yes 11/18/21 1231   Criteria for Appropriate Use Hemodynamically unstable, requiring monitoring lines, vasopressors, or volume resuscitation 11/18/21 1231   Site Assessment Clean, dry, & intact 11/18/21 1231   Infiltration Assessment 0 11/18/21 1231   Affected Extremity/Extremities Color distal to insertion site pink (or appropriate for race) 11/18/21 1231   Date of Last Dressing Change 11/17/21 11/18/21 1231   Dressing Status Clean, dry, & intact 11/18/21 1231   Dressing Type Disk with Chlorhexadine gluconate (CHG); Transparent 11/18/21 1231   Action Taken Other (comment) 11/18/21 1045   Proximal Hub Color/Line Status White; Patent; Flushed 11/18/21 1231   Positive Blood Return (Medial Site) Yes 11/18/21 1231   Medial Hub Color/Line Status Blue; Patent; Flushed 11/18/21 1231   Positive Blood Return (Lateral Site) Yes 11/18/21 1231   Distal Hub Color/Line Status Brown; Patent;  Flushed 11/18/21 1231   Positive Blood Return (Site #3) Yes 11/18/21 1231   Alcohol Cap Used No 11/18/21 1231       Quad Lumen (Active)       Peripheral IV 11/17/21 Proximal; Right; Posterior Forearm (Active)   Site Assessment Clean, dry, & intact 11/18/21 1400   Phlebitis Assessment 0 11/18/21 1400   Infiltration Assessment 0 11/18/21 1400   Dressing Status Clean, dry, & intact 11/18/21 1400   Dressing Type Transparent 11/18/21 1400   Hub Color/Line Status Flushed 11/18/21 1400   Alcohol Cap Used No 11/18/21 1400       Peripheral IV 11/17/21 Left Forearm (Active)   Site Assessment Clean, dry, & intact 11/18/21 1231   Phlebitis Assessment 0 11/18/21 1231   Infiltration Assessment 0 11/18/21 1231   Dressing Status Clean, dry, & intact 11/18/21 1231   Dressing Type Tape; Transparent 11/18/21 1231   Hub Color/Line Status Patent; Flushed 11/18/21 1231   Alcohol Cap Used No 11/18/21 1231        Opportunity for questions and clarification was provided.       Patient transported with:   O2 @ 3 liters

## 2021-11-18 NOTE — CONSULTS
[facility] Consult Note       Primary GI Physician: new    Referring Provider:  Breanne Mayer MD    Consult Date:  11/18/2021    Admit Date:  11/17/2021    Chief Complaint:  Melena, abnormal CT esophagus    Subjective:     History of Present Illness:  Patient is a 77 y.o. female with PMH anxiety, osteoporosis, anemia, hiatal hernia, seen  seen in consultation at the request of Dr. Brian Hitchcock for evaluation of melena. She was admitted 11/17 by the hospitalist service after presenting the the ED via EMS with abdominal pain and syncope. She was found hypotensive and hypoxic on arrival, with AMS. CT head with no acute findings, excepting sinusitis. CT C/A/P with left sided colitis, most marked in the sigmoid, as well as a moderate hiatal hernia with a contrast filled esophagus - stricture vs mass, along with gallstones. Surgery has been consulted for colonic findings, in turn placing gi consult for esophageal findings. Lactic acid was 6.4 on arrival, down to 1.8 today. HS-troponin now at 624. WBC today at 14.7, hgb 15.9, platelet 894. Patient is seen today with assistance of family at bedside. Although she initially required vasopressors, she quickly weaned off and was transferred to the floor. She reports worsening dysphagia to solids and pills, often having to cough up a food bolus. She recalls remote hx of hiatal hernia repair; records reveal laparoscopic repair of hiatal hernia with fundoplication in [id number] with Dr Madeline Ortiz. She has been on Prilosec as an outpatient. She has had some loose stools since admission and reports her normal bowel pattern is irregular, sometimes loose and sometimes constipated. She has been told she has had blood in her stools here but is unaware of the color. Patient has followed with heme/onc, Dr Barney Hinojosa, for chronic iron deficient anemia. She was last seen by Dr Barney Hinojosa late October after receiving [facility] 8/2021; hgb at that time was 14.7.   Colonoscopy with Dr Maryan Coffman 7/30/21 for heme + stool - pan-diverticulosis, no source of heme + stool found; repeat exam recommended for 10 years. Patient did not want to pursue EGD at that time. [facility] by [facility] earlier this year for dysphagia with barium swallow obtained in evaluation. Exam revealed evidence of a hiatal hernia and esophageal stricture; patient canceled follow-up appt. CT C/A/P 11/17/21 - Wet Read  Bree Mclean MD on 11/17/2021  7:44 PM ([facility], Radiology)   No large hematoma. left colitis, most marked in the sigmoid--? Diverticulitis vs Other Inflammatory vs. Ischemic (though BLANCHE is patent). Extensive atherosclerosis w/o aortic dissection or large-vessel occlusion. Moderate HH w/dilated, contrast-filled esophagus--?stricture vs mass. Gallstones. Final report t/f from IR. PMH:  Past Medical History:   Diagnosis Date    Anemia 2005 approx    2 units transfused    Arthritis     hands, hips    B12 deficiency     Cancer ([facility] [address]     hx of melanoma 1986 R arm, and carcinoma x 2 forehead    COVID-19 vaccine series completed 02/2021    PT TO BRING CARD DOS    Depression with anxiety 8/16/2016    Essential hypertension with goal blood pressure less than 140/90     controlled with med    Gastrointestinal disorder     hiatel hernia    Hyperlipidemia, unspecified 8/16/2016    Menopause     @ 51    Mild intermittent asthma 8/16/2016    Osteoarthritis of multiple joints 8/16/2016    Osteoporosis 10/25/2016    Pulmonary emphysema ([facility] [address] 08/16/2016    daily inhalers    Rectal bleed     Vertigo        PSH:  Past Surgical History:   Procedure Laterality Date    COLONOSCOPY N/A 7/30/2021    COLONOSCOPY/BMI 23 performed by Kenisha Duvall MD at [facility]; HI RISK IND  7/30/2021         HX HERNIA REPAIR  2010    helped reflex    HX TUBAL LIGATION  1972       Allergies:   Allergies   Allergen Reactions    Other Medication Rash     Equate antibiotic ointment       Home Medications:  Prior to Admission medications    Medication Sig Start Date End Date Taking? Authorizing Provider   HYDROcodone-acetaminophen (NORCO) 5-325 mg per tablet 1 Tablet every six (6) hours as needed. 11/9/21  Yes Provider, Historical   venlafaxine-SR (Effexor XR) 75 mg capsule Take 1 Capsule by mouth two (2) times a day. 8/8/21  Yes Caryn Alfaro MD   venlafaxine-SR (Effexor XR) 37.5 mg capsule Take 1 Capsule by mouth nightly. 8/8/21  Yes Caryn Alfaro MD   hydroCHLOROthiazide (HYDRODIURIL) 12.5 mg tablet Take 1 Tablet by mouth daily. 8/6/21  Yes Caryn Alfaro MD   verapamil ER (CALAN-SR) 240 mg CR tablet TAKE 1 TABLET BY MOUTH EVERY MORNING 8/6/21  Yes Caryn Alfaro MD   pravastatin (PRAVACHOL) 20 mg tablet Take 1 Tablet by mouth daily. Patient taking differently: Take 20 mg by mouth nightly. 8/6/21  Yes Caryn Alfaro MD   fluticasone-umeclidin-vilanter (Trelegy Ellipta) 509-37.0-10 mcg dsdv Take 1 Puff by inhalation daily. 4/12/21  Yes Lucius Carter MD   omeprazole (PRILOSEC) 40 mg capsule Take 1 Cap by mouth two (2) times a day. 10/20/20  Yes Lucius Carter MD   potassium chloride (KLOR-CON) 10 mEq tablet Take 1 Tablet by mouth daily. 11/12/21   Caryn Alfaro MD   ondansetron (ZOFRAN ODT) 4 mg disintegrating tablet Take 4 mg by mouth. 11/9/21   Provider, Historical   LORazepam (ATIVAN) 1 mg tablet Take 1/2 to 1 tab PO Q 12 hours PRN anxiety, use sparingly  Patient not taking: Reported on 11/18/2021 9/15/21   Caryn Alfaro MD   calcium-cholecalciferol, d3, (CALCIUM 600 + D) 600-125 mg-unit tab Take 1 Tab by mouth daily. Patient not taking: Reported on 11/18/2021    Provider, Historical   cetirizine (ZYRTEC) 10 mg tablet Take 1 Tab by mouth daily. Patient not taking: Reported on 11/18/2021 1/12/21   Odalys Muller NP   albuterol (Ventolin HFA) 90 mcg/actuation inhaler Take 2 Puffs by inhalation four (4) times daily.   Patient taking differently: Take 2 Puffs by inhalation every six (6) hours as needed.  1/12/21   Kaitlin Fortune NP       [facility] Medications:  Current Facility-Administered Medications   Medication Dose Route Frequency    budesonide-formoteroL (SYMBICORT) 160-4.5 mcg/actuation HFA inhaler 2 Puff  2 Puff Inhalation BID RT    And    tiotropium bromide (SPIRIVA RESPIMAT) 2.5 mcg /actuation  2 Puff Inhalation DAILY    venlafaxine-SR (EFFEXOR-XR) capsule 37.5 mg  37.5 mg Oral QHS    sodium chloride (NS) flush 5-10 mL  5-10 mL IntraVENous Q8H    sodium chloride (NS) flush 5-10 mL  5-10 mL IntraVENous PRN    NOREPINephrine (LEVOPHED) 4 mg in 5% dextrose 250 mL infusion  4 mcg/min IntraVENous TITRATE    0.9% sodium chloride infusion 250 mL  250 mL IntraVENous PRN    albuterol (PROVENTIL HFA, VENTOLIN HFA, PROAIR HFA) inhaler 2 Puff  2 Puff Inhalation Q6H PRN    calcium-vitamin D (OS-ZACHARIAH +D3) 250 mg-125 unit per tablet 1 Tablet  1 Tablet Oral DAILY    hydroCHLOROthiazide (HYDRODIURIL) tablet 12.5 mg  12.5 mg Oral DAILY    HYDROcodone-acetaminophen (NORCO) 5-325 mg per tablet 1 Tablet  1 Tablet Oral Q6H PRN    pantoprazole (PROTONIX) 40 mg in 0.9% sodium chloride 10 mL injection  40 mg IntraVENous Q12H    venlafaxine-SR (EFFEXOR-XR) capsule 75 mg  75 mg Oral BID    verapamil ER (CALAN-SR) tablet 240 mg  240 mg Oral DAILY WITH BREAKFAST    sodium chloride (NS) flush 5-40 mL  5-40 mL IntraVENous Q8H    sodium chloride (NS) flush 5-40 mL  5-40 mL IntraVENous PRN    acetaminophen (TYLENOL) tablet 650 mg  650 mg Oral Q6H PRN    Or    acetaminophen (TYLENOL) suppository 650 mg  650 mg Rectal Q6H PRN    polyethylene glycol (MIRALAX) packet 17 g  17 g Oral DAILY PRN    ondansetron (ZOFRAN ODT) tablet 4 mg  4 mg Oral Q8H PRN    Or    ondansetron (ZOFRAN) injection 4 mg  4 mg IntraVENous Q6H PRN    piperacillin-tazobactam (ZOSYN) 3.375 g in 0.9% sodium chloride (MBP/ADV) 100 mL MBP  3.375 g IntraVENous Q8H    melatonin tablet 3 mg  3 mg Oral QHS    OLANZapine (ZyPREXA zydis) disintegrating tablet 5 mg  5 mg Oral QHS PRN    sucralfate (CARAFATE) tablet 1 g  1 g Oral AC&HS    tuberculin injection 5 Units  5 Units IntraDERMal ONCE       Social History:  Social History     Tobacco Use    Smoking status: Never Smoker    Smokeless tobacco: Never Used    Tobacco comment: 2nd hand smoke x 33 yrs   Substance Use Topics    Alcohol use: No           Family History:  Family History   Problem Relation Age of Onset    Stroke Mother         intracerebral aneurysm    Diabetes Maternal Aunt     Diabetes Maternal Uncle     No Known Problems Father         didn't have contact with father    Breast Cancer Daughter 28    Hypertension Maternal Grandmother        Review of Systems:  A detailed 10 system ROS is obtained, with pertinent positives as listed above. All others are negative. Diet:  NPO    Objective:     Physical Exam:  Vitals:  Visit Vitals  BP (!) 151/94 (BP Patient Position: At rest)   Pulse 99   Temp 97.8 °F (36.6 °C)   Resp 16   Ht 5' 2\" (1.575 m)   Wt 53.3 kg (117 lb 8.9 oz)   SpO2 93%   BMI 21.50 kg/m²     Gen:  Pt is alert, cooperative, no acute distress  Skin:  Extremities and face reveal no rashes. HEENT: Sclerae anicteric. Extra-occular muscles are intact. No oral ulcers. No abnormal pigmentation of the lips. The neck is supple. Cardiovascular: Regular rate and rhythm. No murmurs, gallops, or rubs. Respiratory:  Comfortable breathing with no accessory muscle use. Clear but diminished breath sounds anteriorly with no wheezes, rales, or rhonchi. GI:  Abdomen nondistended, soft, and mildly tender throughout abdomen with no rebound or guarding. Active bowel sounds. No enlargement of the liver or spleen. No masses palpable. Rectal:  Deferred  Musculoskeletal:  No pitting edema of the lower legs. Neurological:  Gross memory appears intact. Patient is alert and oriented.   Psychiatric:  Mood appears appropriate with judgement intact. Lymphatic:  No cervical or supraclavicular adenopathy. Laboratory:    Recent Labs     11/18/21  0245 11/17/21  2316 11/17/21  1748   WBC 14.7*  --  10.8   HGB 15.9* 15.9* 8.6*   HCT 48.4* 48.2* 27.5*   *  --  138*   MCV 83.9  --  92.0   NA  --   --  142   K  --   --  3.7   CL  --   --  111*   CO2  --   --  16*   BUN  --   --  11   CREA  --   --  0.52*   CA  --   --  7.2*   GLU  --   --  126*   AP  --   --  140*   AST  --   --  32   ALT  --   --  19   TBILI  --   --  0.5   ALB  --   --  1.2*   TP  --   --  3.2*   LPSE  --   --  63*          Assessment:     Principal Problem:    Severe sepsis with septic shock ([facility]) (11/17/2021)    Active Problems:    Acute blood loss anemia (11/17/2021)      Primary hypertension (8/16/2016)      Overview: Goal < 140/80      Hiatal hernia with GERD and esophagitis (8/16/2016)      Osteoporosis (10/25/2016)      Severe persistent asthma without complication (7/01/3005)      Gastroesophageal reflux disease with esophagitis (11/1/2015)      Mood disorder ([facility] (11/1/2015)      Compression fracture of T9 vertebra ([facility]) (10/18/2021)      Body mass index (BMI) of 21.0 to 21.9 in adult (11/17/2021)      Colitis, indeterminate (11/17/2021)        Plan:     76 yo female is seen today for evaluation of dysphagia with abnormal CT esophagus - stricture vs mass, obtained on admission. She presented to the ED 11/17 with abdominal pain, hypotension, and hypoxia, quickly improving with supportive care. She required Levophed for a short period. Imaging with abnormal esophagus as well as a left sided colitis, for which surgery was consulted. She has been placed on antibx for presumed diverticulitis; colonoscopy with Dr Ashley Harry this summer noted diverticulosis but was otherwise unremarkable. Patient was seen earlier in the year in our office for dysphagia with barium swallow at that time which revealed hiatal hernia and an esophageal narrowing.   She did not keep recommended follow-up to discuss EGD but today reports continued dysphagia to pills and solids. HH repair with  Nissen reviewed from 2010 with Dr Jose Chacon. She has had a chronic anemia, followed by heme/onc, with stable hgb this admission following Injectafer in August.    1.  NPO  2. EGD with possible dilation today for complaints of dysphagia, abnormal imaging of esophagus, and question of blood in stools  3. Agree with antibx per surgery for left sided colitis  4. If diarrhea persists, will obtain stool studies to evaluate (she reports her baseline fluctuates between constipation and diarrhea)  5. Further recommendations to be made based on findings of above     Patient is seen and examined in collaboration with Dr. Christophe Vanessa. Assessment and plan as per Dr. Christophe Vanessa.   Edgar Keller=kate

## 2021-11-18 NOTE — PROGRESS NOTES
Alonzo placed in ER. No orders found for alonzo placement. Dr. Meenakshi Galarza notified. Orders received to remove alonzo.

## 2021-11-18 NOTE — ROUTINE PROCESS
TRANSFER - IN REPORT:    Verbal report received from Mic Loza, [facility] on Joselyn Chapman being received from GI Lab for routine progression of care. Report consisted of patients Situation, Background, Assessment and Recommendations(SBAR). Information from the following report(s) SBAR, Kardex and Procedure Summary was reviewed. Opportunity for questions and clarification was provided. Assessment completed upon patients arrival to unit and care assumed. Patient received to room 310. Patient connected to monitor and assessment completed. Plan of care reviewed. Patient oriented to room and call light. Patient aware to use call light to communicate any chest pain or needs.

## 2021-11-18 NOTE — ASSESSMENT & PLAN NOTE
Anxiety and depression, previously treated with benzodiazepines concomitantly treating pain with opioids  -Venlafaxine  -Use caution with benzodiazepines  -Sleep orders

## 2021-11-18 NOTE — ROUTINE PROCESS
TRANSFER - IN REPORT:    Verbal report received from Aidan Mckinley, [facility] on Otilio Whitfield being received from ER for routine progression of care      Report consisted of patients Situation, Background, Assessment and Recommendations(SBAR). Information from the following report(s) SBAR, Kardex, ED Summary, Intake/Output, MAR and Cardiac Rhythm ST was reviewed with the receiving nurse. Opportunity for questions and clarification was provided. Assessment to be completed upon patients arrival to unit and care assumed.

## 2021-11-19 ENCOUNTER — APPOINTMENT (OUTPATIENT)
Dept: NON INVASIVE DIAGNOSTICS | Age: 76
DRG: 871 | End: 2021-11-19
Attending: PHYSICIAN ASSISTANT
Payer: MEDICARE

## 2021-11-19 ENCOUNTER — ANESTHESIA EVENT (OUTPATIENT)
Dept: ENDOSCOPY | Age: 76
DRG: 871 | End: 2021-11-19
Payer: MEDICARE

## 2021-11-19 ENCOUNTER — ANESTHESIA (OUTPATIENT)
Dept: ENDOSCOPY | Age: 76
DRG: 871 | End: 2021-11-19
Payer: MEDICARE

## 2021-11-19 LAB
ACC. NO. FROM MICRO ORDER, ACCP: NORMAL
ANION GAP SERPL CALC-SCNC: 8 MMOL/L (ref 7–16)
BASOPHILS # BLD: 0 K/UL (ref 0–0.2)
BASOPHILS NFR BLD: 0 % (ref 0–2)
BLOOD CULTURE PCR TESTING: NORMAL
BUN SERPL-MCNC: 12 MG/DL (ref 8–23)
C DIFF GDH STL QL: NORMAL
C DIFF TOX A+B STL QL IA: NORMAL
CALCIUM SERPL-MCNC: 8.2 MG/DL (ref 8.3–10.4)
CHLORIDE SERPL-SCNC: 104 MMOL/L (ref 98–107)
CLINICAL CONSIDERATION: NORMAL
CO2 SERPL-SCNC: 29 MMOL/L (ref 21–32)
CREAT SERPL-MCNC: 0.68 MG/DL (ref 0.6–1)
DIFFERENTIAL METHOD BLD: ABNORMAL
ECHO AO ASC DIAM: 2.9 CM
ECHO AO ROOT DIAM: 2.9 CM
ECHO AV AREA PEAK VELOCITY: 2.55 CM2
ECHO AV AREA VTI: 2.14 CM2
ECHO AV AREA/BSA PEAK VELOCITY: 1.7 CM2/M2
ECHO AV AREA/BSA VTI: 1.4 CM2/M2
ECHO AV MEAN GRADIENT: 8 MMHG
ECHO AV MEAN GRADIENT: 8 MMHG
ECHO AV PEAK GRADIENT: 14 MMHG
ECHO AV PEAK VELOCITY: 186 CM/S
ECHO AV VTI: 35.6 CM
ECHO LA AREA 2C: 16.4 CM2
ECHO LV E' LATERAL VELOCITY: 6.48 CM/S
ECHO LV E' SEPTAL VELOCITY: 5.42 CM/S
ECHO LV EDV A2C: 23.6 CM3
ECHO LV EDV A4C: 39.8 CM3
ECHO LV ESV A2C: 8.57 CM3
ECHO LV ESV A4C: 16.1 CM3
ECHO LV INTERNAL DIMENSION DIASTOLIC: 3.77 CM (ref 3.9–5.3)
ECHO LV INTERNAL DIMENSION SYSTOLIC: 2.21 CM
ECHO LV IVSD: 0.86 CM (ref 0.6–0.9)
ECHO LV MASS 2D: 93.7 G (ref 67–162)
ECHO LV MASS INDEX 2D: 61.6 G/M2 (ref 43–95)
ECHO LV POSTERIOR WALL DIASTOLIC: 0.86 CM (ref 0.6–0.9)
ECHO LVOT DIAM: 1.9 CM
ECHO LVOT PEAK GRADIENT: 11 MMHG
ECHO LVOT VTI: 26.8 CM
ECHO MV A VELOCITY: 127 CM/S
ECHO MV E DECELERATION TIME (DT): 158 MS
ECHO MV E VELOCITY: 86.8 CM/S
ECHO MV E/A RATIO: 0.68
ECHO MV E/E' LATERAL: 13.4
ECHO MV E/E' RATIO (AVERAGED): 14.7
ECHO MV E/E' SEPTAL: 16.01
ECHO RV INTERNAL DIMENSION: 2.88 CM
ECHO RV TAPSE: 1.75 CM (ref 1.5–2)
EOSINOPHIL # BLD: 0.5 K/UL (ref 0–0.8)
EOSINOPHIL NFR BLD: 3 % (ref 0.5–7.8)
ERYTHROCYTE [DISTWIDTH] IN BLOOD BY AUTOMATED COUNT: 17.3 % (ref 11.9–14.6)
GLUCOSE SERPL-MCNC: 96 MG/DL (ref 65–100)
HCT VFR BLD AUTO: 46.1 % (ref 35.8–46.3)
HGB BLD-MCNC: 15.4 G/DL (ref 11.7–15.4)
IMM GRANULOCYTES # BLD AUTO: 0.2 K/UL (ref 0–0.5)
IMM GRANULOCYTES NFR BLD AUTO: 1 % (ref 0–5)
INTERPRETATION: NORMAL
INTERPRETATION: NORMAL
LACTATE SERPL-SCNC: 0.9 MMOL/L (ref 0.4–2)
LYMPHOCYTES # BLD: 1.4 K/UL (ref 0.5–4.6)
LYMPHOCYTES NFR BLD: 9 % (ref 13–44)
MAGNESIUM SERPL-MCNC: 1.8 MG/DL (ref 1.8–2.4)
MCH RBC QN AUTO: 28.2 PG (ref 26.1–32.9)
MCHC RBC AUTO-ENTMCNC: 33.4 G/DL (ref 31.4–35)
MCV RBC AUTO: 84.3 FL (ref 79.6–97.8)
MM INDURATION POC: 0 MM (ref 0–5)
MONOCYTES # BLD: 1.1 K/UL (ref 0.1–1.3)
MONOCYTES NFR BLD: 7 % (ref 4–12)
NEUTS SEG # BLD: 12.9 K/UL (ref 1.7–8.2)
NEUTS SEG NFR BLD: 80 % (ref 43–78)
NRBC # BLD: 0 K/UL (ref 0–0.2)
PCR REFLEX: NORMAL
PLATELET # BLD AUTO: 128 K/UL (ref 150–450)
PMV BLD AUTO: 9.9 FL (ref 9.4–12.3)
POTASSIUM SERPL-SCNC: 2.7 MMOL/L (ref 3.5–5.1)
PPD POC: NEGATIVE NEGATIVE
RBC # BLD AUTO: 5.47 M/UL (ref 4.05–5.2)
SODIUM SERPL-SCNC: 141 MMOL/L (ref 136–145)
WBC # BLD AUTO: 16.1 K/UL (ref 4.3–11.1)

## 2021-11-19 PROCEDURE — 74011250637 HC RX REV CODE- 250/637: Performed by: FAMILY MEDICINE

## 2021-11-19 PROCEDURE — 74011250636 HC RX REV CODE- 250/636: Performed by: FAMILY MEDICINE

## 2021-11-19 PROCEDURE — 88305 TISSUE EXAM BY PATHOLOGIST: CPT

## 2021-11-19 PROCEDURE — 94640 AIRWAY INHALATION TREATMENT: CPT

## 2021-11-19 PROCEDURE — C9113 INJ PANTOPRAZOLE SODIUM, VIA: HCPCS | Performed by: FAMILY MEDICINE

## 2021-11-19 PROCEDURE — 77010033678 HC OXYGEN DAILY

## 2021-11-19 PROCEDURE — 83605 ASSAY OF LACTIC ACID: CPT

## 2021-11-19 PROCEDURE — 74011000250 HC RX REV CODE- 250: Performed by: REGISTERED NURSE

## 2021-11-19 PROCEDURE — 74011250636 HC RX REV CODE- 250/636: Performed by: INTERNAL MEDICINE

## 2021-11-19 PROCEDURE — 2709999900 HC NON-CHARGEABLE SUPPLY: Performed by: INTERNAL MEDICINE

## 2021-11-19 PROCEDURE — 94760 N-INVAS EAR/PLS OXIMETRY 1: CPT

## 2021-11-19 PROCEDURE — 87040 BLOOD CULTURE FOR BACTERIA: CPT

## 2021-11-19 PROCEDURE — 74011250636 HC RX REV CODE- 250/636: Performed by: REGISTERED NURSE

## 2021-11-19 PROCEDURE — 76060000031 HC ANESTHESIA FIRST 0.5 HR: Performed by: INTERNAL MEDICINE

## 2021-11-19 PROCEDURE — 74011250636 HC RX REV CODE- 250/636: Performed by: NURSE PRACTITIONER

## 2021-11-19 PROCEDURE — 93306 TTE W/DOPPLER COMPLETE: CPT

## 2021-11-19 PROCEDURE — 65270000029 HC RM PRIVATE

## 2021-11-19 PROCEDURE — 74011000258 HC RX REV CODE- 258: Performed by: FAMILY MEDICINE

## 2021-11-19 PROCEDURE — 74011000250 HC RX REV CODE- 250: Performed by: FAMILY MEDICINE

## 2021-11-19 PROCEDURE — 80048 BASIC METABOLIC PNL TOTAL CA: CPT

## 2021-11-19 PROCEDURE — 74011250637 HC RX REV CODE- 250/637: Performed by: INTERNAL MEDICINE

## 2021-11-19 PROCEDURE — 0D758ZZ DILATION OF ESOPHAGUS, VIA NATURAL OR ARTIFICIAL OPENING ENDOSCOPIC: ICD-10-PCS | Performed by: INTERNAL MEDICINE

## 2021-11-19 PROCEDURE — 85025 COMPLETE CBC W/AUTO DIFF WBC: CPT

## 2021-11-19 PROCEDURE — 83735 ASSAY OF MAGNESIUM: CPT

## 2021-11-19 PROCEDURE — 74011250637 HC RX REV CODE- 250/637: Performed by: PHYSICIAN ASSISTANT

## 2021-11-19 PROCEDURE — 76040000025: Performed by: INTERNAL MEDICINE

## 2021-11-19 PROCEDURE — 0DB58ZX EXCISION OF ESOPHAGUS, VIA NATURAL OR ARTIFICIAL OPENING ENDOSCOPIC, DIAGNOSTIC: ICD-10-PCS | Performed by: INTERNAL MEDICINE

## 2021-11-19 PROCEDURE — 77030021593 HC FCPS BIOP ENDOSC BSC -A: Performed by: INTERNAL MEDICINE

## 2021-11-19 PROCEDURE — 99232 SBSQ HOSP IP/OBS MODERATE 35: CPT | Performed by: INTERNAL MEDICINE

## 2021-11-19 RX ORDER — SODIUM CHLORIDE, SODIUM LACTATE, POTASSIUM CHLORIDE, CALCIUM CHLORIDE 600; 310; 30; 20 MG/100ML; MG/100ML; MG/100ML; MG/100ML
INJECTION, SOLUTION INTRAVENOUS
Status: DISCONTINUED | OUTPATIENT
Start: 2021-11-19 | End: 2021-11-19 | Stop reason: HOSPADM

## 2021-11-19 RX ORDER — MORPHINE SULFATE 2 MG/ML
0.5 INJECTION, SOLUTION INTRAMUSCULAR; INTRAVENOUS
Status: DISCONTINUED | OUTPATIENT
Start: 2021-11-19 | End: 2021-11-24 | Stop reason: HOSPADM

## 2021-11-19 RX ORDER — LIDOCAINE HYDROCHLORIDE 20 MG/ML
INJECTION, SOLUTION EPIDURAL; INFILTRATION; INTRACAUDAL; PERINEURAL AS NEEDED
Status: DISCONTINUED | OUTPATIENT
Start: 2021-11-19 | End: 2021-11-19 | Stop reason: HOSPADM

## 2021-11-19 RX ORDER — PROPOFOL 10 MG/ML
INJECTION, EMULSION INTRAVENOUS
Status: DISCONTINUED | OUTPATIENT
Start: 2021-11-19 | End: 2021-11-19 | Stop reason: HOSPADM

## 2021-11-19 RX ORDER — POTASSIUM CHLORIDE 14.9 MG/ML
20 INJECTION INTRAVENOUS
Status: COMPLETED | OUTPATIENT
Start: 2021-11-19 | End: 2021-11-19

## 2021-11-19 RX ORDER — ATORVASTATIN CALCIUM 40 MG/1
40 TABLET, FILM COATED ORAL
Status: DISCONTINUED | OUTPATIENT
Start: 2021-11-19 | End: 2021-11-24 | Stop reason: HOSPADM

## 2021-11-19 RX ORDER — PROPOFOL 10 MG/ML
INJECTION, EMULSION INTRAVENOUS AS NEEDED
Status: DISCONTINUED | OUTPATIENT
Start: 2021-11-19 | End: 2021-11-19 | Stop reason: HOSPADM

## 2021-11-19 RX ADMIN — MORPHINE SULFATE 0.5 MG: 2 INJECTION, SOLUTION INTRAMUSCULAR; INTRAVENOUS at 08:16

## 2021-11-19 RX ADMIN — Medication 3 MG: at 20:53

## 2021-11-19 RX ADMIN — ATORVASTATIN CALCIUM 40 MG: 40 TABLET, FILM COATED ORAL at 20:53

## 2021-11-19 RX ADMIN — PROPOFOL 20 MG: 10 INJECTION, EMULSION INTRAVENOUS at 14:28

## 2021-11-19 RX ADMIN — SUCRALFATE 1 G: 1 TABLET ORAL at 10:56

## 2021-11-19 RX ADMIN — MORPHINE SULFATE 0.5 MG: 2 INJECTION, SOLUTION INTRAMUSCULAR; INTRAVENOUS at 20:49

## 2021-11-19 RX ADMIN — Medication 10 ML: at 05:46

## 2021-11-19 RX ADMIN — NITROGLYCERIN 1 INCH: 20 OINTMENT TOPICAL at 17:17

## 2021-11-19 RX ADMIN — BUDESONIDE AND FORMOTEROL FUMARATE DIHYDRATE 2 PUFF: 160; 4.5 AEROSOL RESPIRATORY (INHALATION) at 07:50

## 2021-11-19 RX ADMIN — VERAPAMIL HYDROCHLORIDE 240 MG: 240 TABLET, FILM COATED, EXTENDED RELEASE ORAL at 08:17

## 2021-11-19 RX ADMIN — VENLAFAXINE HYDROCHLORIDE 75 MG: 75 CAPSULE, EXTENDED RELEASE ORAL at 17:18

## 2021-11-19 RX ADMIN — Medication 10 ML: at 13:17

## 2021-11-19 RX ADMIN — BUDESONIDE AND FORMOTEROL FUMARATE DIHYDRATE 2 PUFF: 160; 4.5 AEROSOL RESPIRATORY (INHALATION) at 21:35

## 2021-11-19 RX ADMIN — METOCLOPRAMIDE HYDROCHLORIDE 5 MG: 5 INJECTION INTRAMUSCULAR; INTRAVENOUS at 05:43

## 2021-11-19 RX ADMIN — PROPOFOL 100 MCG/KG/MIN: 10 INJECTION, EMULSION INTRAVENOUS at 14:28

## 2021-11-19 RX ADMIN — Medication 10 ML: at 20:53

## 2021-11-19 RX ADMIN — PANTOPRAZOLE SODIUM 40 MG: 40 INJECTION, POWDER, FOR SOLUTION INTRAVENOUS at 20:44

## 2021-11-19 RX ADMIN — PROPOFOL 20 MG: 10 INJECTION, EMULSION INTRAVENOUS at 14:30

## 2021-11-19 RX ADMIN — PROPOFOL 20 MG: 10 INJECTION, EMULSION INTRAVENOUS at 14:29

## 2021-11-19 RX ADMIN — VENLAFAXINE HYDROCHLORIDE 37.5 MG: 37.5 CAPSULE, EXTENDED RELEASE ORAL at 20:53

## 2021-11-19 RX ADMIN — PIPERACILLIN SODIUM AND TAZOBACTAM SODIUM 3.38 G: 3; .375 INJECTION, POWDER, LYOPHILIZED, FOR SOLUTION INTRAVENOUS at 00:30

## 2021-11-19 RX ADMIN — ONDANSETRON 4 MG: 8 TABLET, ORALLY DISINTEGRATING ORAL at 08:29

## 2021-11-19 RX ADMIN — SUCRALFATE 1 G: 1 TABLET ORAL at 08:17

## 2021-11-19 RX ADMIN — CALCIUM CARBONATE-CHOLECALCIFEROL TAB 250 MG-125 UNIT 1 TABLET: 250-125 TAB at 08:24

## 2021-11-19 RX ADMIN — PANTOPRAZOLE SODIUM 40 MG: 40 INJECTION, POWDER, FOR SOLUTION INTRAVENOUS at 08:25

## 2021-11-19 RX ADMIN — ONDANSETRON 4 MG: 8 TABLET, ORALLY DISINTEGRATING ORAL at 16:49

## 2021-11-19 RX ADMIN — TIOTROPIUM BROMIDE INHALATION SPRAY 2 PUFF: 3.12 SPRAY, METERED RESPIRATORY (INHALATION) at 07:50

## 2021-11-19 RX ADMIN — SUCRALFATE 1 G: 1 TABLET ORAL at 20:54

## 2021-11-19 RX ADMIN — HYDROCHLOROTHIAZIDE 12.5 MG: 25 TABLET ORAL at 08:24

## 2021-11-19 RX ADMIN — Medication 10 ML: at 13:16

## 2021-11-19 RX ADMIN — SODIUM CHLORIDE, SODIUM LACTATE, POTASSIUM CHLORIDE, AND CALCIUM CHLORIDE: 600; 310; 30; 20 INJECTION, SOLUTION INTRAVENOUS at 14:06

## 2021-11-19 RX ADMIN — PIPERACILLIN SODIUM AND TAZOBACTAM SODIUM 3.38 G: 3; .375 INJECTION, POWDER, LYOPHILIZED, FOR SOLUTION INTRAVENOUS at 08:25

## 2021-11-19 RX ADMIN — PIPERACILLIN SODIUM AND TAZOBACTAM SODIUM 3.38 G: 3; .375 INJECTION, POWDER, LYOPHILIZED, FOR SOLUTION INTRAVENOUS at 16:50

## 2021-11-19 RX ADMIN — MORPHINE SULFATE 0.5 MG: 2 INJECTION, SOLUTION INTRAMUSCULAR; INTRAVENOUS at 00:47

## 2021-11-19 RX ADMIN — METOCLOPRAMIDE HYDROCHLORIDE 5 MG: 5 INJECTION INTRAMUSCULAR; INTRAVENOUS at 00:33

## 2021-11-19 RX ADMIN — LIDOCAINE HYDROCHLORIDE 80 MG: 20 INJECTION, SOLUTION EPIDURAL; INFILTRATION; INTRACAUDAL; PERINEURAL at 14:28

## 2021-11-19 RX ADMIN — NITROGLYCERIN 1 INCH: 20 OINTMENT TOPICAL at 08:25

## 2021-11-19 RX ADMIN — VENLAFAXINE HYDROCHLORIDE 75 MG: 75 CAPSULE, EXTENDED RELEASE ORAL at 08:24

## 2021-11-19 RX ADMIN — POTASSIUM CHLORIDE 20 MEQ: 14.9 INJECTION, SOLUTION INTRAVENOUS at 10:57

## 2021-11-19 RX ADMIN — POTASSIUM CHLORIDE 20 MEQ: 14.9 INJECTION, SOLUTION INTRAVENOUS at 15:31

## 2021-11-19 NOTE — ROUTINE PROCESS
TRANSFER - IN REPORT:    Verbal report received from [facility] on [facility] being received from [facility] for routine progression of care. Report consisted of patients Situation, Background, Assessment and Recommendations(SBAR). Patient received back to room 310. Patient connected to monitor and assessment completed. Plan of care reviewed. Patient oriented to room and call light. Patient aware to use call light to communicate any chest pain or needs.

## 2021-11-19 NOTE — PROGRESS NOTES
[facility] Hospitalist Note     Admit Date:  2021  3:47 PM   Name:  Dennis Berger   Age:  77 y.o.  :  1945   MRN:  [id number]   PCP:  Gamal No MD  Treatment Team: Attending Provider: Melody Pitts MD; Consulting Provider: Rafael Pierson MD; Care Manager: Slava Cazares [facility]; Consulting Provider: John Mcintyre MD; Utilization Review: Isamar Dey; Consulting Provider: Timmy Calix MD; Consulting Provider: Clarence Triana MD    HPI/Subjective:   Dennis Berger is a 77 y.o. female with medical history of osteoporosis with multiple vertebral fracture, mood disorder with anxiety and depression, hiatal hernia with gastroparesis who presented with acute abdominal pain and syncope. She was found to be in septic shock and was initially placed on pressors. A central line was placed. After resuscitation her shock stabilized. CT demonstrated acute left-sided colitis with indeterminate cause. UA suggestive of UTI. Blood culture growing Streptococcus species. Patient started on empiric antibiotics. Surgery consulted and recommend no surgical intervention at this time. GI consulted and patient scheduled for EGD today. : Patient seen at the bedside. Complaining of lower abdominal pain. Also complaining of blood in stool. Denies chest pain, palpitation, nausea, vomiting. Denies shortness of breath. Unable to complete EGD yesterday due to retained food in the stomach. Currently n.p.o. and is scheduled for EGD today. Assessment and Plan:     Severe sepsis with septic shock: Resolved  Initially requiring pressor, stabilized after fluid resuscitation  Now off pressor  CT concerning for diverticulitis versus ischemic bowel  UA concerning for UTI  Urine and blood culture ordered  Started on Zosyn    Acute left-sided colitis:  Stool occult positive  Surgery consulted and recommend no surgical intervention at this time.   Recommend GI consult for melena  Continue Zosyn    UTI:  UA suggestive of UTI  Add on urine culture  Continue Zosyn    Streptococcus bacteremia:  Blood culture growing gram-positive cocci, ID PCR showed Streptococcus species(not agalactiae, pyogenes no pneumonia)   Vancomycin discontinued and continue Zosyn  Repeat blood culture today    Hypokalemia:  Repleted  Continue to monitor    Melena:  GI following and scheduled for EGD today  EGD on 11/18 unsuccessful due to retained food in stomach  N.p.o. for now  H&H monitoring  Continue Protonix    Acute blood loss anemia:  Likely due to GI bleed  Status post 1 unit PRBC transfusion in the ED  Continue to monitor  Transfuse if hemoglobin less than 7    Compression fracture of T9 vertebra:  Norco as needed  PT/OT    Mood disorder:  History of anxiety and depression, previously treated with benzodiazepine concomitantly treating pain with opioids  Continue venlafaxine  Use caution with benzodiazepine    GERD:  Continue PPI Carafate    Severe persistent asthma:  Continue albuterol and Trelegy    Hypertension:  Continue Verampamil    Discharge planning:  To be determined    DVT ppx ordered  Code status:  Full  Estimated LOS:  Greater than 2 midnights  Risk:  high    Hospital Problems as of 11/19/2021 Date Reviewed: 11/17/2021          Codes Class Noted - Resolved POA    Acute blood loss anemia ICD-10-CM: D62  ICD-9-CM: 285.1  11/17/2021 - Present Yes        Severe protein-calorie malnutrition ([facility] ICD-10-CM: E43  ICD-9-CM: 262  11/18/2021 - Present Yes        UTI (urinary tract infection) ICD-10-CM: N39.0  ICD-9-CM: 599.0  11/18/2021 - Present Unknown        Streptococcal bacteremia ICD-10-CM: R78.81, B95.5  ICD-9-CM: 790.7, 041.00  11/18/2021 - Present Unknown        * (Principal) Severe sepsis with septic shock ([facility] ICD-10-CM: A41.9, R65.21  ICD-9-CM: 038.9, 995.92, 785.52  11/17/2021 - Present Yes        Body mass index (BMI) of 21.0 to 21.9 in adult ICD-10-CM: Z68.21  ICD-9-CM: V85.1  11/17/2021 - Present Yes        Colitis, indeterminate ICD-10-CM: K52.3  ICD-9-CM: 558.9  11/17/2021 - Present Unknown        Compression fracture of T9 vertebra (HCC) (Chronic) ICD-10-CM: B92.472M  ICD-9-CM: 805.2  10/18/2021 - Present Yes        Severe persistent asthma without complication CHRIS-87-OG: A33.46  ICD-9-CM: 493.90  5/28/2021 - Present Yes        Osteoporosis ICD-10-CM: M81.0  ICD-9-CM: 733.00  10/25/2016 - Present Yes        Primary hypertension (Chronic) ICD-10-CM: I10  ICD-9-CM: 401.9  8/16/2016 - Present Yes    Overview Signed 11/17/2021  9:48 PM by Tarun Vivar MD     Goal < 140/80             Hiatal hernia with GERD and esophagitis ICD-10-CM: K44.9, K21.00  ICD-9-CM: 553.3, 530.11  8/16/2016 - Present Yes        Gastroesophageal reflux disease with esophagitis (Chronic) ICD-10-CM: K21.00  ICD-9-CM: 530.11  11/1/2015 - Present Yes        Mood disorder ([facility] ICD-10-CM: F39  ICD-9-CM: 296.90  11/1/2015 - Present Yes                10 systems reviewed and negative except as noted in HPI.   Past Medical History:   Diagnosis Date    Anemia 2005 approx    2 units transfused    Arthritis     hands, hips    B12 deficiency     Cancer (HCC)     hx of melanoma 1986 R arm, and carcinoma x 2 forehead    COVID-19 vaccine series completed 02/2021    PT TO BRING CARD DOS    Depression with anxiety 8/16/2016    Essential hypertension with goal blood pressure less than 140/90     controlled with med    Gastrointestinal disorder     hiatel hernia    Hyperlipidemia, unspecified 8/16/2016    Menopause     @ 51    Mild intermittent asthma 8/16/2016    Osteoarthritis of multiple joints 8/16/2016    Osteoporosis 10/25/2016    Pulmonary emphysema ([facility] 08/16/2016    daily inhalers    Rectal bleed     Vertigo       Past Surgical History:   Procedure Laterality Date    COLONOSCOPY N/A 7/30/2021    COLONOSCOPY/BMI 23 performed by Melody Feliciano MD at [facility]; HI RISK IND  7/30/2021  HX HERNIA REPAIR  2010    helped reflex    HX TUBAL LIGATION  1972      Allergies   Allergen Reactions    Other Medication Rash     Equate antibiotic ointment      Social History     Tobacco Use    Smoking status: Never Smoker    Smokeless tobacco: Never Used    Tobacco comment: 2nd hand smoke x 33 yrs   Substance Use Topics    Alcohol use: No      Family History   Problem Relation Age of Onset    Stroke Mother         intracerebral aneurysm    Diabetes Maternal Aunt     Diabetes Maternal Uncle     No Known Problems Father         didn't have contact with father    Breast Cancer Daughter 28    Hypertension Maternal Grandmother       Family history reviewed and noncontributory. Immunization History   Administered Date(s) Administered    COVID-19, Moderna, Primary or Immunocompromised Series, MRNA, PF, 100mcg/0.5mL 2021, 2021    Influenza High Dose Vaccine PF 2016, 01/15/2018, 2018, 2020    Influenza Vaccine 2016, 2018    Influenza Vaccine (Tri) Adjuvanted (>65 Yrs FLUAD TRI 06499) 2019    Pneumococcal Conjugate (PCV-13) 2016    Pneumococcal Polysaccharide (PPSV-23) 2018    Pneumococcal Vaccine (Unspecified Type) 2012    TB Skin Test (PPD) Intradermal 2021    Tdap 2021     PTA Medications:  Prior to Admission Medications   Prescriptions Last Dose Informant Patient Reported? Taking? HYDROcodone-acetaminophen (NORCO) 5-325 mg per tablet 2021 at Unknown time  Yes Yes   Si Tablet every six (6) hours as needed. LORazepam (ATIVAN) 1 mg tablet Not Taking at Unknown time  No No   Sig: Take 1/2 to 1 tab PO Q 12 hours PRN anxiety, use sparingly   Patient not taking: Reported on 2021   albuterol (Ventolin HFA) 90 mcg/actuation inhaler   No No   Sig: Take 2 Puffs by inhalation four (4) times daily. Patient taking differently: Take 2 Puffs by inhalation every six (6) hours as needed. calcium-cholecalciferol, d3, (CALCIUM 600 + D) 600-125 mg-unit tab Not Taking at Unknown time  Yes No   Sig: Take 1 Tab by mouth daily. Patient not taking: Reported on 11/18/2021   cetirizine (ZYRTEC) 10 mg tablet Not Taking at Unknown time  No No   Sig: Take 1 Tab by mouth daily. Patient not taking: Reported on 11/18/2021   fluticasone-umeclidin-vilanter (Trelegy Ellipta) 200-62.5-25 mcg dsdv 11/16/2021 at Unknown time  No Yes   Sig: Take 1 Puff by inhalation daily. hydroCHLOROthiazide (HYDRODIURIL) 12.5 mg tablet 11/16/2021 at Unknown time  No Yes   Sig: Take 1 Tablet by mouth daily. omeprazole (PRILOSEC) 40 mg capsule 11/16/2021 at Unknown time  No Yes   Sig: Take 1 Cap by mouth two (2) times a day. ondansetron (ZOFRAN ODT) 4 mg disintegrating tablet   Yes No   Sig: Take 4 mg by mouth.   potassium chloride (KLOR-CON) 10 mEq tablet   No No   Sig: Take 1 Tablet by mouth daily. pravastatin (PRAVACHOL) 20 mg tablet 11/16/2021 at Unknown time  No Yes   Sig: Take 1 Tablet by mouth daily. Patient taking differently: Take 20 mg by mouth nightly. venlafaxine-SR (Effexor XR) 37.5 mg capsule 11/16/2021 at Unknown time  No Yes   Sig: Take 1 Capsule by mouth nightly. venlafaxine-SR (Effexor XR) 75 mg capsule 11/16/2021 at Unknown time  No Yes   Sig: Take 1 Capsule by mouth two (2) times a day.    verapamil ER (CALAN-SR) 240 mg CR tablet 11/16/2021 at Unknown time  No Yes   Sig: TAKE 1 TABLET BY MOUTH EVERY MORNING      Facility-Administered Medications: None       Objective:     Patient Vitals for the past 24 hrs:   Temp Pulse Resp BP SpO2   11/19/21 1515 -- (!) 102 14 (!) 148/73 95 %   11/19/21 1508 -- 97 14 132/71 95 %   11/19/21 1458 -- 100 14 128/67 97 %   11/19/21 1448 -- (!) 106 14 (!) 140/70 97 %   11/19/21 1446 96.8 °F (36 °C) (!) 101 14 (!) 140/70 97 %   11/19/21 1426 -- (!) 105 18 (!) 143/74 93 %   11/19/21 1322 97.8 °F (36.6 °C) 100 18 128/74 94 %   11/19/21 1148 -- -- -- 108/66 --   11/19/21 1118 97.2 °F (36.2 °C) 99 18 108/66 94 %   11/19/21 1100 -- -- -- 127/82 --   11/19/21 0750 -- -- -- -- 93 %   11/19/21 0745 97.6 °F (36.4 °C) (!) 110 18 (!) 169/99 94 %   11/19/21 0514 98.9 °F (37.2 °C) (!) 108 18 (!) 172/84 96 %   11/19/21 0104 98.9 °F (37.2 °C) (!) 109 18 105/62 96 %   11/19/21 0049 -- -- -- (!) 155/95 --   11/18/21 2118 99.6 °F (37.6 °C) (!) 108 18 (!) 148/90 96 %   11/18/21 2030 -- -- -- -- 95 %   11/18/21 1807 -- -- -- (!) 152/89 --   11/18/21 1804 -- (!) 107 -- (!) 148/63 96 %   11/18/21 1648 99.8 °F (37.7 °C) 95 18 (!) 146/97 97 %   11/18/21 1615 -- 99 18 (!) 152/79 97 %   11/18/21 1600 -- 100 18 130/75 97 %   11/18/21 1541 -- 100 18 118/68 99 %   11/18/21 1531 -- (!) 110 18 119/66 99 %   11/18/21 1521 -- (!) 106 14 (!) 89/55 99 %     Oxygen Therapy  O2 Sat (%): 95 % (11/19/21 1515)  Pulse via Oximetry: 97 beats per minute (11/19/21 1515)  O2 Device: Nasal cannula (11/19/21 1515)  O2 Flow Rate (L/min): 2 l/min (11/19/21 1515)    Estimated body mass index is 19.75 kg/m² as calculated from the following:    Height as of this encounter: 5' 2\" (1.575 m). Weight as of this encounter: 49 kg (108 lb). Intake/Output Summary (Last 24 hours) at 11/19/2021 1520  Last data filed at 11/19/2021 1445  Gross per 24 hour   Intake 700 ml   Output 0 ml   Net 700 ml       *Note that automatically entered I/Os may not be accurate; dependent on patient compliance with collection and accurate  by assistants. Physical Exam:  General:    Alert. Ill looking  Eyes:   Normal sclerae. Extraocular movements intact. HENT:  Normocephalic, atraumatic. Moist mucous membranes  CV:   RRR. No m/r/g. Lungs:  CTAB. No wheezing, rhonchi, or rales. Abdomen: Soft, left lower quadrant tenderness, nondistended. Extremities: Warm and dry. No cyanosis or edema. Neurologic: CN II-XII grossly intact. Sensation intact. Skin:     No rashes or jaundice.   Normal coloration  Psych:  Normal mood and affect. I reviewed the labs, imaging, EKGs, telemetry, and other studies done this admission. Data Reviewed:   Recent Results (from the past 24 hour(s))   GLUCOSE, POC    Collection Time: 11/18/21  6:04 PM   Result Value Ref Range    Glucose (POC) 119 (H) 65 - 100 mg/dL    Performed by [address] PPD TEST IN 24 HRS    Collection Time: 11/19/21 12:30 AM   Result Value Ref Range    PPD Negative Negative    mm Induration 0 0 - 5 mm   CBC WITH AUTOMATED DIFF    Collection Time: 11/19/21  5:39 AM   Result Value Ref Range    WBC 16.1 (H) 4.3 - 11.1 K/uL    RBC 5.47 (H) 4.05 - 5.2 M/uL    HGB 15.4 11.7 - 15.4 g/dL    HCT 46.1 35.8 - 46.3 %    MCV 84.3 79.6 - 97.8 FL    MCH 28.2 26.1 - 32.9 PG    MCHC 33.4 31.4 - 35.0 g/dL    RDW 17.3 (H) 11.9 - 14.6 %    PLATELET 763 (L) 261 - 450 K/uL    MPV 9.9 9.4 - 12.3 FL    ABSOLUTE NRBC 0.00 0.0 - 0.2 K/uL    NEUTROPHILS 80 (H) 43 - 78 %    LYMPHOCYTES 9 (L) 13 - 44 %    MONOCYTES 7 4.0 - 12.0 %    EOSINOPHILS 3 0.5 - 7.8 %    BASOPHILS 0 0.0 - 2.0 %    IMMATURE GRANULOCYTES 1 0.0 - 5.0 %    ABS. NEUTROPHILS 12.9 (H) 1.7 - 8.2 K/UL    ABS. LYMPHOCYTES 1.4 0.5 - 4.6 K/UL    ABS. MONOCYTES 1.1 0.1 - 1.3 K/UL    ABS. EOSINOPHILS 0.5 0.0 - 0.8 K/UL    ABS. BASOPHILS 0.0 0.0 - 0.2 K/UL    ABS. IMM.  GRANS. 0.2 0.0 - 0.5 K/UL    DF AUTOMATED     METABOLIC PANEL, BASIC    Collection Time: 11/19/21  5:39 AM   Result Value Ref Range    Sodium 141 136 - 145 mmol/L    Potassium 2.7 (L) 3.5 - 5.1 mmol/L    Chloride 104 98 - 107 mmol/L    CO2 29 21 - 32 mmol/L    Anion gap 8 7 - 16 mmol/L    Glucose 96 65 - 100 mg/dL    BUN 12 8 - 23 MG/DL    Creatinine 0.68 0.6 - 1.0 MG/DL    GFR est AA >60 >60 ml/min/1.73m2    GFR est non-AA >60 >60 ml/min/1.73m2    Calcium 8.2 (L) 8.3 - 10.4 MG/DL   MAGNESIUM    Collection Time: 11/19/21  5:39 AM   Result Value Ref Range    Magnesium 1.8 1.8 - 2.4 mg/dL   ECHO ADULT COMPLETE    Collection Time: 11/19/21 11:30 AM   Result Value Ref Range    LV ED Vol A2C 23.60 cm3    LV ED Vol A4C 39.80 cm3    LV ES Vol A2C 8.57 cm3    LV ES Vol A4C 16.10 cm3    IVSd 0.86 0.6 - 0.9 cm    LVIDd 3.77 (A) 3.9 - 5.3 cm    LVIDs 2.21 cm    LVOT d 1.90 cm    LVOT VTI 26.80 cm    LVOT Peak Gradient 11.00 mmHg    LVPWd 0.86 0.6 - 0.9 cm    LV E' Lateral Velocity 6.48 cm/s    LV E' Septal Velocity 5.42 cm/s    LA Area 2C 16.40 cm2    Aortic Valve Systolic Mean Gradient 5.05 mmHg    Aortic Valve Systolic Mean Gradient 9.96 mmHg    AoV VTI 35.60 cm    Aortic Valve Systolic Peak Velocity 419.57 cm/s    AoV PG 14.00 mmHg    Aortic Valve Area by Continuity of VTI 2.14 cm2    Aortic Valve Area by Continuity of Peak Velocity 2.55 cm2    Ao Root D 2.90 cm    AO ASC D 2.90 cm    Mitral Valve E Wave Deceleration Time 158.00 ms    MV A Farhad 127.00 cm/s    MV E Farhad 86.80 cm/s    RVIDd 2.88 cm    Tapse 1.75 1.5 - 2.0 cm    MV E/A 0.68     LV Mass AL 93.7 67 - 162 g    LV Mass AL Index 61.6 43 - 95 g/m2    E/E' lateral 13.40     E/E' septal 16.01     E/E' ratio (averaged) 14.70     NITHIN/BSA Pk Farahd 1.7 cm2/m2    NITHIN/BSA VTI 1.4 cm2/m2       All Micro Results     Procedure Component Value Units Date/Time    C. DIFFICILE AG & TOXIN A/B [[id number]] Collected: 11/18/21 1154    Order Status: Completed Specimen: Stool Updated: 11/19/21 1408     [address] [place] ANTIGEN       C. DIFFICILE GDH ANTIGEN-NEGATIVE           C. difficile toxin       C. DIFFICILE TOXIN-NEGATIVE           PCR Reflex NOT APPLICABLE        INTERPRETATION       NEGATIVE FOR TOXIGENIC C. DIFFICILE           Clinical Consideration       NEGATIVE FOR TOXIGENIC C. DIFFICILE          CULTURE, STOOL [[id number]] Collected: 11/18/21 1154    Order Status: Completed Specimen: Stool Updated: 11/19/21 1105     Special Requests: NO SPECIAL REQUESTS        Culture result: ORGANISM IN STUDY               CULTURE IN PROGRESS,FURTHER UPDATES TO FOLLOW          CULTURE, URINE [[id number]] Collected: 11/17/21 1720    Order Status: Completed Specimen: Urine from Clean catch Updated: 11/19/21 0849     Special Requests: NO SPECIAL REQUESTS        Culture result: NO GROWTH 1 DAY       BLOOD CULTURE ID PANEL [[id number]] Collected: 11/17/21 1607    Order Status: Completed Specimen: Blood Updated: 11/19/21 0707     Acc. no. from Micro Order [id number]     INTERPRETATION       Gram positive cocci. Identified by realtime PCR as Streptococcus species (not agalactiae, pyogenes, or pneumoniae). Comment: Consider discontinuation of IV vancomycin and using an anti-streptococcal beta-lactam (ceftriaxone/cefotaxime (peds))        Blood Culture PCR Testing       MULTIPLEX PCR NEGATIVE:  A negative FilmArray BCID result does not exclude the possibility of bloodstream infection.           BLOOD CULTURE [[id number]] Collected: 11/17/21 1651    Order Status: Completed Specimen: Blood Updated: 11/19/21 0704     Special Requests: --        NO SPECIAL REQUESTS  LEFT  FOREARM       GRAM STAIN GRAM POS COCCI IN CHAINS         ANAEROBIC BOTTLE POSITIVE               RESULTS VERIFIED, PHONED TO AND READ BACK BY ELISEO CHO AT 0244 ON 11/18/2021, SH/AMM           Culture result:       CULTURE IN [address] UPDATES TO FOLLOW                  Refer to Blood Culture ID Panel Accession  [id number]              RESULTS VERIFIED, PHONED TO AND READ BACK BY  ELISEO CHO @12:14 11/18/21 SH/AMM      CULTURE, BLOOD [[id number]] Collected: 11/19/21 0540    Order Status: Completed Specimen: Blood Updated: 11/19/21 0602    BLOOD CULTURE [[id number]] Collected: 11/17/21 1607    Order Status: Completed Specimen: Blood Updated: 11/19/21 0301     Special Requests: --        RIGHT  FOREARM       GRAM STAIN GRAM NEGATIVE RODS         ANAEROBIC BOTTLE POSITIVE               RESULTS VERIFIED, PHONED TO AND READ BACK BY Freda Orellana RN @0259 [phone] BY RAJESH           Culture result:       CULTURE IN [address] UPDATES TO FOLLOW                  Refer to Blood Culture ID Panel Accession  [id number]      NAHEED & Daniel Estes [[id number]] Collected: 11/18/21 1154    Order Status: Completed Specimen: Feces from Stool Updated: 11/18/21 1257    BLOOD CULTURE ID PANEL [[id number]]  (Abnormal) Collected: 11/17/21 1651    Order Status: Completed Specimen: Blood Updated: 11/18/21 1219     Streptococcus Detected        Comment: RESULTS VERIFIED, PHONED TO AND READ BACK BY  ELISEO CHO @ [facility] 11/18/2021 SH/AMM           INTERPRETATION       Gram positive cocci. Identified by realtime PCR as Streptococcus species (not agalactiae, pyogenes, or pneumoniae). Comment: Consider discontinuation of IV vancomycin and using an anti-streptococcal beta-lactam (ceftriaxone/cefotaxime (peds))       COVID-19 RAPID TEST [[id number]] Collected: 11/18/21 1154    Order Status: Completed Specimen: Nasopharyngeal Updated: 11/18/21 1216     Specimen source Nasopharyngeal        COVID-19 rapid test Not detected        Comment:      The specimen is NEGATIVE for SARS-CoV-2, the novel coronavirus associated with COVID-19. A negative result does not rule out COVID-19. This test has been authorized by the FDA under an Emergency Use Authorization (EUA) for use by authorized laboratories.         Fact sheet for Healthcare Providers: ConventionUpdate.co.nz  Fact sheet for Patients: ConventionUpdate.co.nz       Methodology: Isothermal Nucleic Acid Amplification               Current Facility-Administered Medications   Medication Dose Route Frequency    morphine injection 0.5 mg  0.5 mg IntraVENous Q4H PRN    atorvastatin (LIPITOR) tablet 40 mg  40 mg Oral QHS    budesonide-formoteroL (SYMBICORT) 160-4.5 mcg/actuation HFA inhaler 2 Puff  2 Puff Inhalation BID RT    And    tiotropium bromide (SPIRIVA RESPIMAT) 2.5 mcg /actuation  2 Puff Inhalation DAILY    venlafaxine-SR (EFFEXOR-XR) capsule 37.5 mg  37.5 mg Oral QHS    nitroglycerin (NITROBID) 2 % ointment 1 Inch  1 Inch Topical BID    sodium chloride (NS) flush 5-10 mL  5-10 mL IntraVENous Q8H    sodium chloride (NS) flush 5-10 mL  5-10 mL IntraVENous PRN    0.9% sodium chloride infusion 250 mL  250 mL IntraVENous PRN    albuterol (PROVENTIL HFA, VENTOLIN HFA, PROAIR HFA) inhaler 2 Puff  2 Puff Inhalation Q6H PRN    calcium-vitamin D (OS-ZACHARIAH +D3) 250 mg-125 unit per tablet 1 Tablet  1 Tablet Oral DAILY    hydroCHLOROthiazide (HYDRODIURIL) tablet 12.5 mg  12.5 mg Oral DAILY    HYDROcodone-acetaminophen (NORCO) 5-325 mg per tablet 1 Tablet  1 Tablet Oral Q6H PRN    pantoprazole (PROTONIX) 40 mg in 0.9% sodium chloride 10 mL injection  40 mg IntraVENous Q12H    venlafaxine-SR (EFFEXOR-XR) capsule 75 mg  75 mg Oral BID    verapamil ER (CALAN-SR) tablet 240 mg  240 mg Oral DAILY WITH BREAKFAST    sodium chloride (NS) flush 5-40 mL  5-40 mL IntraVENous Q8H    sodium chloride (NS) flush 5-40 mL  5-40 mL IntraVENous PRN    acetaminophen (TYLENOL) tablet 650 mg  650 mg Oral Q6H PRN    Or    acetaminophen (TYLENOL) suppository 650 mg  650 mg Rectal Q6H PRN    polyethylene glycol (MIRALAX) packet 17 g  17 g Oral DAILY PRN    ondansetron (ZOFRAN ODT) tablet 4 mg  4 mg Oral Q8H PRN    Or    ondansetron (ZOFRAN) injection 4 mg  4 mg IntraVENous Q6H PRN    piperacillin-tazobactam (ZOSYN) 3.375 g in 0.9% sodium chloride (MBP/ADV) 100 mL MBP  3.375 g IntraVENous Q8H    melatonin tablet 3 mg  3 mg Oral QHS    OLANZapine (ZyPREXA zydis) disintegrating tablet 5 mg  5 mg Oral QHS PRN    sucralfate (CARAFATE) tablet 1 g  1 g Oral AC&HS       Other Studies:  No results found for this visit on 11/17/21. CT HEAD WO CONT    Result Date: 11/18/2021  History: Blurred vision, acute onset Exam: CT head without contrast Technique: Thin section axial CT images were obtained from the skullbase through the vertex. Radiation dose reduction techniques were used for this study.   Our CT scanners use one or all of the following: Automated exposure control, adjustment of the mA and/or kV according to patient size, use of iterative reconstruction. COMPARISON: 11/17/2021 Findings: The ventricles are normal in size, shape, and position. Chronic appearing white matter change noted in the corona radiata and centrum semiovale. . No extra-axial fluid collection is present. There is no mass or mass-effect. The basilar cisterns are patent. There is mucosal thickening of the left maxillary sinus. Chronic appearing white matter change without acute intracranial abnormality. ECHO ADULT COMPLETE    Result Date: 11/19/2021  · LV: Estimated LVEF is 55 - 60%. Normal cavity size, wall thickness and systolic function (ejection fraction normal). Wall motion: normal.        [unfilled]       Part of this note was written by using a voice dictation software and the note has been proof read but may still contain some grammatical/other typographical errors.     Signed:  Sebastian Mcdaniel MD

## 2021-11-19 NOTE — PROGRESS NOTES
TRANSFER - IN REPORT:    Verbal report received from Laura Green on [facility]  being received from 310 for ordered procedure      Report consisted of patients Situation, Background, Assessment and   Recommendations(SBAR). Information from the following report(s) SBAR, Intake/Output, MAR, Recent Results, Med Rec Status and Pre Procedure Checklist was reviewed with the receiving nurse. Opportunity for questions and clarification was provided. Per Samanta Marquez NP, pt is hypokalemic and being replaced IV.

## 2021-11-19 NOTE — PROGRESS NOTES
Bedside and Verbal shift change report given to Abhilash Rajan RN (oncoming nurse) by self (offgoing nurse). Report included the following information SBAR, Kardex, Intake/Output, MAR, and Recent Results.

## 2021-11-19 NOTE — PROGRESS NOTES
Problem: Falls - Risk of  Goal: *Absence of Falls  Description: Document Isaac King Fall Risk and appropriate interventions in the flowsheet. Outcome: Progressing Towards Goal  Note: Fall Risk Interventions:            Medication Interventions: Patient to call before getting OOB, Teach patient to arise slowly    Elimination Interventions: Bed/chair exit alarm, Call light in reach, Patient to call for help with toileting needs, Toileting schedule/hourly rounds    History of Falls Interventions: Bed/chair exit alarm, Door open when patient unattended, Investigate reason for fall, Utilize gait belt for transfer/ambulation         Problem: Pressure Injury - Risk of  Goal: *Prevention of pressure injury  Description: Document Otilio Scale and appropriate interventions in the flowsheet. Outcome: Progressing Towards Goal  Note: Pressure Injury Interventions:       Moisture Interventions: Absorbent underpads, Assess need for specialty bed, Check for incontinence Q2 hours and as needed, Internal/External urinary devices, Maintain skin hydration (lotion/cream), Minimize layers    Activity Interventions: Increase time out of bed, Pressure redistribution bed/mattress(bed type)    Mobility Interventions: HOB 30 degrees or less, Pressure redistribution bed/mattress (bed type), PT/OT evaluation    Nutrition Interventions: Document food/fluid/supplement intake    Friction and Shear Interventions: Foam dressings/transparent film/skin sealants, HOB 30 degrees or less, Minimize layers                Problem: Risk for Spread of Infection  Goal: Prevent transmission of infectious organism to others  Description: Prevent the transmission of infectious organisms to other patients, staff members, and visitors.   Outcome: Progressing Towards Goal

## 2021-11-19 NOTE — PROGRESS NOTES
[facility] CARDIOLOGY PROGRESS NOTE           11/19/2021 8:49 AM    Admit Date: 11/17/2021      Subjective:     No overnight events. States constant chest discomfort that has been ongoing for 2 months. No exertional chest discomfort. Tele with ST  Attempted EGD by GI from 11/18/2021 but aborted due to retained food. Plans for repeat today. ROS:  Cardiovascular:  As noted above    Objective:      Vitals:    11/19/21 0104 11/19/21 0514 11/19/21 0745 11/19/21 0750   BP: 105/62 (!) 172/84 (!) 169/99    Pulse: (!) 109 (!) 108 (!) 110    Resp: 18 18 18    Temp: 98.9 °F (37.2 °C) 98.9 °F (37.2 °C) 97.6 °F (36.4 °C)    SpO2: 96% 96% 94% 93%   Weight:  117 lb 3.2 oz (53.2 kg)     Height:           Physical Exam:  General-No Acute Distress  Neck- supple, no JVD  CV- regular rate and rhythm no MRG  Lung- clear bilaterally  Abd- soft, nontender, nondistended  Ext- no edema bilaterally. Skin- warm and dry    Data Review:   Recent Labs     11/19/21  0539 11/18/21  0245 11/17/21  2316 11/17/21  1748     --   --  142   K 2.7*  --   --  3.7   BUN 12  --   --  11   CREA 0.68  --   --  0.52*   GLU 96  --   --  126*   WBC 16.1* 14.7*  --  10.8   HGB 15.4 15.9*   < > 8.6*   HCT 46.1 48.4*   < > 27.5*   * 141*  --  138*    < > = values in this interval not displayed. Assessment/Plan:     Principal Problem:    Severe sepsis with septic shock ([facility] [address] (11/17/2021)  -Blood cultures with noted GPC/GNR; management per primary.  -Contributing to initial presentation. Elevated troponin  -Demand related in the setting of presenting hypotension/sepsis and bacteremia/anemia.  -Not a candidate for any further work-up at this time. Can reassess as an outpatient.   Cannot rule out underlying coronary disease especially with noted diffuse atherosclerotic disease/renal artery stenosis but not contributing to current presentation  -Pending echocardiogram.  -Plan statin therapy    Active Problems: Acute blood loss anemia (11/17/2021)  -Management per primary; transfused      Primary hypertension (8/16/2016)  -Titrate medication during hospital course.   Presenting with hypotension/syncope  -Replete electrolytes      Hiatal hernia with GERD and esophagitis (8/16/2016)      Osteoporosis (10/25/2016)      Severe persistent asthma without complication (7/32/4085)      Gastroesophageal reflux disease with esophagitis (11/1/2015)      Mood disorder ([facility] [address] (11/1/2015)      Compression fracture of T9 vertebra ([facility] [address] (10/18/2021)      Body mass index (BMI) of 21.0 to 21.9 in adult (11/17/2021)      Colitis, indeterminate (11/17/2021)      Severe protein-calorie malnutrition ([address] (11/18/2021)      UTI (urinary tract infection) (11/18/2021)      Streptococcal bacteremia (11/18/2021)      Lenin Myles MD  11/19/2021 8:49 AM

## 2021-11-19 NOTE — PROGRESS NOTES
Progress Note    Patient: Jenni Cazares MRN: [id number]  SSN: [id number]    YOB: 1945  Age: 77 y.o. Sex: female      Admit Date: 2021    1 Day Post-Op    Procedure:  Procedure(s):  ESOPHAGOGASTRODUODENOSCOPY (EGD)    Subjective:     Patient awake in bed. C/o lower abd pain /10. Recently received pain medication. LA 1.8 yesterday. Repeat LA ordered for today. AF, NAD. Objective:     Visit Vitals  /66   Pulse 99   Temp 97.2 °F (36.2 °C)   Resp 18   Ht 5' 2\" (1.575 m)   Wt 117 lb (53.1 kg)   SpO2 94%   BMI 21.40 kg/m²       Temp (24hrs), Av.7 °F (37.1 °C), Min:97.2 °F (36.2 °C), Max:99.8 °F (37.7 °C)      Physical Exam:    Constitutional: Alert, cooperative, no acute distress; appears stated age   Eyes: Sclera are clear. EOMs intact  ENMT: no external lesions' gross hearing normal; no obvious neck masses, no ear or lip lesions, nares normal  CV: Tachy. Normal perfusion  Resp: No JVD. Breathing is  non-labored; no audible wheezing. GI: soft and non-distended, minimally ttp lower abdomen     Musculoskeletal: No embolic signs or cyanosis. Neuro: moves all 4; no focal deficits  Psychiatric: normal affect and mood    Lab Review: All lab results for the last 24 hours reviewed.     Assessment:     Hospital Problems  Date Reviewed: 2021          Codes Class Noted POA    Acute blood loss anemia ICD-10-CM: D62  ICD-9-CM: 285.1  2021 Yes        Severe protein-calorie malnutrition ([facility] [address] ICD-10-CM: E43  ICD-9-CM: 262  2021 Yes        UTI (urinary tract infection) ICD-10-CM: N39.0  ICD-9-CM: 599.0  2021 Unknown        Streptococcal bacteremia ICD-10-CM: R78.81, B95.5  ICD-9-CM: 790.7, 041.00  2021 Unknown        * (Principal) Severe sepsis with septic shock ([facility] [address] ICD-10-CM: A41.9, R65.21  ICD-9-CM: 038.9, 995.92, 785.52  2021 Yes        Body mass index (BMI) of 21.0 to 21.9 in adult ICD-10-CM: Z68.21  ICD-9-CM: V85.1  2021 Yes Colitis, indeterminate ICD-10-CM: K52.3  ICD-9-CM: 558.9  11/17/2021 Unknown        Compression fracture of T9 vertebra (HCC) (Chronic) ICD-10-CM: I23.226C  ICD-9-CM: 805.2  10/18/2021 Yes        Severe persistent asthma without complication [id number]-00-HJ: A18.01  ICD-9-CM: 493.90  5/28/2021 Yes        Osteoporosis ICD-10-CM: M81.0  ICD-9-CM: 733.00  10/25/2016 Yes        Primary hypertension (Chronic) ICD-10-CM: I10  ICD-9-CM: 401.9  8/16/2016 Yes    Overview Signed 11/17/2021  9:48 PM by Jase Richards MD     Goal < 140/80             Hiatal hernia with GERD and esophagitis ICD-10-CM: K44.9, K21.00  ICD-9-CM: 553.3, 530.11  8/16/2016 Yes        Gastroesophageal reflux disease with esophagitis (Chronic) ICD-10-CM: K21.00  ICD-9-CM: 530.11  11/1/2015 Yes        Mood disorder ([facility] [address] ICD-10-CM: S58  ICD-9-CM: 296.90  11/1/2015 Yes              Plan/Recommendations/Medical Decision Making:     Care Management per Hospitalist  Repeat LA pending  Cardiology following - Elevated troponin  GI following - Attempted EGD by 11/18/2021 but aborted due to retained food. Plans for repeat EGD today.   IV Abx -  Zosyn  No current plans for surgical intervention      Tamie Quick NP

## 2021-11-19 NOTE — ROUTINE PROCESS
Verbal bedside report given to Val Don oncoming RN. Patient's situation, background, assessment and recommendations provided. Opportunity for questions provided. Oncoming RN assumed care of patient.

## 2021-11-19 NOTE — PROGRESS NOTES
Pt was unable to complete EGD yesterday due to retained food in her stomach; is now scheduled for EGD today. DCP: home with [facility]. Will f/u at discharge to confirm receipt of referral. RW at bedside. Will continue to monitor. Care Management Interventions  PCP Verified by CM: Yes Shanon Lr)  Mode of Transport at Discharge: Other (see comment) (Family)  Transition of Care Consult (CM Consult): Discharge Planning, Home Health  Discharge Durable Medical Equipment: Yes (RW c.  [facility])  Physical Therapy Consult: Yes  Occupational Therapy Consult: Yes  Speech Therapy Consult: No  Support Systems: Spouse/Significant Other, Child(kaleb), Other Family Member(s), [facility], Friend/Neighbor  Confirm Follow Up Transport: Family  The Plan for Transition of Care is Related to the Following Treatment Goals : Return home and back to his baseline  Discharge Location  Discharge Placement: Home with home health

## 2021-11-19 NOTE — ANESTHESIA POSTPROCEDURE EVALUATION
Procedure(s):  ESOPHAGOGASTRODUODENOSCOPY (EGD) ROOM 310  ESOPHAGEAL DILATION  ESOPHAGOGASTRODUODENAL (EGD) BIOPSY.     total IV anesthesia    Anesthesia Post Evaluation      Multimodal analgesia: multimodal analgesia used between 6 hours prior to anesthesia start to PACU discharge  Patient location during evaluation: PACU  Patient participation: complete - patient participated  Level of consciousness: awake and awake and alert  Pain management: adequate  Airway patency: patent  Anesthetic complications: no  Cardiovascular status: acceptable  Respiratory status: acceptable  Hydration status: acceptable  Post anesthesia nausea and vomiting:  controlled      INITIAL Post-op Vital signs:   Vitals Value Taken Time   /73 11/19/21 1515   Temp     Pulse 102 11/19/21 1515   Resp 14 11/19/21 1515   SpO2 95 % 11/19/21 1515

## 2021-11-19 NOTE — ROUTINE PROCESS
Bedside and Verbal shift change report given to myself (oncoming nurse) by Jaelyn French (offgoing nurse). Report included the following information SBAR, Kardex, Intake/Output, MAR, Recent Results and Med Rec Status.

## 2021-11-19 NOTE — INTERVAL H&P NOTE
Update History & Physical    The Patient's History and Physical of 11/19/21 was reviewed with the patient and I examined the patient. We attempted the EGD yesterday but it was incomplete due to retained food in the stomach. Will repeat EGD today with possible dilation. Plan:  The risk, benefits, expected outcome, and alternative to the recommended procedure have been discussed with the patient. Patient understands and wants to proceed with the procedure.     Electronically signed by Radha Dwyer MD on 11/19/2021 at 2:19 PM

## 2021-11-19 NOTE — ROUTINE PROCESS
TRANSFER - OUT REPORT:    Verbal report given to Karl Head on [facility]  being transferred to [facility] for routine post - op       Report consisted of patients Situation, Background, Assessment and   Recommendations(SBAR). Information from the following report(s) SBAR was reviewed with the receiving nurse. Lines:   Triple Lumen 11/17/21 Anterior; Right Neck (Active)   Central Line Being Utilized Yes 11/19/21 1337   Criteria for Appropriate Use Hemodynamically unstable, requiring monitoring lines, vasopressors, or volume resuscitation 11/19/21 1337   Site Assessment Clean, dry, & intact 11/19/21 1337   Infiltration Assessment 0 11/19/21 1337   Affected Extremity/Extremities Color distal to insertion site pink (or appropriate for race) 11/19/21 1337   Date of Last Dressing Change 11/17/21 11/19/21 1337   Dressing Status Clean, dry, & intact 11/19/21 1337   Dressing Type Disk with Chlorhexadine gluconate (CHG); Transparent 11/19/21 1337   Action Taken Other (comment) 11/19/21 1300   Proximal Hub Color/Line Status White; Patent 11/19/21 1337   Positive Blood Return (Medial Site) Yes 11/18/21 1624   Medial Hub Color/Line Status Blue; Infusing; Patent 11/19/21 1337   Positive Blood Return (Lateral Site) Yes 11/19/21 0851   Distal Hub Color/Line Status Brown; Patent 11/19/21 1337   Positive Blood Return (Site #3) No 11/19/21 0851   Alcohol Cap Used No 11/19/21 1337       Quad Lumen (Active)       Peripheral IV 11/17/21 Left Forearm (Active)   Site Assessment Clean, dry, & intact 11/19/21 1337   Phlebitis Assessment 0 11/19/21 1337   Infiltration Assessment 0 11/19/21 1337   Dressing Status Clean, dry, & intact 11/19/21 1337   Dressing Type Tape; Transparent 11/19/21 1337   Hub Color/Line Status Infusing 11/19/21 1337   Alcohol Cap Used No 11/19/21 0851        Opportunity for questions and clarification was provided.       Patient transported with:   O2 @ 2 liters

## 2021-11-20 PROBLEM — R77.8 ELEVATED TROPONIN: Status: ACTIVE | Noted: 2021-11-20

## 2021-11-20 LAB
ANION GAP SERPL CALC-SCNC: 7 MMOL/L (ref 7–16)
BACTERIA SPEC CULT: ABNORMAL
BACTERIA SPEC CULT: ABNORMAL
BACTERIA SPEC CULT: NORMAL
BASOPHILS # BLD: 0 K/UL (ref 0–0.2)
BASOPHILS NFR BLD: 0 % (ref 0–2)
BUN SERPL-MCNC: 10 MG/DL (ref 8–23)
CALCIUM SERPL-MCNC: 8.3 MG/DL (ref 8.3–10.4)
CHLORIDE SERPL-SCNC: 103 MMOL/L (ref 98–107)
CO2 SERPL-SCNC: 30 MMOL/L (ref 21–32)
CREAT SERPL-MCNC: 0.51 MG/DL (ref 0.6–1)
DIFFERENTIAL METHOD BLD: ABNORMAL
EOSINOPHIL # BLD: 0 K/UL (ref 0–0.8)
EOSINOPHIL NFR BLD: 0 % (ref 0.5–7.8)
ERYTHROCYTE [DISTWIDTH] IN BLOOD BY AUTOMATED COUNT: 17.3 % (ref 11.9–14.6)
GLUCOSE SERPL-MCNC: 101 MG/DL (ref 65–100)
GRAM STN SPEC: ABNORMAL
HCT VFR BLD AUTO: 41.7 % (ref 35.8–46.3)
HGB BLD-MCNC: 13.8 G/DL (ref 11.7–15.4)
IMM GRANULOCYTES # BLD AUTO: 0.2 K/UL (ref 0–0.5)
IMM GRANULOCYTES NFR BLD AUTO: 1 % (ref 0–5)
LYMPHOCYTES # BLD: 1.2 K/UL (ref 0.5–4.6)
LYMPHOCYTES NFR BLD: 8 % (ref 13–44)
MCH RBC QN AUTO: 28.3 PG (ref 26.1–32.9)
MCHC RBC AUTO-ENTMCNC: 33.1 G/DL (ref 31.4–35)
MCV RBC AUTO: 85.6 FL (ref 79.6–97.8)
MONOCYTES # BLD: 1.1 K/UL (ref 0.1–1.3)
MONOCYTES NFR BLD: 7 % (ref 4–12)
NEUTS SEG # BLD: 12.7 K/UL (ref 1.7–8.2)
NEUTS SEG NFR BLD: 84 % (ref 43–78)
NRBC # BLD: 0 K/UL (ref 0–0.2)
PLATELET # BLD AUTO: 103 K/UL (ref 150–450)
PMV BLD AUTO: 10.3 FL (ref 9.4–12.3)
POTASSIUM SERPL-SCNC: 2.7 MMOL/L (ref 3.5–5.1)
RBC # BLD AUTO: 4.87 M/UL (ref 4.05–5.2)
SERVICE CMNT-IMP: ABNORMAL
SERVICE CMNT-IMP: NORMAL
SODIUM SERPL-SCNC: 140 MMOL/L (ref 136–145)
WBC # BLD AUTO: 15.2 K/UL (ref 4.3–11.1)

## 2021-11-20 PROCEDURE — 65270000029 HC RM PRIVATE

## 2021-11-20 PROCEDURE — 85025 COMPLETE CBC W/AUTO DIFF WBC: CPT

## 2021-11-20 PROCEDURE — 74011250637 HC RX REV CODE- 250/637: Performed by: NURSE PRACTITIONER

## 2021-11-20 PROCEDURE — 94760 N-INVAS EAR/PLS OXIMETRY 1: CPT

## 2021-11-20 PROCEDURE — 74011250636 HC RX REV CODE- 250/636: Performed by: NURSE PRACTITIONER

## 2021-11-20 PROCEDURE — 74011250637 HC RX REV CODE- 250/637: Performed by: INTERNAL MEDICINE

## 2021-11-20 PROCEDURE — 77010033678 HC OXYGEN DAILY

## 2021-11-20 PROCEDURE — 74011000258 HC RX REV CODE- 258: Performed by: FAMILY MEDICINE

## 2021-11-20 PROCEDURE — 74011250637 HC RX REV CODE- 250/637: Performed by: FAMILY MEDICINE

## 2021-11-20 PROCEDURE — 80048 BASIC METABOLIC PNL TOTAL CA: CPT

## 2021-11-20 PROCEDURE — 2709999900 HC NON-CHARGEABLE SUPPLY

## 2021-11-20 PROCEDURE — 74011250636 HC RX REV CODE- 250/636: Performed by: FAMILY MEDICINE

## 2021-11-20 PROCEDURE — 74011000258 HC RX REV CODE- 258: Performed by: NURSE PRACTITIONER

## 2021-11-20 PROCEDURE — 99232 SBSQ HOSP IP/OBS MODERATE 35: CPT | Performed by: INTERNAL MEDICINE

## 2021-11-20 PROCEDURE — 94640 AIRWAY INHALATION TREATMENT: CPT

## 2021-11-20 PROCEDURE — 74011000250 HC RX REV CODE- 250: Performed by: FAMILY MEDICINE

## 2021-11-20 PROCEDURE — C9113 INJ PANTOPRAZOLE SODIUM, VIA: HCPCS | Performed by: FAMILY MEDICINE

## 2021-11-20 RX ORDER — POTASSIUM CHLORIDE 20 MEQ/1
40 TABLET, EXTENDED RELEASE ORAL 3 TIMES DAILY
Status: COMPLETED | OUTPATIENT
Start: 2021-11-20 | End: 2021-11-20

## 2021-11-20 RX ORDER — METRONIDAZOLE 500 MG/1
500 TABLET ORAL EVERY 8 HOURS
Status: DISCONTINUED | OUTPATIENT
Start: 2021-11-20 | End: 2021-11-22

## 2021-11-20 RX ADMIN — Medication 10 ML: at 22:12

## 2021-11-20 RX ADMIN — BUDESONIDE AND FORMOTEROL FUMARATE DIHYDRATE 2 PUFF: 160; 4.5 AEROSOL RESPIRATORY (INHALATION) at 20:35

## 2021-11-20 RX ADMIN — ONDANSETRON 4 MG: 8 TABLET, ORALLY DISINTEGRATING ORAL at 08:20

## 2021-11-20 RX ADMIN — VERAPAMIL HYDROCHLORIDE 240 MG: 240 TABLET, FILM COATED, EXTENDED RELEASE ORAL at 10:42

## 2021-11-20 RX ADMIN — POTASSIUM CHLORIDE 40 MEQ: 20 TABLET, EXTENDED RELEASE ORAL at 10:43

## 2021-11-20 RX ADMIN — VENLAFAXINE HYDROCHLORIDE 37.5 MG: 37.5 CAPSULE, EXTENDED RELEASE ORAL at 22:14

## 2021-11-20 RX ADMIN — Medication 3 MG: at 22:13

## 2021-11-20 RX ADMIN — VENLAFAXINE HYDROCHLORIDE 75 MG: 75 CAPSULE, EXTENDED RELEASE ORAL at 10:43

## 2021-11-20 RX ADMIN — POTASSIUM CHLORIDE 40 MEQ: 20 TABLET, EXTENDED RELEASE ORAL at 16:45

## 2021-11-20 RX ADMIN — CEFTRIAXONE 2 G: 2 INJECTION, POWDER, FOR SOLUTION INTRAMUSCULAR; INTRAVENOUS at 16:45

## 2021-11-20 RX ADMIN — Medication 10 ML: at 22:13

## 2021-11-20 RX ADMIN — Medication 10 ML: at 06:22

## 2021-11-20 RX ADMIN — Medication 5 ML: at 16:59

## 2021-11-20 RX ADMIN — TIOTROPIUM BROMIDE INHALATION SPRAY 2 PUFF: 3.12 SPRAY, METERED RESPIRATORY (INHALATION) at 07:54

## 2021-11-20 RX ADMIN — VENLAFAXINE HYDROCHLORIDE 75 MG: 75 CAPSULE, EXTENDED RELEASE ORAL at 22:13

## 2021-11-20 RX ADMIN — METRONIDAZOLE 500 MG: 500 TABLET ORAL at 22:14

## 2021-11-20 RX ADMIN — BUDESONIDE AND FORMOTEROL FUMARATE DIHYDRATE 2 PUFF: 160; 4.5 AEROSOL RESPIRATORY (INHALATION) at 07:54

## 2021-11-20 RX ADMIN — SUCRALFATE 1 G: 1 TABLET ORAL at 16:45

## 2021-11-20 RX ADMIN — MORPHINE SULFATE 0.5 MG: 2 INJECTION, SOLUTION INTRAMUSCULAR; INTRAVENOUS at 00:57

## 2021-11-20 RX ADMIN — METRONIDAZOLE 500 MG: 500 TABLET ORAL at 16:45

## 2021-11-20 RX ADMIN — PIPERACILLIN SODIUM AND TAZOBACTAM SODIUM 3.38 G: 3; .375 INJECTION, POWDER, LYOPHILIZED, FOR SOLUTION INTRAVENOUS at 00:58

## 2021-11-20 RX ADMIN — SUCRALFATE 1 G: 1 TABLET ORAL at 10:44

## 2021-11-20 RX ADMIN — PANTOPRAZOLE SODIUM 40 MG: 40 INJECTION, POWDER, FOR SOLUTION INTRAVENOUS at 10:40

## 2021-11-20 RX ADMIN — POTASSIUM CHLORIDE 40 MEQ: 20 TABLET, EXTENDED RELEASE ORAL at 22:14

## 2021-11-20 RX ADMIN — HYDROCHLOROTHIAZIDE 12.5 MG: 25 TABLET ORAL at 10:42

## 2021-11-20 RX ADMIN — SUCRALFATE 1 G: 1 TABLET ORAL at 08:20

## 2021-11-20 RX ADMIN — ATORVASTATIN CALCIUM 40 MG: 40 TABLET, FILM COATED ORAL at 22:14

## 2021-11-20 RX ADMIN — PIPERACILLIN SODIUM AND TAZOBACTAM SODIUM 3.38 G: 3; .375 INJECTION, POWDER, LYOPHILIZED, FOR SOLUTION INTRAVENOUS at 10:40

## 2021-11-20 RX ADMIN — PANTOPRAZOLE SODIUM 40 MG: 40 INJECTION, POWDER, FOR SOLUTION INTRAVENOUS at 22:10

## 2021-11-20 RX ADMIN — SUCRALFATE 1 G: 1 TABLET ORAL at 22:14

## 2021-11-20 NOTE — CONSULTS
Infectious Disease Consult    Today's Date: 11/20/2021   Admit Date: 11/17/2021    Impression:   · Septic shock secondary to Strep species bacteremia (11/17); source ? Bowel; repeat blood cx NGTD; TTE negative  · EGD with possible dilation (11/20) for complaints of dysphagia, abnormal imaging of esophagus; GERD, hiatal hernia &esophagitis  · Question of blood in stools  · Colitis; thought to be Diverticular vs ischemic; GI following  · Osteoporosis; Compression T9 fx  · AMS; resolved    Plan:   · Stop Zosyn  · Start Rocephin and Flagyl  · Follow repeat blood cx    Anti-infectives:   · Zosyn    Subjective:   Date of Consultation:  November 20, 2021  Referring Physician: Ashley Dsouza DO    Patient is a 77 y.o. female  with medical history of osteoporosis with multiple vertebral fracture, mood disorder with anxiety and depression, hiatal hernia with gastroparesis who presented with acute abdominal pain and syncope. She was found to be in septic shock and was initially placed on pressors. A central line was placed. After resuscitation her shock stabilized. CT demonstrated acute left-sided colitis with indeterminate cause. She had been in her usual state of health when she attempted to have a bowel movement. She had been constipated and was struggling. She began to feel very weak. Upon returning to her dining room she passed out where she was found by her . He immediately called EMS who found her to be hypotensive and hypoxic. She was brought to the emergency department and obtunded state. She reports no recent illness but does report diffuse abdominal pain worse on the left. She reported nausea and constipation without vomiting. She had no changes in her urine. She has not been able to eat and is in fact lost over 10 pounds she has a chronic cough but has not been bothering her. She does have pain in her back due to the vertebral fracture. She denies any other symptoms at this time. Started on Zosyn. ID is asked to evaluate patient for treatment recommendations.      Patient Active Problem List   Diagnosis Code    Primary hypertension I10    Hyperlipidemia, unspecified E78.5    Depression with anxiety F41.8    B12 deficiency E53.8    Osteoarthritis of multiple joints M15.9    Hiatal hernia with GERD and esophagitis K44.9, K21.00    Osteoporosis M81.0    Chronic cough R05.3    Severe persistent asthma without complication N12.24    Other iron deficiency anemias D50.8    Hiatal hernia K44.9    Gastroparesis K31.84    Gastroesophageal reflux disease with esophagitis K21.00    Compression fracture of T12 vertebra (HCC) S22.080A    Chronic rhinitis J31.0    Mood disorder (HCC) F39    Compression fracture of T9 vertebra ([facility]) S22.070A    Severe sepsis with septic shock ([facility]) A41.9, R65.21    Body mass index (BMI) of 21.0 to 21.9 in adult Z68.21    Colitis, indeterminate K52.3    Acute blood loss anemia D62    Severe protein-calorie malnutrition (HCC) E43    UTI (urinary tract infection) N39.0    Streptococcal bacteremia R78.81, B95.5    Elevated troponin R77.8     Past Medical History:   Diagnosis Date    Anemia 2005 approx    2 units transfused    Arthritis     hands, hips    B12 deficiency     Cancer ([facility]     hx of melanoma 1986 R arm, and carcinoma x 2 forehead    COVID-19 vaccine series completed 02/2021    PT TO BRING CARD DOS    Depression with anxiety 8/16/2016    Elevated troponin 11/20/2021    Essential hypertension with goal blood pressure less than 140/90     controlled with med    Gastrointestinal disorder     hiatel hernia    Hyperlipidemia, unspecified 8/16/2016    Menopause     @ 51    Mild intermittent asthma 8/16/2016    Osteoarthritis of multiple joints 8/16/2016    Osteoporosis 10/25/2016    Pulmonary emphysema ([facility] 08/16/2016    daily inhalers    Rectal bleed     Vertigo       Family History   Problem Relation Age of Onset    Stroke Mother intracerebral aneurysm    Diabetes Maternal Aunt     Diabetes Maternal Uncle     No Known Problems Father         didn't have contact with father    Breast Cancer Daughter 28    Hypertension Maternal Grandmother       Social History     Tobacco Use    Smoking status: Never Smoker    Smokeless tobacco: Never Used    Tobacco comment: 2nd hand smoke x 33 yrs   Substance Use Topics    Alcohol use: No     Past Surgical History:   Procedure Laterality Date    COLONOSCOPY N/A 7/30/2021    COLONOSCOPY/BMI 23 performed by Bonnita Felty, MD at [facility]; HI RISK IND  7/30/2021         HX HERNIA REPAIR  2010    helped reflex    HX TUBAL LIGATION  1972      Prior to Admission medications    Medication Sig Start Date End Date Taking? Authorizing Provider   HYDROcodone-acetaminophen (NORCO) 5-325 mg per tablet 1 Tablet every six (6) hours as needed. 11/9/21  Yes Provider, Historical   venlafaxine-SR (Effexor XR) 75 mg capsule Take 1 Capsule by mouth two (2) times a day. 8/8/21  Yes Yeni Pittman MD   venlafaxine-SR (Effexor XR) 37.5 mg capsule Take 1 Capsule by mouth nightly. 8/8/21  Yes Yeni Pittman MD   hydroCHLOROthiazide (HYDRODIURIL) 12.5 mg tablet Take 1 Tablet by mouth daily. 8/6/21  Yes Yeni Pittman MD   verapamil ER (CALAN-SR) 240 mg CR tablet TAKE 1 TABLET BY MOUTH EVERY MORNING 8/6/21  Yes Yeni Pittman MD   pravastatin (PRAVACHOL) 20 mg tablet Take 1 Tablet by mouth daily. Patient taking differently: Take 20 mg by mouth nightly. 8/6/21  Yes Yeni Pittman MD   fluticasone-umeclidin-vilanter (Trelegy Ellipta) 737-65.6-81 mcg dsdv Take 1 Puff by inhalation daily. 4/12/21  Yes Eliu Lindsay MD   omeprazole (PRILOSEC) 40 mg capsule Take 1 Cap by mouth two (2) times a day. 10/20/20  Yes Eliu Lindsay MD   potassium chloride (KLOR-CON) 10 mEq tablet Take 1 Tablet by mouth daily.  11/12/21   Yeni Pittman MD   ondansetron (ZOFRAN ODT) 4 mg disintegrating tablet Take 4 mg by mouth. 21   Provider, Historical   LORazepam (ATIVAN) 1 mg tablet Take 1/2 to 1 tab PO Q 12 hours PRN anxiety, use sparingly  Patient not taking: Reported on 2021 9/15/21   Nataly Velasquez MD   calcium-cholecalciferol, d3, (CALCIUM 600 + D) 600-125 mg-unit tab Take 1 Tab by mouth daily. Patient not taking: Reported on 2021    Provider, Historical   cetirizine (ZYRTEC) 10 mg tablet Take 1 Tab by mouth daily. Patient not taking: Reported on 2021   Pastora Kiser NP   albuterol (Ventolin HFA) 90 mcg/actuation inhaler Take 2 Puffs by inhalation four (4) times daily. Patient taking differently: Take 2 Puffs by inhalation every six (6) hours as needed. 21   Pastora Kiser NP       Allergies   Allergen Reactions    Other Medication Rash     Equate antibiotic ointment        Review of Systems:  A comprehensive review of systems was negative except for that written in the History of Present Illness. Objective:     Visit Vitals  BP (!) 113/53 (BP 1 Location: Right upper arm, BP Patient Position: At rest)   Pulse 79   Temp 97.7 °F (36.5 °C)   Resp 18   Ht 5' 2\" (1.575 m)   Wt 51.3 kg (113 lb 1.6 oz)   SpO2 96%   BMI 20.69 kg/m²     Temp (24hrs), Av.9 °F (36.6 °C), Min:96.8 °F (36 °C), Max:100.3 °F (37.9 °C)       Lines:  Peripheral IV:       Physical Exam:    General:  Alert, cooperative, well noursished, well developed, appears stated age   Eyes:  Sclera anicteric. Pupils equally round and reactive to light. Mouth/Throat: Mucous membranes normal, oral pharynx clear   Neck: Supple   Lungs:   Clear to auscultation bilaterally, good effort   CV:  Regular rate and rhythm,no murmur, click, rub or gallop   Abdomen:   Soft, non-tender.  bowel sounds normal. non-distended   Extremities: No cyanosis or edema   Skin: Skin color, texture, turgor normal. no acute rash or lesions   Lymph nodes: Cervical and supraclavicular normal   Musculoskeletal: No swelling or deformity   Lines/Devices:  Intact, no erythema, drainage or tenderness   Psych: Alert and oriented, normal mood affect given the setting       Data Review:     CBC:  Recent Labs     11/20/21  0629 11/19/21  0539 11/18/21  0245 11/18/21  0245   WBC 15.2* 16.1*  --  14.7*   GRANS 84* 80*   < > 91*   MONOS 7 7   < > 4   EOS 0* 3   < > 0*   ANEU 12.7* 12.9*   < > 13.4*   ABL 1.2 1.4   < > 0.6   HGB 13.8 15.4  --  15.9*   HCT 41.7 46.1  --  48.4*   * 128*  --  141*    < > = values in this interval not displayed. BMP:  Recent Labs     11/20/21  0629 11/19/21  0539 11/17/21  1748   CREA 0.51* 0.68 0.52*   BUN 10 12 11    141 142   K 2.7* 2.7* 3.7    104 111*   CO2 30 29 16*   AGAP 7 8 15   * 96 126*       LFTS:  Recent Labs     11/17/21  1748   TBILI 0.5   ALT 19   *   TP 3.2*   ALB 1.2*       Microbiology:     All Micro Results     Procedure Component Value Units Date/Time    CULTURE, URINE [[id number]] Collected: 11/17/21 1720    Order Status: Completed Specimen: Urine from Clean catch Updated: 11/20/21 0841     Special Requests: NO SPECIAL REQUESTS        Culture result: NO GROWTH 2 DAYS       BLOOD CULTURE [[id number]]  (Abnormal) Collected: 11/17/21 1651    Order Status: Completed Specimen: Blood Updated: 11/20/21 0749     Special Requests: --        NO SPECIAL REQUESTS  LEFT  FOREARM       GRAM STAIN GRAM POS COCCI IN CHAINS               AEROBIC AND ANAEROBIC BOTTLES                  RESULTS VERIFIED, PHONED TO AND READ BACK BY ELISEO CHO AT 1826 ON 11/18/2021, /AMM                  GRAM POS COCCI IN CHAINS RESULTS VERIFIED, PHONED TO AND READ BACK BY Onesimo Hoover RN [id number] ON [id number] MT           Culture result:       ALPHA STREPTOCOCCUS, NOT S. PNEUMONIAE This organism may be indicative of culture contamination. Clinical correlation needs to be evaluated as each case is unique.                   Refer to Blood Culture ID Panel Accession  [id number]      CULTURE, BLOOD [[id number]] Collected: 11/19/21 0540    Order Status: Completed Specimen: Blood Updated: 11/20/21 0650     Special Requests: CENTRAL LINE        Culture result: NO GROWTH 1 DAY       C. DIFFICILE AG & TOXIN A/B [[id number]] Collected: 11/18/21 1154    Order Status: Completed Specimen: Stool Updated: 11/19/21 1401     [address] [place] ANTIGEN       C. DIFFICILE [facility] ANTIGEN-NEGATIVE           C. difficile toxin       C. DIFFICILE TOXIN-NEGATIVE           PCR Reflex NOT APPLICABLE        INTERPRETATION       NEGATIVE FOR TOXIGENIC C. DIFFICILE           Clinical Consideration       NEGATIVE FOR TOXIGENIC C. DIFFICILE          CULTURE, STOOL [[id number]] Collected: 11/18/21 1154    Order Status: Completed Specimen: Stool Updated: 11/19/21 1108     Special Requests: NO SPECIAL REQUESTS        Culture result: ORGANISM IN STUDY               CULTURE IN PROGRESS,FURTHER UPDATES TO FOLLOW          BLOOD CULTURE ID PANEL [[id number]] Collected: 11/17/21 1607    Order Status: Completed Specimen: Blood Updated: 11/19/21 0707     Acc. no. from Micro Order [id number]     INTERPRETATION       Gram positive cocci. Identified by realtime PCR as Streptococcus species (not agalactiae, pyogenes, or pneumoniae). Comment: Consider discontinuation of IV vancomycin and using an anti-streptococcal beta-lactam (ceftriaxone/cefotaxime (peds))        Blood Culture PCR Testing       MULTIPLEX PCR NEGATIVE:  A negative FilmArray BCID result does not exclude the possibility of bloodstream infection.           BLOOD CULTURE [[id number]] Collected: 11/17/21 1607    Order Status: Completed Specimen: Blood Updated: 11/19/21 0301     Special Requests: --        RIGHT  FOREARM       GRAM STAIN GRAM NEGATIVE RODS         ANAEROBIC BOTTLE POSITIVE               RESULTS VERIFIED, PHONED TO AND READ BACK BY Urvashi Angel RN @0259 [phone] BY RAJESH           Culture result:       CULTURE IN [facility] UPDATES TO FOLLOW                  Refer to Blood Culture ID Panel Accession  [id number]      NAHEED & Stephanie Mcnamara [[id number]] Collected: 11/18/21 1154    Order Status: Completed Specimen: Feces from Stool Updated: 11/18/21 1257    BLOOD CULTURE ID PANEL [[id number]]  (Abnormal) Collected: 11/17/21 1651    Order Status: Completed Specimen: Blood Updated: 11/18/21 1219     Streptococcus Detected        Comment: RESULTS VERIFIED, PHONED TO AND READ BACK BY  ELISEO CHO @ [facility] 11/18/2021 SH/AMM           INTERPRETATION       Gram positive cocci. Identified by realtime PCR as Streptococcus species (not agalactiae, pyogenes, or pneumoniae). Comment: Consider discontinuation of IV vancomycin and using an anti-streptococcal beta-lactam (ceftriaxone/cefotaxime (peds))       COVID-19 RAPID TEST [[id number]] Collected: 11/18/21 1154    Order Status: Completed Specimen: Nasopharyngeal Updated: 11/18/21 1216     Specimen source Nasopharyngeal        COVID-19 rapid test Not detected        Comment:      The specimen is NEGATIVE for SARS-CoV-2, the novel coronavirus associated with COVID-19. A negative result does not rule out COVID-19. This test has been authorized by the FDA under an Emergency Use Authorization (EUA) for use by authorized laboratories.         Fact sheet for Healthcare Providers: ConventionUpdate.co.nz  Fact sheet for Patients: ConventionUpdate.co.nz       Methodology: Isothermal Nucleic Acid Amplification               Imaging:   Reviewed    Signed By: Catrina Boeck, NP     November 20, 2021

## 2021-11-20 NOTE — PROGRESS NOTES
[facility]   DAILY PROGRESS NOTE    Admit Date:  11/17/2021    CC:  Dysphagia, Abnormal CT, Melena    Problem List:  Principal Problem:    Severe sepsis with septic shock ([facility] [address] (11/17/2021)    Active Problems:    Acute blood loss anemia (11/17/2021)      Primary hypertension (8/16/2016)      Overview: Goal < 140/80      Hiatal hernia with GERD and esophagitis (8/16/2016)      Osteoporosis (10/25/2016)      Severe persistent asthma without complication (7/83/2508)      Gastroesophageal reflux disease with esophagitis (11/1/2015)      Mood disorder ([address] (11/1/2015)      Compression fracture of T9 vertebra ([address] (10/18/2021)      Body mass index (BMI) of 21.0 to 21.9 in adult (11/17/2021)      Colitis, indeterminate (11/17/2021)      Severe protein-calorie malnutrition ([address] (11/18/2021)      UTI (urinary tract infection) (11/18/2021)      Streptococcal bacteremia (11/18/2021)        Mrs. Carolyn Sales is a 78 yo female with hx of HH repair with Nissen's from 2009 with Dr. Brenda Reyes who we are seeing for evaluation of melena, dysphagia with abnormal CT esophagus - stricture vs mass and concerns for left sided colitis, obtained on admission. She presented to the ED 11/17 with abdominal pain, hypotension, and hypoxia, quickly improving with supportive care. She required Levophed for a short period. Imaging with abnormal esophagus as well as a left sided colitis, for which surgery was consulted. She has been placed on antibx for presumed diverticulitis; colonoscopy with Dr Lexii aDcosta this summer noted diverticulosis but was otherwise unremarkable. Patient was seen earlier in the year in our office for dysphagia with barium swallow at that time which revealed hiatal hernia and an esophageal narrowing. She did not keep recommended follow-up to discuss EGD but today reports continued dysphagia to pills and solids. Note Hg on arrival was 15.9.     EGD was attempted on 11/18 but patient had retained food in stomach so procedure was aborted. Repeat EGD was done on 11/19 with findings of hiatal hernia. Esophagus was empirically dilated using a 51 Fr Savary. No blood was noted anywhere on the exam.      Hg 13.8 today. 1. Dysphagia  2. Melena  3. Left Sided Colitis likely Ischemic Colitis vs. Less likely Diverticulitis    - Patient reports to be swallowing better. - Advance diet as she tolerates  - She is not having any melena. - Surgery has her on Zosyn with concerns for possible diverticulitis. - Given her presentation of hypotension, sepsis requiring pressor on arrival, I think she likely has an ischemic colitis. Maintain normotension. Yoel Betancourt MD   [facility]      Subjective:     Patient feels better today. She is swallowing liquids okay. She has not really tried to eat much. She has mild left sided soreness in her abdomen.       Medications:   Current Facility-Administered Medications   Medication Dose Route Frequency Provider Last Rate Last Admin    potassium chloride (K-DUR, KLOR-CON M20) SR tablet 40 mEq  40 mEq Oral TID Magdi Hernandez E, DO        morphine injection 0.5 mg  0.5 mg IntraVENous Q4H PRN Jason Dueñas MD   0.5 mg at 11/20/21 0057    atorvastatin (LIPITOR) tablet 40 mg  40 mg Oral QHS Orreena Betancourt MD   40 mg at 11/19/21 2053    budesonide-formoteroL (SYMBICORT) 160-4.5 mcg/actuation HFA inhaler 2 Puff  2 Puff Inhalation BID RT Jason Dueñas MD   2 Puff at 11/20/21 0754    And    tiotropium bromide (SPIRIVA RESPIMAT) 2.5 mcg /actuation  2 Puff Inhalation DAILY Jason Dueñas MD   2 Puff at 11/20/21 0754    venlafaxine-SR (EFFEXOR-XR) capsule 37.5 mg  37.5 mg Oral QHS Jason Dueñas MD   37.5 mg at 11/19/21 2053    nitroglycerin (NITROBID) 2 % ointment 1 Inch  1 Inch Topical BID [facility]   1 Inch at 11/19/21 1717    sodium chloride (NS) flush 5-10 mL  5-10 mL IntraVENous Q8H Juanito Klein MD   10 mL at 11/20/21 0622    sodium chloride (NS) flush 5-10 mL  5-10 mL IntraVENous PRN Adán Klein MD        0.9% sodium chloride infusion 250 mL  250 mL IntraVENous PRN Adán Klein MD        albuterol (PROVENTIL HFA, VENTOLIN HFA, PROAIR HFA) inhaler 2 Puff  2 Puff Inhalation Q6H PRN Tani Robbins MD        calcium-vitamin D (OS-ZACHARIAH +D3) 250 mg-125 unit per tablet 1 Tablet  1 Tablet Oral DAILY Tani Robbins MD   1 Tablet at 11/19/21 0824    hydroCHLOROthiazide (HYDRODIURIL) tablet 12.5 mg  12.5 mg Oral DAILY Tani Robbins MD   12.5 mg at 11/19/21 0824    HYDROcodone-acetaminophen (NORCO) 5-325 mg per tablet 1 Tablet  1 Tablet Oral Q6H PRN Tani Robbins MD   1 Tablet at 11/18/21 0038    pantoprazole (PROTONIX) 40 mg in 0.9% sodium chloride 10 mL injection  40 mg IntraVENous Q12H Tani Robbins MD   40 mg at 11/19/21 2044    venlafaxine-SR (EFFEXOR-XR) capsule 75 mg  75 mg Oral BID Tani Robbins MD   75 mg at 11/19/21 1718    verapamil ER (CALAN-SR) tablet 240 mg  240 mg Oral DAILY WITH Beto Lee MD   240 mg at 11/19/21 0817    sodium chloride (NS) flush 5-40 mL  5-40 mL IntraVENous Q8H Adán Klein MD   10 mL at 11/20/21 0622    sodium chloride (NS) flush 5-40 mL  5-40 mL IntraVENous PRN Tani Robbins MD        acetaminophen (TYLENOL) tablet 650 mg  650 mg Oral Q6H PRN Tani Robbins MD   650 mg at 11/18/21 0545    Or    acetaminophen (TYLENOL) suppository 650 mg  650 mg Rectal Q6H PRN Adán Klein MD        polyethylene glycol (MIRALAX) packet 17 g  17 g Oral DAILY PRN Tani Robbins MD        ondansetron (ZOFRAN ODT) tablet 4 mg  4 mg Oral Q8H PRN Tani Robbins MD   4 mg at 11/19/21 1649    Or    ondansetron (ZOFRAN) injection 4 mg  4 mg IntraVENous Q6H PRN Tani Robbins MD   4 mg at 11/18/21 0037    piperacillin-tazobactam (ZOSYN) 3.375 g in 0.9% sodium chloride (MBP/ADV) 100 mL MBP  3.375 g IntraVENous Q8H Denisa Francis MD 25 mL/hr at 11/20/21 0058 3.375 g at 11/20/21 0058    melatonin tablet 3 mg  3 mg Oral QHS Denisa Francis MD   3 mg at 11/19/21 2053    OLANZapine (ZyPREXA zydis) disintegrating tablet 5 mg  5 mg Oral QHS PRN Denisa Francis MD        sucralfate (CARAFATE) tablet 1 g  1 g Oral AC&HS Denisa Francis MD   1 g at 11/19/21 2054       Review of Systems:  ROS was obtained, with pertinent positives as listed above. No chest pain or SOB. Objective:   Vitals:  Visit Vitals  BP (!) 154/82 (BP 1 Location: Right upper arm, BP Patient Position: Lying)   Pulse 92   Temp 100.3 °F (37.9 °C)   Resp 14   Ht 5' 2\" (1.575 m)   Wt 51.3 kg (113 lb 1.6 oz)   SpO2 94%   BMI 20.69 kg/m²     Intake/Output:  No intake/output data recorded. 11/18 1901 - 11/20 0700  In: 620 [P.O.:220; I.V.:400]  Out: 800 [Urine:800]  Exam:  General appearance: alert, cooperative, no distress. Appears chronically ill. Lungs: clear to auscultation bilaterally anteriorly  Heart: regular rate and rhythm  Abdomen: soft, non-tender. Bowel sounds normal. Prior surgical scars.   Extremities: extremities normal, atraumatic, no cyanosis or edema  Neuro:  alert and oriented    Data Review (Labs):    Recent Labs     11/20/21  0629 11/19/21  0539 11/18/21  0245 11/17/21  2316 11/17/21  1748   WBC 15.2* 16.1* 14.7*  --  10.8   HGB 13.8 15.4 15.9* 15.9* 8.6*   HCT 41.7 46.1 48.4* 48.2* 27.5*   * 128* 141*  --  138*   MCV 85.6 84.3 83.9  --  92.0    141  --   --  142   K 2.7* 2.7*  --   --  3.7    104  --   --  111*   CO2 30 29  --   --  16*   BUN 10 12  --   --  11   MG  --  1.8  --   --   --    AST  --   --   --   --  32   ALT  --   --   --   --  19         Andrew Bermudez MD  [facility]

## 2021-11-20 NOTE — PROGRESS NOTES
[facility] CARDIOLOGY PROGRESS NOTE           11/20/2021 9:00 AM    Admit Date: 11/17/2021      Subjective:   Feels better but hurts all over. ROS:  GEN:  No fever or chills  Cardiovascular:  As noted above:chest pain has improved. Pulmonary:  As noted above:no dyspnea this am.  Neuro:  No new focal motor or sensory loss    Objective:      Vitals:    11/20/21 0045 11/20/21 0514 11/20/21 0759 11/20/21 0809   BP: (!) 154/82   108/69   Pulse: (!) 105 92  77   Resp: 16 14  16   Temp: 98.2 °F (36.8 °C) 100.3 °F (37.9 °C)  97.6 °F (36.4 °C)   SpO2: 96% 94% 94% 94%   Weight:  113 lb 1.6 oz (51.3 kg)     Height:           Physical Exam:  General-no distress  Neck- supple, no JVD  CV- regular rate and rhythm no MRG  Lung- clear bilaterally  Abd- soft, nontender, nondistended  Ext- no edema bilaterally. Skin- warm and dry  Psychiatric:  Normal mood and affect.   Neurologic:  Alert and oriented X 3      Data Review:   Recent Labs     11/20/21  0629 11/19/21  0539    141   K 2.7* 2.7*   MG  --  1.8   BUN 10 12   CREA 0.51* 0.68   * 96   WBC 15.2* 16.1*   HGB 13.8 15.4   HCT 41.7 46.1   * 128*       TELEMETRY:  NSR    Assessment/Plan:     Principal Problem:    Severe sepsis with septic shock (HCC) (11/17/2021)    Active Problems:    Acute blood loss anemia (11/17/2021)      Primary hypertension (8/16/2016)      Overview: Goal < 140/80      Hiatal hernia with GERD and esophagitis (8/16/2016)      Osteoporosis (10/25/2016)      Severe persistent asthma without complication (2/47/9082)      Gastroesophageal reflux disease with esophagitis (11/1/2015)      Mood disorder ([facility] [address] (11/1/2015)      Compression fracture of T9 vertebra ([facility] (10/18/2021)      Body mass index (BMI) of 21.0 to 21.9 in adult (11/17/2021)      Colitis, indeterminate (11/17/2021)      Severe protein-calorie malnutrition ([facility] [address] (11/18/2021)      UTI (urinary tract infection) (11/18/2021)      Streptococcal bacteremia (11/18/2021)      Elevated troponin (11/20/2021):CV status appears stable. Echo showed normal LV EF with no serious wall motion abnormalities. Chronic chest pain does not seem to represent unstable angina. Given her multiple co morbidities,continued conservative medical therapy is recommended. Continue Topical NTG and Verapamil. Will sign off and be on standby as needed. Anticipate outpatient follow up.     Severe Hypokalemia:per primary team.            Melissa North MD  11/20/2021 9:00 AM

## 2021-11-20 NOTE — PROGRESS NOTES
Reviewed notes for concerns      Per notes:      Progressing in her care        Will continue to assess how to best serve this family

## 2021-11-20 NOTE — ROUTINE PROCESS
Bedside and Verbal shift change report given to [address] [place] [address] Road (oncoming nurse) by myself (offgoing nurse). Report included the following information SBAR, Kardex, Intake/Output, MAR, Recent Results and Med Rec Status.

## 2021-11-21 LAB
ANION GAP SERPL CALC-SCNC: 6 MMOL/L (ref 7–16)
BACTERIA SPEC CULT: NORMAL
BASOPHILS # BLD: 0 K/UL (ref 0–0.2)
BASOPHILS NFR BLD: 0 % (ref 0–2)
BUN SERPL-MCNC: 9 MG/DL (ref 8–23)
CALCIUM SERPL-MCNC: 8.3 MG/DL (ref 8.3–10.4)
CHLORIDE SERPL-SCNC: 106 MMOL/L (ref 98–107)
CO2 SERPL-SCNC: 31 MMOL/L (ref 21–32)
CREAT SERPL-MCNC: 0.45 MG/DL (ref 0.6–1)
DIFFERENTIAL METHOD BLD: ABNORMAL
EOSINOPHIL # BLD: 0.2 K/UL (ref 0–0.8)
EOSINOPHIL NFR BLD: 1 % (ref 0.5–7.8)
ERYTHROCYTE [DISTWIDTH] IN BLOOD BY AUTOMATED COUNT: 17.1 % (ref 11.9–14.6)
GLUCOSE SERPL-MCNC: 98 MG/DL (ref 65–100)
HCT VFR BLD AUTO: 40.3 % (ref 35.8–46.3)
HGB BLD-MCNC: 13.4 G/DL (ref 11.7–15.4)
IMM GRANULOCYTES # BLD AUTO: 0.1 K/UL (ref 0–0.5)
IMM GRANULOCYTES NFR BLD AUTO: 1 % (ref 0–5)
LYMPHOCYTES # BLD: 1.3 K/UL (ref 0.5–4.6)
LYMPHOCYTES NFR BLD: 10 % (ref 13–44)
MCH RBC QN AUTO: 28.3 PG (ref 26.1–32.9)
MCHC RBC AUTO-ENTMCNC: 33.3 G/DL (ref 31.4–35)
MCV RBC AUTO: 85 FL (ref 79.6–97.8)
MONOCYTES # BLD: 0.7 K/UL (ref 0.1–1.3)
MONOCYTES NFR BLD: 6 % (ref 4–12)
NEUTS SEG # BLD: 10.2 K/UL (ref 1.7–8.2)
NEUTS SEG NFR BLD: 82 % (ref 43–78)
NRBC # BLD: 0 K/UL (ref 0–0.2)
PLATELET # BLD AUTO: 100 K/UL (ref 150–450)
PMV BLD AUTO: 10.4 FL (ref 9.4–12.3)
POTASSIUM SERPL-SCNC: 3.1 MMOL/L (ref 3.5–5.1)
RBC # BLD AUTO: 4.74 M/UL (ref 4.05–5.2)
SERVICE CMNT-IMP: NORMAL
SODIUM SERPL-SCNC: 143 MMOL/L (ref 136–145)
WBC # BLD AUTO: 12.4 K/UL (ref 4.3–11.1)

## 2021-11-21 PROCEDURE — 94760 N-INVAS EAR/PLS OXIMETRY 1: CPT

## 2021-11-21 PROCEDURE — 74011250637 HC RX REV CODE- 250/637: Performed by: FAMILY MEDICINE

## 2021-11-21 PROCEDURE — 74011000250 HC RX REV CODE- 250: Performed by: FAMILY MEDICINE

## 2021-11-21 PROCEDURE — 74011250637 HC RX REV CODE- 250/637: Performed by: INTERNAL MEDICINE

## 2021-11-21 PROCEDURE — 74011000258 HC RX REV CODE- 258: Performed by: NURSE PRACTITIONER

## 2021-11-21 PROCEDURE — 2709999900 HC NON-CHARGEABLE SUPPLY

## 2021-11-21 PROCEDURE — 74011250636 HC RX REV CODE- 250/636: Performed by: NURSE PRACTITIONER

## 2021-11-21 PROCEDURE — C9113 INJ PANTOPRAZOLE SODIUM, VIA: HCPCS | Performed by: FAMILY MEDICINE

## 2021-11-21 PROCEDURE — 65270000029 HC RM PRIVATE

## 2021-11-21 PROCEDURE — 74011250636 HC RX REV CODE- 250/636: Performed by: FAMILY MEDICINE

## 2021-11-21 PROCEDURE — 94640 AIRWAY INHALATION TREATMENT: CPT

## 2021-11-21 PROCEDURE — 85025 COMPLETE CBC W/AUTO DIFF WBC: CPT

## 2021-11-21 PROCEDURE — 80048 BASIC METABOLIC PNL TOTAL CA: CPT

## 2021-11-21 PROCEDURE — 77010033678 HC OXYGEN DAILY

## 2021-11-21 PROCEDURE — 74011250637 HC RX REV CODE- 250/637: Performed by: NURSE PRACTITIONER

## 2021-11-21 RX ORDER — PANTOPRAZOLE SODIUM 40 MG/1
40 TABLET, DELAYED RELEASE ORAL
Status: DISCONTINUED | OUTPATIENT
Start: 2021-11-21 | End: 2021-11-24 | Stop reason: HOSPADM

## 2021-11-21 RX ORDER — POTASSIUM CHLORIDE 20 MEQ/1
40 TABLET, EXTENDED RELEASE ORAL 3 TIMES DAILY
Status: COMPLETED | OUTPATIENT
Start: 2021-11-21 | End: 2021-11-21

## 2021-11-21 RX ADMIN — VERAPAMIL HYDROCHLORIDE 240 MG: 240 TABLET, FILM COATED, EXTENDED RELEASE ORAL at 10:28

## 2021-11-21 RX ADMIN — Medication 10 ML: at 05:39

## 2021-11-21 RX ADMIN — METRONIDAZOLE 500 MG: 500 TABLET ORAL at 13:47

## 2021-11-21 RX ADMIN — Medication 10 ML: at 21:00

## 2021-11-21 RX ADMIN — MORPHINE SULFATE 0.5 MG: 2 INJECTION, SOLUTION INTRAMUSCULAR; INTRAVENOUS at 13:59

## 2021-11-21 RX ADMIN — SUCRALFATE 1 G: 1 TABLET ORAL at 12:24

## 2021-11-21 RX ADMIN — VENLAFAXINE HYDROCHLORIDE 75 MG: 75 CAPSULE, EXTENDED RELEASE ORAL at 10:27

## 2021-11-21 RX ADMIN — Medication 5 ML: at 13:49

## 2021-11-21 RX ADMIN — TIOTROPIUM BROMIDE INHALATION SPRAY 2 PUFF: 3.12 SPRAY, METERED RESPIRATORY (INHALATION) at 08:07

## 2021-11-21 RX ADMIN — SUCRALFATE 1 G: 1 TABLET ORAL at 05:38

## 2021-11-21 RX ADMIN — PANTOPRAZOLE SODIUM 40 MG: 40 TABLET, DELAYED RELEASE ORAL at 16:58

## 2021-11-21 RX ADMIN — POTASSIUM CHLORIDE 40 MEQ: 20 TABLET, EXTENDED RELEASE ORAL at 23:32

## 2021-11-21 RX ADMIN — SUCRALFATE 1 G: 1 TABLET ORAL at 23:41

## 2021-11-21 RX ADMIN — POTASSIUM CHLORIDE 40 MEQ: 20 TABLET, EXTENDED RELEASE ORAL at 12:24

## 2021-11-21 RX ADMIN — SUCRALFATE 1 G: 1 TABLET ORAL at 16:58

## 2021-11-21 RX ADMIN — HYDROCHLOROTHIAZIDE 12.5 MG: 25 TABLET ORAL at 10:28

## 2021-11-21 RX ADMIN — METRONIDAZOLE 500 MG: 500 TABLET ORAL at 05:38

## 2021-11-21 RX ADMIN — ONDANSETRON 4 MG: 8 TABLET, ORALLY DISINTEGRATING ORAL at 19:35

## 2021-11-21 RX ADMIN — PANTOPRAZOLE SODIUM 40 MG: 40 INJECTION, POWDER, FOR SOLUTION INTRAVENOUS at 10:30

## 2021-11-21 RX ADMIN — POTASSIUM CHLORIDE 40 MEQ: 20 TABLET, EXTENDED RELEASE ORAL at 16:58

## 2021-11-21 RX ADMIN — BUDESONIDE AND FORMOTEROL FUMARATE DIHYDRATE 2 PUFF: 160; 4.5 AEROSOL RESPIRATORY (INHALATION) at 19:50

## 2021-11-21 RX ADMIN — BUDESONIDE AND FORMOTEROL FUMARATE DIHYDRATE 2 PUFF: 160; 4.5 AEROSOL RESPIRATORY (INHALATION) at 08:06

## 2021-11-21 RX ADMIN — CEFTRIAXONE 2 G: 2 INJECTION, POWDER, FOR SOLUTION INTRAMUSCULAR; INTRAVENOUS at 13:48

## 2021-11-21 RX ADMIN — MORPHINE SULFATE 0.5 MG: 2 INJECTION, SOLUTION INTRAMUSCULAR; INTRAVENOUS at 19:35

## 2021-11-21 RX ADMIN — Medication 3 MG: at 23:39

## 2021-11-21 RX ADMIN — METRONIDAZOLE 500 MG: 500 TABLET ORAL at 23:46

## 2021-11-21 RX ADMIN — ATORVASTATIN CALCIUM 40 MG: 40 TABLET, FILM COATED ORAL at 23:39

## 2021-11-21 RX ADMIN — VENLAFAXINE HYDROCHLORIDE 37.5 MG: 37.5 CAPSULE, EXTENDED RELEASE ORAL at 23:37

## 2021-11-21 NOTE — PROGRESS NOTES
Progress Note    Patient: Reinaldo Gould MRN: [id number]  SSN: [id number]    YOB: 1945  Age: 77 y.o. Sex: female      Admit Date: 2021    2 Day Post-Op    Procedure:  Procedure(s):  ESOPHAGOGASTRODUODENOSCOPY (EGD)    Subjective:     Patient awake in bed. Feeling good. No complaints. EGD done  with findings of hiatal hernia and esophagus was dilated. Pt reports now swallowing better. AF, NAD. Objective:     Visit Vitals  /70 (BP 1 Location: Right upper arm, BP Patient Position: Lying)   Pulse 87   Temp 97.8 °F (36.6 °C)   Resp 18   Ht 5' 2\" (1.575 m)   Wt 116 lb (52.6 kg)   SpO2 95%   BMI 21.22 kg/m²       Temp (24hrs), Av.9 °F (36.6 °C), Min:97.6 °F (36.4 °C), Max:98.1 °F (36.7 °C)      Physical Exam:    Constitutional: Alert, cooperative, no acute distress; appears stated age   Eyes: Sclera are clear. EOMs intact  ENMT: no external lesions' gross hearing normal; no obvious neck masses, no ear or lip lesions, nares normal  CV: RRR. Normal perfusion  Resp: No JVD. Breathing is  non-labored; no audible wheezing. GI: soft and non-distended, minimally ttp left abdomen     Musculoskeletal: No embolic signs or cyanosis. Neuro: moves all 4; no focal deficits  Psychiatric: normal affect and mood    Lab Review: All lab results for the last 24 hours reviewed.     Assessment:     Hospital Problems  Date Reviewed: 2021          Codes Class Noted POA    Acute blood loss anemia ICD-10-CM: D62  ICD-9-CM: 285.1  2021 Yes        Elevated troponin ICD-10-CM: R77.8  ICD-9-CM: 790.6  2021 Unknown        Severe protein-calorie malnutrition ([facility] [address] ICD-10-CM: E43  ICD-9-CM: 262  2021 Yes        UTI (urinary tract infection) ICD-10-CM: N39.0  ICD-9-CM: 599.0  2021 Unknown        Streptococcal bacteremia ICD-10-CM: R78.81, B95.5  ICD-9-CM: 790.7, 041.00  2021 Unknown        * (Principal) Severe sepsis with septic shock ([facility] [address] ICD-10-CM: A41.9, R65.21  ICD-9-CM: 038.9, 995.92, 785.52  11/17/2021 Yes        Body mass index (BMI) of 21.0 to 21.9 in adult ICD-10-CM: Z68.21  ICD-9-CM: V85.1  11/17/2021 Yes        Colitis, indeterminate ICD-10-CM: K52.3  ICD-9-CM: 558.9  11/17/2021 Unknown        Compression fracture of T9 vertebra (HCC) (Chronic) ICD-10-CM: O43.538E  ICD-9-CM: 805.2  10/18/2021 Yes        Severe persistent asthma without complication IXN-[id number]: T61.92  ICD-9-CM: 493.90  5/28/2021 Yes        Osteoporosis ICD-10-CM: M81.0  ICD-9-CM: 733.00  10/25/2016 Yes        Primary hypertension (Chronic) ICD-10-CM: I10  ICD-9-CM: 401.9  8/16/2016 Yes    Overview Signed 11/17/2021  9:48 PM by Nena Fermin MD     Goal < 140/80             Hiatal hernia with GERD and esophagitis ICD-10-CM: K44.9, K21.00  ICD-9-CM: 553.3, 530.11  8/16/2016 Yes        Gastroesophageal reflux disease with esophagitis (Chronic) ICD-10-CM: K21.00  ICD-9-CM: 530.11  11/1/2015 Yes        Mood disorder ([facility] [address] ICD-10-CM: V39  ICD-9-CM: 296.90  11/1/2015 Yes              Plan/Recommendations/Medical Decision Making:     Care Management per Hospitalist  Cardiology now signed off  GI now signed off  IV Abx -  Zosyn  No current plans for surgical intervention      Emperatriz Quintana NP

## 2021-11-21 NOTE — PROGRESS NOTES
[facility]   DAILY PROGRESS NOTE    Admit Date:  11/17/2021    CC:  Dysphagia, Abnormal CT, Melena    Problem List:  Principal Problem:    Severe sepsis with septic shock ([address] (11/17/2021)    Active Problems:    Acute blood loss anemia (11/17/2021)      Primary hypertension (8/16/2016)      Overview: Goal < 140/80      Hiatal hernia with GERD and esophagitis (8/16/2016)      Osteoporosis (10/25/2016)      Severe persistent asthma without complication (5/93/0631)      Gastroesophageal reflux disease with esophagitis (11/1/2015)      Mood disorder ([address] (11/1/2015)      Compression fracture of T9 vertebra ([address] (10/18/2021)      Body mass index (BMI) of 21.0 to 21.9 in adult (11/17/2021)      Colitis, indeterminate (11/17/2021)      Severe protein-calorie malnutrition ([address] (11/18/2021)      UTI (urinary tract infection) (11/18/2021)      Streptococcal bacteremia (11/18/2021)      Elevated troponin (11/20/2021)      Mrs. Andrey Goodwin is a 78 yo female with hx of HH repair with Nissen's from 2009 with Dr. Kelsey Scott who we are seeing for evaluation of melena, dysphagia with abnormal CT esophagus - stricture vs mass and concerns for left sided colitis, obtained on admission.       She presented to the ED 11/17 with abdominal pain, hypotension, and hypoxia, quickly improving with supportive care.  She required Levophed for a short period. Lubertha Dark with abnormal esophagus as well as a left sided colitis, for which surgery was consulted. Freedom Gottlieb has been placed on antibx for presumed diverticulitis; colonoscopy with Dr Genesis Nicole this summer noted diverticulosis but was otherwise unremarkable.  Patient was seen earlier in the year in our office for dysphagia with barium swallow at that time which revealed hiatal hernia and an esophageal narrowing.  She did not keep recommended follow-up to discuss EGD but today reports continued dysphagia to pills and solids.       Note Hg on arrival was 15.9.     EGD was attempted on 11/18 but patient had retained food in stomach so procedure was aborted.     Repeat EGD was done on 11/19 with findings of hiatal hernia. Esophagus was empirically dilated using a 51 Fr Savary. No blood was noted anywhere on the exam.       Hg 13.4 today from 13.8. No more GI bleeding. Patient tolerating diet and says swallowing is much better.     1. Dysphagia  2. Melena  3. Left Sided Colitis likely Ischemic Colitis vs. Less likely Diverticulitis     - Patient reports to be swallowing better. - Advance diet as she tolerates  - She is not having any melena. Hg stable. - Surgery has her on Zosyn with concerns for possible diverticulitis. - Given her presentation of hypotension, sepsis requiring pressor on arrival, I think she likely has an ischemic colitis. Maintain normotension.  - Will sign off. Please call us with any further questions or concerns. - Will arrange outpatient follow up in GI clinic in 4-6 weeks.       Camron Amado MD   [facility]    Subjective:     Patient eating better and says her swallowing is much better. She reports no black tarry stool. She reports no abdominal pain at rest but reports cramping in the left side with a BM.     Medications:   Current Facility-Administered Medications   Medication Dose Route Frequency Provider Last Rate Last Admin    cefTRIAXone (ROCEPHIN) 2 g in 0.9% sodium chloride (MBP/ADV) 50 mL MBP  2 g IntraVENous Q24H Evia Rachna A,  mL/hr at 11/20/21 1645 2 g at 11/20/21 1645    metroNIDAZOLE (FLAGYL) tablet 500 mg  500 mg Oral Q8H Duyen MARTINEZ, NP   500 mg at 11/21/21 0538    morphine injection 0.5 mg  0.5 mg IntraVENous Q4H PRN Satish Cabrera MD   0.5 mg at 11/20/21 0057    atorvastatin (LIPITOR) tablet 40 mg  40 mg Oral QHS Angelica Martínez MD   40 mg at 11/20/21 2214    budesonide-formoteroL (SYMBICORT) 160-4.5 mcg/actuation HFA inhaler 2 Puff  2 Puff Inhalation BID RT Satish Cabrera MD   2 Puff at 11/21/21 0806    And    tiotropium bromide (SPIRIVA RESPIMAT) 2.5 mcg /actuation  2 Puff Inhalation DAILY Sylvain Medina MD   2 Puff at 11/21/21 3152    venlafaxine-SR (EFFEXOR-XR) capsule 37.5 mg  37.5 mg Oral QHS Sylvain Medina MD   37.5 mg at 11/20/21 2214    nitroglycerin (NITROBID) 2 % ointment 1 Inch  1 Inch Topical BID [place]   1 Inch at 11/19/21 1717    sodium chloride (NS) flush 5-10 mL  5-10 mL IntraVENous Q8H Zoran Klein MD   10 mL at 11/21/21 0539    sodium chloride (NS) flush 5-10 mL  5-10 mL IntraVENous PRN Zoran Klein MD        0.9% sodium chloride infusion 250 mL  250 mL IntraVENous PRN Zoran Klein MD        albuterol (PROVENTIL HFA, VENTOLIN HFA, PROAIR HFA) inhaler 2 Puff  2 Puff Inhalation Q6H PRN Foreign Cordova MD        calcium-vitamin D (OS-ZACHARIAH +D3) 250 mg-125 unit per tablet 1 Tablet  1 Tablet Oral DAILY Foreign Cordova MD   1 Tablet at 11/19/21 0824    hydroCHLOROthiazide (HYDRODIURIL) tablet 12.5 mg  12.5 mg Oral DAILY Foreign Cordova MD   12.5 mg at 11/20/21 1042    HYDROcodone-acetaminophen (NORCO) 5-325 mg per tablet 1 Tablet  1 Tablet Oral Q6H PRN Foreign Cordova MD   1 Tablet at 11/18/21 0038    pantoprazole (PROTONIX) 40 mg in 0.9% sodium chloride 10 mL injection  40 mg IntraVENous Q12H Foreign Cordova MD   40 mg at 11/20/21 2210    venlafaxine-SR (EFFEXOR-XR) capsule 75 mg  75 mg Oral BID Foreign Cordova MD   75 mg at 11/20/21 2213    verapamil ER (CALAN-SR) tablet 240 mg  240 mg Oral DAILY WITH Elyssa Chawla MD   240 mg at 11/20/21 1042    sodium chloride (NS) flush 5-40 mL  5-40 mL IntraVENous Q8H Foreign Cordova MD   10 mL at 11/21/21 0539    sodium chloride (NS) flush 5-40 mL  5-40 mL IntraVENous PRN Foreign Cordova MD        acetaminophen (TYLENOL) tablet 650 mg  650 mg Oral Q6H PRN Foreign Cordova MD   650 mg at 11/18/21 0545    Or    acetaminophen (TYLENOL) suppository 650 mg 650 mg Rectal Q6H PRN Norma Klein MD        polyethylene glycol (MIRALAX) packet 17 g  17 g Oral DAILY PRN Nicolasa Knapp MD        ondansetron (ZOFRAN ODT) tablet 4 mg  4 mg Oral Q8H PRN Nicolasa Knapp MD   4 mg at 11/20/21 0820    Or    ondansetron (ZOFRAN) injection 4 mg  4 mg IntraVENous Q6H PRN Nicolasa Knapp MD   4 mg at 11/18/21 0037    melatonin tablet 3 mg  3 mg Oral QHS Nicolasa Knapp MD   3 mg at 11/20/21 2213    OLANZapine (ZyPREXA zydis) disintegrating tablet 5 mg  5 mg Oral QHS PRN Nicolasa Knapp MD        sucralfate (CARAFATE) tablet 1 g  1 g Oral AC&HS Nicolasa Knapp MD   1 g at 11/21/21 6071       Review of Systems:  ROS was obtained, with pertinent positives as listed above. No chest pain or SOB. Diet:      Objective:   Vitals:  Visit Vitals  /75 (BP 1 Location: Left upper arm, BP Patient Position: Lying)   Pulse 85   Temp 97.6 °F (36.4 °C)   Resp 18   Ht 5' 2\" (1.575 m)   Wt 52.6 kg (116 lb)   SpO2 95%   BMI 21.22 kg/m²     Intake/Output:  No intake/output data recorded. 11/19 1901 - 11/21 0700  In: 1192 [P.O.:570; I.V.:500]  Out: 225 [Urine:225]  Exam:  General appearance: alert, cooperative, no distress. Lungs: clear to auscultation bilaterally anteriorly  Heart: regular rate and rhythm  Abdomen: soft, non-tender. Bowel sounds normal. Prior surgical scars.   Extremities: extremities normal, atraumatic, no cyanosis or edema  Neuro:  alert and oriented    Data Review (Labs):    Recent Labs     11/21/21  0544 11/20/21  0629 11/19/21  0539   WBC 12.4* 15.2* 16.1*   HGB 13.4 13.8 15.4   HCT 40.3 41.7 46.1   * 103* 128*   MCV 85.0 85.6 84.3    140 141   K 3.1* 2.7* 2.7*    103 104   CO2 31 30 29   BUN 9 10 12   MG  --   --  1.8         Mark Jean MD  [facility]

## 2021-11-21 NOTE — PROGRESS NOTES
[facility] Hospitalist Note     Admit Date:  2021  3:47 PM   Name:  Justine Luna   Age:  77 y.o.  :  1945   MRN:  [id number]   PCP:  Yeni Pittman MD  Treatment Team: Attending Provider: Ivan Ray DO; Consulting Provider: Darrell Edward MD; Care Manager: Yasmeen [facility]; Utilization Review: Neida Dumont; Consulting Provider: Dave Kennedy MD    HPI/Subjective:   Justine Luna is a 77 y.o. female with medical history of osteoporosis with multiple vertebral fracture, mood disorder with anxiety and depression, hiatal hernia with gastroparesis who presented with acute abdominal pain and syncope. She was found to be in septic shock and was initially placed on pressors. A central line was placed. After resuscitation her shock stabilized. CT demonstrated acute left-sided colitis with indeterminate cause. UA suggestive of UTI. Blood culture growing Streptococcus species. Patient started on empiric antibiotics. Surgery consulted and recommend no surgical intervention at this time. GI consulted and EGD showed hiatal hernia and she was dilated. : Lying comfortably in bed.  at bedside. Lower abdominal pain resolved. No blood in stool today. Denies chest pain, palpitation, nausea, vomiting. Denies shortness of breath. Denies F/C. Denies N/V. Assessment and Plan:     Severe sepsis with septic shock: Resolved  Initially requiring pressor, stabilized after fluid resuscitation  Now off pressors  CT concerning for diverticulitis versus ischemic bowel - stool culture NGTD + C. Diff neg  UA concerning for UTI - culture NGTD  Continue Zosyn   - Consult ID for assistance since all cultures negative except first blood culture   - Changed Zosyn to Ceftriaxone + Flagyl    Acute left-sided colitis:  Stool occult positive  Surgery consulted and recommend no surgical intervention.   GI consulted and suspects ischemic colitis with hypotension  11/21 - Changed Zosyn to Ceftriaxone + Flagyl  GI suspected ischemic colitis since was hypotensive on admission  Stool culture pending  C.  Diff negative    UTI:  UA suggestive of UTI  Urine culture NGTD  11/21 - Changed Zosyn to Ceftriaxone + Flagyl    Streptococcus bacteremia:  Blood culture growing gram-positive cocci, ID PCR showed Streptococcus species(not agalactiae, pyogenes no pneumonia)   Vancomycin discontinued  11/21 - Changed Zosyn to Ceftriaxone + Flagyl  Repeat blood culture NGTD  Appreciate GI's input and assistance    Hypokalemia:  11/20 Down to 2.7  11/21 Up to 3.1  Replace with 120meq PO today  Continue to monitor    Melena:  11/19 EGD with no signs of bleeding  EGD on 11/18 unsuccessful due to retained food in stomach  11/21 Change Protonix IV BID to PO BID    Acute blood loss anemia:  11/20 Hgb down to 13.8  11/21 Hgb stable at 13.4  Likely due to GI bleed  Status post 1 unit PRBC transfusion in the ED  Continue to monitor  Transfuse if hemoglobin less than 7    Compression fracture of T9 vertebra:  Norco as needed  PT/OT    Mood disorder:  History of anxiety and depression, previously treated with benzodiazepine concomitantly treating pain with opioids  Continue venlafaxine  Use caution with benzodiazepine    GERD:  Continue PPI Carafate    Severe persistent asthma:  Continue albuterol and Trelegy    Hypertension:  Continue Verampamil    Discharge planning: Home tomorrow AM    DVT ppx ordered  Code status:  Full  Estimated LOS:  Greater than 2 midnights  Risk: moderate    Hospital Problems as of 11/21/2021 Date Reviewed: 11/17/2021          Codes Class Noted - Resolved POA    Acute blood loss anemia ICD-10-CM: D62  ICD-9-CM: 285.1  11/17/2021 - Present Yes        Elevated troponin ICD-10-CM: R77.8  ICD-9-CM: 790.6  11/20/2021 - Present Unknown        Severe protein-calorie malnutrition ([facility] [address] ICD-10-CM: E43  ICD-9-CM: 262  11/18/2021 - Present Yes        UTI (urinary tract infection) ICD-10-CM: N39.0  ICD-9-CM: 599.0  11/18/2021 - Present Unknown        Streptococcal bacteremia ICD-10-CM: R78.81, B95.5  ICD-9-CM: 790.7, 041.00  11/18/2021 - Present Unknown        * (Principal) Severe sepsis with septic shock ([facility] ICD-10-CM: A41.9, R65.21  ICD-9-CM: 038.9, 995.92, 785.52  11/17/2021 - Present Yes        Body mass index (BMI) of 21.0 to 21.9 in adult ICD-10-CM: Z68.21  ICD-9-CM: V85.1  11/17/2021 - Present Yes        Colitis, indeterminate ICD-10-CM: K52.3  ICD-9-CM: 558.9  11/17/2021 - Present Unknown        Compression fracture of T9 vertebra (HCC) (Chronic) ICD-10-CM: J34.939B  ICD-9-CM: 805.2  10/18/2021 - Present Yes        Severe persistent asthma without complication KEVIN-20-FL: X71.54  ICD-9-CM: 493.90  5/28/2021 - Present Yes        Osteoporosis ICD-10-CM: M81.0  ICD-9-CM: 733.00  10/25/2016 - Present Yes        Primary hypertension (Chronic) ICD-10-CM: I10  ICD-9-CM: 401.9  8/16/2016 - Present Yes    Overview Signed 11/17/2021  9:48 PM by Francisco Bowen MD     Goal < 140/80             Hiatal hernia with GERD and esophagitis ICD-10-CM: K44.9, K21.00  ICD-9-CM: 553.3, 530.11  8/16/2016 - Present Yes        Gastroesophageal reflux disease with esophagitis (Chronic) ICD-10-CM: K21.00  ICD-9-CM: 530.11  11/1/2015 - Present Yes        Mood disorder ([facility] ICD-10-CM: F39  ICD-9-CM: 296.90  11/1/2015 - Present Yes                10 systems reviewed and negative except as noted in HPI.   Past Medical History:   Diagnosis Date    Anemia 2005 approx    2 units transfused    Arthritis     hands, hips    B12 deficiency     Cancer ([facility] [address]     hx of melanoma 1986 R arm, and carcinoma x 2 forehead    COVID-19 vaccine series completed 02/2021    PT TO BRING CARD DOS    Depression with anxiety 8/16/2016    Elevated troponin 11/20/2021    Essential hypertension with goal blood pressure less than 140/90     controlled with med    Gastrointestinal disorder     hiatel hernia    Hyperlipidemia, unspecified 8/16/2016    Menopause     @ 49    Mild intermittent asthma 8/16/2016    Osteoarthritis of multiple joints 8/16/2016    Osteoporosis 10/25/2016    Pulmonary emphysema ([address] 08/16/2016    daily inhalers    Rectal bleed     Vertigo       Past Surgical History:   Procedure Laterality Date    COLONOSCOPY N/A 7/30/2021    COLONOSCOPY/BMI 23 performed by Gricelda Best MD at [facility]; HI RISK IND  7/30/2021         HX HERNIA REPAIR  2010    helped reflex    HX TUBAL LIGATION  1972      Allergies   Allergen Reactions    Other Medication Rash     Equate antibiotic ointment      Social History     Tobacco Use    Smoking status: Never Smoker    Smokeless tobacco: Never Used    Tobacco comment: 2nd hand smoke x 33 yrs   Substance Use Topics    Alcohol use: No      Family History   Problem Relation Age of Onset    Stroke Mother         intracerebral aneurysm    Diabetes Maternal Aunt     Diabetes Maternal Uncle     No Known Problems Father         didn't have contact with father    Breast Cancer Daughter 28    Hypertension Maternal Grandmother       Family history reviewed and noncontributory. Immunization History   Administered Date(s) Administered    COVID-19, Moderna, Primary or Immunocompromised Series, MRNA, PF, 100mcg/0.5mL 01/01/2021, 02/02/2021    Influenza High Dose Vaccine PF 01/01/2016, 01/15/2018, 11/27/2018, 11/01/2020    Influenza Vaccine 09/19/2016, 01/01/2018    Influenza Vaccine (Tri) Adjuvanted (>65 Yrs FLUAD TRI 43648) 11/22/2019    Pneumococcal Conjugate (PCV-13) 09/19/2016    Pneumococcal Polysaccharide (PPSV-23) 02/12/2018    Pneumococcal Vaccine (Unspecified Type) 08/11/2012    TB Skin Test (PPD) Intradermal 11/18/2021    Tdap 05/19/2021     PTA Medications:  Prior to Admission Medications   Prescriptions Last Dose Informant Patient Reported? Taking?    HYDROcodone-acetaminophen (NORCO) 5-325 mg per tablet 11/17/2021 at Unknown time Yes Yes   Si Tablet every six (6) hours as needed. LORazepam (ATIVAN) 1 mg tablet Not Taking at Unknown time  No No   Sig: Take 1/2 to 1 tab PO Q 12 hours PRN anxiety, use sparingly   Patient not taking: Reported on 2021   albuterol (Ventolin HFA) 90 mcg/actuation inhaler   No No   Sig: Take 2 Puffs by inhalation four (4) times daily. Patient taking differently: Take 2 Puffs by inhalation every six (6) hours as needed. calcium-cholecalciferol, d3, (CALCIUM 600 + D) 600-125 mg-unit tab Not Taking at Unknown time  Yes No   Sig: Take 1 Tab by mouth daily. Patient not taking: Reported on 2021   cetirizine (ZYRTEC) 10 mg tablet Not Taking at Unknown time  No No   Sig: Take 1 Tab by mouth daily. Patient not taking: Reported on 2021   fluticasone-umeclidin-vilanter (Trelegy Ellipta) 200-62.5-25 mcg dsdv 2021 at Unknown time  No Yes   Sig: Take 1 Puff by inhalation daily. hydroCHLOROthiazide (HYDRODIURIL) 12.5 mg tablet 2021 at Unknown time  No Yes   Sig: Take 1 Tablet by mouth daily. omeprazole (PRILOSEC) 40 mg capsule 2021 at Unknown time  No Yes   Sig: Take 1 Cap by mouth two (2) times a day. ondansetron (ZOFRAN ODT) 4 mg disintegrating tablet   Yes No   Sig: Take 4 mg by mouth.   potassium chloride (KLOR-CON) 10 mEq tablet   No No   Sig: Take 1 Tablet by mouth daily. pravastatin (PRAVACHOL) 20 mg tablet 2021 at Unknown time  No Yes   Sig: Take 1 Tablet by mouth daily. Patient taking differently: Take 20 mg by mouth nightly. venlafaxine-SR (Effexor XR) 37.5 mg capsule 2021 at Unknown time  No Yes   Sig: Take 1 Capsule by mouth nightly. venlafaxine-SR (Effexor XR) 75 mg capsule 2021 at Unknown time  No Yes   Sig: Take 1 Capsule by mouth two (2) times a day.    verapamil ER (CALAN-SR) 240 mg CR tablet 2021 at Unknown time  No Yes   Sig: TAKE 1 TABLET BY MOUTH EVERY MORNING      Facility-Administered Medications: None       Objective: Patient Vitals for the past 24 hrs:   Temp Pulse Resp BP SpO2   11/21/21 0907 97.8 °F (36.6 °C) 87 18 130/70 95 %   11/21/21 0807 -- -- -- -- 95 %   11/21/21 0527 97.6 °F (36.4 °C) 85 18 134/75 96 %   11/21/21 0007 97.9 °F (36.6 °C) 76 18 119/71 93 %   11/20/21 2058 98.1 °F (36.7 °C) 77 16 127/82 95 %   11/20/21 2035 -- -- -- -- 94 %   11/20/21 1618 98 °F (36.7 °C) 67 16 104/67 97 %     Oxygen Therapy  O2 Sat (%): 95 % (11/21/21 0907)  Pulse via Oximetry: 89 beats per minute (11/21/21 0807)  O2 Device: Nasal cannula (11/21/21 0807)  O2 Flow Rate (L/min): 2 l/min (11/21/21 0807)    Estimated body mass index is 21.22 kg/m² as calculated from the following:    Height as of this encounter: 5' 2\" (1.575 m). Weight as of this encounter: 52.6 kg (116 lb). Intake/Output Summary (Last 24 hours) at 11/21/2021 1147  Last data filed at 11/21/2021 0425  Gross per 24 hour   Intake 350 ml   Output 0 ml   Net 350 ml       *Note that automatically entered I/Os may not be accurate; dependent on patient compliance with collection and accurate  by assistants. Physical Exam:  General:    Alert. Appears stable. Eyes:   Normal sclerae. Extraocular movements intact. HENT:  Normocephalic, atraumatic. Moist mucous membranes  CV:   RRR. No m/r/g. Lungs:  CTAB. No wheezing, rhonchi, or rales. Abdomen: Soft, mild left lower quadrant tenderness, nondistended. Extremities: Warm and dry. No cyanosis or edema. Neurologic: CN II-XII grossly intact. Sensation intact. Skin:     No rashes or jaundice. Normal coloration  Psych:  Normal mood and affect. I reviewed the labs, imaging, EKGs, telemetry, and other studies done this admission.   Data Reviewed:   Recent Results (from the past 24 hour(s))   METABOLIC PANEL, BASIC    Collection Time: 11/21/21  5:44 AM   Result Value Ref Range    Sodium 143 136 - 145 mmol/L    Potassium 3.1 (L) 3.5 - 5.1 mmol/L    Chloride 106 98 - 107 mmol/L    CO2 31 21 - 32 mmol/L Anion gap 6 (L) 7 - 16 mmol/L    Glucose 98 65 - 100 mg/dL    BUN 9 8 - 23 MG/DL    Creatinine 0.45 (L) 0.6 - 1.0 MG/DL    GFR est AA >60 >60 ml/min/1.73m2    GFR est non-AA >60 >60 ml/min/1.73m2    Calcium 8.3 8.3 - 10.4 MG/DL   CBC WITH AUTOMATED DIFF    Collection Time: 11/21/21  5:44 AM   Result Value Ref Range    WBC 12.4 (H) 4.3 - 11.1 K/uL    RBC 4.74 4.05 - 5.2 M/uL    HGB 13.4 11.7 - 15.4 g/dL    HCT 40.3 35.8 - 46.3 %    MCV 85.0 79.6 - 97.8 FL    MCH 28.3 26.1 - 32.9 PG    MCHC 33.3 31.4 - 35.0 g/dL    RDW 17.1 (H) 11.9 - 14.6 %    PLATELET 887 (L) 614 - 450 K/uL    MPV 10.4 9.4 - 12.3 FL    ABSOLUTE NRBC 0.00 0.0 - 0.2 K/uL    DF AUTOMATED      NEUTROPHILS 82 (H) 43 - 78 %    LYMPHOCYTES 10 (L) 13 - 44 %    MONOCYTES 6 4.0 - 12.0 %    EOSINOPHILS 1 0.5 - 7.8 %    BASOPHILS 0 0.0 - 2.0 %    IMMATURE GRANULOCYTES 1 0.0 - 5.0 %    ABS. NEUTROPHILS 10.2 (H) 1.7 - 8.2 K/UL    ABS. LYMPHOCYTES 1.3 0.5 - 4.6 K/UL    ABS. MONOCYTES 0.7 0.1 - 1.3 K/UL    ABS. EOSINOPHILS 0.2 0.0 - 0.8 K/UL    ABS. BASOPHILS 0.0 0.0 - 0.2 K/UL    ABS. IMM. GRANS. 0.1 0.0 - 0.5 K/UL       All Micro Results     Procedure Component Value Units Date/Time    CULTURE, STOOL [[id number]] Collected: 11/18/21 1154    Order Status: Completed Specimen: Stool Updated: 11/21/21 0749     Special Requests: NO SPECIAL REQUESTS        Culture result:       No Salmonella, Shigella, or Ecoli 0157 isolated.           CULTURE, BLOOD [[id number]] Collected: 11/19/21 0540    Order Status: Completed Specimen: Blood Updated: 11/21/21 0643     Special Requests: CENTRAL LINE        Culture result: NO GROWTH 2 DAYS       CULTURE, URINE [[id number]] Collected: 11/17/21 1720    Order Status: Completed Specimen: Urine from Clean catch Updated: 11/20/21 0841     Special Requests: NO SPECIAL REQUESTS        Culture result: NO GROWTH 2 DAYS       BLOOD CULTURE [[id number]]  (Abnormal) Collected: 11/17/21 1651    Order Status: Completed Specimen: Blood Updated: 11/20/21 7808     Special Requests: --        NO SPECIAL REQUESTS  LEFT  FOREARM       GRAM STAIN GRAM POS COCCI IN CHAINS               AEROBIC AND ANAEROBIC BOTTLES                  RESULTS VERIFIED, PHONED TO AND READ BACK BY ELISEO CHO AT 8239 ON 11/18/2021, SH/AMM                  GRAM POS COCCI IN CHAINS RESULTS VERIFIED, PHONED TO AND READ BACK BY Onesimo Saraviaiftikhar Hoover RN @8487 ON [id number] PF           Culture result:       ALPHA STREPTOCOCCUS, NOT S. PNEUMONIAE This organism may be indicative of culture contamination. Clinical correlation needs to be evaluated as each case is unique. -                  Refer to Blood Culture ID Panel Accession  [id number]      C. DIFFICILE AG & TOXIN A/B [[id number]] Collected: 11/18/21 1154    Order Status: Completed Specimen: Stool Updated: 11/19/21 1401     [address] ANTIGEN       C. DIFFICILE GDH ANTIGEN-NEGATIVE           C. difficile toxin       C. DIFFICILE TOXIN-NEGATIVE           PCR Reflex NOT APPLICABLE        INTERPRETATION       NEGATIVE FOR TOXIGENIC C. DIFFICILE           Clinical Consideration       NEGATIVE FOR TOXIGENIC C. DIFFICILE          BLOOD CULTURE ID PANEL [[id number]] Collected: 11/17/21 1607    Order Status: Completed Specimen: Blood Updated: 11/19/21 0707     Acc. no. from Micro Order [id number]     INTERPRETATION       Gram positive cocci. Identified by realtime PCR as Streptococcus species (not agalactiae, pyogenes, or pneumoniae). Comment: Consider discontinuation of IV vancomycin and using an anti-streptococcal beta-lactam (ceftriaxone/cefotaxime (peds))        Blood Culture PCR Testing       MULTIPLEX PCR NEGATIVE:  A negative FilmArray BCID result does not exclude the possibility of bloodstream infection.           BLOOD CULTURE [[id number]] Collected: 11/17/21 1607    Order Status: Completed Specimen: Blood Updated: 11/19/21 0301     Special Requests: --        RIGHT  FOREARM       GRAM STAIN GRAM NEGATIVE RODS         ANAEROBIC BOTTLE POSITIVE RESULTS VERIFIED, PHONED TO AND READ BACK BY [facility] RN @3002 [phone] BY RAJESH           Culture result:       CULTURE IN [facility] UPDATES TO FOLLOW                  Refer to Blood Culture ID Panel Accession  [id number]      OVA & PARASITES, STOOL [[id number]] Collected: 11/18/21 1154    Order Status: Completed Specimen: Feces from Stool Updated: 11/18/21 1257    BLOOD CULTURE ID PANEL [[id number]]  (Abnormal) Collected: 11/17/21 1651    Order Status: Completed Specimen: Blood Updated: 11/18/21 1219     Streptococcus Detected-        Comment: RESULTS VERIFIED, PHONED TO AND READ BACK BY  ELISEO CHO @ 0325 11/18/2021 SH/AMM           INTERPRETATION       Gram positive cocci. Identified by realtime PCR as Streptococcus species (not agalactiae, pyogenes, or pneumoniae). Comment: Consider discontinuation of IV vancomycin and using an anti-streptococcal beta-lactam (ceftriaxone/cefotaxime (peds))       COVID-19 RAPID TEST [[id number]] Collected: 11/18/21 1154    Order Status: Completed Specimen: Nasopharyngeal Updated: 11/18/21 1216     Specimen source Nasopharyngeal        COVID-19 rapid test Not detected        Comment:      The specimen is NEGATIVE for SARS-CoV-2, the novel coronavirus associated with COVID-19. A negative result does not rule out COVID-19. This test has been authorized by the FDA under an Emergency Use Authorization (EUA) for use by authorized laboratories.         Fact sheet for Healthcare Providers: ConventionUpdate.co.nz  Fact sheet for Patients: ConventionUpdate.co.nz       Methodology: Isothermal Nucleic Acid Amplification               Current Facility-Administered Medications   Medication Dose Route Frequency    pantoprazole (PROTONIX) tablet 40 mg  40 mg Oral ACB&D    cefTRIAXone (ROCEPHIN) 2 g in 0.9% sodium chloride (MBP/ADV) 50 mL MBP  2 g IntraVENous Q24H    metroNIDAZOLE (FLAGYL) tablet 500 mg  500 mg Oral Q8H    morphine injection 0.5 mg  0.5 mg IntraVENous Q4H PRN    atorvastatin (LIPITOR) tablet 40 mg  40 mg Oral QHS    budesonide-formoteroL (SYMBICORT) 160-4.5 mcg/actuation HFA inhaler 2 Puff  2 Puff Inhalation BID RT    And    tiotropium bromide (SPIRIVA RESPIMAT) 2.5 mcg /actuation  2 Puff Inhalation DAILY    venlafaxine-SR (EFFEXOR-XR) capsule 37.5 mg  37.5 mg Oral QHS    nitroglycerin (NITROBID) 2 % ointment 1 Inch  1 Inch Topical BID    sodium chloride (NS) flush 5-10 mL  5-10 mL IntraVENous Q8H    sodium chloride (NS) flush 5-10 mL  5-10 mL IntraVENous PRN    0.9% sodium chloride infusion 250 mL  250 mL IntraVENous PRN    albuterol (PROVENTIL HFA, VENTOLIN HFA, PROAIR HFA) inhaler 2 Puff  2 Puff Inhalation Q6H PRN    calcium-vitamin D (OS-ZACHARIAH +D3) 250 mg-125 unit per tablet 1 Tablet  1 Tablet Oral DAILY    hydroCHLOROthiazide (HYDRODIURIL) tablet 12.5 mg  12.5 mg Oral DAILY    HYDROcodone-acetaminophen (NORCO) 5-325 mg per tablet 1 Tablet  1 Tablet Oral Q6H PRN    venlafaxine-SR (EFFEXOR-XR) capsule 75 mg  75 mg Oral BID    verapamil ER (CALAN-SR) tablet 240 mg  240 mg Oral DAILY WITH BREAKFAST    sodium chloride (NS) flush 5-40 mL  5-40 mL IntraVENous Q8H    sodium chloride (NS) flush 5-40 mL  5-40 mL IntraVENous PRN    acetaminophen (TYLENOL) tablet 650 mg  650 mg Oral Q6H PRN    Or    acetaminophen (TYLENOL) suppository 650 mg  650 mg Rectal Q6H PRN    polyethylene glycol (MIRALAX) packet 17 g  17 g Oral DAILY PRN    ondansetron (ZOFRAN ODT) tablet 4 mg  4 mg Oral Q8H PRN    Or    ondansetron (ZOFRAN) injection 4 mg  4 mg IntraVENous Q6H PRN    melatonin tablet 3 mg  3 mg Oral QHS    OLANZapine (ZyPREXA zydis) disintegrating tablet 5 mg  5 mg Oral QHS PRN    sucralfate (CARAFATE) tablet 1 g  1 g Oral AC&HS       Other Studies:  No results found for this visit on 11/17/21. No results found.     Part of this note was written by using a voice dictation software and the note has been proof read but may still contain some grammatical/other typographical errors.     Signed:  Jane Najera,

## 2021-11-22 ENCOUNTER — APPOINTMENT (OUTPATIENT)
Dept: GENERAL RADIOLOGY | Age: 76
DRG: 871 | End: 2021-11-22
Attending: INTERNAL MEDICINE
Payer: MEDICARE

## 2021-11-22 LAB
ANION GAP SERPL CALC-SCNC: 4 MMOL/L (ref 7–16)
BASOPHILS # BLD: 0 K/UL (ref 0–0.2)
BASOPHILS NFR BLD: 0 % (ref 0–2)
BUN SERPL-MCNC: 8 MG/DL (ref 8–23)
CALCIUM SERPL-MCNC: 8.1 MG/DL (ref 8.3–10.4)
CHLORIDE SERPL-SCNC: 107 MMOL/L (ref 98–107)
CO2 SERPL-SCNC: 28 MMOL/L (ref 21–32)
CREAT SERPL-MCNC: 0.43 MG/DL (ref 0.6–1)
DIFFERENTIAL METHOD BLD: ABNORMAL
EOSINOPHIL # BLD: 0.2 K/UL (ref 0–0.8)
EOSINOPHIL NFR BLD: 2 % (ref 0.5–7.8)
ERYTHROCYTE [DISTWIDTH] IN BLOOD BY AUTOMATED COUNT: 17.1 % (ref 11.9–14.6)
GLUCOSE SERPL-MCNC: 80 MG/DL (ref 65–100)
HCT VFR BLD AUTO: 39.6 % (ref 35.8–46.3)
HGB BLD-MCNC: 13.1 G/DL (ref 11.7–15.4)
IMM GRANULOCYTES # BLD AUTO: 0.1 K/UL (ref 0–0.5)
IMM GRANULOCYTES NFR BLD AUTO: 1 % (ref 0–5)
LYMPHOCYTES # BLD: 1.6 K/UL (ref 0.5–4.6)
LYMPHOCYTES NFR BLD: 19 % (ref 13–44)
MCH RBC QN AUTO: 28.4 PG (ref 26.1–32.9)
MCHC RBC AUTO-ENTMCNC: 33.1 G/DL (ref 31.4–35)
MCV RBC AUTO: 85.7 FL (ref 79.6–97.8)
MONOCYTES # BLD: 0.7 K/UL (ref 0.1–1.3)
MONOCYTES NFR BLD: 8 % (ref 4–12)
NEUTS SEG # BLD: 6 K/UL (ref 1.7–8.2)
NEUTS SEG NFR BLD: 70 % (ref 43–78)
NRBC # BLD: 0 K/UL (ref 0–0.2)
PLATELET # BLD AUTO: 118 K/UL (ref 150–450)
PMV BLD AUTO: 10.8 FL (ref 9.4–12.3)
POTASSIUM SERPL-SCNC: 4.1 MMOL/L (ref 3.5–5.1)
RBC # BLD AUTO: 4.62 M/UL (ref 4.05–5.2)
SODIUM SERPL-SCNC: 139 MMOL/L (ref 136–145)
WBC # BLD AUTO: 8.6 K/UL (ref 4.3–11.1)

## 2021-11-22 PROCEDURE — 74011250636 HC RX REV CODE- 250/636: Performed by: NURSE PRACTITIONER

## 2021-11-22 PROCEDURE — 74011000250 HC RX REV CODE- 250: Performed by: NURSE PRACTITIONER

## 2021-11-22 PROCEDURE — 80048 BASIC METABOLIC PNL TOTAL CA: CPT

## 2021-11-22 PROCEDURE — 94640 AIRWAY INHALATION TREATMENT: CPT

## 2021-11-22 PROCEDURE — 74018 RADEX ABDOMEN 1 VIEW: CPT

## 2021-11-22 PROCEDURE — 94760 N-INVAS EAR/PLS OXIMETRY 1: CPT

## 2021-11-22 PROCEDURE — 36415 COLL VENOUS BLD VENIPUNCTURE: CPT

## 2021-11-22 PROCEDURE — 74011000250 HC RX REV CODE- 250: Performed by: INTERNAL MEDICINE

## 2021-11-22 PROCEDURE — 74011250637 HC RX REV CODE- 250/637: Performed by: FAMILY MEDICINE

## 2021-11-22 PROCEDURE — 74011250636 HC RX REV CODE- 250/636: Performed by: FAMILY MEDICINE

## 2021-11-22 PROCEDURE — 97535 SELF CARE MNGMENT TRAINING: CPT

## 2021-11-22 PROCEDURE — 74011250637 HC RX REV CODE- 250/637: Performed by: INTERNAL MEDICINE

## 2021-11-22 PROCEDURE — 97530 THERAPEUTIC ACTIVITIES: CPT

## 2021-11-22 PROCEDURE — 65270000029 HC RM PRIVATE

## 2021-11-22 PROCEDURE — 74011250637 HC RX REV CODE- 250/637: Performed by: NURSE PRACTITIONER

## 2021-11-22 PROCEDURE — 74011000258 HC RX REV CODE- 258: Performed by: FAMILY MEDICINE

## 2021-11-22 PROCEDURE — 74011250636 HC RX REV CODE- 250/636: Performed by: INTERNAL MEDICINE

## 2021-11-22 PROCEDURE — 85025 COMPLETE CBC W/AUTO DIFF WBC: CPT

## 2021-11-22 RX ORDER — CEFPODOXIME PROXETIL 200 MG/1
200 TABLET, FILM COATED ORAL EVERY 12 HOURS
Status: DISCONTINUED | OUTPATIENT
Start: 2021-11-22 | End: 2021-11-22

## 2021-11-22 RX ORDER — LORAZEPAM 2 MG/ML
0.5 INJECTION INTRAMUSCULAR ONCE
Status: COMPLETED | OUTPATIENT
Start: 2021-11-22 | End: 2021-11-22

## 2021-11-22 RX ORDER — ONDANSETRON 2 MG/ML
4 INJECTION INTRAMUSCULAR; INTRAVENOUS
Status: DISCONTINUED | OUTPATIENT
Start: 2021-11-22 | End: 2021-11-24 | Stop reason: HOSPADM

## 2021-11-22 RX ORDER — AMOXICILLIN 500 MG/1
1000 CAPSULE ORAL EVERY 8 HOURS
Status: DISCONTINUED | OUTPATIENT
Start: 2021-11-22 | End: 2021-11-24 | Stop reason: HOSPADM

## 2021-11-22 RX ORDER — ONDANSETRON 8 MG/1
8 TABLET, ORALLY DISINTEGRATING ORAL
Status: DISCONTINUED | OUTPATIENT
Start: 2021-11-22 | End: 2021-11-24 | Stop reason: HOSPADM

## 2021-11-22 RX ORDER — ONDANSETRON 2 MG/ML
4 INJECTION INTRAMUSCULAR; INTRAVENOUS ONCE
Status: COMPLETED | OUTPATIENT
Start: 2021-11-22 | End: 2021-11-22

## 2021-11-22 RX ADMIN — METRONIDAZOLE 500 MG: 500 TABLET ORAL at 05:39

## 2021-11-22 RX ADMIN — SODIUM CHLORIDE 25 MG: 9 INJECTION INTRAMUSCULAR; INTRAVENOUS; SUBCUTANEOUS at 09:16

## 2021-11-22 RX ADMIN — VENLAFAXINE HYDROCHLORIDE 75 MG: 75 CAPSULE, EXTENDED RELEASE ORAL at 08:55

## 2021-11-22 RX ADMIN — VERAPAMIL HYDROCHLORIDE 240 MG: 240 TABLET, FILM COATED, EXTENDED RELEASE ORAL at 08:55

## 2021-11-22 RX ADMIN — Medication 5 ML: at 23:47

## 2021-11-22 RX ADMIN — Medication 5 ML: at 13:15

## 2021-11-22 RX ADMIN — Medication 10 ML: at 04:35

## 2021-11-22 RX ADMIN — BUDESONIDE AND FORMOTEROL FUMARATE DIHYDRATE 2 PUFF: 160; 4.5 AEROSOL RESPIRATORY (INHALATION) at 19:49

## 2021-11-22 RX ADMIN — Medication 5 ML: at 13:14

## 2021-11-22 RX ADMIN — ONDANSETRON 4 MG: 8 TABLET, ORALLY DISINTEGRATING ORAL at 05:34

## 2021-11-22 RX ADMIN — PANTOPRAZOLE SODIUM 40 MG: 40 TABLET, DELAYED RELEASE ORAL at 05:42

## 2021-11-22 RX ADMIN — Medication 5 ML: at 23:46

## 2021-11-22 RX ADMIN — PROMETHAZINE HYDROCHLORIDE 12.5 MG: 25 INJECTION INTRAMUSCULAR; INTRAVENOUS at 16:33

## 2021-11-22 RX ADMIN — ONDANSETRON 4 MG: 2 INJECTION INTRAMUSCULAR; INTRAVENOUS at 22:16

## 2021-11-22 RX ADMIN — SUCRALFATE 1 G: 1 TABLET ORAL at 05:37

## 2021-11-22 RX ADMIN — AMOXICILLIN 1000 MG: 500 CAPSULE ORAL at 13:15

## 2021-11-22 RX ADMIN — HYDROCHLOROTHIAZIDE 12.5 MG: 25 TABLET ORAL at 08:56

## 2021-11-22 RX ADMIN — SUCRALFATE 1 G: 1 TABLET ORAL at 16:04

## 2021-11-22 RX ADMIN — BUDESONIDE AND FORMOTEROL FUMARATE DIHYDRATE 2 PUFF: 160; 4.5 AEROSOL RESPIRATORY (INHALATION) at 07:47

## 2021-11-22 RX ADMIN — CEFTRIAXONE 2 G: 2 INJECTION, POWDER, FOR SOLUTION INTRAMUSCULAR; INTRAVENOUS at 23:38

## 2021-11-22 RX ADMIN — LORAZEPAM 0.5 MG: 2 INJECTION INTRAMUSCULAR; INTRAVENOUS at 09:26

## 2021-11-22 RX ADMIN — VENLAFAXINE HYDROCHLORIDE 75 MG: 75 CAPSULE, EXTENDED RELEASE ORAL at 17:23

## 2021-11-22 RX ADMIN — TIOTROPIUM BROMIDE INHALATION SPRAY 2 PUFF: 3.12 SPRAY, METERED RESPIRATORY (INHALATION) at 07:47

## 2021-11-22 RX ADMIN — ONDANSETRON 4 MG: 8 TABLET, ORALLY DISINTEGRATING ORAL at 19:28

## 2021-11-22 RX ADMIN — MORPHINE SULFATE 0.5 MG: 2 INJECTION, SOLUTION INTRAMUSCULAR; INTRAVENOUS at 00:24

## 2021-11-22 RX ADMIN — SUCRALFATE 1 G: 1 TABLET ORAL at 10:48

## 2021-11-22 RX ADMIN — PANTOPRAZOLE SODIUM 40 MG: 40 TABLET, DELAYED RELEASE ORAL at 16:04

## 2021-11-22 RX ADMIN — CALCIUM CARBONATE-CHOLECALCIFEROL TAB 250 MG-125 UNIT 1 TABLET: 250-125 TAB at 08:56

## 2021-11-22 NOTE — PROGRESS NOTES
[facility] Hospitalist Note     Admit Date:  2021  3:47 PM   Name:  Tonya Pepe   Age:  77 y.o.  :  1945   MRN:  [id number]   PCP:  Nena Munoz MD  Treatment Team: Attending Provider: Cheryl Anderson DO; Consulting Provider: Fouzia Rubin MD; Care Manager: Pascale Griffiths, [facility] [place]; Utilization Review: Urban Higashi; Consulting Provider: Michael Christianson MD; Physical Therapist: Ashly Segovia PT, DPT; Occupational Therapist: Kristen Helms OT    HPI/Subjective:   Tonya Pepe is a 77 y.o. female with medical history of osteoporosis with multiple vertebral fracture, mood disorder with anxiety and depression, hiatal hernia with gastroparesis who presented with acute abdominal pain and syncope. She was found to be in septic shock and was initially placed on pressors. A central line was placed. After resuscitation her shock stabilized. CT demonstrated acute left-sided colitis with indeterminate cause. UA suggestive of UTI. Blood culture growing Streptococcus species. Patient started on empiric antibiotics. Surgery consulted and recommend no surgical intervention at this time. GI consulted and EGD showed hiatal hernia and she was dilated. : Actively vomiting apple sauce with meds when I entered the room. Says she is nauseated like when she was admitted and also has worsened abdominal pain and cramping.  at bedside. No blood in stool. Denies CP/SOB. Denies F/C. Assessment and Plan:     Severe sepsis with septic shock: Resolved  Initially requiring pressor, stabilized after fluid resuscitation  Now off pressors  CT concerning for diverticulitis versus ischemic bowel - stool culture NGTD + C.  Diff neg  UA concerning for UTI - culture NGTD  Continue Zosyn   - Consult ID for assistance since all cultures negative except first blood culture   - Changed Zosyn to Ceftriaxone + Flagyl   - Changed Ceftriaxone and Flagyl to Amoxil to complete 14 days course    Acute left-sided colitis:  Stool occult positive  Surgery consulted and recommend no surgical intervention. GI consulted and suspects ischemic colitis with hypotension  11/21 - Changed Zosyn to Ceftriaxone + Flagyl  11/22 - Changed Ceftriaxone and Flagyl to Amoxil  GI suspected ischemic colitis since was hypotensive on admission  Stool culture pending  C.  Diff negative    UTI:  UA suggestive of UTI  Urine culture NGTD  11/21 - Changed Zosyn to Ceftriaxone + Flagyl  11/22 - Changed Ceftriaxone and Flagyl to Amoxil    Streptococcus bacteremia:  Blood culture growing gram-positive cocci, ID PCR showed Streptococcus species (not agalactiae, pyogenes no pneumonia)   Vancomycin discontinued  11/21 - Changed Zosyn to Ceftriaxone + Flagyl  11/22 - Changed Ceftriaxone and Flagyl to Amoxil  Repeat blood culture NGTD  Appreciate GI's input and assistance    Hypokalemia:  11/20 Down to 2.7  11/21 Up to 3.1 --> replace with 120meq PO  11/22 Resolved - K 4.1  Continue to monitor    Melena:  11/19 EGD with no signs of bleeding  EGD on 11/18 unsuccessful due to retained food in stomach  11/21 Change Protonix IV BID to PO BID    Acute blood loss anemia:  11/20 Hgb down to 13.8  11/21 Hgb stable at 13.4  11/22 Hgb 13.1  Likely due to GI bleed  Status post 1 unit PRBC transfusion in the ED  Continue to monitor  Transfuse if hemoglobin less than 7    Compression fracture of T9 vertebra:  Norco as needed  PT/OT    Mood disorder:  History of anxiety and depression, previously treated with benzodiazepine concomitantly treating pain with opioids  Continue venlafaxine  Use caution with benzodiazepine    GERD:  Continue PPI Carafate    Severe persistent asthma:  Continue albuterol and Trelegy    Hypertension:  Continue Verampamil    Discharge planning: Home tomorrow AM if nausea improved    DVT ppx ordered  Code status:  Full  Estimated LOS:  Greater than 2 midnights  Risk: moderate    Hospital Problems as of 11/22/2021 Date Reviewed: 11/17/2021          Codes Class Noted - Resolved POA    Acute blood loss anemia ICD-10-CM: D62  ICD-9-CM: 285.1  11/17/2021 - Present Yes        Elevated troponin ICD-10-CM: R77.8  ICD-9-CM: 790.6  11/20/2021 - Present Unknown        Severe protein-calorie malnutrition ([facility] ICD-10-CM: E43  ICD-9-CM: 262  11/18/2021 - Present Yes        UTI (urinary tract infection) ICD-10-CM: N39.0  ICD-9-CM: 599.0  11/18/2021 - Present Unknown        Streptococcal bacteremia ICD-10-CM: R78.81, B95.5  ICD-9-CM: 790.7, 041.00  11/18/2021 - Present Unknown        * (Principal) Severe sepsis with septic shock ([facility] ICD-10-CM: A41.9, R65.21  ICD-9-CM: 038.9, 995.92, 785.52  11/17/2021 - Present Yes        Body mass index (BMI) of 21.0 to 21.9 in adult ICD-10-CM: Z68.21  ICD-9-CM: V85.1  11/17/2021 - Present Yes        Colitis, indeterminate ICD-10-CM: K52.3  ICD-9-CM: 558.9  11/17/2021 - Present Unknown        Compression fracture of T9 vertebra (HCC) (Chronic) ICD-10-CM: F39.567Y  ICD-9-CM: 805.2  10/18/2021 - Present Yes        Severe persistent asthma without complication [address]: E78.94  ICD-9-CM: 493.90  5/28/2021 - Present Yes        Osteoporosis ICD-10-CM: M81.0  ICD-9-CM: 733.00  10/25/2016 - Present Yes        Primary hypertension (Chronic) ICD-10-CM: I10  ICD-9-CM: 401.9  8/16/2016 - Present Yes    Overview Signed 11/17/2021  9:48 PM by Mariela Max MD     Goal < 140/80             Hiatal hernia with GERD and esophagitis ICD-10-CM: K44.9, K21.00  ICD-9-CM: 553.3, 530.11  8/16/2016 - Present Yes        Gastroesophageal reflux disease with esophagitis (Chronic) ICD-10-CM: K21.00  ICD-9-CM: 530.11  11/1/2015 - Present Yes        Mood disorder ([facility] ICD-10-CM: F39  ICD-9-CM: 296.90  11/1/2015 - Present Yes                10 systems reviewed and negative except as noted in HPI.   Past Medical History:   Diagnosis Date    Anemia 2005 approx    2 units transfused    Arthritis     hands, hips  B12 deficiency     Cancer ([facility]     hx of melanoma 1986 R arm, and carcinoma x 2 forehead    COVID-19 vaccine series completed 02/2021    PT TO BRING CARD DOS    Depression with anxiety 8/16/2016    Elevated troponin 11/20/2021    Essential hypertension with goal blood pressure less than 140/90     controlled with med    Gastrointestinal disorder     hiatel hernia    Hyperlipidemia, unspecified 8/16/2016    Menopause     @ 51    Mild intermittent asthma 8/16/2016    Osteoarthritis of multiple joints 8/16/2016    Osteoporosis 10/25/2016    Pulmonary emphysema ([facility] 08/16/2016    daily inhalers    Rectal bleed     Vertigo       Past Surgical History:   Procedure Laterality Date    COLONOSCOPY N/A 7/30/2021    COLONOSCOPY/BMI 23 performed by Dejah Coelho MD at [facility]; HI RISK IND  7/30/2021         HX HERNIA REPAIR  2010    helped reflex    HX TUBAL LIGATION  1972      Allergies   Allergen Reactions    Other Medication Rash     Equate antibiotic ointment      Social History     Tobacco Use    Smoking status: Never Smoker    Smokeless tobacco: Never Used    Tobacco comment: 2nd hand smoke x 33 yrs   Substance Use Topics    Alcohol use: No      Family History   Problem Relation Age of Onset    Stroke Mother         intracerebral aneurysm    Diabetes Maternal Aunt     Diabetes Maternal Uncle     No Known Problems Father         didn't have contact with father    Breast Cancer Daughter 28    Hypertension Maternal Grandmother       Family history reviewed and noncontributory.   Immunization History   Administered Date(s) Administered    COVID-19, Moderna, Primary or Immunocompromised Series, MRNA, PF, 100mcg/0.5mL 01/01/2021, 02/02/2021    Influenza High Dose Vaccine PF 01/01/2016, 01/15/2018, 11/27/2018, 11/01/2020    Influenza Vaccine 09/19/2016, 01/01/2018    Influenza Vaccine (Tri) Adjuvanted (>65 Yrs FLUAD TRI 93803) 11/22/2019    Pneumococcal Conjugate (PCV-13) 2016    Pneumococcal Polysaccharide (PPSV-23) 2018    Pneumococcal Vaccine (Unspecified Type) 2012    TB Skin Test (PPD) Intradermal 2021    Tdap 2021     PTA Medications:  Prior to Admission Medications   Prescriptions Last Dose Informant Patient Reported? Taking? HYDROcodone-acetaminophen (NORCO) 5-325 mg per tablet 2021 at Unknown time  Yes Yes   Si Tablet every six (6) hours as needed. LORazepam (ATIVAN) 1 mg tablet Not Taking at Unknown time  No No   Sig: Take 1/2 to 1 tab PO Q 12 hours PRN anxiety, use sparingly   Patient not taking: Reported on 2021   albuterol (Ventolin HFA) 90 mcg/actuation inhaler   No No   Sig: Take 2 Puffs by inhalation four (4) times daily. Patient taking differently: Take 2 Puffs by inhalation every six (6) hours as needed. calcium-cholecalciferol, d3, (CALCIUM 600 + D) 600-125 mg-unit tab Not Taking at Unknown time  Yes No   Sig: Take 1 Tab by mouth daily. Patient not taking: Reported on 2021   cetirizine (ZYRTEC) 10 mg tablet Not Taking at Unknown time  No No   Sig: Take 1 Tab by mouth daily. Patient not taking: Reported on 2021   fluticasone-umeclidin-vilanter (Trelegy Ellipta) 200-62.5-25 mcg dsdv 2021 at Unknown time  No Yes   Sig: Take 1 Puff by inhalation daily. hydroCHLOROthiazide (HYDRODIURIL) 12.5 mg tablet 2021 at Unknown time  No Yes   Sig: Take 1 Tablet by mouth daily. omeprazole (PRILOSEC) 40 mg capsule 2021 at Unknown time  No Yes   Sig: Take 1 Cap by mouth two (2) times a day. ondansetron (ZOFRAN ODT) 4 mg disintegrating tablet   Yes No   Sig: Take 4 mg by mouth.   potassium chloride (KLOR-CON) 10 mEq tablet   No No   Sig: Take 1 Tablet by mouth daily. pravastatin (PRAVACHOL) 20 mg tablet 2021 at Unknown time  No Yes   Sig: Take 1 Tablet by mouth daily. Patient taking differently: Take 20 mg by mouth nightly.    venlafaxine-SR (Effexor XR) 37.5 mg capsule 11/16/2021 at Unknown time  No Yes   Sig: Take 1 Capsule by mouth nightly. venlafaxine-SR (Effexor XR) 75 mg capsule 11/16/2021 at Unknown time  No Yes   Sig: Take 1 Capsule by mouth two (2) times a day. verapamil ER (CALAN-SR) 240 mg CR tablet 11/16/2021 at Unknown time  No Yes   Sig: TAKE 1 TABLET BY MOUTH EVERY MORNING      Facility-Administered Medications: None       Objective:     Patient Vitals for the past 24 hrs:   Temp Pulse Resp BP SpO2   11/22/21 0838 97.2 °F (36.2 °C) 92 18 (!) 153/98 94 %   11/22/21 0747 -- -- -- -- 95 %   11/22/21 0450 98.2 °F (36.8 °C) 78 18 114/89 91 %   11/22/21 0039 99.2 °F (37.3 °C) 89 18 117/80 91 %   11/21/21 2045 98.5 °F (36.9 °C) 84 18 121/82 91 %   11/21/21 1950 -- -- -- -- 91 %   11/21/21 1702 99 °F (37.2 °C) 89 18 113/84 93 %   11/21/21 1157 98 °F (36.7 °C) 89 18 111/79 91 %     Oxygen Therapy  O2 Sat (%): 94 % (11/22/21 0838)  Pulse via Oximetry: 80 beats per minute (11/22/21 0747)  O2 Device: None (Room air) (11/22/21 0838)  O2 Flow Rate (L/min): 0 l/min (11/22/21 0747)    Estimated body mass index is 21.25 kg/m² as calculated from the following:    Height as of this encounter: 5' 2\" (1.575 m). Weight as of this encounter: 52.7 kg (116 lb 3.2 oz). Intake/Output Summary (Last 24 hours) at 11/22/2021 0129  Last data filed at 11/21/2021 1814  Gross per 24 hour   Intake 680 ml   Output 0 ml   Net 680 ml       *Note that automatically entered I/Os may not be accurate; dependent on patient compliance with collection and accurate  by assistants. Physical Exam:  General:    Alert. Appears stable. Eyes:   Normal sclerae. Extraocular movements intact. HENT:  Normocephalic, atraumatic. Moist mucous membranes  CV:   RRR. No m/r/g. Lungs:  CTAB. No wheezing, rhonchi, or rales. Abdomen: Soft, mild left lower quadrant tenderness, nondistended. Extremities: Warm and dry. No cyanosis or edema. Neurologic: CN II-XII grossly intact. Sensation intact. Skin:     No rashes or jaundice. Normal coloration  Psych:  Normal mood and affect. I reviewed the labs, imaging, EKGs, telemetry, and other studies done this admission. Data Reviewed:   Recent Results (from the past 24 hour(s))   METABOLIC PANEL, BASIC    Collection Time: 11/22/21  3:41 AM   Result Value Ref Range    Sodium 139 136 - 145 mmol/L    Potassium 4.1 3.5 - 5.1 mmol/L    Chloride 107 98 - 107 mmol/L    CO2 28 21 - 32 mmol/L    Anion gap 4 (L) 7 - 16 mmol/L    Glucose 80 65 - 100 mg/dL    BUN 8 8 - 23 MG/DL    Creatinine 0.43 (L) 0.6 - 1.0 MG/DL    GFR est AA >60 >60 ml/min/1.73m2    GFR est non-AA >60 >60 ml/min/1.73m2    Calcium 8.1 (L) 8.3 - 10.4 MG/DL   CBC WITH AUTOMATED DIFF    Collection Time: 11/22/21  3:41 AM   Result Value Ref Range    WBC 8.6 4.3 - 11.1 K/uL    RBC 4.62 4.05 - 5.2 M/uL    HGB 13.1 11.7 - 15.4 g/dL    HCT 39.6 35.8 - 46.3 %    MCV 85.7 79.6 - 97.8 FL    MCH 28.4 26.1 - 32.9 PG    MCHC 33.1 31.4 - 35.0 g/dL    RDW 17.1 (H) 11.9 - 14.6 %    PLATELET 594 (L) 868 - 450 K/uL    MPV 10.8 9.4 - 12.3 FL    ABSOLUTE NRBC 0.00 0.0 - 0.2 K/uL    DF AUTOMATED      NEUTROPHILS 70 43 - 78 %    LYMPHOCYTES 19 13 - 44 %    MONOCYTES 8 4.0 - 12.0 %    EOSINOPHILS 2 0.5 - 7.8 %    BASOPHILS 0 0.0 - 2.0 %    IMMATURE GRANULOCYTES 1 0.0 - 5.0 %    ABS. NEUTROPHILS 6.0 1.7 - 8.2 K/UL    ABS. LYMPHOCYTES 1.6 0.5 - 4.6 K/UL    ABS. MONOCYTES 0.7 0.1 - 1.3 K/UL    ABS. EOSINOPHILS 0.2 0.0 - 0.8 K/UL    ABS. BASOPHILS 0.0 0.0 - 0.2 K/UL    ABS. IMM.  GRANS. 0.1 0.0 - 0.5 K/UL       All Micro Results     Procedure Component Value Units Date/Time    CULTURE, BLOOD [[id number]] Collected: 11/19/21 0540    Order Status: Completed Specimen: Blood Updated: 11/22/21 0648     Special Requests: CENTRAL LINE        Culture result: NO GROWTH 3 DAYS       CULTURE, STOOL [[id number]] Collected: 11/18/21 1154    Order Status: Completed Specimen: Stool Updated: 11/21/21 0631     Special Requests: NO SPECIAL REQUESTS        Culture result:       No Salmonella, Shigella, or Ecoli 0157 isolated. CULTURE, URINE [[id number]] Collected: 11/17/21 1720    Order Status: Completed Specimen: Urine from Clean catch Updated: 11/20/21 0841     Special Requests: NO SPECIAL REQUESTS        Culture result: NO GROWTH 2 DAYS       BLOOD CULTURE [[id number]]  (Abnormal) Collected: 11/17/21 1651    Order Status: Completed Specimen: Blood Updated: 11/20/21 0749     Special Requests: --        NO SPECIAL REQUESTS  LEFT  FOREARM       GRAM STAIN GRAM POS COCCI IN CHAINS               AEROBIC AND ANAEROBIC BOTTLES                  RESULTS VERIFIED, PHONED TO AND READ BACK BY ELISEO CHO AT 6422 ON 11/18/2021, SH/AMM                  GRAM POS COCCI IN CHAINS RESULTS VERIFIED, PHONED TO AND READ BACK BY Onesimo Hoover RN [id number] ON [id number] BV           Culture result:       ALPHA STREPTOCOCCUS, NOT S. PNEUMONIAE This organism may be indicative of culture contamination. Clinical correlation needs to be evaluated as each case is unique. Refer to Blood Culture ID Panel Accession  [id number]      C. DIFFICILE AG & TOXIN A/B [[id number]] Collected: 11/18/21 1154    Order Status: Completed Specimen: Stool Updated: 11/19/21 1401     [address] [place] ANTIGEN       C. DIFFICILE [facility] ANTIGEN-NEGATIVE           C. difficile toxin       C. DIFFICILE TOXIN-NEGATIVE           PCR Reflex NOT APPLICABLE        INTERPRETATION       NEGATIVE FOR TOXIGENIC C. DIFFICILE           Clinical Consideration       NEGATIVE FOR TOXIGENIC C. DIFFICILE          BLOOD CULTURE ID PANEL [[id number]] Collected: 11/17/21 1607    Order Status: Completed Specimen: Blood Updated: 11/19/21 0707     Acc. no. from Micro Order [id number]     INTERPRETATION       Gram positive cocci. Identified by realtime PCR as Streptococcus species (not agalactiae, pyogenes, or pneumoniae).            Comment: Consider discontinuation of IV vancomycin and using an anti-streptococcal beta-lactam (ceftriaxone/cefotaxime (peds))        Blood Culture PCR Testing       MULTIPLEX PCR NEGATIVE:  A negative FilmArray BCID result does not exclude the possibility of bloodstream infection. BLOOD CULTURE [[id number]] Collected: 11/17/21 1607    Order Status: Completed Specimen: Blood Updated: 11/19/21 0301     Special Requests: --        RIGHT  FOREARM       GRAM STAIN GRAM NEGATIVE RODS         ANAEROBIC BOTTLE POSITIVE               RESULTS VERIFIED, PHONED TO AND READ BACK BY Margarita Cotton RN @0259 [phone] BY            Culture result:       CULTURE IN [address] UPDATES TO FOLLOW                  Refer to Blood Culture ID Panel Accession  [id number]      OVA & PARASITES, STOOL [[id number]] Collected: 11/18/21 1154    Order Status: Completed Specimen: Feces from Stool Updated: 11/18/21 1257    BLOOD CULTURE ID PANEL [[id number]]  (Abnormal) Collected: 11/17/21 1651    Order Status: Completed Specimen: Blood Updated: 11/18/21 1219     Streptococcus Detected        Comment: RESULTS VERIFIED, PHONED TO AND READ BACK BY  ELISEO CHO @ 4429 11/18/2021 SH/AMM           INTERPRETATION       Gram positive cocci. Identified by realtime PCR as Streptococcus species (not agalactiae, pyogenes, or pneumoniae). Comment: Consider discontinuation of IV vancomycin and using an anti-streptococcal beta-lactam (ceftriaxone/cefotaxime (peds))       COVID-19 RAPID TEST [[id number]] Collected: 11/18/21 1154    Order Status: Completed Specimen: Nasopharyngeal Updated: 11/18/21 1216     Specimen source Nasopharyngeal        COVID-19 rapid test Not detected        Comment:      The specimen is NEGATIVE for SARS-CoV-2, the novel coronavirus associated with COVID-19. A negative result does not rule out COVID-19. This test has been authorized by the FDA under an Emergency Use Authorization (EUA) for use by authorized laboratories.         Fact sheet for Healthcare Providers: ConventionUpdate.co.nz  Fact sheet for Patients: ConventionUpdate.co.nz       Methodology: Isothermal Nucleic Acid Amplification               Current Facility-Administered Medications   Medication Dose Route Frequency    amoxicillin (AMOXIL) capsule 1,000 mg  1,000 mg Oral Q8H    LORazepam (ATIVAN) injection 0.5 mg  0.5 mg IntraVENous ONCE    promethazine (PHENERGAN) with saline injection 12.5 mg  12.5 mg IntraVENous Q4H PRN    pantoprazole (PROTONIX) tablet 40 mg  40 mg Oral ACB&D    morphine injection 0.5 mg  0.5 mg IntraVENous Q4H PRN    atorvastatin (LIPITOR) tablet 40 mg  40 mg Oral QHS    budesonide-formoteroL (SYMBICORT) 160-4.5 mcg/actuation HFA inhaler 2 Puff  2 Puff Inhalation BID RT    And    tiotropium bromide (SPIRIVA RESPIMAT) 2.5 mcg /actuation  2 Puff Inhalation DAILY    venlafaxine-SR (EFFEXOR-XR) capsule 37.5 mg  37.5 mg Oral QHS    sodium chloride (NS) flush 5-10 mL  5-10 mL IntraVENous Q8H    sodium chloride (NS) flush 5-10 mL  5-10 mL IntraVENous PRN    0.9% sodium chloride infusion 250 mL  250 mL IntraVENous PRN    albuterol (PROVENTIL HFA, VENTOLIN HFA, PROAIR HFA) inhaler 2 Puff  2 Puff Inhalation Q6H PRN    calcium-vitamin D (OS-ZACHARIAH +D3) 250 mg-125 unit per tablet 1 Tablet  1 Tablet Oral DAILY    hydroCHLOROthiazide (HYDRODIURIL) tablet 12.5 mg  12.5 mg Oral DAILY    HYDROcodone-acetaminophen (NORCO) 5-325 mg per tablet 1 Tablet  1 Tablet Oral Q6H PRN    venlafaxine-SR (EFFEXOR-XR) capsule 75 mg  75 mg Oral BID    verapamil ER (CALAN-SR) tablet 240 mg  240 mg Oral DAILY WITH BREAKFAST    sodium chloride (NS) flush 5-40 mL  5-40 mL IntraVENous Q8H    sodium chloride (NS) flush 5-40 mL  5-40 mL IntraVENous PRN    acetaminophen (TYLENOL) tablet 650 mg  650 mg Oral Q6H PRN    Or    acetaminophen (TYLENOL) suppository 650 mg  650 mg Rectal Q6H PRN    polyethylene glycol (MIRALAX) packet 17 g  17 g Oral DAILY PRN    ondansetron (ZOFRAN ODT) tablet 4 mg  4 mg Oral Q8H PRN    Or    ondansetron (ZOFRAN) injection 4 mg  4 mg IntraVENous Q6H PRN    melatonin tablet 3 mg  3 mg Oral QHS    OLANZapine (ZyPREXA zydis) disintegrating tablet 5 mg  5 mg Oral QHS PRN    sucralfate (CARAFATE) tablet 1 g  1 g Oral AC&HS       Other Studies:  No results found for this visit on 11/17/21. No results found. Part of this note was written by using a voice dictation software and the note has been proof read but may still contain some grammatical/other typographical errors.     Signed:  Kathleen Alvarenga DO

## 2021-11-22 NOTE — PROGRESS NOTES
Bedside report given to KAUR Bill. Pt med with 0.5 mg morphine IV for complaints of stomach pain (7/10) and PO zofran (4 mg) for nausea.

## 2021-11-22 NOTE — PROGRESS NOTES
Problem: Falls - Risk of  Goal: *Absence of Falls  Description: Document Pancho Noyola Fall Risk and appropriate interventions in the flowsheet.   Outcome: Progressing Towards Goal  Note: Fall Risk Interventions:  Mobility Interventions: Bed/chair exit alarm, Communicate number of staff needed for ambulation/transfer, Patient to call before getting OOB, PT Consult for assist device competence, Strengthening exercises (ROM-active/passive), Utilize walker, cane, or other assistive device         Medication Interventions: Assess postural VS orthostatic hypotension, Bed/chair exit alarm, Evaluate medications/consider consulting pharmacy, Patient to call before getting OOB, Teach patient to arise slowly    Elimination Interventions: Bed/chair exit alarm, Call light in reach, Patient to call for help with toileting needs    History of Falls Interventions: Bed/chair exit alarm, Door open when patient unattended, Evaluate medications/consider consulting pharmacy

## 2021-11-22 NOTE — ROUTINE PROCESS
Bedside and Verbal shift change report given to self (oncoming nurse) by Sara Moore RN (offgoing nurse). Report included the following information SBAR, Kardex, Procedure Summary, MAR and Recent Results.

## 2021-11-22 NOTE — ROUTINE PROCESS
Bedside and Verbal shift change report received from Art, RN. Report included the following information SBAR, Kardex, Intake/Output, MAR and Recent Results.

## 2021-11-22 NOTE — ROUTINE PROCESS
Bedside and Verbal shift change report given to Kaylah Pereira RN (oncoming nurse) by self (offgoing nurse). Report included the following information SBAR, Kardex, Procedure Summary, MAR and Recent Results.

## 2021-11-22 NOTE — PROGRESS NOTES
Comprehensive Nutrition Assessment    Type and Reason for Visit: Reassess    Nutrition Recommendations/Plan:   Meals and Snacks:  Continue current diet. Encouraged pt to work with PDA to make food choices she would prefer due to ongoing N/V  Encouraged pt to request antiemetic if N/V persists  Nutrition Supplement Therapy:   Medical food supplement therapy:  Initiate Ensure Enlive three times per day (this provides 350 kcal and 20 grams protein per bottle)     Malnutrition Assessment:  Malnutrition Status: Severe malnutrition  Context: Acute illness  Findings of clinical characteristics of malnutrition:   Energy Intake:  Unable to assess (pt reports 1-1.5months poor po, worsened 1-1.5 weeks PTA)  Weight Loss:  7.0 - Greater than 5% over 1 month (128lbs at ortho office visit 10/18; -10% x1 month)     Body Fat Loss:  7 - Moderate body fat loss, Orbital, Triceps   Muscle Mass Loss:  7 - Moderate muscle mass loss, Calf, Thigh (quadriceps) (mild temporal wasting)  Fluid Accumulation:  Unable to assess,     Strength:  Not performed     Nutrition Assessment:   Nutrition History: Pt reports poor po intake for 1-1.5 months PTA due to loss of appetite consuming 2 meals/day with occasional snacks. Po intake worsened over the past 1-1.5 weeks PTA due to nausea with intake of 3-4 bites of 2 meals per day and occasional intake of jello. Pt states her daughter provided her with Boost last week. Pt reports drinking Boost last week and yesterday; however, intake of supplement not specified. Pt reports constipation 1 week PTA per H&P. Pt reports having a BM every 2-3 days varying from hard/solid stools to loose. Pt reports UBW of 128lbs which is confirmed with chart review of ortho office visit on 10/18. Over the past year, pt has weighed between 124-128lbs with declining wt noted over the past month. Nutrition Background: Pt with PMH notable for HTN, COPD, HLD, hiatal hernia s/p Nissen 2010, and gastroparesis.  Pt presents after syncopal event at home after attempting to have a BM. CTAP in ED showing descending colon/sigmoid colitis, contrast filled and distended esophagus to the level of the hiatal hernia at the GE junction, esophagus with possible obstructing mass or stricture, and gallstone. Pt is admitted for severe sepsis with septic shock, colitis, and acute blood loss anemia. Noted GI with concerns for ischemic colitis on arrival due to hypotension and sepsis. Daily Update:  Pt seen lying in bed at time of visit. She reports eating well once diet was advanced 11/20. She reports eating 5-6 bites of each meal until today. Discussed trying to eat more although she feels this is more than what she was previously eating. EGD on 11/18 terminated due to retained food in the stomach. Repeat EGD 11/19 showed findings of hiatal hernia. Esophagus dilated as well.      Nutrition Related Findings:   (11/18) NPO (11/19) diet advanced to full liquids (11/20) diet advanced to soft and bite sized      Current Nutrition Therapies:  ADULT DIET Dysphagia - Soft & Bite Sized    Current Intake:   Average Meal Intake: 1-25% Average Supplement Intake: None ordered      Anthropometric Measures:  Height: 5' 2\" (157.5 cm)  Current Body Wt: 52.7 kg (116 lb 2.9 oz) (11/22), Weight source: Bed scale  BMI: 21.2, Underweight (BMI less than 22) age over 72  Admission Body Weight: 117 lb 8.1 oz (11/17; bed scale)  Ideal Body Weight (lbs) (Calculated): 110 lbs (50 kg), 104 %  Usual Body Wt: 58.1 kg (128 lb) (10/18; ortho office visit), Percent weight change: -10.6          Edema: LLE: No Edema (11/22/2021  8:38 AM)  RLE: No Edema (11/22/2021  8:38 AM)     Estimated Daily Nutrient Needs:  Energy (kcal/day): 8134-1560 (Kcal/kg (25-30), Weight Used: Current (51.9kg))  Protein (g/day): 52-62 (1-1.2gm/kg) Weight Used: (Current (51.9kg))  Fluid (ml/day):   (1 ml/kcal)    Nutrition Diagnosis:   · Inadequate oral intake related to altered GI function as evidenced by nausea, intake 0-25%, vomiting    · Severe malnutrition, In context of acute illness or injury related to inadequate protein-energy intake as evidenced by  (criteria provided per malnutrition assessment above)    Nutrition Interventions:   Food and/or Nutrient Delivery: Continue current diet, Start oral nutrition supplement     Coordination of Nutrition Care: Continue to monitor while inpatient  Plan of Care discussed with Jennifer Barclay RN    Goals:   Previous Goal Met: Goal(s) achieved  Active Goal: Meet >50% estimated nutrition needs by RD follow up. Nutrition Monitoring and Evaluation:      Food/Nutrient Intake Outcomes: Food and nutrient intake, Supplement intake  Physical Signs/Symptoms Outcomes: Biochemical data, GI status, Meal time behavior, Weight    Discharge Planning:     Too soon to determine    Electronically signed by Kimberlee Councilman MS, VERONICA, LD 11/22/2021 at 4:32 PM  Contact: [phone]

## 2021-11-22 NOTE — PROGRESS NOTES
Infectious Disease Note    Today's Date: 11/22/2021   Admit Date: 11/17/2021    Impression:   · Septic shock secondary to Strep species bacteremia (11/17); source ? Bowel. TTE negative  · EGD with possible dilation (11/20) for complaints of dysphagia, abnormal imaging of esophagus; GERD, hiatal hernia &esophagitis  · Question of blood in stools  · Colitis; thought to be Diverticular vs ischemic; GI following    Plan:   -Change to Amoxicillin 1g oral TID for 14 days. Stop Flagyl as this may be contributing to her nausea. **Will sign off at this time. Please call with questions or concerns. Anti-infectives:   · Zosyn--CTX/Flagyl    Subjective:   Afebrile, abdominal pain stopped for 2 days but now has returned. Describes as nausea and ab cramping. Patient is a 77 y.o. female  with medical history of osteoporosis with multiple vertebral fracture, mood disorder with anxiety and depression, hiatal hernia with gastroparesis who presented with acute abdominal pain and syncope. She was found to be in septic shock and was initially placed on pressors. A central line was placed. After resuscitation her shock stabilized. CT demonstrated acute left-sided colitis with indeterminate cause. She had been in her usual state of health when she attempted to have a bowel movement. She had been constipated and was struggling. She began to feel very weak. Upon returning to her dining room she passed out where she was found by her . He immediately called EMS who found her to be hypotensive and hypoxic. She was brought to the emergency department and obtunded state. She reports no recent illness but does report diffuse abdominal pain worse on the left. She reported nausea and constipation without vomiting. She had no changes in her urine. She has not been able to eat and is in fact lost over 10 pounds she has a chronic cough but has not been bothering her.   She does have pain in her back due to the vertebral fracture. She denies any other symptoms at this time. Started on Zosyn. ID is asked to evaluate patient for treatment recommendations.      Patient Active Problem List   Diagnosis Code    Primary hypertension I10    Hyperlipidemia, unspecified E78.5    Depression with anxiety F41.8    B12 deficiency E53.8    Osteoarthritis of multiple joints M15.9    Hiatal hernia with GERD and esophagitis K44.9, K21.00    Osteoporosis M81.0    Chronic cough R05.3    Severe persistent asthma without complication W82.02    Other iron deficiency anemias D50.8    Hiatal hernia K44.9    Gastroparesis K31.84    Gastroesophageal reflux disease with esophagitis K21.00    Compression fracture of T12 vertebra ([facility]) S22.080A    Chronic rhinitis J31.0    Mood disorder ([facility]) F39    Compression fracture of T9 vertebra ([facility]) S22.070A    Severe sepsis with septic shock ([facility]) A41.9, R65.21    Body mass index (BMI) of 21.0 to 21.9 in adult Z68.21    Colitis, indeterminate K52.3    Acute blood loss anemia D62    Severe protein-calorie malnutrition ([facility] E43    UTI (urinary tract infection) N39.0    Streptococcal bacteremia R78.81, B95.5    Elevated troponin R77.8     Past Medical History:   Diagnosis Date    Anemia 2005 approx    2 units transfused    Arthritis     hands, hips    B12 deficiency     Cancer ([facility]     hx of melanoma 1986 R arm, and carcinoma x 2 forehead    COVID-19 vaccine series completed 02/2021    PT TO BRING CARD DOS    Depression with anxiety 8/16/2016    Elevated troponin 11/20/2021    Essential hypertension with goal blood pressure less than 140/90     controlled with med    Gastrointestinal disorder     hiatel hernia    Hyperlipidemia, unspecified 8/16/2016    Menopause     @ 51    Mild intermittent asthma 8/16/2016    Osteoarthritis of multiple joints 8/16/2016    Osteoporosis 10/25/2016    Pulmonary emphysema ([facility]) 08/16/2016    daily inhalers    Rectal bleed     Vertigo       Family History   Problem Relation Age of Onset    Stroke Mother         intracerebral aneurysm    Diabetes Maternal Aunt     Diabetes Maternal Uncle     No Known Problems Father         didn't have contact with father    Breast Cancer Daughter 28    Hypertension Maternal Grandmother       Social History     Tobacco Use    Smoking status: Never Smoker    Smokeless tobacco: Never Used    Tobacco comment: 2nd hand smoke x 33 yrs   Substance Use Topics    Alcohol use: No     Past Surgical History:   Procedure Laterality Date    COLONOSCOPY N/A 7/30/2021    COLONOSCOPY/BMI 23 performed by Gricelda Best MD at [employer]; HI RISK IND  7/30/2021         HX HERNIA REPAIR  2010    helped reflex    HX TUBAL LIGATION  1972      Prior to Admission medications    Medication Sig Start Date End Date Taking? Authorizing Provider   HYDROcodone-acetaminophen (NORCO) 5-325 mg per tablet 1 Tablet every six (6) hours as needed. 11/9/21  Yes Provider, Historical   venlafaxine-SR (Effexor XR) 75 mg capsule Take 1 Capsule by mouth two (2) times a day. 8/8/21  Yes Andreina Vivas MD   venlafaxine-SR (Effexor XR) 37.5 mg capsule Take 1 Capsule by mouth nightly. 8/8/21  Yes Andreina Vivas MD   hydroCHLOROthiazide (HYDRODIURIL) 12.5 mg tablet Take 1 Tablet by mouth daily. 8/6/21  Yes Andreina Vivas MD   verapamil ER (CALAN-SR) 240 mg CR tablet TAKE 1 TABLET BY MOUTH EVERY MORNING 8/6/21  Yes Andreina Vivas MD   pravastatin (PRAVACHOL) 20 mg tablet Take 1 Tablet by mouth daily. Patient taking differently: Take 20 mg by mouth nightly. 8/6/21  Yes Andreina Vivas MD   fluticasone-umeclidin-vilanter (Trelegy Ellipta) 846-83.1-59 mcg dsdv Take 1 Puff by inhalation daily. 4/12/21  Yes Marlene Baca MD   omeprazole (PRILOSEC) 40 mg capsule Take 1 Cap by mouth two (2) times a day. 10/20/20  Yes Marlene Baca MD   potassium chloride (KLOR-CON) 10 mEq tablet Take 1 Tablet by mouth daily.  11/12/21   Viktoriya Cardona Saleem Hadley MD   ondansetron (ZOFRAN ODT) 4 mg disintegrating tablet Take 4 mg by mouth. 21   Provider, Historical   LORazepam (ATIVAN) 1 mg tablet Take 1/2 to 1 tab PO Q 12 hours PRN anxiety, use sparingly  Patient not taking: Reported on 2021 9/15/21   Nataly Velasquez MD   calcium-cholecalciferol, d3, (CALCIUM 600 + D) 600-125 mg-unit tab Take 1 Tab by mouth daily. Patient not taking: Reported on 2021    Provider, Historical   cetirizine (ZYRTEC) 10 mg tablet Take 1 Tab by mouth daily. Patient not taking: Reported on 2021   Pastora Kiser NP   albuterol (Ventolin HFA) 90 mcg/actuation inhaler Take 2 Puffs by inhalation four (4) times daily. Patient taking differently: Take 2 Puffs by inhalation every six (6) hours as needed. 21   Pastora Kiser NP       Allergies   Allergen Reactions    Other Medication Rash     Equate antibiotic ointment        Review of Systems:  A comprehensive review of systems was negative except for that written in the History of Present Illness. Objective:     Visit Vitals  /89 (BP 1 Location: Left upper arm, BP Patient Position: Lying)   Pulse 78   Temp 98.2 °F (36.8 °C)   Resp 18   Ht 5' 2\" (1.575 m)   Wt 52.7 kg (116 lb 3.2 oz)   SpO2 95%   BMI 21.25 kg/m²     Temp (24hrs), Av.5 °F (36.9 °C), Min:97.8 °F (36.6 °C), Max:99.2 °F (37.3 °C)       Lines:  Peripheral IV:       Physical Exam:    General:  Alert, cooperative, well noursished, well developed, appears stated age   Eyes:  Sclera anicteric. Pupils equally round and reactive to light. Mouth/Throat: Mucous membranes normal, oral pharynx clear   Neck: Supple   Lungs:   Clear to auscultation bilaterally, good effort   CV:  Regular rate and rhythm,no murmur, click, rub or gallop   Abdomen:   Soft, mild tenderness to lower quadrants.  bowel sounds normal. non-distended   Extremities: No cyanosis or edema   Skin: Skin color, texture, turgor normal. no acute rash or lesions   Lymph nodes: Cervical and supraclavicular normal   Musculoskeletal: No swelling or deformity   Lines/Devices:  Intact, no erythema, drainage or tenderness   Psych: Alert and oriented, normal mood affect given the setting       Data Review:     CBC:  Recent Labs     11/22/21 0341 11/21/21 0544 11/20/21 0629 11/20/21 0629   WBC 8.6 12.4*  --  15.2*   GRANS 70 82*   < > 84*   MONOS 8 6   < > 7   EOS 2 1   < > 0*   ANEU 6.0 10.2*   < > 12.7*   ABL 1.6 1.3   < > 1.2   HGB 13.1 13.4  --  13.8   HCT 39.6 40.3  --  41.7   * 100*  --  103*    < > = values in this interval not displayed. BMP:  Recent Labs     11/22/21 0341 11/21/21 0544 11/20/21 0629   CREA 0.43* 0.45* 0.51*   BUN 8 9 10    143 140   K 4.1 3.1* 2.7*    106 103   CO2 28 31 30   AGAP 4* 6* 7   GLU 80 98 101*       LFTS:  No results for input(s): TBILI, ALT, AP, TP, ALB in the last 72 hours. No lab exists for component: SGOT    Microbiology:     All Micro Results     Procedure Component Value Units Date/Time    CULTURE, BLOOD [[id number]] Collected: 11/19/21 0540    Order Status: Completed Specimen: Blood Updated: 11/22/21 0648     Special Requests: CENTRAL LINE        Culture result: NO GROWTH 3 DAYS       CULTURE, STOOL [[id number]] Collected: 11/18/21 1154    Order Status: Completed Specimen: Stool Updated: 11/21/21 5621     Special Requests: NO SPECIAL REQUESTS        Culture result:       No Salmonella, Shigella, or Ecoli 0157 isolated.           CULTURE, URINE [[id number]] Collected: 11/17/21 1720    Order Status: Completed Specimen: Urine from Clean catch Updated: 11/20/21 0841     Special Requests: NO SPECIAL REQUESTS        Culture result: NO GROWTH 2 DAYS       BLOOD CULTURE [[id number]]  (Abnormal) Collected: 11/17/21 1651    Order Status: Completed Specimen: Blood Updated: 11/20/21 0749     Special Requests: --        NO SPECIAL REQUESTS  LEFT  FOREARM       GRAM STAIN GRAM POS COCCI IN CHAINS               AEROBIC AND ANAEROBIC BOTTLES                  RESULTS VERIFIED, PHONED TO AND READ BACK BY ELISEO CHO AT 8235 ON 11/18/2021, SH/AMM                  GRAM POS COCCI IN CHAINS RESULTS VERIFIED, PHONED TO AND READ BACK BY Onesimo Hoover RN @4585 ON [id number] [place]           Culture result:       ALPHA STREPTOCOCCUS, NOT S. PNEUMONIAE This organism may be indicative of culture contamination. Clinical correlation needs to be evaluated as each case is unique. Refer to Blood Culture ID Panel Accession  [id number]      C. DIFFICILE AG & TOXIN A/B [[id number]] Collected: 11/18/21 1154    Order Status: Completed Specimen: Stool Updated: 11/19/21 1401     [address] ANTIGEN       C. DIFFICILE GDH ANTIGEN-NEGATIVE           C. difficile toxin       C. DIFFICILE TOXIN-NEGATIVE           PCR Reflex NOT APPLICABLE        INTERPRETATION       NEGATIVE FOR TOXIGENIC C. DIFFICILE           Clinical Consideration       NEGATIVE FOR TOXIGENIC C. DIFFICILE          BLOOD CULTURE ID PANEL [[id number]] Collected: 11/17/21 1607    Order Status: Completed Specimen: Blood Updated: 11/19/21 0707     Acc. no. from Micro Order [id number]     INTERPRETATION       Gram positive cocci. Identified by realtime PCR as Streptococcus species (not agalactiae, pyogenes, or pneumoniae). Comment: Consider discontinuation of IV vancomycin and using an anti-streptococcal beta-lactam (ceftriaxone/cefotaxime (peds))        Blood Culture PCR Testing       MULTIPLEX PCR NEGATIVE:  A negative FilmArray BCID result does not exclude the possibility of bloodstream infection.           BLOOD CULTURE [[id number]] Collected: 11/17/21 1607    Order Status: Completed Specimen: Blood Updated: 11/19/21 0301     Special Requests: --        RIGHT  FOREARM       GRAM STAIN GRAM NEGATIVE RODS         ANAEROBIC BOTTLE POSITIVE               RESULTS VERIFIED, PHONED TO AND READ BACK BY Caroline Gee RN @2960 [phone] BY RAJESH           Culture result:       CULTURE IN PROGRESS,FURTHER UPDATES TO FOLLOW                  Refer to Blood Culture ID Panel Accession  [id number]      OVA & PARASITES, STOOL [[id number]] Collected: 11/18/21 1154    Order Status: Completed Specimen: Feces from Stool Updated: 11/18/21 1257    BLOOD CULTURE ID PANEL [[id number]]  (Abnormal) Collected: 11/17/21 1651    Order Status: Completed Specimen: Blood Updated: 11/18/21 1219     Streptococcus Detected        Comment: RESULTS VERIFIED, PHONED TO AND READ BACK BY  ELISEO CHO @ [facility] 11/18/2021 /AMM           INTERPRETATION       Gram positive cocci. Identified by realtime PCR as Streptococcus species (not agalactiae, pyogenes, or pneumoniae). Comment: Consider discontinuation of IV vancomycin and using an anti-streptococcal beta-lactam (ceftriaxone/cefotaxime (peds))       COVID-19 RAPID TEST [[id number]] Collected: 11/18/21 1154    Order Status: Completed Specimen: Nasopharyngeal Updated: 11/18/21 1216     Specimen source Nasopharyngeal        COVID-19 rapid test Not detected        Comment:      The specimen is NEGATIVE for SARS-CoV-2, the novel coronavirus associated with COVID-19. A negative result does not rule out COVID-19. This test has been authorized by the FDA under an Emergency Use Authorization (EUA) for use by authorized laboratories.         Fact sheet for Healthcare Providers: ConventionUpdate.co.nz  Fact sheet for Patients: ConventionUpdate.co.nz       Methodology: Isothermal Nucleic Acid Amplification               Imaging:   Reviewed    Signed By: Sharon Perrin NP     November 22, 2021

## 2021-11-22 NOTE — PROGRESS NOTES
ACUTE OT GOALS:  (Developed with and agreed upon by patient and/or caregiver.)  1. Patient will complete total body bathing and dressing with SET UP and adaptive equipment as needed. 2. Patient will complete toileting with SUPERVISION. 3. Patient will complete grooming ADL standing at sink with SUPERVISION. 4. Patient will tolerate 25 minutes of OT treatment with 1-2 rest breaks to increase activity tolerance for ADLs. 5. Patient will complete functional transfers with SUPERVISION and adaptive equipment as needed. 6. Patient will tolerate 10 minutes BUE exercises to increase strength for safe, functional transfers.      Timeframe: 7 visits     OCCUPATIONAL THERAPY: Daily Note OT Treatment Day # 2    Claudia Laboy is a 77 y.o. female   PRIMARY DIAGNOSIS: Severe sepsis with septic shock ([facility])  [facility] [A41.9, R65.21]  Procedure(s) (LRB):  ESOPHAGOGASTRODUODENOSCOPY (EGD) ROOM 310 (N/A)  ESOPHAGEAL DILATION (N/A)  ESOPHAGOGASTRODUODENAL (EGD) BIOPSY (N/A)  3 Days Post-Op  Payor: [employer] MEDICARE / Plan: [address] / Product Type: Managed Care Medicare /   ASSESSMENT:     REHAB RECOMMENDATIONS: CURRENT LEVEL OF FUNCTION:  (Most Recently Demonstrated)   Recommendation to date pending progress:  [address] [place] [address] Therapy  Equipment:    Rolling Walker Bathing:   Not tested  Dressing:   Minimal Assistance  Feeding/Grooming:   Standby Assistance   Toileting:   Maximal Assistance in static standing  Functional Mobility:  [address] x 2 x RW     ASSESSMENT:  Ms. Noah Snow continues to present with deficits in overall strength, activity tolerance, ADL performance, and functional mobility. Supine <> sit performed with min A. Fair (+) EOB sitting balance. Sit <> stand completed with min A x 2. Patient required max A for bowel hygiene/andre-care in static standing while holding onto walker.  Multiple rest breaks required throughout session d/t pt's limited standing tolerance and back pain. Performed self-grooming tasks while standing EOS with SBA. OOB to chair at end of session. Progressing towards therapy goals. WIll continue OT efforts       SUBJECTIVE:   Ms. Charissa Bruno states, \"I got sick earlier this morning. \"    SOCIAL HISTORY/LIVING ENVIRONMENT:   Home Environment: Private residence  One/Two Story Residence: One story  Living Alone: No  Support Systems: Spouse/Significant Other, Child(kaleb), Other Family Member(s), [employer], Friend/Neighbor    OBJECTIVE:     PAIN: VITAL SIGNS: LINES/DRAINS:   Pre Treatment: Pain Screen  Pain Scale 1: FLACC  Pain Intensity 1: 3  Post Treatment: 3 (back pain)   none  O2 Device: None (Room air)     ACTIVITIES OF DAILY LIVING: I Mod I S SBA CGA Min Mod Max Total NT Comments   BASIC ADLs:              Bathing/ Showering [] [] [] [] [] [] [] [] [] [x]    Toileting [] [] [] [] [] [] [] [x] [] [] In static standing while holding RW   Dressing [] [] [] [] [] [x] [] [] [] []    Feeding [] [] [] [] [] [] [] [] [] [x]    Grooming [] [] [] [x] [] [] [] [] [] [] Standing EOS   Personal Device Care [] [] [] [] [] [] [] [] [] [x]    Functional Mobility [] [] [] [] [x] [] [] [] [] [] X 2 x RW   I=Independent, Mod I=Modified Independent, S=Supervision, SBA=Standby Assistance, CGA=Contact Guard Assistance,   Min=Minimal Assistance, Mod=Moderate Assistance, Max=Maximal Assistance, Total=Total Assistance, NT=Not Tested    MOBILITY: I Mod I S SBA CGA Min Mod Max Total  NT x2 Comments:   Supine to sit [] [] [] [] [] [x] [] [] [] [] []    Sit to supine [] [] [] [] [] [] [] [] [] [x] []    Sit to stand [] [] [] [] [] [x] [] [] [] [] []    Bed to chair [] [] [] [] [x] [] [] [] [] [] [x]    I=Independent, Mod I=Modified Independent, S=Supervision, SBA=Standby Assistance, CGA=Contact Guard Assistance,   Min=Minimal Assistance, Mod=Moderate Assistance, Max=Maximal Assistance, Total=Total Assistance, NT=Not Tested    BALANCE: Good Fair+ Fair Fair- Poor NT Comments   Sitting Static [] [x] [] [] [] []    Sitting Dynamic [] [x] [x] [] [] []              Standing Static [] [x] [] [] [] []    Standing Dynamic [] [] [x] [] [] [] X RW     PLAN:   FREQUENCY/DURATION: OT Plan of Care: 3 times/week for duration of hospital stay or until stated goals are met, whichever comes first.    TREATMENT:   TREATMENT:   ($$ Self Care/Home Management: 23-37 mins    )  Self Care (24 Minutes): Self care including Toileting, Grooming and Energy Conservation Training to increase independence and decrease level of assistance required.     TREATMENT GRID:  N/A    AFTER TREATMENT POSITION/PRECAUTIONS:  Chair, Needs within reach and RN notified    INTERDISCIPLINARY COLLABORATION:  RN/PCT, PT/PTA and OT/[place]    TOTAL TREATMENT DURATION:  OT Patient Time In/Time Out  Time In: 1315  Time Out: [facility], OT

## 2021-11-22 NOTE — PROGRESS NOTES
General Surgery Progress Note    11/22/2021    Admit Date: 11/17/2021    Subjective:     Surgery  The patient reports an increase in her lower abdominal pain this morning. Her WBC is normal. Creatinine is normal.    Objective:     Visit Vitals  /89 (BP 1 Location: Left upper arm, BP Patient Position: Lying)   Pulse 78   Temp 98.2 °F (36.8 °C)   Resp 18   Ht 5' 2\" (1.575 m)   Wt 116 lb 3.2 oz (52.7 kg)   SpO2 95%   BMI 21.25 kg/m²         Intake/Output Summary (Last 24 hours) at 11/22/2021 0809  Last data filed at 11/21/2021 1814  Gross per 24 hour   Intake 800 ml   Output 0 ml   Net 800 ml        EXAM:  ABD soft, mild lower abdominal tenderness, active BS'S. No peritoneal signs. Data Review    Recent Results (from the past 24 hour(s))   METABOLIC PANEL, BASIC    Collection Time: 11/22/21  3:41 AM   Result Value Ref Range    Sodium 139 136 - 145 mmol/L    Potassium 4.1 3.5 - 5.1 mmol/L    Chloride 107 98 - 107 mmol/L    CO2 28 21 - 32 mmol/L    Anion gap 4 (L) 7 - 16 mmol/L    Glucose 80 65 - 100 mg/dL    BUN 8 8 - 23 MG/DL    Creatinine 0.43 (L) 0.6 - 1.0 MG/DL    GFR est AA >60 >60 ml/min/1.73m2    GFR est non-AA >60 >60 ml/min/1.73m2    Calcium 8.1 (L) 8.3 - 10.4 MG/DL   CBC WITH AUTOMATED DIFF    Collection Time: 11/22/21  3:41 AM   Result Value Ref Range    WBC 8.6 4.3 - 11.1 K/uL    RBC 4.62 4.05 - 5.2 M/uL    HGB 13.1 11.7 - 15.4 g/dL    HCT 39.6 35.8 - 46.3 %    MCV 85.7 79.6 - 97.8 FL    MCH 28.4 26.1 - 32.9 PG    MCHC 33.1 31.4 - 35.0 g/dL    RDW 17.1 (H) 11.9 - 14.6 %    PLATELET 061 (L) 737 - 450 K/uL    MPV 10.8 9.4 - 12.3 FL    ABSOLUTE NRBC 0.00 0.0 - 0.2 K/uL    DF AUTOMATED      NEUTROPHILS 70 43 - 78 %    LYMPHOCYTES 19 13 - 44 %    MONOCYTES 8 4.0 - 12.0 %    EOSINOPHILS 2 0.5 - 7.8 %    BASOPHILS 0 0.0 - 2.0 %    IMMATURE GRANULOCYTES 1 0.0 - 5.0 %    ABS. NEUTROPHILS 6.0 1.7 - 8.2 K/UL    ABS. LYMPHOCYTES 1.6 0.5 - 4.6 K/UL    ABS. MONOCYTES 0.7 0.1 - 1.3 K/UL    ABS.  EOSINOPHILS 0.2 0.0 - 0.8 K/UL    ABS. BASOPHILS 0.0 0.0 - 0.2 K/UL    ABS. IMM. GRANS. 0.1 0.0 - 0.5 K/UL        Hospital Problems  Date Reviewed: 11/17/2021          Codes Class Noted POA    Acute blood loss anemia ICD-10-CM: D62  ICD-9-CM: 285.1  11/17/2021 Yes        Elevated troponin ICD-10-CM: R77.8  ICD-9-CM: 790.6  11/20/2021 Unknown        Severe protein-calorie malnutrition ([facility] ICD-10-CM: E43  ICD-9-CM: 262  11/18/2021 Yes        UTI (urinary tract infection) ICD-10-CM: N39.0  ICD-9-CM: 599.0  11/18/2021 Unknown        Streptococcal bacteremia ICD-10-CM: R78.81, B95.5  ICD-9-CM: 790.7, 041.00  11/18/2021 Unknown        * (Principal) Severe sepsis with septic shock ([facility] ICD-10-CM: A41.9, R65.21  ICD-9-CM: 038.9, 995.92, 785.52  11/17/2021 Yes        Body mass index (BMI) of 21.0 to 21.9 in adult ICD-10-CM: Z68.21  ICD-9-CM: V85.1  11/17/2021 Yes        Colitis, indeterminate ICD-10-CM: K52.3  ICD-9-CM: 558.9  11/17/2021 Unknown        Compression fracture of T9 vertebra (HCC) (Chronic) ICD-10-CM: F52.911I  ICD-9-CM: 805.2  10/18/2021 Yes        Severe persistent asthma without complication NLL-52-VR: M64.95  ICD-9-CM: 493.90  5/28/2021 Yes        Osteoporosis ICD-10-CM: M81.0  ICD-9-CM: 733.00  10/25/2016 Yes        Primary hypertension (Chronic) ICD-10-CM: I10  ICD-9-CM: 401.9  8/16/2016 Yes    Overview Signed 11/17/2021  9:48 PM by Елена Jalloh MD     Goal < 140/80             Hiatal hernia with GERD and esophagitis ICD-10-CM: K44.9, K21.00  ICD-9-CM: 553.3, 530.11  8/16/2016 Yes        Gastroesophageal reflux disease with esophagitis (Chronic) ICD-10-CM: K21.00  ICD-9-CM: 530.11  11/1/2015 Yes        Mood disorder ([facility] ICD-10-CM: F39  ICD-9-CM: 296.90  11/1/2015 Yes          1. Trying to avoid surgery in the 77year old lady with multiple medical problems. Surgery would involve a sigmoid resection, Mcdermott's pouch and a colostomy. 2. Continue antibiotics. 3. Medical follow up.   Corwin Villar MD.

## 2021-11-22 NOTE — PROGRESS NOTES
No new discharge needs identified. Pt is still actively vomiting and nauseated; worsening abdominal pain and cramping. EGD performed on Friday; biopsies taken and no old or new blood found. Will continue to follow. Care Management Interventions  PCP Verified by CM: Yes Shanon Lr)  Mode of Transport at Discharge:  Other (see comment) (Family)  Transition of Care Consult (CM Consult): Discharge Planning, [facility]  Discharge Durable Medical Equipment: No  Physical Therapy Consult: Yes  Occupational Therapy Consult: Yes  Speech Therapy Consult: No  Support Systems: Spouse/Significant Other, Child(kaleb), Other Family Member(s), [employer], Friend/Neighbor  Confirm Follow Up Transport: Family  The Plan for Transition of Care is Related to the Following Treatment Goals : Return home and back to her baseline  Discharge Location  Discharge Placement: Home

## 2021-11-22 NOTE — PROGRESS NOTES
ACUTE PHYSICAL THERAPY GOALS:  (Developed with and agreed upon by patient and/or caregiver. )  LTG:  (1.)Ms. Jack will move from supine to sit and sit to supine , scoot up and down and roll side to side in bed with MODIFIED INDEPENDENCE within 7 treatment day(s). (2.)Ms. Jack will transfer from bed to chair and chair to bed with MODIFIED INDEPENDENCE using the least restrictive device within 7 treatment day(s). (3.)Ms. Jack will ambulate with MODIFIED INDEPENDENCE for 250 feet with the least restrictive device within 7 treatment day(s). (4.)Ms. Jack will participate in therapeutic activity/exercises x 24 minutes for increased activity tolerance within 7 treatment days.       PHYSICAL THERAPY: Daily Note and PM Treatment Day # 2    Romina Johnson is a 77 y.o. female   PRIMARY DIAGNOSIS: Severe sepsis with septic shock (HCC)  Severe sepsis with septic shock ([facility] [A41.9, R65.21]  Procedure(s) (LRB):  ESOPHAGOGASTRODUODENOSCOPY (EGD) ROOM 310 (N/A)  ESOPHAGEAL DILATION (N/A)  ESOPHAGOGASTRODUODENAL (EGD) BIOPSY (N/A)  3 Days Post-Op    ASSESSMENT:     REHAB RECOMMENDATIONS: CURRENT LEVEL OF FUNCTION:  (Most Recently Demonstrated)   Recommendation to date pending progress:  [address] [place] [address]  Equipment:    To Be Determined Bed Mobility:   Standby Assistance  Sit to Stand:   Minimal Assistance  Transfers:   Minimal Assistance  Gait/Mobility:   Contact Guard Assistance     ASSESSMENT:  Ms. Jack was supine in bed upon arrival and agreeable to treatment. Pt performed bed mobility with additional time and fair sitting balance. She performed STS transfers with RW and Tomas today. Pt also ambulated with RW and CGA at decreased gait speed with cuing for pacing. She tolerated standing activities at sink too with SBA. Pt progressed in ambulation today. SUBJECTIVE:   Ms. Jack states, \"It just come out. \"    SOCIAL HISTORY/ LIVING ENVIRONMENT: see [facility]  Home Environment: Private residence  One/Two Story Residence: One story  Living Alone: No  Support Systems: Spouse/Significant Other, Child(kaleb), Other Family Member(s), [employer], Friend/Neighbor  OBJECTIVE:     PAIN: VITAL SIGNS: LINES/DRAINS:   Pre Treatment: Pain Screen  Pain Scale 1: FLACC  Pain Intensity 1: 1  Post Treatment: 1   none  O2 Device: None (Room air)     MOBILITY: I Mod I S SBA CGA Min Mod Max Total  NT x2 Comments:   Bed Mobility    Rolling [] [] [] [] [] [] [] [] [] [] []    Supine to Sit [] [] [] [x] [] [] [] [] [] [] []    Scooting [] [] [] [x] [] [] [] [] [] [] []    Sit to Supine [] [] [] [] [] [] [] [] [] [] []    Transfers    Sit to Stand [] [] [] [] [] [x] [] [] [] [] []    Bed to Chair [] [] [] [] [x] [] [] [] [] [] []    Stand to Sit [] [] [] [] [] [x] [] [] [] [] []    I=Independent, Mod I=Modified Independent, S=Supervision, SBA=Standby Assistance, CGA=Contact Guard Assistance,   Min=Minimal Assistance, Mod=Moderate Assistance, Max=Maximal Assistance, Total=Total Assistance, NT=Not Tested    BALANCE: Good Fair+ Fair Fair- Poor NT Comments   Sitting Static [x] [] [] [] [] []    Sitting Dynamic [] [x] [] [] [] []              Standing Static [] [] [x] [] [] []    Standing Dynamic [] [] [] [x] [] []      GAIT: I Mod I S SBA CGA Min Mod Max Total  NT x2 Comments:   Level of Assistance [] [] [] [] [x] [] [] [] [] [] []    Distance 75 ft    DME Rolling Walker    Gait Quality Decreased gait speed    Weightbearing  Status N/A     I=Independent, Mod I=Modified Independent, S=Supervision, SBA=Standby Assistance, CGA=Contact Guard Assistance,   Min=Minimal Assistance, Mod=Moderate Assistance, Max=Maximal Assistance, Total=Total Assistance, NT=Not Tested    PLAN:   FREQUENCY/DURATION: PT Plan of Care: 3 times/week for duration of hospital stay or until stated goals are met, whichever comes first.  TREATMENT:     TREATMENT:   ($$ Therapeutic Activity: 23-37 mins    )  Co-Treatment PT/OT necessary due to patient's decreased overall endurance/tolerance levels, as well as need for high level skilled assistance to complete functional transfers/mobility and functional tasks  Therapeutic Activity (23 Minutes): Therapeutic activity included Supine to Sit, Scooting, Transfer Training, Ambulation on level ground and Standing balance to improve functional Mobility, Strength and Activity tolerance.     TREATMENT GRID:  N/A    AFTER TREATMENT POSITION/PRECAUTIONS:  Chair and Needs within reach    INTERDISCIPLINARY COLLABORATION:  RN/PCT, PT/PTA and OT/NEWTON    TOTAL TREATMENT DURATION:  PT Patient Time In/Time Out  Time In: 1315  Time Out: Demetrius Szymanski, PT, DPT

## 2021-11-22 NOTE — ROUTINE PROCESS
Bedside and Verbal shift change report to be given to Art, RN (oncoming nurse) by self (offgoing nurse). Report included the following information SBAR, Kardex, Intake/Output, MAR and Recent Results.

## 2021-11-23 LAB
ANION GAP SERPL CALC-SCNC: 13 MMOL/L (ref 7–16)
BASOPHILS # BLD: 0.1 K/UL (ref 0–0.2)
BASOPHILS NFR BLD: 1 % (ref 0–2)
BUN SERPL-MCNC: 9 MG/DL (ref 8–23)
CALCIUM SERPL-MCNC: 8.7 MG/DL (ref 8.3–10.4)
CHLORIDE SERPL-SCNC: 105 MMOL/L (ref 98–107)
CO2 SERPL-SCNC: 22 MMOL/L (ref 21–32)
CREAT SERPL-MCNC: 0.48 MG/DL (ref 0.6–1)
DIFFERENTIAL METHOD BLD: ABNORMAL
EOSINOPHIL # BLD: 0.1 K/UL (ref 0–0.8)
EOSINOPHIL NFR BLD: 1 % (ref 0.5–7.8)
ERYTHROCYTE [DISTWIDTH] IN BLOOD BY AUTOMATED COUNT: 17.2 % (ref 11.9–14.6)
GLUCOSE SERPL-MCNC: 73 MG/DL (ref 65–100)
HCT VFR BLD AUTO: 44.2 % (ref 35.8–46.3)
HCT VFR BLD AUTO: 44.9 % (ref 35.8–46.3)
HGB BLD-MCNC: 14.5 G/DL (ref 11.7–15.4)
HGB BLD-MCNC: 14.6 G/DL (ref 11.7–15.4)
IMM GRANULOCYTES # BLD AUTO: 0.4 K/UL (ref 0–0.5)
IMM GRANULOCYTES NFR BLD AUTO: 5 % (ref 0–5)
LYMPHOCYTES # BLD: 1.5 K/UL (ref 0.5–4.6)
LYMPHOCYTES NFR BLD: 18 % (ref 13–44)
MCH RBC QN AUTO: 27.3 PG (ref 26.1–32.9)
MCHC RBC AUTO-ENTMCNC: 32.5 G/DL (ref 31.4–35)
MCV RBC AUTO: 84.1 FL (ref 79.6–97.8)
MONOCYTES # BLD: 0.6 K/UL (ref 0.1–1.3)
MONOCYTES NFR BLD: 7 % (ref 4–12)
NEUTS SEG # BLD: 5.6 K/UL (ref 1.7–8.2)
NEUTS SEG NFR BLD: 68 % (ref 43–78)
NRBC # BLD: 0 K/UL (ref 0–0.2)
O+P SPEC MICRO: NORMAL
O+P STL CONC: NORMAL
PLATELET # BLD AUTO: 151 K/UL (ref 150–450)
PMV BLD AUTO: 9.7 FL (ref 9.4–12.3)
POTASSIUM SERPL-SCNC: 3.5 MMOL/L (ref 3.5–5.1)
RBC # BLD AUTO: 5.34 M/UL (ref 4.05–5.2)
SODIUM SERPL-SCNC: 140 MMOL/L (ref 136–145)
SPECIMEN SOURCE: NORMAL
WBC # BLD AUTO: 8.2 K/UL (ref 4.3–11.1)

## 2021-11-23 PROCEDURE — 85018 HEMOGLOBIN: CPT

## 2021-11-23 PROCEDURE — 74011250636 HC RX REV CODE- 250/636: Performed by: FAMILY MEDICINE

## 2021-11-23 PROCEDURE — 74011250637 HC RX REV CODE- 250/637: Performed by: FAMILY MEDICINE

## 2021-11-23 PROCEDURE — 65270000029 HC RM PRIVATE

## 2021-11-23 PROCEDURE — 74011250637 HC RX REV CODE- 250/637: Performed by: NURSE PRACTITIONER

## 2021-11-23 PROCEDURE — 74011250637 HC RX REV CODE- 250/637: Performed by: INTERNAL MEDICINE

## 2021-11-23 PROCEDURE — 94760 N-INVAS EAR/PLS OXIMETRY 1: CPT

## 2021-11-23 PROCEDURE — 74011250636 HC RX REV CODE- 250/636: Performed by: INTERNAL MEDICINE

## 2021-11-23 PROCEDURE — 85025 COMPLETE CBC W/AUTO DIFF WBC: CPT

## 2021-11-23 PROCEDURE — 80048 BASIC METABOLIC PNL TOTAL CA: CPT

## 2021-11-23 PROCEDURE — 94640 AIRWAY INHALATION TREATMENT: CPT

## 2021-11-23 PROCEDURE — 77010033678 HC OXYGEN DAILY

## 2021-11-23 PROCEDURE — 2709999900 HC NON-CHARGEABLE SUPPLY

## 2021-11-23 RX ORDER — HYDRALAZINE HYDROCHLORIDE 20 MG/ML
10 INJECTION INTRAMUSCULAR; INTRAVENOUS
Status: DISCONTINUED | OUTPATIENT
Start: 2021-11-23 | End: 2021-11-24 | Stop reason: HOSPADM

## 2021-11-23 RX ORDER — LORAZEPAM 0.5 MG/1
0.5 TABLET ORAL 2 TIMES DAILY
Status: DISCONTINUED | OUTPATIENT
Start: 2021-11-23 | End: 2021-11-24 | Stop reason: HOSPADM

## 2021-11-23 RX ORDER — METOCLOPRAMIDE HYDROCHLORIDE 5 MG/ML
5 INJECTION INTRAMUSCULAR; INTRAVENOUS EVERY 6 HOURS
Status: DISCONTINUED | OUTPATIENT
Start: 2021-11-23 | End: 2021-11-23

## 2021-11-23 RX ORDER — METOCLOPRAMIDE HYDROCHLORIDE 5 MG/ML
5 INJECTION INTRAMUSCULAR; INTRAVENOUS EVERY 6 HOURS
Status: DISCONTINUED | OUTPATIENT
Start: 2021-11-23 | End: 2021-11-24 | Stop reason: HOSPADM

## 2021-11-23 RX ORDER — LORAZEPAM 2 MG/ML
0.5 INJECTION INTRAMUSCULAR ONCE
Status: COMPLETED | OUTPATIENT
Start: 2021-11-23 | End: 2021-11-23

## 2021-11-23 RX ADMIN — PANTOPRAZOLE SODIUM 40 MG: 40 TABLET, DELAYED RELEASE ORAL at 08:10

## 2021-11-23 RX ADMIN — ONDANSETRON 8 MG: 8 TABLET, ORALLY DISINTEGRATING ORAL at 08:10

## 2021-11-23 RX ADMIN — Medication 10 ML: at 22:00

## 2021-11-23 RX ADMIN — BUDESONIDE AND FORMOTEROL FUMARATE DIHYDRATE 2 PUFF: 160; 4.5 AEROSOL RESPIRATORY (INHALATION) at 19:56

## 2021-11-23 RX ADMIN — AMOXICILLIN 1000 MG: 500 CAPSULE ORAL at 06:11

## 2021-11-23 RX ADMIN — LORAZEPAM 0.5 MG: 0.5 TABLET ORAL at 17:37

## 2021-11-23 RX ADMIN — ONDANSETRON 8 MG: 8 TABLET, ORALLY DISINTEGRATING ORAL at 16:01

## 2021-11-23 RX ADMIN — ATORVASTATIN CALCIUM 40 MG: 40 TABLET, FILM COATED ORAL at 21:49

## 2021-11-23 RX ADMIN — METOCLOPRAMIDE HYDROCHLORIDE 5 MG: 5 INJECTION INTRAMUSCULAR; INTRAVENOUS at 17:37

## 2021-11-23 RX ADMIN — SUCRALFATE 1 G: 1 TABLET ORAL at 21:49

## 2021-11-23 RX ADMIN — BUDESONIDE AND FORMOTEROL FUMARATE DIHYDRATE 2 PUFF: 160; 4.5 AEROSOL RESPIRATORY (INHALATION) at 08:01

## 2021-11-23 RX ADMIN — LORAZEPAM 0.5 MG: 2 INJECTION INTRAMUSCULAR; INTRAVENOUS at 02:11

## 2021-11-23 RX ADMIN — PANTOPRAZOLE SODIUM 40 MG: 40 TABLET, DELAYED RELEASE ORAL at 16:01

## 2021-11-23 RX ADMIN — VENLAFAXINE HYDROCHLORIDE 37.5 MG: 37.5 CAPSULE, EXTENDED RELEASE ORAL at 22:00

## 2021-11-23 RX ADMIN — VENLAFAXINE HYDROCHLORIDE 75 MG: 75 CAPSULE, EXTENDED RELEASE ORAL at 08:10

## 2021-11-23 RX ADMIN — AMOXICILLIN 1000 MG: 500 CAPSULE ORAL at 21:49

## 2021-11-23 RX ADMIN — Medication 10 ML: at 14:00

## 2021-11-23 RX ADMIN — LORAZEPAM 0.5 MG: 0.5 TABLET ORAL at 12:02

## 2021-11-23 RX ADMIN — TIOTROPIUM BROMIDE INHALATION SPRAY 2 PUFF: 3.12 SPRAY, METERED RESPIRATORY (INHALATION) at 08:01

## 2021-11-23 RX ADMIN — SUCRALFATE 1 G: 1 TABLET ORAL at 10:39

## 2021-11-23 RX ADMIN — Medication 3 MG: at 21:49

## 2021-11-23 RX ADMIN — SUCRALFATE 1 G: 1 TABLET ORAL at 16:01

## 2021-11-23 RX ADMIN — VENLAFAXINE HYDROCHLORIDE 75 MG: 75 CAPSULE, EXTENDED RELEASE ORAL at 17:37

## 2021-11-23 RX ADMIN — METOCLOPRAMIDE HYDROCHLORIDE 5 MG: 5 INJECTION INTRAMUSCULAR; INTRAVENOUS at 10:39

## 2021-11-23 RX ADMIN — HYDROCHLOROTHIAZIDE 12.5 MG: 25 TABLET ORAL at 08:10

## 2021-11-23 RX ADMIN — AMOXICILLIN 1000 MG: 500 CAPSULE ORAL at 14:01

## 2021-11-23 RX ADMIN — VERAPAMIL HYDROCHLORIDE 240 MG: 240 TABLET, FILM COATED, EXTENDED RELEASE ORAL at 08:10

## 2021-11-23 RX ADMIN — HYDRALAZINE HYDROCHLORIDE 10 MG: 20 INJECTION INTRAMUSCULAR; INTRAVENOUS at 00:29

## 2021-11-23 RX ADMIN — Medication 5 ML: at 04:15

## 2021-11-23 NOTE — ROUTINE PROCESS
Bedside and Verbal report given to self by Fly RN. Report included SBAR, Kardex, ED Summary, Procedure Summary, Intake and Output and Cardiac Rhythm.

## 2021-11-23 NOTE — ROUTINE PROCESS
Bedside and Verbal shift change report given to Courtney Dobbs RN (oncoming nurse) by self [facility] nurse). Report included the following information SBAR, Kardex, Procedure Summary, MAR and Recent Results.

## 2021-11-23 NOTE — ROUTINE PROCESS
Bedside and Verbal shift change report given to self (oncoming nurse) by Dylan Patel RN (offgoing nurse). Report included the following information SBAR, Kardex, Procedure Summary, MAR and Recent Results.

## 2021-11-23 NOTE — ROUTINE PROCESS
Bedside and Verbal report given to Shena Pacheco RN by self. Report included SBAR, Kardex, ED summary, procedure summary, recent results and cardiac rhythm.

## 2021-11-23 NOTE — PROGRESS NOTES
[facility] Hospitalist Note     Admit Date:  2021  3:47 PM   Name:  Gracy Villanueva   Age:  77 y.o.  :  1945   MRN:  [id number]   PCP:  Celine Romeo MD  Treatment Team: Attending Provider: Annamarie Bowman DO; Consulting Provider: Ruby Monroe MD; Care Manager: Lizett Concepcion [facility] [place]ization Review: Etelvina Yañez; Staff Nurse: Sowmya Avina; Physical Therapist: Melissa Weber, PT, DPT    HPI/Subjective:   Gracy Villanueva is a 77 y.o. female with medical history of osteoporosis with multiple vertebral fracture, mood disorder with anxiety and depression, hiatal hernia with gastroparesis who presented with acute abdominal pain and syncope. She was found to be in septic shock and was initially placed on pressors. A central line was placed. After resuscitation her shock stabilized. CT demonstrated acute left-sided colitis with indeterminate cause. UA suggestive of UTI. Blood culture growing Streptococcus species. Patient started on empiric antibiotics. Surgery consulted and recommend no surgical intervention at this time. GI consulted and EGD showed hiatal hernia and she was dilated. : Has continued to be nauseated x 24 hours. Tolerated morning meds and small bites of applesauce this AM. Abdominal pain and cramping improved this AM, but kept her up last evening.  at bedside. No blood in stool. Denies CP/SOB. Denies F/C. Assessment and Plan:     Severe sepsis with septic shock: Resolved  Initially requiring pressor, stabilized after fluid resuscitation  Now off pressors  CT concerning for diverticulitis versus ischemic bowel - stool culture NGTD + C.  Diff neg  UA concerning for UTI - culture NGTD  Continue Zosyn   - Consulted ID for assistance since all cultures negative except first blood culture   - Changed Zosyn to Ceftriaxone + Flagyl   - Changed Ceftriaxone and Flagyl to Amoxil to complete 14 days course   - Continue Amoxil    Acute left-sided colitis:  Stool occult positive  Surgery consulted and recommend no surgical intervention. GI consulted and suspects ischemic colitis with hypotension  11/21 - Changed Zosyn to Ceftriaxone + Flagyl  11/22 - Changed Ceftriaxone and Flagyl to Amoxil  11/23 - Abdominal pain improved, still nauseated  GI suspected ischemic colitis since was hypotensive on admission  Stool culture negative  C.  Diff negative    UTI:  UA suggestive of UTI  Urine culture NGTD  11/21 - Changed Zosyn to Ceftriaxone + Flagyl  11/22 - Changed Ceftriaxone and Flagyl to Amoxil    Streptococcus bacteremia:  Blood culture growing gram-positive cocci, ID PCR showed Streptococcus species (not agalactiae, pyogenes no pneumonia)  Repeat blood culture NGTD  11/21 - Changed Zosyn to Ceftriaxone + Flagyl  11/22 - Changed Ceftriaxone and Flagyl to Amoxil  11/23 - Continue Amoxil  Appreciate ID's input and assistance    Hypokalemia:  11/20 Down to 2.7  11/21 Up to 3.1 --> replace with 120meq PO  11/22 Resolved - K 4.1  11/23 Does to 3.7, check BMP daily  Continue to monitor    Melena:  11/19 EGD with no signs of bleeding  EGD on 11/18 unsuccessful due to retained food in stomach  11/21 Change Protonix IV BID to PO BID  11/23 Vomiting \"brown stuff\", trend Hgb    Acute blood loss anemia:  Likely due to GI bleed from ischemic colitis  11/20 Hgb down to 13.8  11/21 Hgb stable at 13.4  11/22 Hgb 13.1  11/23 Hgb stable 14.6  Status post 1 unit PRBC transfusion in the ED  Continue to monitor  Transfuse if hemoglobin less than 7    Compression fracture of T9 vertebra:  Norco as needed  PT/OT    Mood disorder:  History of anxiety and depression, previously treated with benzodiazepine concomitantly treating pain with opioids  Continue venlafaxine  Use caution with benzodiazepine    GERD:  Continue PPI Carafate    Severe persistent asthma:  Continue albuterol and Trelegy    Hypertension:  Continue Verampamil    Discharge planning: Home when nausea improves x 24 hours    DVT ppx ordered  Code status:  Full  Estimated LOS:  Greater than 2 midnights  Risk: moderate    Hospital Problems as of 11/23/2021 Date Reviewed: 11/17/2021          Codes Class Noted - Resolved POA    Acute blood loss anemia ICD-10-CM: D62  ICD-9-CM: 285.1  11/17/2021 - Present Yes        Elevated troponin ICD-10-CM: R77.8  ICD-9-CM: 790.6  11/20/2021 - Present Unknown        Severe protein-calorie malnutrition ([facility] ICD-10-CM: E43  ICD-9-CM: 262  11/18/2021 - Present Yes        UTI (urinary tract infection) ICD-10-CM: N39.0  ICD-9-CM: 599.0  11/18/2021 - Present Unknown        Streptococcal bacteremia ICD-10-CM: R78.81, B95.5  ICD-9-CM: 790.7, 041.00  11/18/2021 - Present Unknown        * (Principal) Severe sepsis with septic shock ([facility] ICD-10-CM: A41.9, R65.21  ICD-9-CM: 038.9, 995.92, 785.52  11/17/2021 - Present Yes        Body mass index (BMI) of 21.0 to 21.9 in adult ICD-10-CM: Z68.21  ICD-9-CM: V85.1  11/17/2021 - Present Yes        Colitis, indeterminate ICD-10-CM: K52.3  ICD-9-CM: 558.9  11/17/2021 - Present Unknown        Compression fracture of T9 vertebra (HCC) (Chronic) ICD-10-CM: S42.457X  ICD-9-CM: 805.2  10/18/2021 - Present Yes        Severe persistent asthma without complication ROT-52-MB: S68.69  ICD-9-CM: 493.90  5/28/2021 - Present Yes        Osteoporosis ICD-10-CM: M81.0  ICD-9-CM: 733.00  10/25/2016 - Present Yes        Primary hypertension (Chronic) ICD-10-CM: I10  ICD-9-CM: 401.9  8/16/2016 - Present Yes    Overview Signed 11/17/2021  9:48 PM by Dileep Enriquez MD     Goal < 140/80             Hiatal hernia with GERD and esophagitis ICD-10-CM: K44.9, K21.00  ICD-9-CM: 553.3, 530.11  8/16/2016 - Present Yes        Gastroesophageal reflux disease with esophagitis (Chronic) ICD-10-CM: K21.00  ICD-9-CM: 530.11  11/1/2015 - Present Yes        Mood disorder ([facility] ICD-10-CM: F39  ICD-9-CM: 296.90  11/1/2015 - Present Yes                10 systems reviewed and negative except as noted in HPI. Past Medical History:   Diagnosis Date    Anemia 2005 approx    2 units transfused    Arthritis     hands, hips    B12 deficiency     Cancer ([facility]     hx of melanoma 1986 R arm, and carcinoma x 2 forehead    COVID-19 vaccine series completed 02/2021    PT TO BRING CARD DOS    Depression with anxiety 8/16/2016    Elevated troponin 11/20/2021    Essential hypertension with goal blood pressure less than 140/90     controlled with med    Gastrointestinal disorder     hiatel hernia    Hyperlipidemia, unspecified 8/16/2016    Menopause     @ 51    Mild intermittent asthma 8/16/2016    Osteoarthritis of multiple joints 8/16/2016    Osteoporosis 10/25/2016    Pulmonary emphysema ([facility] 08/16/2016    daily inhalers    Rectal bleed     Vertigo       Past Surgical History:   Procedure Laterality Date    COLONOSCOPY N/A 7/30/2021    COLONOSCOPY/BMI 23 performed by Lacy Mccracken MD at [facility]; HI RISK IND  7/30/2021         HX HERNIA REPAIR  2010    helped reflex    HX TUBAL LIGATION  1972      Allergies   Allergen Reactions    Other Medication Rash     Equate antibiotic ointment      Social History     Tobacco Use    Smoking status: Never Smoker    Smokeless tobacco: Never Used    Tobacco comment: 2nd hand smoke x 33 yrs   Substance Use Topics    Alcohol use: No      Family History   Problem Relation Age of Onset    Stroke Mother         intracerebral aneurysm    Diabetes Maternal Aunt     Diabetes Maternal Uncle     No Known Problems Father         didn't have contact with father    Breast Cancer Daughter 28    Hypertension Maternal Grandmother       Family history reviewed and noncontributory.   Immunization History   Administered Date(s) Administered    COVID-19, Renata Michaels, Primary or Immunocompromised Series, MRNA, PF, 100mcg/0.5mL 01/01/2021, 02/02/2021    Influenza High Dose Vaccine PF 01/01/2016, 01/15/2018, 2018, 2020    Influenza Vaccine 2016, 2018    Influenza Vaccine (Tri) Adjuvanted (>65 Yrs FLUAD TRI 56504) 2019    Pneumococcal Conjugate (PCV-13) 2016    Pneumococcal Polysaccharide (PPSV-23) 2018    Pneumococcal Vaccine (Unspecified Type) 2012    TB Skin Test (PPD) Intradermal 2021    Tdap 2021     PTA Medications:  Prior to Admission Medications   Prescriptions Last Dose Informant Patient Reported? Taking? HYDROcodone-acetaminophen (NORCO) 5-325 mg per tablet 2021 at Unknown time  Yes Yes   Si Tablet every six (6) hours as needed. LORazepam (ATIVAN) 1 mg tablet Not Taking at Unknown time  No No   Sig: Take 1/2 to 1 tab PO Q 12 hours PRN anxiety, use sparingly   Patient not taking: Reported on 2021   albuterol (Ventolin HFA) 90 mcg/actuation inhaler   No No   Sig: Take 2 Puffs by inhalation four (4) times daily. Patient taking differently: Take 2 Puffs by inhalation every six (6) hours as needed. calcium-cholecalciferol, d3, (CALCIUM 600 + D) 600-125 mg-unit tab Not Taking at Unknown time  Yes No   Sig: Take 1 Tab by mouth daily. Patient not taking: Reported on 2021   cetirizine (ZYRTEC) 10 mg tablet Not Taking at Unknown time  No No   Sig: Take 1 Tab by mouth daily. Patient not taking: Reported on 2021   fluticasone-umeclidin-vilanter (Trelegy Ellipta) 200-62.5-25 mcg dsdv 2021 at Unknown time  No Yes   Sig: Take 1 Puff by inhalation daily. hydroCHLOROthiazide (HYDRODIURIL) 12.5 mg tablet 2021 at Unknown time  No Yes   Sig: Take 1 Tablet by mouth daily. omeprazole (PRILOSEC) 40 mg capsule 2021 at Unknown time  No Yes   Sig: Take 1 Cap by mouth two (2) times a day. ondansetron (ZOFRAN ODT) 4 mg disintegrating tablet   Yes No   Sig: Take 4 mg by mouth.   potassium chloride (KLOR-CON) 10 mEq tablet   No No   Sig: Take 1 Tablet by mouth daily.    pravastatin (PRAVACHOL) 20 mg tablet 11/16/2021 at Unknown time  No Yes   Sig: Take 1 Tablet by mouth daily. Patient taking differently: Take 20 mg by mouth nightly. venlafaxine-SR (Effexor XR) 37.5 mg capsule 11/16/2021 at Unknown time  No Yes   Sig: Take 1 Capsule by mouth nightly. venlafaxine-SR (Effexor XR) 75 mg capsule 11/16/2021 at Unknown time  No Yes   Sig: Take 1 Capsule by mouth two (2) times a day. verapamil ER (CALAN-SR) 240 mg CR tablet 11/16/2021 at Unknown time  No Yes   Sig: TAKE 1 TABLET BY MOUTH EVERY MORNING      Facility-Administered Medications: None       Objective:     Patient Vitals for the past 24 hrs:   Temp Pulse Resp BP SpO2   11/23/21 0807 97.7 °F (36.5 °C) (!) 112 16 131/82 96 %   11/23/21 0801 -- -- -- -- 96 %   11/23/21 0453 99.3 °F (37.4 °C) (!) 110 18 (!) 154/92 98 %   11/23/21 0128 -- (!) 115 -- (!) 154/90 96 %   11/23/21 0050 -- (!) 113 -- (!) 144/90 --   11/23/21 0029 -- (!) 110 -- (!) 183/114 --   11/23/21 0012 -- -- -- (!) 189/101 --   11/22/21 2351 99.2 °F (37.3 °C) (!) 113 18 (!) 175/101 98 %   11/22/21 2022 98.4 °F (36.9 °C) (!) 106 16 (!) 169/97 97 %   11/22/21 1807 97.7 °F (36.5 °C) (!) 104 18 (!) 153/106 97 %   11/22/21 1218 98.4 °F (36.9 °C) 92 18 130/80 --     Oxygen Therapy  O2 Sat (%): 96 % (11/23/21 0807)  Pulse via Oximetry: 113 beats per minute (11/23/21 0801)  O2 Device: Nasal cannula (11/23/21 0291)  Skin Assessment: Clean, dry, & intact (11/23/21 0812)  O2 Flow Rate (L/min): 2 l/min (11/23/21 0812)    Estimated body mass index is 20.92 kg/m² as calculated from the following:    Height as of this encounter: 5' 2\" (1.575 m). Weight as of this encounter: 51.9 kg (114 lb 6.4 oz).       Intake/Output Summary (Last 24 hours) at 11/23/2021 0943  Last data filed at 11/23/2021 5737  Gross per 24 hour   Intake 170 ml   Output 200 ml   Net -30 ml       *Note that automatically entered I/Os may not be accurate; dependent on patient compliance with collection and accurate  by assistants. Physical Exam:  General:    Alert. Appears stable. Eyes:   Normal sclerae. Extraocular movements intact. HENT:  Normocephalic, atraumatic. Moist mucous membranes  CV:   RRR. No m/r/g. Lungs:  CTAB. No wheezing, rhonchi, or rales. Abdomen: Soft, mild left lower quadrant tenderness, nondistended. Extremities: Warm and dry. No cyanosis or edema. Neurologic: CN II-XII grossly intact. Sensation intact. Skin:     No rashes or jaundice. Normal coloration  Psych:  Normal mood and affect. I reviewed the labs, imaging, EKGs, telemetry, and other studies done this admission. Data Reviewed:   Recent Results (from the past 24 hour(s))   METABOLIC PANEL, BASIC    Collection Time: 11/23/21  3:58 AM   Result Value Ref Range    Sodium 140 136 - 145 mmol/L    Potassium 3.5 3.5 - 5.1 mmol/L    Chloride 105 98 - 107 mmol/L    CO2 22 21 - 32 mmol/L    Anion gap 13 7 - 16 mmol/L    Glucose 73 65 - 100 mg/dL    BUN 9 8 - 23 MG/DL    Creatinine 0.48 (L) 0.6 - 1.0 MG/DL    GFR est AA >60 >60 ml/min/1.73m2    GFR est non-AA >60 >60 ml/min/1.73m2    Calcium 8.7 8.3 - 10.4 MG/DL   CBC WITH AUTOMATED DIFF    Collection Time: 11/23/21  3:58 AM   Result Value Ref Range    WBC 8.2 4.3 - 11.1 K/uL    RBC 5.34 (H) 4.05 - 5.2 M/uL    HGB 14.6 11.7 - 15.4 g/dL    HCT 44.9 35.8 - 46.3 %    MCV 84.1 79.6 - 97.8 FL    MCH 27.3 26.1 - 32.9 PG    MCHC 32.5 31.4 - 35.0 g/dL    RDW 17.2 (H) 11.9 - 14.6 %    PLATELET 591 797 - 017 K/uL    MPV 9.7 9.4 - 12.3 FL    ABSOLUTE NRBC 0.00 0.0 - 0.2 K/uL    DF AUTOMATED      NEUTROPHILS 68 43 - 78 %    LYMPHOCYTES 18 13 - 44 %    MONOCYTES 7 4.0 - 12.0 %    EOSINOPHILS 1 0.5 - 7.8 %    BASOPHILS 1 0.0 - 2.0 %    IMMATURE GRANULOCYTES 5 0.0 - 5.0 %    ABS. NEUTROPHILS 5.6 1.7 - 8.2 K/UL    ABS. LYMPHOCYTES 1.5 0.5 - 4.6 K/UL    ABS. MONOCYTES 0.6 0.1 - 1.3 K/UL    ABS. EOSINOPHILS 0.1 0.0 - 0.8 K/UL    ABS. BASOPHILS 0.1 0.0 - 0.2 K/UL    ABS. IMM. GRANS.  0.4 0.0 - 0.5 K/UL All Micro Results     Procedure Component Value Units Date/Time    BLOOD CULTURE [[id number]]  (Abnormal) Collected: 11/17/21 1607    Order Status: Completed Specimen: Blood Updated: 11/22/21 0949     Special Requests: --        RIGHT  FOREARM       GRAM STAIN GRAM NEGATIVE RODS         ANAEROBIC BOTTLE POSITIVE               RESULTS VERIFIED, PHONED TO AND READ BACK BY Cori Zuniga RN @0259 [phone] BY RAJESH           Culture result:       ANAEROBIC GRAM NEGATIVE RODS                  Refer to Blood Culture ID Panel Accession  [id number]              SENT TO [facility] FOR TESTING on 11/22/21 [place]. CULTURE, BLOOD [[id number]] Collected: 11/19/21 0540    Order Status: Completed Specimen: Blood Updated: 11/22/21 0648     Special Requests: CENTRAL LINE        Culture result: NO GROWTH 3 DAYS       CULTURE, STOOL [[id number]] Collected: 11/18/21 1154    Order Status: Completed Specimen: Stool Updated: 11/21/21 0749     Special Requests: NO SPECIAL REQUESTS        Culture result:       No Salmonella, Shigella, or Ecoli 0157 isolated. CULTURE, URINE [[id number]] Collected: 11/17/21 1720    Order Status: Completed Specimen: Urine from Clean catch Updated: 11/20/21 0841     Special Requests: NO SPECIAL REQUESTS        Culture result: NO GROWTH 2 DAYS       BLOOD CULTURE [[id number]]  (Abnormal) Collected: 11/17/21 1651    Order Status: Completed Specimen: Blood Updated: 11/20/21 0749     Special Requests: --        NO SPECIAL REQUESTS  LEFT  FOREARM       GRAM STAIN GRAM POS COCCI IN CHAINS               AEROBIC AND ANAEROBIC BOTTLES                  RESULTS VERIFIED, PHONED TO AND READ BACK BY ELISEO CHO AT 0948 ON 11/18/2021, SH/AMM                  GRAM POS COCCI IN CHAINS RESULTS VERIFIED, PHONED TO AND READ BACK BY Onesimo Hoover RN [id number] ON [id number] [place]           Culture result:       ALPHA STREPTOCOCCUS, NOT S. PNEUMONIAE This organism may be indicative of culture contamination.  Clinical correlation needs to be evaluated as each case is unique. Refer to Blood Culture ID Panel Accession  [id number]      C. DIFFICILE AG & TOXIN A/B [[id number]] Collected: 11/18/21 1154    Order Status: Completed Specimen: Stool Updated: 11/19/21 1401     [address] ANTIGEN       C. DIFFICILE GDH ANTIGEN-NEGATIVE           C. difficile toxin       C. DIFFICILE TOXIN-NEGATIVE           PCR Reflex NOT APPLICABLE        INTERPRETATION       NEGATIVE FOR TOXIGENIC C. DIFFICILE           Clinical Consideration       NEGATIVE FOR TOXIGENIC C. DIFFICILE          BLOOD CULTURE ID PANEL [[id number]] Collected: 11/17/21 1607    Order Status: Completed Specimen: Blood Updated: 11/19/21 0707     Acc. no. from Micro Order [id number]     INTERPRETATION       Gram positive cocci. Identified by realtime PCR as Streptococcus species (not agalactiae, pyogenes, or pneumoniae). Comment: Consider discontinuation of IV vancomycin and using an anti-streptococcal beta-lactam (ceftriaxone/cefotaxime (peds))        Blood Culture PCR Testing       MULTIPLEX PCR NEGATIVE:  A negative FilmArray BCID result does not exclude the possibility of bloodstream infection. Charles Hillman [[id number]] Collected: 11/18/21 1154    Order Status: Completed Specimen: Feces from Stool Updated: 11/18/21 1257    BLOOD CULTURE ID PANEL [[id number]]  (Abnormal) Collected: 11/17/21 1651    Order Status: Completed Specimen: Blood Updated: 11/18/21 1219     Streptococcus Detected        Comment: RESULTS VERIFIED, PHONED TO AND READ BACK BY  ELISEO TAMMIE @ 5269 11/18/2021 SH/AMM           INTERPRETATION       Gram positive cocci. Identified by realtime PCR as Streptococcus species (not agalactiae, pyogenes, or pneumoniae).            Comment: Consider discontinuation of IV vancomycin and using an anti-streptococcal beta-lactam (ceftriaxone/cefotaxime (peds))       COVID-19 RAPID TEST [[id number]] Collected: 11/18/21 1154    Order Status: Completed Specimen: Nasopharyngeal Updated: 11/18/21 1216     Specimen source Nasopharyngeal        COVID-19 rapid test Not detected        Comment:      The specimen is NEGATIVE for SARS-CoV-2, the novel coronavirus associated with COVID-19. A negative result does not rule out COVID-19. This test has been authorized by the FDA under an Emergency Use Authorization (EUA) for use by authorized laboratories.         Fact sheet for Healthcare Providers: CarHounddate.co.nz  Fact sheet for Patients: SoleTrader.com.co.nz       Methodology: Isothermal Nucleic Acid Amplification               Current Facility-Administered Medications   Medication Dose Route Frequency    hydrALAZINE (APRESOLINE) 20 mg/mL injection 10 mg  10 mg IntraVENous Q4H PRN    metoclopramide HCl (REGLAN) injection 5 mg  5 mg IntraVENous Q6H    amoxicillin (AMOXIL) capsule 1,000 mg  1,000 mg Oral Q8H    promethazine (PHENERGAN) with saline injection 12.5 mg  12.5 mg IntraVENous Q4H PRN    ondansetron (ZOFRAN ODT) tablet 8 mg  8 mg Oral Q8H PRN    Or    ondansetron (ZOFRAN) injection 4 mg  4 mg IntraVENous Q6H PRN    pantoprazole (PROTONIX) tablet 40 mg  40 mg Oral ACB&D    morphine injection 0.5 mg  0.5 mg IntraVENous Q4H PRN    atorvastatin (LIPITOR) tablet 40 mg  40 mg Oral QHS    budesonide-formoteroL (SYMBICORT) 160-4.5 mcg/actuation HFA inhaler 2 Puff  2 Puff Inhalation BID RT    And    tiotropium bromide (SPIRIVA RESPIMAT) 2.5 mcg /actuation  2 Puff Inhalation DAILY    venlafaxine-SR (EFFEXOR-XR) capsule 37.5 mg  37.5 mg Oral QHS    sodium chloride (NS) flush 5-10 mL  5-10 mL IntraVENous Q8H    sodium chloride (NS) flush 5-10 mL  5-10 mL IntraVENous PRN    0.9% sodium chloride infusion 250 mL  250 mL IntraVENous PRN    albuterol (PROVENTIL HFA, VENTOLIN HFA, PROAIR HFA) inhaler 2 Puff  2 Puff Inhalation Q6H PRN    calcium-vitamin D (OS-ZACHARIAH +D3) 250 mg-125 unit per tablet 1 Tablet  1 Tablet Oral DAILY    hydroCHLOROthiazide (HYDRODIURIL) tablet 12.5 mg  12.5 mg Oral DAILY    HYDROcodone-acetaminophen (NORCO) 5-325 mg per tablet 1 Tablet  1 Tablet Oral Q6H PRN    venlafaxine-SR (EFFEXOR-XR) capsule 75 mg  75 mg Oral BID    verapamil ER (CALAN-SR) tablet 240 mg  240 mg Oral DAILY WITH BREAKFAST    sodium chloride (NS) flush 5-40 mL  5-40 mL IntraVENous Q8H    sodium chloride (NS) flush 5-40 mL  5-40 mL IntraVENous PRN    acetaminophen (TYLENOL) tablet 650 mg  650 mg Oral Q6H PRN    Or    acetaminophen (TYLENOL) suppository 650 mg  650 mg Rectal Q6H PRN    polyethylene glycol (MIRALAX) packet 17 g  17 g Oral DAILY PRN    melatonin tablet 3 mg  3 mg Oral QHS    OLANZapine (ZyPREXA zydis) disintegrating tablet 5 mg  5 mg Oral QHS PRN    sucralfate (CARAFATE) tablet 1 g  1 g Oral AC&HS       Other Studies:  No results found for this visit on 11/17/21. XR ABD (KUB)    Result Date: 11/22/2021  Abdomen x-ray supine view. HISTORY: Abdominal pain. COMPARISON: 11/18/2021 FINDINGS: Nonspecific bowel gas pattern. Nodes organomegaly, abnormal calcification or ascites. Contrast is present within a normal appendix. No acute abnormalities. Part of this note was written by using a voice dictation software and the note has been proof read but may still contain some grammatical/other typographical errors.     Signed:  Shannan Savage DO

## 2021-11-23 NOTE — PROGRESS NOTES
General Surgery Progress Note    11/23/2021    Admit Date: 11/17/2021    Subjective:     Surgery  The patient reports nausea. Less abdominal pain. Continues to move her bowels without melena. Objective:     Visit Vitals  /82 (BP 1 Location: Right upper arm, BP Patient Position: Lying)   Pulse (!) 112   Temp 97.7 °F (36.5 °C)   Resp 16   Ht 5' 2\" (1.575 m)   Wt 114 lb 6.4 oz (51.9 kg)   SpO2 96%   BMI 20.92 kg/m²         Intake/Output Summary (Last 24 hours) at 11/23/2021 0845  Last data filed at 11/23/2021 0880  Gross per 24 hour   Intake 170 ml   Output 200 ml   Net -30 ml        EXAM:  ABD soft, very mild LLQ tenderness, no rebound or guarding. Data Review    Recent Results (from the past 24 hour(s))   METABOLIC PANEL, BASIC    Collection Time: 11/23/21  3:58 AM   Result Value Ref Range    Sodium 140 136 - 145 mmol/L    Potassium 3.5 3.5 - 5.1 mmol/L    Chloride 105 98 - 107 mmol/L    CO2 22 21 - 32 mmol/L    Anion gap 13 7 - 16 mmol/L    Glucose 73 65 - 100 mg/dL    BUN 9 8 - 23 MG/DL    Creatinine 0.48 (L) 0.6 - 1.0 MG/DL    GFR est AA >60 >60 ml/min/1.73m2    GFR est non-AA >60 >60 ml/min/1.73m2    Calcium 8.7 8.3 - 10.4 MG/DL   CBC WITH AUTOMATED DIFF    Collection Time: 11/23/21  3:58 AM   Result Value Ref Range    WBC 8.2 4.3 - 11.1 K/uL    RBC 5.34 (H) 4.05 - 5.2 M/uL    HGB 14.6 11.7 - 15.4 g/dL    HCT 44.9 35.8 - 46.3 %    MCV 84.1 79.6 - 97.8 FL    MCH 27.3 26.1 - 32.9 PG    MCHC 32.5 31.4 - 35.0 g/dL    RDW 17.2 (H) 11.9 - 14.6 %    PLATELET 613 612 - 414 K/uL    MPV 9.7 9.4 - 12.3 FL    ABSOLUTE NRBC 0.00 0.0 - 0.2 K/uL    DF AUTOMATED      NEUTROPHILS 68 43 - 78 %    LYMPHOCYTES 18 13 - 44 %    MONOCYTES 7 4.0 - 12.0 %    EOSINOPHILS 1 0.5 - 7.8 %    BASOPHILS 1 0.0 - 2.0 %    IMMATURE GRANULOCYTES 5 0.0 - 5.0 %    ABS. NEUTROPHILS 5.6 1.7 - 8.2 K/UL    ABS. LYMPHOCYTES 1.5 0.5 - 4.6 K/UL    ABS. MONOCYTES 0.6 0.1 - 1.3 K/UL    ABS. EOSINOPHILS 0.1 0.0 - 0.8 K/UL    ABS.  BASOPHILS 0.1 0.0 - 0.2 K/UL    ABS. IMM. GRANS. 0.4 0.0 - 0.5 K/UL        Hospital Problems  Date Reviewed: 11/17/2021          Codes Class Noted POA    Acute blood loss anemia ICD-10-CM: D62  ICD-9-CM: 285.1  11/17/2021 Yes        Elevated troponin ICD-10-CM: R77.8  ICD-9-CM: 790.6  11/20/2021 Unknown        Severe protein-calorie malnutrition ([facility] ICD-10-CM: E43  ICD-9-CM: 262  11/18/2021 Yes        UTI (urinary tract infection) ICD-10-CM: N39.0  ICD-9-CM: 599.0  11/18/2021 Unknown        Streptococcal bacteremia ICD-10-CM: R78.81, B95.5  ICD-9-CM: 790.7, 041.00  11/18/2021 Unknown        * (Principal) Severe sepsis with septic shock ([facility] 75.) ICD-10-CM: A41.9, R65.21  ICD-9-CM: 038.9, 995.92, 785.52  11/17/2021 Yes        Body mass index (BMI) of 21.0 to 21.9 in adult ICD-10-CM: Z68.21  ICD-9-CM: V85.1  11/17/2021 Yes        Colitis, indeterminate ICD-10-CM: K52.3  ICD-9-CM: 558.9  11/17/2021 Unknown        Compression fracture of T9 vertebra (HCC) (Chronic) ICD-10-CM: P21.228Q  ICD-9-CM: 805.2  10/18/2021 Yes        Severe persistent asthma without complication CKB-00-VL: N47.17  ICD-9-CM: 493.90  5/28/2021 Yes        Osteoporosis ICD-10-CM: M81.0  ICD-9-CM: 733.00  10/25/2016 Yes        Primary hypertension (Chronic) ICD-10-CM: I10  ICD-9-CM: 401.9  8/16/2016 Yes    Overview Signed 11/17/2021  9:48 PM by Parish Ferris MD     Goal < 140/80             Hiatal hernia with GERD and esophagitis ICD-10-CM: K44.9, K21.00  ICD-9-CM: 553.3, 530.11  8/16/2016 Yes        Gastroesophageal reflux disease with esophagitis (Chronic) ICD-10-CM: K21.00  ICD-9-CM: 530.11  11/1/2015 Yes        Mood disorder ([facility] ICD-10-CM: F39  ICD-9-CM: 296.90  11/1/2015 Yes          1. Antibioitics as per ID  2. Anti-emetics  3. No surgery planned.   Zeeshan Lopez MD.

## 2021-11-23 NOTE — PROGRESS NOTES
PT complains of N/V during day shift. PT still complains of N/V despite Zofran and Phenergan. Pt also verbalized that she is not able to keep oral medications down. BP increased from lastnight as well as pulse. Dr. Aleta Collins notified, one time dose of zofran 4 mg IV ordered, as well as increased dosage of PO Zofran 8 mg    2222  Informed by pt that she does not want to take her PO medications tonight in fear of making her sick and vomit. Dr. Aleta Collins notified of pt refusal of medications. Provider ordered Rocephin 2g.

## 2021-11-23 NOTE — PROGRESS NOTES
No new discharge needs identified. Pt reports less abdominal pain but is still nauseous. Will continue to monitor. Care Management Interventions  PCP Verified by CM: Yes Marianne Maya)  Mode of Transport at Discharge:  Other (see comment) (Family)  Transition of Care Consult (CM Consult): Discharge Planning  Discharge Durable Medical Equipment: No  Physical Therapy Consult: Yes  Occupational Therapy Consult: Yes  Speech Therapy Consult: No  Support Systems: Spouse/Significant Other, Child(kaleb), Other Family Member(s), [employer], Friend/Neighbor  Confirm Follow Up Transport: Family  The Plan for Transition of Care is Related to the Following Treatment Goals : Return home and back to her baseline  The Patient and/or Patient Representative was Provided with a Choice of Provider and Agrees with the Discharge Plan?: Yes  Name of the Patient Representative Who was Provided with a Choice of Provider and Agrees with the Discharge Plan: Patient  Freedom of Choice List was Provided with Basic Dialogue that Supports the Patient's Individualized Plan of Care/Goals, Treatment Preferences and Shares the Quality Data Associated with the Providers?: Yes   Resource Information Provided?: No  Discharge Location  Discharge Placement: Home with home health

## 2021-11-24 VITALS
TEMPERATURE: 97.7 F | HEIGHT: 62 IN | HEART RATE: 101 BPM | BODY MASS INDEX: 20.56 KG/M2 | OXYGEN SATURATION: 97 % | WEIGHT: 111.7 LBS | SYSTOLIC BLOOD PRESSURE: 138 MMHG | DIASTOLIC BLOOD PRESSURE: 79 MMHG | RESPIRATION RATE: 16 BRPM

## 2021-11-24 PROBLEM — R78.81 BACTEREMIA: Status: ACTIVE | Noted: 2021-11-24

## 2021-11-24 LAB
ANION GAP SERPL CALC-SCNC: 10 MMOL/L (ref 7–16)
ATRIAL RATE: 100 BPM
BACTERIA SPEC CULT: NORMAL
BASOPHILS # BLD: 0.1 K/UL (ref 0–0.2)
BASOPHILS NFR BLD: 1 % (ref 0–2)
BUN SERPL-MCNC: 12 MG/DL (ref 8–23)
CALCIUM SERPL-MCNC: 8.3 MG/DL (ref 8.3–10.4)
CALCULATED P AXIS, ECG09: 42 DEGREES
CALCULATED R AXIS, ECG10: -30 DEGREES
CALCULATED T AXIS, ECG11: 78 DEGREES
CHLORIDE SERPL-SCNC: 104 MMOL/L (ref 98–107)
CO2 SERPL-SCNC: 26 MMOL/L (ref 21–32)
CREAT SERPL-MCNC: 0.57 MG/DL (ref 0.6–1)
DIAGNOSIS, 93000: NORMAL
DIFFERENTIAL METHOD BLD: ABNORMAL
EOSINOPHIL # BLD: 0.6 K/UL (ref 0–0.8)
EOSINOPHIL NFR BLD: 5 % (ref 0.5–7.8)
ERYTHROCYTE [DISTWIDTH] IN BLOOD BY AUTOMATED COUNT: 17.2 % (ref 11.9–14.6)
GLUCOSE SERPL-MCNC: 78 MG/DL (ref 65–100)
HCT VFR BLD AUTO: 42.4 % (ref 35.8–46.3)
HGB BLD-MCNC: 14.1 G/DL (ref 11.7–15.4)
IMM GRANULOCYTES # BLD AUTO: 0.4 K/UL (ref 0–0.5)
IMM GRANULOCYTES NFR BLD AUTO: 4 % (ref 0–5)
LYMPHOCYTES # BLD: 2.5 K/UL (ref 0.5–4.6)
LYMPHOCYTES NFR BLD: 22 % (ref 13–44)
MCH RBC QN AUTO: 28.5 PG (ref 26.1–32.9)
MCHC RBC AUTO-ENTMCNC: 33.3 G/DL (ref 31.4–35)
MCV RBC AUTO: 85.7 FL (ref 79.6–97.8)
MONOCYTES # BLD: 1 K/UL (ref 0.1–1.3)
MONOCYTES NFR BLD: 9 % (ref 4–12)
NEUTS SEG # BLD: 6.6 K/UL (ref 1.7–8.2)
NEUTS SEG NFR BLD: 59 % (ref 43–78)
NRBC # BLD: 0 K/UL (ref 0–0.2)
P-R INTERVAL, ECG05: 150 MS
PLATELET # BLD AUTO: 170 K/UL (ref 150–450)
PLATELET COMMENTS,PCOM: ADEQUATE
PMV BLD AUTO: 9.5 FL (ref 9.4–12.3)
POTASSIUM SERPL-SCNC: 3.1 MMOL/L (ref 3.5–5.1)
Q-T INTERVAL, ECG07: 354 MS
QRS DURATION, ECG06: 88 MS
QTC CALCULATION (BEZET), ECG08: 456 MS
RBC # BLD AUTO: 4.95 M/UL (ref 4.05–5.2)
RBC MORPH BLD: ABNORMAL
SERVICE CMNT-IMP: NORMAL
SODIUM SERPL-SCNC: 140 MMOL/L (ref 136–145)
VENTRICULAR RATE, ECG03: 100 BPM
WBC # BLD AUTO: 11.2 K/UL (ref 4.3–11.1)
WBC MORPH BLD: ABNORMAL

## 2021-11-24 PROCEDURE — 74011250637 HC RX REV CODE- 250/637: Performed by: NURSE PRACTITIONER

## 2021-11-24 PROCEDURE — 74011250637 HC RX REV CODE- 250/637: Performed by: INTERNAL MEDICINE

## 2021-11-24 PROCEDURE — 94640 AIRWAY INHALATION TREATMENT: CPT

## 2021-11-24 PROCEDURE — 80048 BASIC METABOLIC PNL TOTAL CA: CPT

## 2021-11-24 PROCEDURE — 74011250637 HC RX REV CODE- 250/637: Performed by: FAMILY MEDICINE

## 2021-11-24 PROCEDURE — 93005 ELECTROCARDIOGRAM TRACING: CPT | Performed by: INTERNAL MEDICINE

## 2021-11-24 PROCEDURE — 2709999900 HC NON-CHARGEABLE SUPPLY

## 2021-11-24 PROCEDURE — 85025 COMPLETE CBC W/AUTO DIFF WBC: CPT

## 2021-11-24 PROCEDURE — 94760 N-INVAS EAR/PLS OXIMETRY 1: CPT

## 2021-11-24 PROCEDURE — 74011250636 HC RX REV CODE- 250/636: Performed by: INTERNAL MEDICINE

## 2021-11-24 RX ORDER — AMOXICILLIN 500 MG/1
1000 CAPSULE ORAL EVERY 8 HOURS
Qty: 42 CAPSULE | Refills: 0 | Status: SHIPPED | OUTPATIENT
Start: 2021-11-24 | End: 2021-12-01

## 2021-11-24 RX ORDER — POTASSIUM CHLORIDE 20 MEQ/1
40 TABLET, EXTENDED RELEASE ORAL
Status: COMPLETED | OUTPATIENT
Start: 2021-11-24 | End: 2021-11-24

## 2021-11-24 RX ORDER — ONDANSETRON 4 MG/1
4 TABLET, ORALLY DISINTEGRATING ORAL
Qty: 30 TABLET | Refills: 0 | Status: SHIPPED | OUTPATIENT
Start: 2021-11-24

## 2021-11-24 RX ADMIN — PANTOPRAZOLE SODIUM 40 MG: 40 TABLET, DELAYED RELEASE ORAL at 05:51

## 2021-11-24 RX ADMIN — VENLAFAXINE HYDROCHLORIDE 75 MG: 75 CAPSULE, EXTENDED RELEASE ORAL at 08:05

## 2021-11-24 RX ADMIN — SUCRALFATE 1 G: 1 TABLET ORAL at 05:51

## 2021-11-24 RX ADMIN — ONDANSETRON 8 MG: 8 TABLET, ORALLY DISINTEGRATING ORAL at 05:54

## 2021-11-24 RX ADMIN — LORAZEPAM 0.5 MG: 0.5 TABLET ORAL at 08:05

## 2021-11-24 RX ADMIN — AMOXICILLIN 1000 MG: 500 CAPSULE ORAL at 05:51

## 2021-11-24 RX ADMIN — Medication 5 ML: at 05:52

## 2021-11-24 RX ADMIN — METOCLOPRAMIDE HYDROCHLORIDE 5 MG: 5 INJECTION INTRAMUSCULAR; INTRAVENOUS at 11:31

## 2021-11-24 RX ADMIN — METOCLOPRAMIDE HYDROCHLORIDE 5 MG: 5 INJECTION INTRAMUSCULAR; INTRAVENOUS at 00:05

## 2021-11-24 RX ADMIN — SUCRALFATE 1 G: 1 TABLET ORAL at 11:31

## 2021-11-24 RX ADMIN — HYDROCHLOROTHIAZIDE 12.5 MG: 25 TABLET ORAL at 08:04

## 2021-11-24 RX ADMIN — VERAPAMIL HYDROCHLORIDE 240 MG: 240 TABLET, FILM COATED, EXTENDED RELEASE ORAL at 08:05

## 2021-11-24 RX ADMIN — TIOTROPIUM BROMIDE INHALATION SPRAY 2 PUFF: 3.12 SPRAY, METERED RESPIRATORY (INHALATION) at 07:41

## 2021-11-24 RX ADMIN — BUDESONIDE AND FORMOTEROL FUMARATE DIHYDRATE 2 PUFF: 160; 4.5 AEROSOL RESPIRATORY (INHALATION) at 07:42

## 2021-11-24 RX ADMIN — METOCLOPRAMIDE HYDROCHLORIDE 5 MG: 5 INJECTION INTRAMUSCULAR; INTRAVENOUS at 05:55

## 2021-11-24 RX ADMIN — POTASSIUM CHLORIDE 40 MEQ: 20 TABLET, EXTENDED RELEASE ORAL at 08:04

## 2021-11-24 NOTE — ROUTINE PROCESS
Bedside and Verbal shift change report given to [facility] (oncoming nurse) by self (offgoing nurse).  Report included the following information SBAR, Kardex, Intake/Output, MAR, Recent Results and Cardiac Rhythm ST.

## 2021-11-24 NOTE — PROGRESS NOTES
General Surgery Progress Note    11/24/2021    Admit Date: 11/17/2021    Subjective:     Surgery  The patient says she feels \"better\" today having been able to eat after receiving Zofran. Objective:     Visit Vitals  BP (!) 147/81 (BP 1 Location: Right arm, BP Patient Position: At rest)   Pulse 99   Temp 98 °F (36.7 °C)   Resp 15   Ht 5' 2\" (1.575 m)   Wt 111 lb 11.2 oz (50.7 kg)   SpO2 95%   BMI 20.43 kg/m²         Intake/Output Summary (Last 24 hours) at 11/24/2021 0957  Last data filed at 11/24/2021 0926  Gross per 24 hour   Intake 230 ml   Output 0 ml   Net 230 ml        EXAM:  ABD soft, no significant lower abdominal tenderness to palpation, active BS's. Data Review    Recent Results (from the past 24 hour(s))   HGB & HCT    Collection Time: 11/23/21  4:05 PM   Result Value Ref Range    HGB 14.5 11.7 - 15.4 g/dL    HCT 44.2 35.8 - 67.4 %   METABOLIC PANEL, BASIC    Collection Time: 11/24/21  4:13 AM   Result Value Ref Range    Sodium 140 136 - 145 mmol/L    Potassium 3.1 (L) 3.5 - 5.1 mmol/L    Chloride 104 98 - 107 mmol/L    CO2 26 21 - 32 mmol/L    Anion gap 10 7 - 16 mmol/L    Glucose 78 65 - 100 mg/dL    BUN 12 8 - 23 MG/DL    Creatinine 0.57 (L) 0.6 - 1.0 MG/DL    GFR est AA >60 >60 ml/min/1.73m2    GFR est non-AA >60 >60 ml/min/1.73m2    Calcium 8.3 8.3 - 10.4 MG/DL   CBC WITH AUTOMATED DIFF    Collection Time: 11/24/21  4:13 AM   Result Value Ref Range    WBC 11.2 (H) 4.3 - 11.1 K/uL    RBC 4.95 4.05 - 5.2 M/uL    HGB 14.1 11.7 - 15.4 g/dL    HCT 42.4 35.8 - 46.3 %    MCV 85.7 79.6 - 97.8 FL    MCH 28.5 26.1 - 32.9 PG    MCHC 33.3 31.4 - 35.0 g/dL    RDW 17.2 (H) 11.9 - 14.6 %    PLATELET 662 257 - 227 K/uL    MPV 9.5 9.4 - 12.3 FL    ABSOLUTE NRBC 0.00 0.0 - 0.2 K/uL    NEUTROPHILS 59 43 - 78 %    LYMPHOCYTES 22 13 - 44 %    MONOCYTES 9 4.0 - 12.0 %    EOSINOPHILS 5 0.5 - 7.8 %    BASOPHILS 1 0.0 - 2.0 %    IMMATURE GRANULOCYTES 4 0.0 - 5.0 %    ABS. NEUTROPHILS 6.6 1.7 - 8.2 K/UL    ABS. LYMPHOCYTES 2.5 0.5 - 4.6 K/UL    ABS. MONOCYTES 1.0 0.1 - 1.3 K/UL    ABS. EOSINOPHILS 0.6 0.0 - 0.8 K/UL    ABS. BASOPHILS 0.1 0.0 - 0.2 K/UL    ABS. IMM. GRANS. 0.4 0.0 - 0.5 K/UL    RBC COMMENTS NORMOCYTIC/NORMOCHROMIC      WBC COMMENTS MODERATE      PLATELET COMMENTS ADEQUATE      [facility] AUTOMATED          Hospital Problems  Date Reviewed: 11/17/2021          Codes Class Noted POA    Acute blood loss anemia ICD-10-CM: D62  ICD-9-CM: 285.1  11/17/2021 Yes        Elevated troponin ICD-10-CM: R77.8  ICD-9-CM: 790.6  11/20/2021 Unknown        Severe protein-calorie malnutrition ([facility] ICD-10-CM: E43  ICD-9-CM: 262  11/18/2021 Yes        UTI (urinary tract infection) ICD-10-CM: N39.0  ICD-9-CM: 599.0  11/18/2021 Unknown        Streptococcal bacteremia ICD-10-CM: R78.81, B95.5  ICD-9-CM: 790.7, 041.00  11/18/2021 Unknown        * (Principal) Severe sepsis with septic shock ([facility] 75.) ICD-10-CM: A41.9, R65.21  ICD-9-CM: 038.9, 995.92, 785.52  11/17/2021 Yes        Body mass index (BMI) of 21.0 to 21.9 in adult ICD-10-CM: Z68.21  ICD-9-CM: V85.1  11/17/2021 Yes        Colitis, indeterminate ICD-10-CM: K52.3  ICD-9-CM: 558.9  11/17/2021 Unknown        Compression fracture of T9 vertebra (HCC) (Chronic) ICD-10-CM: O72.447Y  ICD-9-CM: 805.2  10/18/2021 Yes        Severe persistent asthma without complication YOS-54-SB: B93.60  ICD-9-CM: 493.90  5/28/2021 Yes        Osteoporosis ICD-10-CM: M81.0  ICD-9-CM: 733.00  10/25/2016 Yes        Primary hypertension (Chronic) ICD-10-CM: I10  ICD-9-CM: 401.9  8/16/2016 Yes    Overview Signed 11/17/2021  9:48 PM by Winford Lennox, MD     Goal < 140/80             Hiatal hernia with GERD and esophagitis ICD-10-CM: K44.9, K21.00  ICD-9-CM: 553.3, 530.11  8/16/2016 Yes        Gastroesophageal reflux disease with esophagitis (Chronic) ICD-10-CM: K21.00  ICD-9-CM: 530.11  11/1/2015 Yes        Mood disorder ([facility] ICD-10-CM: F39  ICD-9-CM: 296.90  11/1/2015 Yes          1. No surgery planned.   2. Likely discharge later today.   Magnolia Rogel MD.

## 2021-11-24 NOTE — ROUTINE PROCESS
Discharge instructions reviewed with patient and patient spouse. Prescriptions given and med info sheets provided for all new medications. Opportunity for questions provided. Patient and spouse voiced understanding of all discharge instructions. IVs removed, TELE box returned.

## 2021-11-24 NOTE — DISCHARGE INSTRUCTIONS
Patient Education        Sepsis: Care Instructions  Overview     Sepsis is an intense reaction to an infection. It can cause damage to the body and lead to dangerously low blood pressure. You may have inflammation across large areas of your body. It can damage tissue and even go deep into your organs. Infections that can lead to sepsis include:  · A skin infection such as from a cut. · A lung infection like pneumonia. · A kidney infection. · A gut infection such as E. coli. Sepsis is treated with antibiotics. Your doctor will try to find the infection that led to sepsis. Kaylee Stiles also get fluids through a vein (IV). Machines will track your vital signs, including temperature, blood pressure, breathing rate, and pulse rate. The physical and mental effects of sepsis may not be seen for several weeks after treatment. And they may last long after the infection is gone. Physical problems may include:  · Feeling weak and tired. · Feeling out of breath. · Aches and pains. · Problems with getting around. · Trouble falling asleep or staying asleep. · Dry and itchy skin, brittle nails, and hair loss. Some of these effects can lead to problems with your organs or your feet, legs, hands, or arms. Sepsis can also affect your mind and emotions. Problems may include:  · Self-doubt. · Anxiety. · Nightmares. · Depression and mood problems. · Wanting to avoid other people. · Confusion. · Flashbacks and bad memories of your illness. It's important to care for yourself and try to avoid infections. This may lower your risk of getting sepsis again. Follow-up care is a key part of your treatment and safety. Be sure to make and go to all appointments, and call your doctor if you are having problems. It's also a good idea to know your test results and keep a list of the medicines you take. How can you care for yourself at home? · Be safe with medicines. Take your medicines exactly as prescribed.  Call your doctor if you think you are having a problem with your medicine. · If your doctor prescribed antibiotics, take them as directed. Do not stop taking them just because you feel better. You need to take the full course of antibiotics. · Help prevent infections that could again lead to sepsis. ? Try to avoid colds and flu. If you must be around people who have a cold or the flu, wash your hands often. And get a flu vaccine every year. ? Ask your doctor if you need a pneumococcal vaccine (to prevent pneumonia, meningitis, and other infections). If you have had one before, ask your doctor if you need another dose. ? Clean any wounds or scrapes. · Do not smoke or use other tobacco products. When you quit smoking, you are less likely to get a cold, the flu, bronchitis, and pneumonia. If you need help quitting, talk to your doctor about stop-smoking programs and medicines. These can increase your chances of quitting for good. · Drink plenty of fluids to prevent dehydration. Choose water and other clear liquids until you feel better. If you have kidney, heart, or liver disease and have to limit fluids, talk with your doctor before you increase the amount of fluids you drink. · Eat a healthy diet. Include fruits, vegetables, and whole grains in your diet every day. · If your doctor recommends it, try doing some physical activity. Walking is a good choice. Bit by bit, increase the amount you walk every day. · Talk with your family and friends about your challenges. Ask for help if you need it. · Keep a journal. Writing down your thoughts and feelings can help reduce your stress. · Ask family members to fill in gaps in your memory. · Set small goals for yourself that you can reach. Reward yourself for success. When should you call for help? Call 911  anytime you think you may need emergency care. For example, call if:    · You passed out (lost consciousness).    Call your doctor now or seek immediate medical care if:    · You have symptoms such as:  ? Shortness of breath. ? Feeling very sick. ? Severe pain. ? A fast heart rate. ? Cool, pale, or clammy skin. ? Feeling confused. ? Feeling very sleepy, or you are hard to wake up.     · You are dizzy or lightheaded, or you feel like you may faint.     · You have a fever or chills. Watch closely for changes in your health, and be sure to contact your doctor if:    · You do not get better as expected. Where can you learn more? Go to http://hannah-terra.info/  Enter T383 in the search box to learn more about \"Sepsis: Care Instructions. \"  Current as of: July 1, 2021               Content Version: 13.0  © 7204-6592 [employer]. Care instructions adapted under license by [employer] (which disclaims liability or warranty for this information). If you have questions about a medical condition or this instruction, always ask your healthcare professional. [place] [address] any warranty or liability for your use of this information. Patient Education        Hiatal Hernia: Care Instructions  Your Care Instructions  A hiatal hernia occurs when part of the stomach bulges into the chest cavity. A hiatal hernia may allow stomach acid and juices to back up into the esophagus (acid reflux). This can cause a feeling of burning, warmth, heat, or pain behind the breastbone. This feeling may often occur after you eat, soon after you lie down, or when you bend forward, and it may come and go. You also may have a sour taste in your mouth. These symptoms are commonly known as heartburn or reflux. But not all hiatal hernias cause symptoms. Follow-up care is a key part of your treatment and safety. Be sure to make and go to all appointments, and call your doctor if you are having problems. It's also a good idea to know your test results and keep a list of the medicines you take. How can you care for yourself at home?   · Take your medicines exactly as prescribed. Call your doctor if you think you are having a problem with your medicine. · Do not take aspirin or other nonsteroidal anti-inflammatory drugs (NSAIDs), such as ibuprofen (Advil, Motrin) or naproxen (Aleve), unless your doctor says it is okay. Ask your doctor what you can take for pain. · Your doctor may recommend over-the-counter medicine. For mild or occasional indigestion, antacids such as Tums, Gaviscon, Maalox, or Mylanta may help. Your doctor also may recommend over-the-counter acid reducers, such as famotidine (Pepcid AC), cimetidine (Tagamet HB), or omeprazole (Prilosec). Read and follow all instructions on the label. If you use these medicines often, talk with your doctor. · Change your eating habits. ? It's best to eat several small meals instead of two or three large meals. ? After you eat, wait 2 to 3 hours before you lie down. Late-night snacks aren't a good idea. ? Chocolate, mint, and alcohol can make heartburn worse. They relax the valve between the esophagus and the stomach. ? Spicy foods, foods that have a lot of acid (like tomatoes and oranges), and coffee can make heartburn symptoms worse in some people. If your symptoms are worse after you eat a certain food, you may want to stop eating that food to see if your symptoms get better. · Do not smoke or chew tobacco.  · If you get heartburn at night, raise the head of your bed 6 to 8 inches by putting the frame on blocks or placing a foam wedge under the head of your mattress. (Adding extra pillows does not work.)  · Do not wear tight clothing around your middle. · Lose weight if you need to. Losing just 5 to 10 pounds can help. When should you call for help? Call your doctor now or seek immediate medical care if:    · You have new or worse belly pain.     · You are vomiting.    Watch closely for changes in your health, and be sure to contact your doctor if:    · You have new or worse symptoms of indigestion.     · You have trouble or pain swallowing.     · You are losing weight.     · You do not get better as expected. Where can you learn more? Go to http://www.[employer].com/  Enter T074 in the search box to learn more about \"Hiatal Hernia: Care Instructions. \"  Current as of: February 10, 2021               Content Version: 13.0  © 2006-2021 [employer]. Care instructions adapted under license by [employer] (which disclaims liability or warranty for this information). If you have questions about a medical condition or this instruction, always ask your healthcare professional. [place] [address] any warranty or liability for your use of this information. Patient Education   Amoxicillin (By mouth)   Amoxicillin ([id number]-huy-n-STEPH-in)  Treats infections or stomach ulcers. This medicine is a penicillin antibiotic. Brand Name(s): Moxatag, Omeclamox-Shemar, Prevpac, Triple Therapy   There may be other brand names for this medicine. When This Medicine Should Not Be Used: This medicine is not right for everyone. You should not use it if you had an allergic reaction to amoxicillin, any type of penicillin, or a cephalosporin antibiotic. How to Use This Medicine:   Capsule, Liquid, Tablet, Chewable Tablet, Long Acting Tablet  · Your doctor will tell you how much medicine to use. Do not use more than directed. · Chewable tablet: You must chew the tablet before you swallow it. You may crush the tablet and mix the medicine with a small amount of food to make it easier to swallow. · Oral liquid: Shake well just before each use. · Measure the oral liquid medicine with a marked measuring spoon, oral syringe, or medicine cup. You may mix the oral liquid with a baby formula, milk, fruit juice, water, ginger ale, or another cold drink. Be sure your child drinks all of the mixture right away.   · Tablet for suspension: Place the tablet in a small drinking glass, and add 2 teaspoons of water. Do not use any other liquid. Gently stir or swirl the water in the glass until the tablet is completely dissolved. Drink all of this mixture right away. Add more water to the glass and drink all of it to make sure you get all of the medicine. Do not chew or swallow the tablet for suspension. · Take all of the medicine in your prescription to clear up your infection, even if you feel better after the first few doses. · Take a dose as soon as you remember. If it is almost time for your next dose, wait until then and take a regular dose. Do not take extra medicine to make up for a missed dose. · Store the tablets, capsules, and tablets for suspension at room temperature, away from heat, moisture, and direct light. · Store the oral liquid in the refrigerator. Do not freeze. Throw away any unused medicine after 14 days. Drugs and Foods to Avoid:   Ask your doctor or pharmacist before using any other medicine, including over-the-counter medicines, vitamins, and herbal products. · Some medicines can affect how amoxicillin works. Tell your doctor if you are also using any of the following:   ¨ Allopurinol  ¨ Probenecid  ¨ Birth control pills  ¨ A blood thinner  Warnings While Using This Medicine:   · Tell your doctor if you are pregnant or breastfeeding, or if you have kidney disease, allergies, or a condition called phenylketonuria (PKU). Tell your doctor if you are on dialysis. · This medicine can cause diarrhea. Call your doctor if the diarrhea becomes severe, does not stop, or is bloody. Do not take any medicine to stop diarrhea until you have talked to your doctor. Diarrhea can occur 2 months or more after you stop taking this medicine. · Tell any doctor or dentist who treats you that you are using this medicine. This medicine may affect certain medical test results. · Call your doctor if your symptoms do not improve or if they get worse.   · Use this medicine to treat only the infection your doctor has prescribed it for. Do not use this medicine for any infection or condition that has not been checked by a doctor. This medicine will not treat the flu or the common cold. · Keep all medicine out of the reach of children. Never share your medicine with anyone. Possible Side Effects While Using This Medicine:   Call your doctor right away if you notice any of these side effects:  · Allergic reaction: Itching or hives, swelling in your face or hands, swelling or tingling in your mouth or throat, chest tightness, trouble breathing  · Blistering, peeling, or red skin rash  · Diarrhea that may contain blood, stomach cramps, fever  If you notice these less serious side effects, talk with your doctor:   · Mild diarrhea, nausea, or vomiting  · Mild skin rash  If you notice other side effects that you think are caused by this medicine, tell your doctor. Call your doctor for medical advice about side effects. You may report side effects to FDA at 6-824-FDA-6797  © 2017 [facility] [place] Information is for End User's use only and may not be sold, redistributed or otherwise used for commercial purposes. The above information is an  only. It is not intended as medical advice for individual conditions or treatments. Talk to your doctor, nurse or pharmacist before following any medical regimen to see if it is safe and effective for you. Patient Education   Ondansetron (By mouth, Into the mouth)   Ondansetron (on-DAN-se-jenn)  Prevents nausea and vomiting. Brand Name(s): Zofran, Zofran ODT, Zuplenz   There may be other brand names for this medicine. When This Medicine Should Not Be Used: This medicine is not right for everyone. Do not use it if you had an allergic reaction to ondansetron. How to Use This Medicine: Thin Sheet, Liquid, Tablet, Dissolving Tablet  · Your doctor will tell you how much medicine to use. Do not use more than directed.   · Measure the oral liquid medicine with a marked measuring spoon, oral syringe, or medicine cup. · To use the disintegrating tablet:   ¨ Do not open the blister pack that contains the tablet until you are ready to take it. ¨ Make sure your hands are dry. Peel back the foil, then remove the tablet from the blister pack. Do not push the tablet through the foil. ¨ Place the tablet on top of your tongue where it will dissolve in seconds. After the tablet has melted, swallow or take a sip of water. · To use the soluble film:   ¨ Make sure your hands are clean and dry. ¨ Fold the pouch along the dotted line. ¨ While still folded, tear the pouch carefully along the edge. Remove the film from the pouch. ¨ Place the film on top of your tongue. It will dissolve in 4 to 20 seconds. Do not chew or swallow the film whole. ¨ After the film has dissolved, you may swallow with or without water. · Read and follow the patient instructions that come with this medicine. Talk to your doctor or pharmacist if you have any questions. · Missed dose: Take a dose as soon as you remember. If it is almost time for your next dose, wait until then and take a regular dose. Do not take extra medicine to make up for a missed dose. · Store the medicine in a closed container at room temperature, away from heat, moisture, and direct light. Keep the soluble film in the foil pouch until you ready to use it. Drugs and Foods to Avoid:   Ask your doctor or pharmacist before using any other medicine, including over-the-counter medicines, vitamins, and herbal products. · Do not use this medicine together with apomorphine. · Some medicines may affect how ondansetron works. Tell your doctor if you are using tramadol, diuretics (water pills), or any other medicine for nausea and vomiting.   Warnings While Using This Medicine:   · Tell your doctor if you are pregnant or breastfeeding, or if you have liver disease, congestive heart failure, heart rhythm problems (such as prolonged QT interval, slow heartbeat), low magnesium or potassium levels, stomach or bowel problems, or phenylketonuria (PKU). · This medicine may cause heart rhythm problems (such as QT prolongation). · This medicine may make you dizzy. Do not drive or do anything else that could be dangerous until you know how this medicine affects you. · Keep all medicine out of the reach of children. Never share your medicine with anyone. Possible Side Effects While Using This Medicine:   Call your doctor right away if you notice any of these side effects:  · Allergic reaction: Itching or hives, swelling in your face or hands, swelling or tingling in your mouth or throat, chest tightness, trouble breathing  · Fainting, dizziness, or lightheadedness  · Fast, pounding, or uneven heartbeat  · Trouble breathing  If you notice these less serious side effects, talk with your doctor:   · Constipation or diarrhea  · Headache  · Tiredness or weakness  If you notice other side effects that you think are caused by this medicine, tell your doctor. Call your doctor for medical advice about side effects. You may report side effects to FDA at 9-776-FDA-5628  © 2017 [facility] [place] Information is for End User's use only and may not be sold, redistributed or otherwise used for commercial purposes. The above information is an  only. It is not intended as medical advice for individual conditions or treatments. Talk to your doctor, nurse or pharmacist before following any medical regimen to see if it is safe and effective for you.

## 2021-11-24 NOTE — ROUTINE PROCESS
Bedside and Verbal shift change report given to self (oncoming nurse) by Lizet Shook RN (offgoing nurse).  Report included the following information SBAR, Kardex, ED Summary, Procedure Summary, Intake/Output, MAR, Recent Results and Cardiac Rhythm ST.

## 2021-11-24 NOTE — DISCHARGE SUMMARY
Date of Admission: 11/17/2021  Date of Discharge: 11/24/2021    Discharge Diagnoses: Active Hospital Problems    Diagnosis Date Noted    Acute blood loss anemia 11/17/2021     Priority: 3 - Three    Bacteremia 11/24/2021    Elevated troponin 11/20/2021    Severe protein-calorie malnutrition ([facility] [address] 11/18/2021    UTI (urinary tract infection) 11/18/2021    Streptococcal bacteremia 11/18/2021    Severe sepsis with septic shock ([facility] [address] 11/17/2021    Body mass index (BMI) of 21.0 to 21.9 in adult 11/17/2021    Colitis, indeterminate 11/17/2021    Compression fracture of T9 vertebra ([facility] [address] 10/18/2021    Severe persistent asthma without complication 32/24/4546    Osteoporosis 10/25/2016    Primary hypertension 08/16/2016     Goal < 140/80      Hiatal hernia with GERD and esophagitis 08/16/2016    Mood disorder ([facility] [address] 11/01/2015    Gastroesophageal reflux disease with esophagitis 11/01/2015        Discharge Medications:  Discharge Medication List as of 11/24/2021 12:44 PM      START taking these medications    Details   amoxicillin (AMOXIL) 500 mg capsule Take 2 Capsules by mouth every eight (8) hours for 7 days. , Normal, Disp-42 Capsule, R-0         CONTINUE these medications which have CHANGED    Details   ondansetron (ZOFRAN ODT) 4 mg disintegrating tablet Take 1 Tablet by mouth every eight (8) hours as needed for Nausea or Vomiting., Normal, Disp-30 Tablet, R-0         CONTINUE these medications which have NOT CHANGED    Details   HYDROcodone-acetaminophen (NORCO) 5-325 mg per tablet 1 Tablet every six (6) hours as needed., Historical Med      LORazepam (ATIVAN) 1 mg tablet Take 1/2 to 1 tab PO Q 12 hours PRN anxiety, use sparingly, Normal, Disp-60 Tablet, R-1      !! venlafaxine-SR (Effexor XR) 75 mg capsule Take 1 Capsule by mouth two (2) times a day., Normal, Disp-180 Capsule, R-2      !! venlafaxine-SR (Effexor XR) 37.5 mg capsule Take 1 Capsule by mouth nightly., Normal, Disp-90 Capsule, R-2 verapamil ER (CALAN-SR) 240 mg CR tablet TAKE 1 TABLET BY MOUTH EVERY MORNING, Normal, Disp-90 Tablet, R-2      pravastatin (PRAVACHOL) 20 mg tablet Take 1 Tablet by mouth daily. , Normal, Disp-90 Tablet, R-2      calcium-cholecalciferol, d3, (CALCIUM 600 + D) 600-125 mg-unit tab Take 1 Tab by mouth daily. , Historical Med      fluticasone-umeclidin-vilanter (Trelegy Ellipta) 200-62.5-25 mcg dsdv Take 1 Puff by inhalation daily. , Print, Disp-1 Inhaler, R-11      cetirizine (ZYRTEC) 10 mg tablet Take 1 Tab by mouth daily. , Normal, Disp-30 Tab, R-11      albuterol (Ventolin HFA) 90 mcg/actuation inhaler Take 2 Puffs by inhalation four (4) times daily. , Normal, Disp-1 Inhaler, R-11      omeprazole (PRILOSEC) 40 mg capsule Take 1 Cap by mouth two (2) times a day., Normal, Disp-60 Cap,R-11       !! - Potential duplicate medications found. Please discuss with provider. STOP taking these medications       potassium chloride (KLOR-CON) 10 mEq tablet Comments:   Reason for Stopping:         hydroCHLOROthiazide (HYDRODIURIL) 12.5 mg tablet Comments:   Reason for Stopping:                Pending Labs:  None    Follow-up (including scheduled tests): Follow-up Information     Follow up With Specialties Details [place]   [address] [facility] [address] [address], [address]   [address] [place] [address]  [phone]             History of Present Illness:  77 y. o. female with medical history of osteoporosis with multiple vertebral fracture, mood disorder with anxiety and depression, hiatal hernia with gastroparesis who presented with acute abdominal pain and syncope.  She was found to be in septic shock and was initially placed on pressors.  A central line was placed.  After resuscitation her shock stabilized.  CT demonstrated acute left-sided colitis with indeterminate cause. UA suggestive of UTI. Blood culture growing Streptococcus species. Patient started on empiric antibiotics. Surgery consulted and recommend no surgical intervention. Past Medical History:  Past Medical History:   Diagnosis Date    Anemia 2005 approx    2 units transfused    Arthritis     hands, hips    B12 deficiency     Cancer ([facility] [address]     hx of melanoma 1986 R arm, and carcinoma x 2 forehead    COVID-19 vaccine series completed 02/2021    PT TO BRING CARD DOS    Depression with anxiety 8/16/2016    Elevated troponin 11/20/2021    Essential hypertension with goal blood pressure less than 140/90     controlled with med    Gastrointestinal disorder     hiatel hernia    Hyperlipidemia, unspecified 8/16/2016    Menopause     @ 51    Mild intermittent asthma 8/16/2016    Osteoarthritis of multiple joints 8/16/2016    Osteoporosis 10/25/2016    Pulmonary emphysema ([facility] [address] 08/16/2016    daily inhalers    Rectal bleed     Vertigo        Allergies: Allergies   Allergen Reactions    Other Medication Rash     Equate antibiotic ointment       Hospital Course:  77 y. o. female with medical history of osteoporosis with multiple vertebral fracture, mood disorder with anxiety and depression, hiatal hernia with gastroparesis who presented with acute abdominal pain and syncope.  She was found to be in septic shock and was initially placed on pressors.  A central line was placed.  After resuscitation her shock stabilized.  CT demonstrated acute left-sided colitis with indeterminate cause. UA suggestive of UTI. Blood culture growing Streptococcus species. Patient started on empiric antibiotics. Surgery consulted and recommend no surgical intervention at this time. GI consulted and EGD showed hiatal hernia and she was dilated.   Streptococcus bacteremia  Blood culture growing gram-positive cocci, ID PCR showed Streptococcus species (not agalactiae, pyogenes no pneumonia)  TTE no signs IE  Repeat blood culture NGTD  ID consulted   11/21 - Changed Zosyn to Ceftriaxone + Flagyl  11/22 - Changed Ceftriaxone and Flagyl to Amoxil  Discharged on Amoxil    Discharge Day Information:  Follow with PMD    Discharge Physical Exam:  General: Alert, oriented, NAD  HEENT: NC/AT, EOM are intact  Neck: supple, no JVD  Cardiovascular: RRR, S1, S2, no murmurs  Respiratory: Lungs are clear, no wheezes or rales  Abdomen: Soft, NT, ND  Back: No CVA tenderness, no paraspinal tenderness  Extremities: LE without pedal edema, no erythema  Neuro: A&O, CN are intact, no focal deficits  Skin: no rash or ulcers  Psych: good mood and affect    Recent Results (from the past 24 hour(s))   HGB & HCT    Collection Time: 11/23/21  4:05 PM   Result Value Ref Range    HGB 14.5 11.7 - 15.4 g/dL    HCT 44.2 35.8 - 40.2 %   METABOLIC PANEL, BASIC    Collection Time: 11/24/21  4:13 AM   Result Value Ref Range    Sodium 140 136 - 145 mmol/L    Potassium 3.1 (L) 3.5 - 5.1 mmol/L    Chloride 104 98 - 107 mmol/L    CO2 26 21 - 32 mmol/L    Anion gap 10 7 - 16 mmol/L    Glucose 78 65 - 100 mg/dL    BUN 12 8 - 23 MG/DL    Creatinine 0.57 (L) 0.6 - 1.0 MG/DL    GFR est AA >60 >60 ml/min/1.73m2    GFR est non-AA >60 >60 ml/min/1.73m2    Calcium 8.3 8.3 - 10.4 MG/DL   CBC WITH AUTOMATED DIFF    Collection Time: 11/24/21  4:13 AM   Result Value Ref Range    WBC 11.2 (H) 4.3 - 11.1 K/uL    RBC 4.95 4.05 - 5.2 M/uL    HGB 14.1 11.7 - 15.4 g/dL    HCT 42.4 35.8 - 46.3 %    MCV 85.7 79.6 - 97.8 FL    MCH 28.5 26.1 - 32.9 PG    MCHC 33.3 31.4 - 35.0 g/dL    RDW 17.2 (H) 11.9 - 14.6 %    PLATELET 829 831 - 655 K/uL    MPV 9.5 9.4 - 12.3 FL    ABSOLUTE NRBC 0.00 0.0 - 0.2 K/uL    NEUTROPHILS 59 43 - 78 %    LYMPHOCYTES 22 13 - 44 %    MONOCYTES 9 4.0 - 12.0 %    EOSINOPHILS 5 0.5 - 7.8 %    BASOPHILS 1 0.0 - 2.0 %    IMMATURE GRANULOCYTES 4 0.0 - 5.0 %    ABS. NEUTROPHILS 6.6 1.7 - 8.2 K/UL    ABS. LYMPHOCYTES 2.5 0.5 - 4.6 K/UL    ABS. MONOCYTES 1.0 0.1 - 1.3 K/UL    ABS.  EOSINOPHILS 0.6 0.0 - 0.8 K/UL ABS. BASOPHILS 0.1 0.0 - 0.2 K/UL    ABS. IMM. GRANS. 0.4 0.0 - 0.5 K/UL    RBC COMMENTS NORMOCYTIC/NORMOCHROMIC      WBC COMMENTS MODERATE      PLATELET COMMENTS ADEQUATE      DF AUTOMATED     EKG, 12 LEAD, INITIAL    Collection Time: 11/24/21  9:53 AM   Result Value Ref Range    Ventricular Rate 100 BPM    Atrial Rate 100 BPM    P-R Interval 150 ms    QRS Duration 88 ms    Q-T Interval 354 ms    QTC Calculation (Bezet) 456 ms    Calculated P Axis 42 degrees    Calculated R Axis -30 degrees    Calculated T Axis 78 degrees    Diagnosis       Sinus rhythm with Premature atrial complexes  Left axis deviation  Voltage criteria for left ventricular hypertrophy    Confirmed by Meggan Bowman MD, Meli Flores ([id number]) on 11/24/2021 11:57:43 AM          XR ABD (KUB)   Final Result      No acute abnormalities. CT HEAD WO CONT   Final Result   Chronic appearing white matter change without acute intracranial   abnormality. XR ABD (KUB)   Final Result      CTA CHEST ABD PELV W WO CONT   ED Interpretation   No large hematoma. Left colitis, most marked in the sigmoid--? Diverticulitis vs Other Inflammatory vs. Ischemic (though BLANCHE is patent). Extensive atherosclerosis w/o aortic dissection or large-vessel occlusion. Moderate HH w/dilated, contrast-filled esophagus--?stricture vs mass. Gallstones. Final report t/f from IR. Final Result   Atherosclerotic disease. Patent mesenteric arteries. Descending colon/sigmoid colitis. No abscess. Contrast filled and distended esophagus to the level of the hiatal hernia at the   GE junction. Recommend further evaluation with endoscopy as there may well be   an obstructing mass or stricture in this area. Gallstone. Terminal RIGHT upper lobe bronchus is filled with soft tissue density. While   this most likely represents mucus plugging, an endobronchial lesion cannot be   excluded.   Recommend follow-up CT scan of the chest in 3 months if an interval   bronchoscopy has not been performed. T12 vertebral compression fracture has progressed since 9/28/2020. Does this   patient have focal tenderness? Further evaluation with MRI is suggested. CT HEAD WO CONT   Final Result      1. EXTENSIVE SMALL VESSEL ISCHEMIC DISEASE WITH NO ACUTE INTRACRANIAL   ABNORMALITY IDENTIFIED AT NONCONTRAST CT.      2. EXTENSIVE SINUSITIS, MOST MARKED IN THE LEFT MAXILLARY SINUS. .               XR CHEST PORT   Final Result   No evidence of complication post line placement         XR CHEST PORT   Final Result   Negative for acute change.                     Condition: Improved    Disposition: Home health    Consultants During This Hospitalization: ID, surgery            Spent 35 minutes on discharge services

## 2021-11-24 NOTE — PROGRESS NOTES
Problem: Falls - Risk of  Goal: *Absence of Falls  Description: Document Felipa Mandes Fall Risk and appropriate interventions in the flowsheet. Outcome: Progressing Towards Goal  Note: Fall Risk Interventions:  Mobility Interventions: Bed/chair exit alarm, Communicate number of staff needed for ambulation/transfer, Patient to call before getting OOB         Medication Interventions: Assess postural VS orthostatic hypotension, Patient to call before getting OOB, Teach patient to arise slowly    Elimination Interventions: Bed/chair exit alarm, Call light in reach, Patient to call for help with toileting needs, Toilet paper/wipes in reach, Toileting schedule/hourly rounds    History of Falls Interventions: Bed/chair exit alarm, Investigate reason for fall         Problem: Pressure Injury - Risk of  Goal: *Prevention of pressure injury  Description: Document Otilio Scale and appropriate interventions in the flowsheet.   Outcome: Progressing Towards Goal  Note: Pressure Injury Interventions:       Moisture Interventions: Absorbent underpads, Minimize layers    Activity Interventions: Pressure redistribution bed/mattress(bed type), Increase time out of bed    Mobility Interventions: Pressure redistribution bed/mattress (bed type)    Nutrition Interventions: Document food/fluid/supplement intake, Offer support with meals,snacks and hydration    Friction and Shear Interventions: Minimize layers

## 2021-11-24 NOTE — PROGRESS NOTES
Pt reports that she feels better and able to eat now; received Zofran. Discharge order is in now and pt is discharging home in stable condition. CM confirmed with Palak Amaya at [facility] that she was discharging. RW is at bedside. No other discharge needs were identified. Tx goals have been met. Care Management Interventions  PCP Verified by CM: Yes Elin Mitchell)  Mode of Transport at Discharge:  Other (see comment) (Family)  Transition of Care Consult (CM Consult): Discharge Planning, [address] [place] [address] Edda ([facility])  Discharge Durable Medical Equipment: Yes ([facility])  Physical Therapy Consult: Yes  Occupational Therapy Consult: Yes  Speech Therapy Consult: No  Support Systems: Spouse/Significant Other, Child(kaleb), Other Family Member(s), Friend/Neighbor, [employer]  Confirm Follow Up Transport: Family  The Plan for Transition of Care is Related to the Following Treatment Goals : Return home and back to her baseline  The Patient and/or Patient Representative was Provided with a Choice of Provider and Agrees with the Discharge Plan?: Yes  Name of the Patient Representative Who was Provided with a Choice of Provider and Agrees with the Discharge Plan: Patient  Freedom of Choice List was Provided with Basic Dialogue that Supports the Patient's Individualized Plan of Care/Goals, Treatment Preferences and Shares the Quality Data Associated with the Providers?: Yes  [place] Resource Information Provided?: No  Discharge Location  Discharge Placement: Home with home health ([address] [place] [address])

## 2021-11-24 NOTE — PROGRESS NOTES
Comprehensive Nutrition Assessment    Type and Reason for Visit: Reassess      Nutrition Recommendations/Plan:   · Meals and Snacks:  · Continue current diet. · Nutrition Supplement Therapy:  · Medical food supplement therapy:  Continue Ensure Enlive three times per day (this provides 350 kcal and 20 grams protein per bottle) - flavor preference noted  · Discussed PO intake at discharge - recommended small, frequent meals/snacks and continued boost (2-3/day). Malnutrition Assessment:  Malnutrition Status: Severe malnutrition  Context: Acute illness  Findings of clinical characteristics of malnutrition:   Energy Intake:  Unable to assess (pt reports 1-1.5months poor po, worsened 1-1.5 weeks PTA)  Weight Loss:  7.0 - Greater than 5% over 1 month (128lbs at ortho office visit 10/18; -10% x1 month)     Body Fat Loss:  7 - Moderate body fat loss, Orbital, Triceps   Muscle Mass Loss:  7 - Moderate muscle mass loss, Calf, Thigh (quadriceps) (mild temporal wasting)  Fluid Accumulation:  Unable to assess,     Strength:  Not performed     Nutrition Assessment:   Nutrition History: Pt reports poor po intake for 1-1.5 months PTA due to loss of appetite consuming 2 meals/day with occasional snacks. Po intake worsened over the past 1-1.5 weeks PTA due to nausea with intake of 3-4 bites of 2 meals per day and occasional intake of jello. Pt states her daughter provided her with Boost last week. Pt reports drinking Boost last week and yesterday; however, intake of supplement not specified. Pt reports constipation 1 week PTA per H&P. Pt reports having a BM every 2-3 days varying from hard/solid stools to loose. Pt reports UBW of 128lbs which is confirmed with chart review of ortho office visit on 10/18. Over the past year, pt has weighed between 124-128lbs with declining wt noted over the past month. Nutrition Background: Pt with PMH notable for HTN, COPD, HLD, hiatal hernia s/p Nissen 2010, and gastroparesis.  Pt presents after syncopal event at home after attempting to have a BM. CTAP in ED showing descending colon/sigmoid colitis, contrast filled and distended esophagus to the level of the hiatal hernia at the GE junction, esophagus with possible obstructing mass or stricture, and gallstone. Pt is admitted for severe sepsis with septic shock, colitis, and acute blood loss anemia. Noted GI with concerns for ischemic colitis on arrival due to hypotension and sepsis. Daily Update:  Pt seen reclined in bed. Male visitor at bedside. Pt reports that she is now swallowing without difficulty and endorses improved nausea. Pt states she was able to eat 100% of a waffle this AM and 1/2 of peaches. Per pt, ate a baked potato brought in from family last evening. Observed ensure at bedside unopened. Pt states she consumed ~50% of one ensure yesterday. Nutrition Related Findings:   (11/18) NPO (11/19) diet advanced to full liquids (11/20) diet advanced to soft and bite sized. S/P EGD with esophageal dilation 11/19. Current Nutrition Therapies:  ADULT ORAL NUTRITION SUPPLEMENT Breakfast, Lunch, Dinner; Standard High Calorie/High Protein  ADULT DIET Dysphagia - Soft & Bite Sized; 6 small meals. chocolate ensure enlive with all meals.     Current Intake:   Average Meal Intake: 1-25% Average Supplement Intake: Unable to assess      Anthropometric Measures:  Height: 5' 2\" (157.5 cm)  Current Body Wt: 50.7 kg (111 lb 12.4 oz) (11/24), Weight source: Not specified  BMI: 20.4, Underweight (BMI less than 22) age over 72  Admission Body Weight: 117 lb 8.1 oz (11/17; bed scale)  Ideal Body Weight (lbs) (Calculated): 110 lbs (50 kg), 104 %  Usual Body Wt: 58.1 kg (128 lb) (10/18; ortho office visit), Percent weight change: -10.6          Edema: LLE: No Edema (11/24/2021  7:30 AM)  RLE: No Edema (11/24/2021  7:30 AM)     Estimated Daily Nutrient Needs:  Energy (kcal/day): 8514-9818 (Kcal/kg (25-30), Weight Used: Current (51.9kg))  Protein (g/day): 52-62 (1-1.2gm/kg) Weight Used: (Current (51.9kg))  Fluid (ml/day):   (1 ml/kcal)    Nutrition Diagnosis:   · Inadequate oral intake related to altered GI function as evidenced by nausea, intake 0-25%, vomiting    Nutrition Interventions:   Food and/or Nutrient Delivery: Continue current diet, Continue oral nutrition supplement     Coordination of Nutrition Care: Continue to monitor while inpatient  Plan of Care discussed with Will Barton RN. Goals:   Previous Goal Met: Progressing toward goal(s)  Active Goal: Meet >50% estimated nutrition needs by RD follow up. Nutrition Monitoring and Evaluation:      Food/Nutrient Intake Outcomes: Food and nutrient intake, Supplement intake  Physical Signs/Symptoms Outcomes: Biochemical data, GI status, Nausea/vomiting, Hemodynamic status, Meal time behavior, Weight    Discharge Planning:     Too soon to determine    Gaurav Celaya [address], [address], [address] [place] [address], [address] [place] [address] Ave.

## 2021-11-24 NOTE — ROUTINE PROCESS
Bedside and Verbal shift change report given to self (oncoming nurse) by Buddy Perez RN (offgoing nurse). Report included the following information SBAR, Kardex, Intake/Output, MAR and Recent Results.

## 2021-11-24 NOTE — PROGRESS NOTES
PT Note:  Treatment attempted but pt getting cleaned up. Will re-attempt pending schedule and pt status.   Thanks,  Stalin Shows, PT, DPT

## 2021-11-24 NOTE — ROUTINE PROCESS
8880: Pt. Potassium level 3.1. Erika OLIVEIRA notified. No new orders at this time. 3233: New orders for 40 mEq Potassium Chloride, PO received.

## 2021-12-03 LAB
BACTERIA SPEC CULT: ABNORMAL
GRAM STN SPEC: ABNORMAL
Lab: NORMAL
REFERENCE LAB,REFLB: NORMAL
SERVICE CMNT-IMP: ABNORMAL
TEST DESCRIPTION:,ATST: NORMAL

## 2021-12-30 PROBLEM — R78.81 STREPTOCOCCAL BACTEREMIA: Status: RESOLVED | Noted: 2021-11-18 | Resolved: 2021-12-30

## 2021-12-30 PROBLEM — K52.3 COLITIS, INDETERMINATE: Status: RESOLVED | Noted: 2021-11-17 | Resolved: 2021-12-30

## 2021-12-30 PROBLEM — R65.21 SEVERE SEPSIS WITH SEPTIC SHOCK (HCC): Status: RESOLVED | Noted: 2021-11-17 | Resolved: 2021-12-30

## 2021-12-30 PROBLEM — B95.5 STREPTOCOCCAL BACTEREMIA: Status: RESOLVED | Noted: 2021-11-18 | Resolved: 2021-12-30

## 2021-12-30 PROBLEM — N39.0 UTI (URINARY TRACT INFECTION): Status: RESOLVED | Noted: 2021-11-18 | Resolved: 2021-12-30

## 2021-12-30 PROBLEM — A41.9 SEVERE SEPSIS WITH SEPTIC SHOCK (HCC): Status: RESOLVED | Noted: 2021-11-17 | Resolved: 2021-12-30

## 2022-01-19 ENCOUNTER — HOSPITAL ENCOUNTER (OUTPATIENT)
Dept: LAB | Age: 77
Discharge: HOME | End: 2022-01-19
Payer: MEDICARE

## 2022-01-19 DIAGNOSIS — D50.8 OTHER IRON DEFICIENCY ANEMIAS: ICD-10-CM

## 2022-01-19 DIAGNOSIS — E55.9 VITAMIN D DEFICIENCY: ICD-10-CM

## 2022-01-19 LAB
25(OH)D3 SERPL-MCNC: 24.3 NG/ML (ref 30–100)
ALBUMIN SERPL-MCNC: 3.3 G/DL (ref 3.2–4.6)
ALBUMIN/GLOB SERPL: 0.8 {RATIO} (ref 1.2–3.5)
ALP SERPL-CCNC: 200 U/L (ref 50–136)
ALT SERPL-CCNC: 118 U/L (ref 12–65)
ANION GAP SERPL CALC-SCNC: 4 MMOL/L (ref 7–16)
AST SERPL-CCNC: 94 U/L (ref 15–37)
BASOPHILS # BLD: 0.1 K/UL (ref 0–0.2)
BASOPHILS NFR BLD: 1 % (ref 0–2)
BILIRUB SERPL-MCNC: 0.4 MG/DL (ref 0.2–1.1)
BUN SERPL-MCNC: 11 MG/DL (ref 8–23)
CALCIUM SERPL-MCNC: 9.6 MG/DL (ref 8.3–10.4)
CHLORIDE SERPL-SCNC: 104 MMOL/L (ref 98–107)
CO2 SERPL-SCNC: 30 MMOL/L (ref 21–32)
CREAT SERPL-MCNC: 0.8 MG/DL (ref 0.6–1)
DIFFERENTIAL METHOD BLD: ABNORMAL
EOSINOPHIL # BLD: 1.6 K/UL (ref 0–0.8)
EOSINOPHIL NFR BLD: 24 % (ref 0.5–7.8)
ERYTHROCYTE [DISTWIDTH] IN BLOOD BY AUTOMATED COUNT: 14.2 % (ref 11.9–14.6)
FERRITIN SERPL-MCNC: 981 NG/ML (ref 8–388)
GLOBULIN SER CALC-MCNC: 4 G/DL (ref 2.3–3.5)
GLUCOSE SERPL-MCNC: 80 MG/DL (ref 65–100)
HCT VFR BLD AUTO: 45.5 % (ref 35.8–46.3)
HGB BLD-MCNC: 14.4 G/DL (ref 11.7–15.4)
IMM GRANULOCYTES # BLD AUTO: 0 K/UL (ref 0–0.5)
IMM GRANULOCYTES NFR BLD AUTO: 0 % (ref 0–5)
LYMPHOCYTES # BLD: 2.7 K/UL (ref 0.5–4.6)
LYMPHOCYTES NFR BLD: 41 % (ref 13–44)
MCH RBC QN AUTO: 28.5 PG (ref 26.1–32.9)
MCHC RBC AUTO-ENTMCNC: 31.6 G/DL (ref 31.4–35)
MCV RBC AUTO: 89.9 FL (ref 79.6–97.8)
MONOCYTES # BLD: 0.6 K/UL (ref 0.1–1.3)
MONOCYTES NFR BLD: 9 % (ref 4–12)
NEUTS SEG # BLD: 1.7 K/UL (ref 1.7–8.2)
NEUTS SEG NFR BLD: 25 % (ref 43–78)
NRBC # BLD: 0 K/UL (ref 0–0.2)
PLATELET # BLD AUTO: 181 K/UL (ref 150–450)
PMV BLD AUTO: 9.6 FL (ref 9.4–12.3)
POTASSIUM SERPL-SCNC: 3.9 MMOL/L (ref 3.5–5.1)
PROT SERPL-MCNC: 7.3 G/DL (ref 6.3–8.2)
RBC # BLD AUTO: 5.06 M/UL (ref 4.05–5.2)
SODIUM SERPL-SCNC: 138 MMOL/L (ref 136–145)
WBC # BLD AUTO: 6.7 K/UL (ref 4.3–11.1)

## 2022-01-19 PROCEDURE — 85025 COMPLETE CBC W/AUTO DIFF WBC: CPT

## 2022-01-19 PROCEDURE — 36415 COLL VENOUS BLD VENIPUNCTURE: CPT

## 2022-01-19 PROCEDURE — 82728 ASSAY OF FERRITIN: CPT

## 2022-01-19 PROCEDURE — 80053 COMPREHEN METABOLIC PANEL: CPT

## 2022-01-19 PROCEDURE — 82306 VITAMIN D 25 HYDROXY: CPT

## 2022-03-18 PROBLEM — R05.3 CHRONIC COUGH: Status: ACTIVE | Noted: 2020-10-12

## 2022-03-18 PROBLEM — E43 SEVERE PROTEIN-CALORIE MALNUTRITION (HCC): Status: ACTIVE | Noted: 2021-11-18

## 2022-03-19 PROBLEM — J45.50 SEVERE PERSISTENT ASTHMA WITHOUT COMPLICATION: Status: ACTIVE | Noted: 2021-05-28

## 2022-03-19 PROBLEM — S22.080A COMPRESSION FRACTURE OF T12 VERTEBRA (HCC): Status: ACTIVE | Noted: 2020-04-21

## 2022-03-19 PROBLEM — D50.8 OTHER IRON DEFICIENCY ANEMIAS: Status: ACTIVE | Noted: 2021-08-05

## 2022-03-19 PROBLEM — J31.0 CHRONIC RHINITIS: Status: ACTIVE | Noted: 2017-10-03

## 2022-03-20 PROBLEM — S22.070A COMPRESSION FRACTURE OF T9 VERTEBRA (HCC): Status: ACTIVE | Noted: 2021-10-18

## 2022-03-20 PROBLEM — R77.8 ELEVATED TROPONIN: Status: ACTIVE | Noted: 2021-11-20

## 2022-03-20 PROBLEM — D62 ACUTE BLOOD LOSS ANEMIA: Status: ACTIVE | Noted: 2021-11-17

## 2022-03-20 PROBLEM — R79.89 ELEVATED TROPONIN: Status: ACTIVE | Noted: 2021-11-20

## 2022-03-21 ENCOUNTER — HOSPITAL ENCOUNTER (OUTPATIENT)
Dept: LAB | Age: 77
Discharge: HOME | End: 2022-03-21
Payer: MEDICARE

## 2022-03-21 DIAGNOSIS — J45.50 SEVERE PERSISTENT ASTHMA WITHOUT COMPLICATION: ICD-10-CM

## 2022-03-21 PROCEDURE — 86003 ALLG SPEC IGE CRUDE XTRC EA: CPT

## 2022-03-21 PROCEDURE — 82785 ASSAY OF IGE: CPT

## 2022-03-21 PROCEDURE — 36415 COLL VENOUS BLD VENIPUNCTURE: CPT

## 2022-03-24 LAB
IGE SERPL-ACNC: 806 IU/ML (ref 6–495)
Lab: NORMAL
REFERENCE LAB,REFLB: NORMAL
TEST DESCRIPTION:,ATST: NORMAL

## 2022-05-06 ENCOUNTER — NURSE TRIAGE (OUTPATIENT)
Dept: OTHER | Facility: CLINIC | Age: 77
End: 2022-05-06

## 2022-05-06 NOTE — TELEPHONE ENCOUNTER
Received call from [place] Raza at [facility] with Red Flag Complaint. Subjective: Caller states \"the doctor changed our medicines about a week ago and she told us to let her know how its working\"     Current Symptoms: current BP this /122 before medications. BP's running 150s/100 since medication change. Onset: 1 week ago; waxing and waning    Associated Symptoms: NA    Pain Severity: 0/10; Temperature: No     What has been tried: NA    LMP: NA Pregnant: NA    Recommended disposition: Go to Office Now    Care advice provided, patient verbalizes understanding; denies any other questions or concerns; instructed to call back for any new or worsening symptoms. Patient/Caller agrees with recommended disposition; writer provided warm transfer to Wilver Jim at [facility] for appointment scheduling    Attention Provider: Thank you for allowing me to participate in the care of your patient. The patient was connected to triage in response to information provided to the ECC. Please do not respond through this encounter as the response is not directed to a shared pool.       Reason for Disposition   Systolic BP >= 394 OR Diastolic >= 777 and having NO cardiac or neurologic symptoms    Protocols used: BLOOD PRESSURE - HIGH-ADULT-OH

## 2022-05-11 ENCOUNTER — TELEPHONE (OUTPATIENT)
Dept: PULMONOLOGY | Age: 77
End: 2022-05-11

## 2022-05-11 NOTE — TELEPHONE ENCOUNTER
Patient says that she has completed predinsone but she has a refill and was wondering they still wanted her to continue on another

## 2022-05-23 RX ORDER — ALBUTEROL SULFATE 90 UG/1
AEROSOL, METERED RESPIRATORY (INHALATION)
Qty: 1 EACH | Refills: 11 | Status: SHIPPED | OUTPATIENT
Start: 2022-05-23

## 2022-06-03 DIAGNOSIS — J45.51 SEVERE PERSISTENT ASTHMA WITH EXACERBATION: Primary | ICD-10-CM

## 2022-06-20 RX ORDER — PRAVASTATIN SODIUM 20 MG
TABLET ORAL
Qty: 90 TABLET | Refills: 2 | Status: SHIPPED | OUTPATIENT
Start: 2022-06-20 | End: 2022-06-27 | Stop reason: SDUPTHER

## 2022-06-20 RX ORDER — MONTELUKAST SODIUM 10 MG/1
TABLET ORAL
Qty: 90 TABLET | Refills: 2 | Status: SHIPPED | OUTPATIENT
Start: 2022-06-20 | End: 2022-06-27 | Stop reason: SDUPTHER

## 2022-06-27 ENCOUNTER — OFFICE VISIT (OUTPATIENT)
Dept: FAMILY MEDICINE CLINIC | Facility: CLINIC | Age: 77
End: 2022-06-27
Payer: MEDICARE

## 2022-06-27 ENCOUNTER — TELEPHONE (OUTPATIENT)
Dept: FAMILY MEDICINE CLINIC | Facility: CLINIC | Age: 77
End: 2022-06-27

## 2022-06-27 VITALS
HEIGHT: 60 IN | WEIGHT: 117 LBS | BODY MASS INDEX: 22.97 KG/M2 | OXYGEN SATURATION: 92 % | HEART RATE: 98 BPM | DIASTOLIC BLOOD PRESSURE: 74 MMHG | SYSTOLIC BLOOD PRESSURE: 118 MMHG

## 2022-06-27 DIAGNOSIS — R11.0 CHRONIC NAUSEA: ICD-10-CM

## 2022-06-27 DIAGNOSIS — E83.39 HYPOPHOSPHATEMIA: ICD-10-CM

## 2022-06-27 DIAGNOSIS — J18.9 PNEUMONIA OF BOTH LOWER LOBES DUE TO INFECTIOUS ORGANISM: ICD-10-CM

## 2022-06-27 DIAGNOSIS — Z87.19 HISTORY OF GI BLEED: ICD-10-CM

## 2022-06-27 DIAGNOSIS — F41.0 PANIC ATTACK: ICD-10-CM

## 2022-06-27 DIAGNOSIS — J45.50 SEVERE PERSISTENT ASTHMA WITHOUT COMPLICATION: ICD-10-CM

## 2022-06-27 DIAGNOSIS — R31.29 MICROHEMATURIA: ICD-10-CM

## 2022-06-27 DIAGNOSIS — M15.9 PRIMARY OSTEOARTHRITIS INVOLVING MULTIPLE JOINTS: ICD-10-CM

## 2022-06-27 DIAGNOSIS — K21.00 GASTROESOPHAGEAL REFLUX DISEASE WITH ESOPHAGITIS, UNSPECIFIED WHETHER HEMORRHAGE: ICD-10-CM

## 2022-06-27 DIAGNOSIS — I10 ESSENTIAL HYPERTENSION: Primary | ICD-10-CM

## 2022-06-27 DIAGNOSIS — F41.8 DEPRESSION WITH ANXIETY: ICD-10-CM

## 2022-06-27 DIAGNOSIS — E78.5 HYPERLIPIDEMIA, UNSPECIFIED HYPERLIPIDEMIA TYPE: ICD-10-CM

## 2022-06-27 DIAGNOSIS — F51.04 PSYCHOPHYSIOLOGICAL INSOMNIA: ICD-10-CM

## 2022-06-27 DIAGNOSIS — I10 PRIMARY HYPERTENSION: ICD-10-CM

## 2022-06-27 PROBLEM — R31.21 ASYMPTOMATIC MICROSCOPIC HEMATURIA: Status: ACTIVE | Noted: 2022-06-13

## 2022-06-27 LAB
ALBUMIN SERPL-MCNC: 3 G/DL (ref 3.2–4.6)
ALBUMIN/GLOB SERPL: 0.7 {RATIO} (ref 1.2–3.5)
ALP SERPL-CCNC: 102 U/L (ref 50–136)
ALT SERPL-CCNC: 26 U/L (ref 12–65)
ANION GAP SERPL CALC-SCNC: 4 MMOL/L (ref 7–16)
APPEARANCE UR: NORMAL
AST SERPL-CCNC: 28 U/L (ref 15–37)
BASOPHILS # BLD: 0.1 K/UL (ref 0–0.2)
BASOPHILS NFR BLD: 1 % (ref 0–2)
BILIRUB SERPL-MCNC: 0.2 MG/DL (ref 0.2–1.1)
BILIRUB UR QL: NEGATIVE
BUN SERPL-MCNC: 14 MG/DL (ref 8–23)
CALCIUM SERPL-MCNC: 10.2 MG/DL (ref 8.3–10.4)
CHLORIDE SERPL-SCNC: 104 MMOL/L (ref 98–107)
CHOLEST SERPL-MCNC: 169 MG/DL
CO2 SERPL-SCNC: 32 MMOL/L (ref 21–32)
COLOR UR: NORMAL
CREAT SERPL-MCNC: 1 MG/DL (ref 0.6–1)
DIFFERENTIAL METHOD BLD: ABNORMAL
EOSINOPHIL # BLD: 0.6 K/UL (ref 0–0.8)
EOSINOPHIL NFR BLD: 8 % (ref 0.5–7.8)
ERYTHROCYTE [DISTWIDTH] IN BLOOD BY AUTOMATED COUNT: 13.6 % (ref 11.9–14.6)
GLOBULIN SER CALC-MCNC: 4.6 G/DL (ref 2.3–3.5)
GLUCOSE SERPL-MCNC: 88 MG/DL (ref 65–100)
GLUCOSE UR STRIP.AUTO-MCNC: NEGATIVE MG/DL
HCT VFR BLD AUTO: 43.3 % (ref 35.8–46.3)
HDLC SERPL-MCNC: 56 MG/DL (ref 40–60)
HDLC SERPL: 3 {RATIO}
HGB BLD-MCNC: 13.7 G/DL (ref 11.7–15.4)
HGB UR QL STRIP: NEGATIVE
IMM GRANULOCYTES # BLD AUTO: 0.1 K/UL (ref 0–0.5)
IMM GRANULOCYTES NFR BLD AUTO: 1 % (ref 0–5)
KETONES UR QL STRIP.AUTO: NEGATIVE MG/DL
LDLC SERPL CALC-MCNC: 84 MG/DL
LEUKOCYTE ESTERASE UR QL STRIP.AUTO: NEGATIVE
LYMPHOCYTES # BLD: 2.6 K/UL (ref 0.5–4.6)
LYMPHOCYTES NFR BLD: 33 % (ref 13–44)
MCH RBC QN AUTO: 28.8 PG (ref 26.1–32.9)
MCHC RBC AUTO-ENTMCNC: 31.6 G/DL (ref 31.4–35)
MCV RBC AUTO: 91.2 FL (ref 79.6–97.8)
MONOCYTES # BLD: 0.8 K/UL (ref 0.1–1.3)
MONOCYTES NFR BLD: 9 % (ref 4–12)
NEUTS SEG # BLD: 3.9 K/UL (ref 1.7–8.2)
NEUTS SEG NFR BLD: 48 % (ref 43–78)
NITRITE UR QL STRIP.AUTO: NEGATIVE
NRBC # BLD: 0 K/UL (ref 0–0.2)
PH UR STRIP: 5 [PH] (ref 5–9)
PHOSPHATE SERPL-MCNC: 3.2 MG/DL (ref 2.3–3.7)
PLATELET # BLD AUTO: 518 K/UL (ref 150–450)
PMV BLD AUTO: 9.8 FL (ref 9.4–12.3)
POTASSIUM SERPL-SCNC: 4.1 MMOL/L (ref 3.5–5.1)
PROT SERPL-MCNC: 7.6 G/DL (ref 6.3–8.2)
PROT UR STRIP-MCNC: NEGATIVE MG/DL
RBC # BLD AUTO: 4.75 M/UL (ref 4.05–5.2)
SODIUM SERPL-SCNC: 140 MMOL/L (ref 136–145)
SP GR UR REFRACTOMETRY: 1.02 (ref 1–1.02)
TRIGL SERPL-MCNC: 145 MG/DL (ref 35–150)
TSH W FREE THYROID IF ABNORMAL: 2.85 UIU/ML (ref 0.36–3.74)
UROBILINOGEN UR QL STRIP.AUTO: 0.2 EU/DL (ref 0.2–1)
VLDLC SERPL CALC-MCNC: 29 MG/DL (ref 6–23)
WBC # BLD AUTO: 8 K/UL (ref 4.3–11.1)

## 2022-06-27 PROCEDURE — 1123F ACP DISCUSS/DSCN MKR DOCD: CPT | Performed by: FAMILY MEDICINE

## 2022-06-27 PROCEDURE — 99214 OFFICE O/P EST MOD 30 MIN: CPT | Performed by: FAMILY MEDICINE

## 2022-06-27 RX ORDER — ONDANSETRON 4 MG/1
4 TABLET, ORALLY DISINTEGRATING ORAL 3 TIMES DAILY PRN
Qty: 21 TABLET | Refills: 1 | Status: SHIPPED | OUTPATIENT
Start: 2022-06-27

## 2022-06-27 RX ORDER — MONTELUKAST SODIUM 10 MG/1
TABLET ORAL
Qty: 90 TABLET | Refills: 2 | Status: SHIPPED | OUTPATIENT
Start: 2022-06-27

## 2022-06-27 RX ORDER — LOSARTAN POTASSIUM 25 MG/1
25 TABLET ORAL DAILY
Qty: 90 TABLET | Refills: 2 | Status: SHIPPED | OUTPATIENT
Start: 2022-06-27

## 2022-06-27 RX ORDER — PRAVASTATIN SODIUM 20 MG
TABLET ORAL
Qty: 90 TABLET | Refills: 2 | Status: SHIPPED | OUTPATIENT
Start: 2022-06-27

## 2022-06-27 RX ORDER — VENLAFAXINE HYDROCHLORIDE 150 MG/1
150 CAPSULE, EXTENDED RELEASE ORAL DAILY
Qty: 90 CAPSULE | Refills: 2 | Status: SHIPPED | OUTPATIENT
Start: 2022-06-27

## 2022-06-27 RX ORDER — VERAPAMIL HYDROCHLORIDE 240 MG/1
240 TABLET, FILM COATED, EXTENDED RELEASE ORAL NIGHTLY
Qty: 30 TABLET | Refills: 2 | Status: SHIPPED | OUTPATIENT
Start: 2022-06-27

## 2022-06-27 RX ORDER — HYDROXYZINE HYDROCHLORIDE 25 MG/1
25 TABLET, FILM COATED ORAL EVERY 8 HOURS PRN
Qty: 90 TABLET | Refills: 1 | Status: SHIPPED | OUTPATIENT
Start: 2022-06-27

## 2022-06-27 RX ORDER — MIRTAZAPINE 7.5 MG/1
7.5 TABLET, FILM COATED ORAL NIGHTLY
Qty: 90 TABLET | Refills: 2 | Status: SHIPPED | OUTPATIENT
Start: 2022-06-27

## 2022-06-27 RX ORDER — OMEPRAZOLE 40 MG/1
40 CAPSULE, DELAYED RELEASE ORAL 2 TIMES DAILY
Qty: 180 CAPSULE | Refills: 2 | Status: SHIPPED | OUTPATIENT
Start: 2022-06-27

## 2022-06-27 ASSESSMENT — PATIENT HEALTH QUESTIONNAIRE - PHQ9
SUM OF ALL RESPONSES TO PHQ9 QUESTIONS 1 & 2: 0
2. FEELING DOWN, DEPRESSED OR HOPELESS: 0
SUM OF ALL RESPONSES TO PHQ QUESTIONS 1-9: 0
SUM OF ALL RESPONSES TO PHQ QUESTIONS 1-9: 0
1. LITTLE INTEREST OR PLEASURE IN DOING THINGS: 0
SUM OF ALL RESPONSES TO PHQ QUESTIONS 1-9: 0
SUM OF ALL RESPONSES TO PHQ QUESTIONS 1-9: 0

## 2022-06-27 ASSESSMENT — ENCOUNTER SYMPTOMS
SHORTNESS OF BREATH: 0
ABDOMINAL PAIN: 0
WHEEZING: 0
CHEST TIGHTNESS: 0

## 2022-06-27 NOTE — PROGRESS NOTES
Jose Chapin is a 77 y.o. female who presents today for the following:  Chief Complaint   Patient presents with    Follow-Up from Hospital       No Known Allergies    Current Outpatient Medications   Medication Sig Dispense Refill    pravastatin (PRAVACHOL) 20 MG tablet TAKE 1 TABLET BY MOUTH DAILY 90 tablet 2    montelukast (SINGULAIR) 10 MG tablet TAKE 1 TABLET BY MOUTH DAILY AT BEDTIME 90 tablet 2    albuterol sulfate  (90 Base) MCG/ACT inhaler INHALE 2 PUFFS BY MOUTH FOUR TIMES DAILY 1 each 11    albuterol (PROVENTIL) (2.5 MG/3ML) 0.083% nebulizer solution Inhale 2.5 mg into the lungs every 6 hours as needed      Calcium Carbonate-Vitamin D (CALCIUM-VITAMIN D) 600-125 MG-UNIT TABS Take 1 tablet by mouth daily      cetirizine (ZYRTEC) 10 MG tablet Take 10 mg by mouth daily      denosumab (PROLIA) 60 MG/ML SOSY SC injection Inject 60 mg into the skin      ergocalciferol (ERGOCALCIFEROL) 1.25 MG (50332 UT) capsule TAKE 1 CAPSULE BY MOUTH ONCE WEEKLY      fluticasone-umeclidin-vilant (TRELEGY ELLIPTA) 100-62.5-25 MCG/INH AEPB Inhale 1 puff into the lungs daily      HYDROcodone-ibuprofen (VICOPROFEN) 7.5-200 MG per tablet Take by mouth.  losartan (COZAAR) 25 MG tablet Take 25 mg by mouth daily      meclizine (ANTIVERT) 25 MG tablet TAKE 1 TABLET BY MOUTH THREE TIMES DAILY AS NEEDED FOR DIZZINESS OR FOR NAUSEA      mirtazapine (REMERON) 7.5 MG tablet Take 7.5 mg by mouth      naloxone 4 MG/0.1ML LIQD nasal spray Use 1 spray intranasally, then discard. Repeat with new spray every 2 min as needed for opioid overdose symptoms, alternating nostrils.       omalizumab (OMALIZUMAB) 150 MG injection Inject 150 mg into the skin every 30 days      omeprazole (PRILOSEC) 40 MG delayed release capsule Take 40 mg by mouth 2 times daily      ondansetron (ZOFRAN-ODT) 4 MG disintegrating tablet Take 4 mg by mouth every 8 hours as needed      venlafaxine (EFFEXOR XR) 150 MG extended release capsule Take 150 mg by mouth daily      verapamil (CALAN SR) 240 MG extended release tablet TAKE 1 TABLET BY MOUTH EVERY MORNING       No current facility-administered medications for this visit. Past Medical History:   Diagnosis Date    Anemia  approx    2 units transfused    Arthritis     hands, hips    B12 deficiency     Cancer ([facility] [address] [address]     hx of melanoma 1986 R arm, and carcinoma x 2 forehead    Colitis, indeterminate 2021    COVID-19 vaccine series completed 2021    PT TO BRING CARD DOS    Depression with anxiety 2016    Elevated troponin 2021    Essential hypertension with goal blood pressure less than 140/90     controlled with med    Gastrointestinal disorder     hiatel hernia    Hyperlipidemia, unspecified 2016    Menopause     @ 51    Mild intermittent asthma 2016    Osteoarthritis of multiple joints 2016    Osteoporosis 10/25/2016    Pulmonary emphysema ([facility] [address] 2016    daily inhalers    Rectal bleed     Streptococcal bacteremia 2021    Vertigo        Past Surgical History:   Procedure Laterality Date    COLONOSCOPY N/A 2021    COLONOSCOPY/BMI 23 performed by Ashley Lind MD at [facility]; HI RISK IND  2021         HERNIA REPAIR  2010    helped reflex    TUBAL LIGATION  1972       Social History     Tobacco Use    Smoking status: Never Smoker    Smokeless tobacco: Never Used    Tobacco comment: Quit smokinnd hand smoke x 33 yrs   Substance Use Topics    Alcohol use: No        Family History   Problem Relation Age of Onset    Stroke Mother         intracerebral aneurysm    Hypertension Maternal Grandmother     Breast Cancer Daughter 28    Diabetes Maternal Aunt     Diabetes Maternal Uncle     No Known Problems Father         didn't have contact with father       Is a 50-year-old female here today for hospital follow up.   Patient was admitted to [facility] for sepsis secondary to pneumonia. She has completed course of antibiotic. She reports feeling overall better but continues to have decreased appetite and feel weak. Also continues to have headaches. There was also concern during admission for possible GI bleed. She denies any further black or bloody stool. She was also noted to have blood in urine. She has not noticed blood in her urine. Patient has a past medical history of osteoporosis with multiple vertebral fractures, mood disorder, hypertension, chronic pain, hiatal hernia with gastroparesis. Patient was also admitted to the hospital last year for septic shock secondary to colitis and urinary tract infection. Patient was subsequently found to have streptococcal bacteremia. She has followed up outpatient with GI regarding colitis. She continues on venlafaxine for anxiety and remeron to help her sleep at night. She has prior history of benzodiazepine dependence.  reports patient continues to be anxious. She would like to know what she can take PRN for panic. She has not been monitoring blood pressure since last discharge from hospital.  No changes were made to blood pressure medications. Review of Systems   Constitutional: Positive for appetite change. Negative for fatigue and fever. Respiratory: Negative for chest tightness, shortness of breath and wheezing. Cardiovascular: Negative for chest pain and palpitations. Gastrointestinal: Negative for abdominal pain. Neurological: Positive for headaches. Negative for dizziness. Psychiatric/Behavioral: Negative for sleep disturbance. The patient is nervous/anxious. /74   Pulse 98   Ht 5' (1.524 m)   Wt 117 lb (53.1 kg)   SpO2 92%   BMI 22.85 kg/m²     Physical Exam  Constitutional:       Appearance: Normal appearance. She is not ill-appearing. Eyes:      General: No scleral icterus. Cardiovascular:      Rate and Rhythm: Normal rate and regular rhythm.       Heart sounds: Normal bedtime.  -     venlafaxine (EFFEXOR XR) 150 MG extended release capsule; Take 1 capsule by mouth daily, Disp-90 capsule, R-2Normal  -     mirtazapine (REMERON) 7.5 MG tablet; Take 1 tablet by mouth nightly, Disp-90 tablet, R-2Normal  10. Psychophysiological insomnia  Patient requests refill on melatonin.  -     FINEST NUTRITION MELATONIN 5-10 MG TBCR; TAKE 1 TABLET BY MOUTH EVERY NIGHT AT BEDTIME AS NEEDED FOR INSOMNIA, Disp-90 tablet, R-2, DAWNormal  11. Gastroesophageal reflux disease with esophagitis, unspecified whether hemorrhage  Concern for GI bleeding during last hospital admission. Denies abdominal pain. Denies black or bloody stool.  -     omeprazole (PRILOSEC) 40 MG delayed release capsule; Take 1 capsule by mouth 2 times daily, Disp-180 capsule, R-2Normal  12. Severe persistent asthma without complication  -     montelukast (SINGULAIR) 10 MG tablet; TAKE 1 TABLET BY MOUTH DAILY AT BEDTIME, Disp-90 tablet, R-2Normal  13. Chronic nausea  Refilled nausea medication per patient request.  -     ondansetron (ZOFRAN-ODT) 4 MG disintegrating tablet; Take 1 tablet by mouth 3 times daily as needed for Nausea or Vomiting, Disp-21 tablet, R-1Normal  14. Primary osteoarthritis involving multiple joints  -     Handicap Placard MISC; Starting Mon 6/27/2022, Disp-1 each, R-0, Print       Patient informed, we will call with blood work results within one week. If you have not heard regarding results in over a week, please contact office. You can also review results on [email or web address].            Remy Lees MD

## 2022-06-28 NOTE — PATIENT INSTRUCTIONS

## 2022-07-18 DIAGNOSIS — D50.8 OTHER IRON DEFICIENCY ANEMIAS: ICD-10-CM

## 2022-07-18 DIAGNOSIS — E78.5 HYPERLIPIDEMIA, UNSPECIFIED HYPERLIPIDEMIA TYPE: Primary | ICD-10-CM

## 2022-07-18 DIAGNOSIS — E55.9 VITAMIN D DEFICIENCY: ICD-10-CM

## 2022-07-19 ENCOUNTER — TELEPHONE (OUTPATIENT)
Dept: ONCOLOGY | Age: 77
End: 2022-07-19

## 2022-07-22 ENCOUNTER — TELEMEDICINE (OUTPATIENT)
Dept: FAMILY MEDICINE CLINIC | Facility: CLINIC | Age: 77
End: 2022-07-22
Payer: MEDICARE

## 2022-07-22 DIAGNOSIS — N18.31 STAGE 3A CHRONIC KIDNEY DISEASE (HCC): ICD-10-CM

## 2022-07-22 DIAGNOSIS — Z00.00 ENCOUNTER FOR SUBSEQUENT ANNUAL WELLNESS VISIT (AWV) IN MEDICARE PATIENT: Primary | ICD-10-CM

## 2022-07-22 DIAGNOSIS — J20.9 ACUTE BRONCHITIS, UNSPECIFIED ORGANISM: ICD-10-CM

## 2022-07-22 PROBLEM — N18.30 CHRONIC RENAL DISEASE, STAGE III (HCC): Status: ACTIVE | Noted: 2022-07-22

## 2022-07-22 PROCEDURE — 1123F ACP DISCUSS/DSCN MKR DOCD: CPT | Performed by: FAMILY MEDICINE

## 2022-07-22 PROCEDURE — G0439 PPPS, SUBSEQ VISIT: HCPCS | Performed by: FAMILY MEDICINE

## 2022-07-22 RX ORDER — PREDNISONE 20 MG/1
20 TABLET ORAL DAILY
Qty: 5 TABLET | Refills: 0 | Status: SHIPPED | OUTPATIENT
Start: 2022-07-22 | End: 2022-07-27

## 2022-07-22 RX ORDER — AZITHROMYCIN 250 MG/1
250 TABLET, FILM COATED ORAL SEE ADMIN INSTRUCTIONS
Qty: 6 TABLET | Refills: 0 | Status: SHIPPED | OUTPATIENT
Start: 2022-07-22 | End: 2022-07-27

## 2022-07-22 ASSESSMENT — PATIENT HEALTH QUESTIONNAIRE - PHQ9
2. FEELING DOWN, DEPRESSED OR HOPELESS: 0
1. LITTLE INTEREST OR PLEASURE IN DOING THINGS: 0
3. TROUBLE FALLING OR STAYING ASLEEP: 0
6. FEELING BAD ABOUT YOURSELF - OR THAT YOU ARE A FAILURE OR HAVE LET YOURSELF OR YOUR FAMILY DOWN: 0
SUM OF ALL RESPONSES TO PHQ QUESTIONS 1-9: 0
7. TROUBLE CONCENTRATING ON THINGS, SUCH AS READING THE NEWSPAPER OR WATCHING TELEVISION: 0
1. LITTLE INTEREST OR PLEASURE IN DOING THINGS: 0
SUM OF ALL RESPONSES TO PHQ QUESTIONS 1-9: 0
5. POOR APPETITE OR OVEREATING: 0
SUM OF ALL RESPONSES TO PHQ QUESTIONS 1-9: 0
6. FEELING BAD ABOUT YOURSELF - OR THAT YOU ARE A FAILURE OR HAVE LET YOURSELF OR YOUR FAMILY DOWN: 0
3. TROUBLE FALLING OR STAYING ASLEEP: 0
4. FEELING TIRED OR HAVING LITTLE ENERGY: 0
9. THOUGHTS THAT YOU WOULD BE BETTER OFF DEAD, OR OF HURTING YOURSELF: 0
9. THOUGHTS THAT YOU WOULD BE BETTER OFF DEAD, OR OF HURTING YOURSELF: 0
5. POOR APPETITE OR OVEREATING: 0
8. MOVING OR SPEAKING SO SLOWLY THAT OTHER PEOPLE COULD HAVE NOTICED. OR THE OPPOSITE, BEING SO FIGETY OR RESTLESS THAT YOU HAVE BEEN MOVING AROUND A LOT MORE THAN USUAL: 0
SUM OF ALL RESPONSES TO PHQ QUESTIONS 1-9: 0
10. IF YOU CHECKED OFF ANY PROBLEMS, HOW DIFFICULT HAVE THESE PROBLEMS MADE IT FOR YOU TO DO YOUR WORK, TAKE CARE OF THINGS AT HOME, OR GET ALONG WITH OTHER PEOPLE: 0
4. FEELING TIRED OR HAVING LITTLE ENERGY: 0
SUM OF ALL RESPONSES TO PHQ QUESTIONS 1-9: 0
SUM OF ALL RESPONSES TO PHQ9 QUESTIONS 1 & 2: 0
8. MOVING OR SPEAKING SO SLOWLY THAT OTHER PEOPLE COULD HAVE NOTICED. OR THE OPPOSITE, BEING SO FIGETY OR RESTLESS THAT YOU HAVE BEEN MOVING AROUND A LOT MORE THAN USUAL: 0
SUM OF ALL RESPONSES TO PHQ9 QUESTIONS 1 & 2: 0
2. FEELING DOWN, DEPRESSED OR HOPELESS: 0
SUM OF ALL RESPONSES TO PHQ QUESTIONS 1-9: 0
SUM OF ALL RESPONSES TO PHQ QUESTIONS 1-9: 0
7. TROUBLE CONCENTRATING ON THINGS, SUCH AS READING THE NEWSPAPER OR WATCHING TELEVISION: 0
SUM OF ALL RESPONSES TO PHQ QUESTIONS 1-9: 0

## 2022-07-22 ASSESSMENT — LIFESTYLE VARIABLES
HOW OFTEN DO YOU HAVE A DRINK CONTAINING ALCOHOL: NEVER
HOW MANY STANDARD DRINKS CONTAINING ALCOHOL DO YOU HAVE ON A TYPICAL DAY: PATIENT DOES NOT DRINK

## 2022-07-22 NOTE — PROGRESS NOTES
Medicare Annual Wellness Visit    [place] is here for Medicare AWV    Assessment & Plan   Encounter for subsequent annual wellness visit (AWV) in Medicare patient  -Updated problem list, care and history  -Recommended COVID 19 booster  -Can get shingrix vaccine at pharmacy  -Prior treatment for osteoporosis with prolia and fosamax. Last DEXA scan in 2019. Acute bronchitis, unspecified organism  Symptoms present for over a week. Has COPD. Will treat with steroid and antibiotic.   -     azithromycin (ZITHROMAX) 250 MG tablet; Take 1 tablet by mouth See Admin Instructions for 5 days 500mg on day 1 followed by 250mg on days 2 - 5, Disp-6 tablet, R-0Normal  -     predniSONE (DELTASONE) 20 MG tablet; Take 1 tablet by mouth in the morning for 5 days. , Disp-5 tablet, R-0Normal  Stage 3a chronic kidney disease ([facility] [address]  Advised to avoid NSAIDs, maintain healthy blood pressure, importance of blood sugar control, and adequate fluid intake to preserve renal function. All medications reviewed and appropriately dose for chronic kidney disease. Recommendations for Preventive Services Due: see orders and patient instructions/AVS.  Recommended screening schedule for the next 5-10 years is provided to the patient in written form: see Patient Instructions/AVS.     Return for Medicare Annual Wellness Visit in 1 year. Patient's complete Health Risk Assessment and screening values have been reviewed and are found in Flowsheets. The following problems were reviewed today and where indicated follow up appointments were made and/or referrals ordered. Positive Risk Factor Screenings with Interventions:  Hydrocodone prescribed by pain management             Opioid Risk: (Low risk score <55) Opioid risk score: 25    Patient is low risk for opioid use disorder or overdose.   Last PDMP Placido Junior as Reviewed:  Review User Review Instant Review Result   Laine Taylor 7/22/2022  9:52 AM Reviewed PDMP [1] daily  Shabnam Rust MD   FINEST NUTRITION MELATONIN 5-10 MG TBCR TAKE 1 TABLET BY MOUTH EVERY NIGHT AT BEDTIME AS NEEDED FOR INSOMNIA  Shabnam Rust MD   ondansetron (ZOFRAN-ODT) 4 MG disintegrating tablet Take 1 tablet by mouth 3 times daily as needed for Nausea or Vomiting  Shabnam Rust MD   Handicap Placard MISC by Does not apply route  Shabnam Rust MD   albuterol sulfate  (90 Base) MCG/ACT inhaler INHALE 2 PUFFS BY MOUTH FOUR TIMES DAILY  PANKAJ Berry Sa - CNP   albuterol (PROVENTIL) (2.5 MG/3ML) 0.083% nebulizer solution Inhale 2.5 mg into the lungs every 6 hours as needed  Ar Automatic Reconciliation   Calcium Carbonate-Vitamin D (CALCIUM-VITAMIN D) 600-125 MG-UNIT TABS Take 1 tablet by mouth daily  Ar Automatic Reconciliation   cetirizine (ZYRTEC) 10 MG tablet Take 10 mg by mouth daily  Ar Automatic Reconciliation   denosumab (PROLIA) 60 MG/ML SOSY SC injection Inject 60 mg into the skin  Ar Automatic Reconciliation   ergocalciferol (ERGOCALCIFEROL) 1.25 MG (41758 UT) capsule TAKE 1 CAPSULE BY MOUTH ONCE WEEKLY  Ar Automatic Reconciliation   fluticasone-umeclidin-vilant (TRELEGY ELLIPTA) 100-62.5-25 MCG/INH AEPB Inhale 1 puff into the lungs daily  Ar Automatic Reconciliation   HYDROcodone-ibuprofen (VICOPROFEN) 7.5-200 MG per tablet Take by mouth. Ar Automatic Reconciliation   meclizine (ANTIVERT) 25 MG tablet TAKE 1 TABLET BY MOUTH THREE TIMES DAILY AS NEEDED FOR DIZZINESS OR FOR NAUSEA  Ar Automatic Reconciliation   naloxone 4 MG/0.1ML LIQD nasal spray Use 1 spray intranasally, then discard. Repeat with new spray every 2 min as needed for opioid overdose symptoms, alternating nostrils.   Ar Automatic Reconciliation   omalizumab (OMALIZUMAB) 150 MG injection Inject 150 mg into the skin every 30 days  Ar Automatic Reconciliation   ondansetron (ZOFRAN-ODT) 4 MG disintegrating tablet Take 4 mg by mouth every 8 hours as needed  Ar Automatic Reconciliation       CareTeam (Including outside providers/suppliers regularly involved in providing care):   Patient Care Team:  Shabnam Rust MD as PCP - General  Shabnam Rust MD as PCP - [facility] Empaneled Provider  Evelyn Ferrara MD as Referring Physician  PANKAJ Berry Sa - CNP (Pulmonary Disease)     Reviewed and updated this visit:  Tobacco  Allergies  Meds  Problems  Med Hx  Surg Hx  Soc Hx  Fam Hx            Thenanali Monge, was evaluated through a synchronous (real-time) audio encounter. The patient (or guardian if applicable) is aware that this is a billable service, which includes applicable co-pays. This Virtual Visit was conducted with patient's (and/or legal guardian's) consent. The visit was conducted pursuant to the emergency declaration under the [address] [place] [address], [address] [place] [address] waiver authority and the [employer] General Act. Patient identification was verified, and a caregiver was present when appropriate. The patient was located at Home: [address] [place] [address].    Provider was located at Home ([facility]): [facility].

## 2022-07-22 NOTE — PATIENT INSTRUCTIONS
Personalized Preventive Plan for Aaron Ards - 7/22/2022  Medicare offers a range of preventive health benefits. Some of the tests and screenings are paid in full while other may be subject to a deductible, co-insurance, and/or copay. Some of these benefits include a comprehensive review of your medical history including lifestyle, illnesses that may run in your family, and various assessments and screenings as appropriate. After reviewing your medical record and screening and assessments performed today your provider may have ordered immunizations, labs, imaging, and/or referrals for you. A list of these orders (if applicable) as well as your Preventive Care list are included within your After Visit Summary for your review. Other Preventive Recommendations:    A preventive eye exam performed by an eye specialist is recommended every 1-2 years to screen for glaucoma; cataracts, macular degeneration, and other eye disorders. A preventive dental visit is recommended every 6 months. Try to get at least 150 minutes of exercise per week or 10,000 steps per day on a pedometer . Order or download the FREE \"Exercise & Physical Activity: Your Everyday Guide\" from [employer]. Call 9-149.346.2645 or search [employer] online. You need 4316-5900 mg of calcium and 3000-6540 IU of vitamin D per day. It is possible to meet your calcium requirement with diet alone, but a vitamin D supplement is usually necessary to meet this goal.  When exposed to the sun, use a sunscreen that protects against both UVA and UVB radiation with an SPF of 30 or greater. Reapply every 2 to 3 hours or after sweating, drying off with a towel, or swimming. Always wear a seat belt when traveling in a car. Always wear a helmet when riding a bicycle or motorcycle.

## 2022-07-25 ENCOUNTER — TELEPHONE (OUTPATIENT)
Dept: ONCOLOGY | Age: 77
End: 2022-07-25

## 2022-08-02 ENCOUNTER — TELEPHONE (OUTPATIENT)
Dept: FAMILY MEDICINE CLINIC | Facility: CLINIC | Age: 77
End: 2022-08-02

## 2022-08-02 ENCOUNTER — HOSPITAL ENCOUNTER (OUTPATIENT)
Dept: GENERAL RADIOLOGY | Age: 77
Discharge: HOME | End: 2022-08-05
Payer: MEDICARE

## 2022-08-02 DIAGNOSIS — J42 CHRONIC BRONCHITIS, UNSPECIFIED CHRONIC BRONCHITIS TYPE (HCC): ICD-10-CM

## 2022-08-02 DIAGNOSIS — J42 CHRONIC BRONCHITIS, UNSPECIFIED CHRONIC BRONCHITIS TYPE (HCC): Primary | ICD-10-CM

## 2022-08-02 DIAGNOSIS — I10 PRIMARY HYPERTENSION: ICD-10-CM

## 2022-08-02 PROCEDURE — 71046 X-RAY EXAM CHEST 2 VIEWS: CPT

## 2022-08-02 RX ORDER — PREDNISONE 20 MG/1
20 TABLET ORAL 2 TIMES DAILY
Qty: 10 TABLET | Refills: 0 | Status: SHIPPED | OUTPATIENT
Start: 2022-08-02 | End: 2022-08-07

## 2022-08-02 RX ORDER — VERAPAMIL HYDROCHLORIDE 240 MG/1
TABLET, FILM COATED, EXTENDED RELEASE ORAL
Qty: 90 TABLET | OUTPATIENT
Start: 2022-08-02

## 2022-08-02 RX ORDER — DOXYCYCLINE HYCLATE 100 MG
100 TABLET ORAL 2 TIMES DAILY
Qty: 14 TABLET | Refills: 0 | Status: SHIPPED | OUTPATIENT
Start: 2022-08-02 | End: 2022-08-09

## 2022-08-02 NOTE — TELEPHONE ENCOUNTER
Mckenna Killian was seen today for other. Diagnoses and all orders for this visit:    Chronic bronchitis, unspecified chronic bronchitis type ([facility]  -     XR CHEST PA LAT (2 VIEWS);  Future

## 2022-08-02 NOTE — TELEPHONE ENCOUNTER
Patient called and stated she is still not feeling better and would like to know if she can get a refill on antibiotics and prednisone? Please advise?  Thank you

## 2022-08-04 DIAGNOSIS — E55.9 VITAMIN D DEFICIENCY: ICD-10-CM

## 2022-08-04 DIAGNOSIS — D50.8 OTHER IRON DEFICIENCY ANEMIAS: ICD-10-CM

## 2022-08-04 DIAGNOSIS — E78.5 HYPERLIPIDEMIA, UNSPECIFIED HYPERLIPIDEMIA TYPE: Primary | ICD-10-CM

## 2022-08-29 ENCOUNTER — TELEPHONE (OUTPATIENT)
Dept: SLEEP MEDICINE | Age: 77
End: 2022-08-29

## 2022-08-29 RX ORDER — PREDNISONE 20 MG/1
20 TABLET ORAL DAILY
Qty: 5 TABLET | Refills: 0 | Status: SHIPPED | OUTPATIENT
Start: 2022-08-29 | End: 2022-09-03

## 2022-08-29 RX ORDER — DOXYCYCLINE HYCLATE 100 MG
100 TABLET ORAL 2 TIMES DAILY
Qty: 14 TABLET | Refills: 0 | Status: SHIPPED | OUTPATIENT
Start: 2022-08-29 | End: 2022-09-05

## 2022-08-29 NOTE — TELEPHONE ENCOUNTER
Patient states that she has deep cough, productive. She has been short of breath since the weekend. She is also very hoarse. Please call something in to help with these symptoms. [facility] in [place]. She can be reached at [phone].

## 2022-08-29 NOTE — TELEPHONE ENCOUNTER
Last seen: 4/16/22  Hx: asthma, anemia, HTN, HLD, B12 deficient    Patient calling to report feeling hoarse, sore throat, deep productive cough, increased sob since the weekend. Asking for something to be called in.

## 2022-09-07 ENCOUNTER — OFFICE VISIT (OUTPATIENT)
Dept: PULMONOLOGY | Age: 77
End: 2022-09-07
Payer: MEDICARE

## 2022-09-07 ENCOUNTER — HOSPITAL ENCOUNTER (OUTPATIENT)
Dept: GENERAL RADIOLOGY | Age: 77
Discharge: HOME | End: 2022-09-10
Payer: MEDICARE

## 2022-09-07 VITALS
HEIGHT: 62 IN | BODY MASS INDEX: 22.56 KG/M2 | OXYGEN SATURATION: 97 % | TEMPERATURE: 97.1 F | RESPIRATION RATE: 18 BRPM | DIASTOLIC BLOOD PRESSURE: 68 MMHG | HEART RATE: 86 BPM | WEIGHT: 122.6 LBS | SYSTOLIC BLOOD PRESSURE: 122 MMHG

## 2022-09-07 DIAGNOSIS — J45.50 SEVERE PERSISTENT ASTHMA WITHOUT COMPLICATION: Primary | ICD-10-CM

## 2022-09-07 DIAGNOSIS — K21.00 GASTRO-ESOPHAGEAL REFLUX DISEASE WITH ESOPHAGITIS, WITHOUT BLEEDING: ICD-10-CM

## 2022-09-07 DIAGNOSIS — J45.51 SEVERE PERSISTENT ASTHMA WITH EXACERBATION: ICD-10-CM

## 2022-09-07 DIAGNOSIS — J30.89 ENVIRONMENTAL AND SEASONAL ALLERGIES: ICD-10-CM

## 2022-09-07 DIAGNOSIS — R05.3 CHRONIC COUGH: ICD-10-CM

## 2022-09-07 PROCEDURE — 71046 X-RAY EXAM CHEST 2 VIEWS: CPT

## 2022-09-07 PROCEDURE — 99215 OFFICE O/P EST HI 40 MIN: CPT | Performed by: NURSE PRACTITIONER

## 2022-09-07 PROCEDURE — 1123F ACP DISCUSS/DSCN MKR DOCD: CPT | Performed by: NURSE PRACTITIONER

## 2022-09-07 RX ORDER — FLUTICASONE FUROATE, UMECLIDINIUM BROMIDE AND VILANTEROL TRIFENATATE 200; 62.5; 25 UG/1; UG/1; UG/1
1 POWDER RESPIRATORY (INHALATION) DAILY
Qty: 1 EACH | Refills: 11 | Status: SHIPPED | OUTPATIENT
Start: 2022-09-07

## 2022-09-07 NOTE — PROGRESS NOTES
Patient Name:  Marlena Johnson                             YOB: 1945  MRN: [id number]                                              Office Visit 9/7/2022    ASSESSMENT AND PLAN:  (Medical Decision Making)    Rubi Storey was seen today for asthma. Diagnoses and all orders for this visit:    Severe persistent asthma without complication  -- Currently on Trelegy 200 and continues to have exacerbations. 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 --Last CBC shows an absolute eosinophil count of 600. Last IgE as of March 2022 was 800. --Patient is a great candidate for a biologic like Dupixent or Ney Bro. We will see if we can get some assistance through these programs with financial coverage. Ultimately explained that to improve her lung function as well as decrease symptoms, she will need some sort of therapy like this since maximum inhaled therapy is not working. 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 Application for Dupixent initiated today with Becca Edgar. Further information was given to the patient about the medication as well as the process for getting it initiated. Environmental and seasonal allergies  --Continue Singulair daily. This was refilled today    Chronic cough  --Ongoing productive cough. Currently sputum is white. Patient encouraged to let me know if anything changes with sputum    Gastro-esophageal reflux disease with esophagitis, without bleeding  --Continue omeprazole. Patient does have a small diaphragmatic hernia noted on CT completed in November 2021. Other orders  -     Fluticasone-Umeclidin-Vilant (Frosty Edinger) 822-68.5-37 MCG/INH AEPB;  Inhale 1 puff into the lungs daily    Orders Placed This Encounter   Medications    Fluticasone-Umeclidin-Vilant (TRELEGY ELLIPTA) 200-62.5-25 MCG/INH AEPB     Sig: Inhale 1 puff into the lungs daily     Dispense:  1 each     Refill:  11     No orders of the defined types were placed in this encounter. PANKAJ Vance - CNP    Collaborating physician is Carlos Adrian MD    Total time for encounter on day of encounter was 42 minutes. This time includes chart prep, review of tests/procedures, review of other provider's notes, documentation and counseling patient regarding disease process and medications. _________________________________________________________________________    HISTORY OF PRESENT ILLNESS:    Ms. Eugenio Spears is a 69 y.o. female who is seen at [facility] today for  Asthma    Ms. Eugenio Spears is a pleasant 77year old female, PMH asthma, anemia, HTN, HLD, and B12 deficiency here today for follow up for asthma and cough. She was just in the hospital in June for pneumonia and sepsis. Patient treated with vancomycin and Rocephin. Cultures were all negative. She did not have a bronchoscopy, although eosinophils noted on CBC while there were up to 24. Today she reports she is feeling better. She is not wheezing as much and not eating her rescue inhaler hardly at all. She continues on Trelegy 200, Singulair 10 mg. The patient states that she is still coughing but it is much improved and her sputum is white in color. She denies fevers, chills, nausea, or vomiting. She denies any recent sick contacts. The patient's most recent CBC shows an eosinophil count of 0.6 and last IgE in March 2022 showed 806. The patient has been approved to try Floreen Citrin in the past however either cost or having to give herself an injection has been a problem. She states that she has been on antibiotics and oral steroids 4 times already this year and notices a significant improvement when she is on the oral steroids. REVIEW OF SYSTEMS: 10 point review of systems is negative except as reported in HPI. PHYSICAL EXAM: Body mass index is 22.42 kg/m².   Vitals: 09/07/22 0807   BP: 122/68   Pulse: 86   Resp: 18   Temp: 97.1 °F (36.2 °C)   TempSrc: Skin   SpO2: 97%   Weight: 122 lb 9.6 oz (55.6 kg)   Height: 5' 2\" (1.575 m)         General:   Alert, cooperative, no distress, appears stated age. Eyes:   Conjunctivae/corneas clear. PERRL        Mouth/Throat:  Lips, mucosa, and tongue normal. Teeth and gums normal.        Lungs:   Mild rhonchi in the right base, otherwise clear on room air. Heart:   Regular rate and rhythm, S1, S2 normal, no murmur, click, rub or gallop. Abdomen:    Soft, non-tender. Extremities:  Extremities normal, atraumatic, no cyanosis or edema. Skin:  Skin color normal. No rashes or lesions     Neurologic:  A&Ox3     DIAGNOSTIC TESTS:                                                                                    LABS:   Lab Results   Component Value Date/Time    WBC 8.0 06/27/2022 11:02 AM    HGB 13.7 06/27/2022 11:02 AM    HCT 43.3 06/27/2022 11:02 AM     06/27/2022 11:02 AM    TSH 2.610 07/22/2020 09:53 AM     Imaging: I performed an independent interpretation of the patient's images. CXR:     XR CHEST STANDARD TWO VW 09/07/2022    Narrative  TWO-VIEW CHEST:    CLINICAL HISTORY: Severe, persistent asthma with exacerbation. COMPARISON:  CHEST radiographs of 8/2/2022 and CTA torso of November 17, 2021. FINDINGS: PA and lateral chest images demonstrate hyperinflation with no acute  pneumonic infiltrate or significant pleural fluid collection. The heart size is  within normal limits without evidence of congestive heart failure or  pneumothorax. Compression deformities at T10 and T12 are again noted. The bony  thorax otherwise appears intact on these views. Impression  NO ACUTE CARDIOPULMONARY DISEASE IDENTIFIED. CT Chest: 11/2021      Findings:    CHEST   Lungs:  Dependent atelectasis. A bronchus in the anterior RIGHT upper lobe is   filled with soft tissue density.    Mediastinum:  Stable, nonenlarged, lymph nodes since 2018   Thyroid Gland:  Heterogeneous       ABDOMEN   Gallbladder:  Large calcified gallstone   Liver:  Unremarkable. Spleen:  Unremarkable   Pancreas:  Unremarkable   Kidneys:  Symmetric nephrograms. Calcification in the periphery of the LEFT   kidney is an unusual location for a kidney stone. Perhaps this represents   sequela of previous focal pyelonephritis. Adrenal Glands:  Unremarkable   Esophagus: Contrast filled and distended consistent with obstruction at the   level of the GE junction. Stomach:  Large hiatal hernia   Small intestine:  Unremarkable   Colon: Thickened wall in the redundant descending and sigmoid colon. There are   a few, scattered, air-filled diverticula. No abscess. PELVIS   Bladder:  Decompressed by Zavaleta catheter     Uterus/Adnexa:  Unremarkable       MUSCULOSKELETAL:  T12, wedge-shaped, vertebral compression fracture has been   present since at least 2020 but appears to have progressed in the interval.       VASCULAR   Thoracic Aorta: Atherosclerotic disease, patent   Great Arteries:  Atherosclerotic disease, patent. Central veins: RIGHT internal jugular vein catheter. Abdominal Aorta: Atherosclerotic disease, patent   Iliac Arteries:  Atherosclerotic disease, patent   Femoral Arteries:  Patent       Celiac Artery:  Mild atherosclerotic disease, patent. Patent splenic artery   with calcification and a bifurcation point, no aneurysm identified. Patent   hepatic artery. Superior mesenteric Artery:  Patent   Inferior mesenteric Artery:  Patent       Renal Arteries:  Grade stenosis of the RIGHT renal artery secondary to calcified   plaque at the origin. High-grade stenosis of the LEFT renal artery secondary to   calcified plaque at the origin. Impression   Atherosclerotic disease. Patent mesenteric arteries. Descending colon/sigmoid colitis. No abscess.        Contrast filled and distended esophagus to the level of the hiatal hernia at the   GE junction. Recommend further evaluation with endoscopy as there may well be   an obstructing mass or stricture in this area. Gallstone. Terminal RIGHT upper lobe bronchus is filled with soft tissue density. While   this most likely represents mucus plugging, an endobronchial lesion cannot be   excluded. Recommend follow-up CT scan of the chest in 3 months if an interval   bronchoscopy has not been performed. T12 vertebral compression fracture has progressed since 9/28/2020. Does this   patient have focal tenderness? Further evaluation with MRI is suggested. Nuclear Medicine: No results found for this or any previous visit from the past 3650 days. PFTs:     Date 12/2017      FVC    1.76-65%      FEV1    1.05-51%      FEV1/FVC    60%      FEF 25-75%    0.45-26%      Bronchodilator Response          TLC          RV          DLCO            FeNO: No results found for this or any previous visit. FeNO and Likelihood of Eosinophilic Asthma   Unlikely Intermediate Likely   <25 ppb 25-50 ppb >50ppb   Exercise Oximetry:  Echo:   TRANSTHORACIC ECHOCARDIOGRAM (TTE) COMPLETE (CONTRAST/BUBBLE/3D PRN) 11/19/2021    Narrative  This is a summary report. The complete report is available in the patient's medical record. If you cannot access the medical record, please contact the sending organization for a detailed fax or copy. · LV: Estimated LVEF is 55 - 60%. Normal cavity size, wall thickness and systolic function (ejection fraction normal).  Wall motion: normal.    [facility] Reference Info:                                                                                                                Exposure History:  Second Hand Smoke Exposure: Yes  Birds: No  Asbestos: No  TB: No  Hot Tubs/Humidifier: No  Organic/Inorganic Dusts: No  Molds: No  Occupation/Hobbies:   Past Medical History:   Diagnosis Date    Anemia 2005 approx    2 units transfused    Arthritis     hands, hips    B12 deficiency     Cancer ([facility]     hx of melanoma 1986 R arm, and carcinoma x 2 forehead    Colitis, indeterminate 11/17/2021    COVID-19 vaccine series completed 02/2021    PT TO BRING CARD DOS    Depression with anxiety 8/16/2016    Elevated troponin 11/20/2021    Essential hypertension with goal blood pressure less than 140/90     controlled with med    Gastrointestinal disorder     hiatel hernia    Hyperlipidemia, unspecified 8/16/2016    Menopause     @ 51    Mild intermittent asthma 8/16/2016    Osteoarthritis of multiple joints 8/16/2016    Osteoporosis 10/25/2016    [facility]ma ([facility] 08/16/2016    daily inhalers    Rectal bleed     Streptococcal bacteremia 11/18/2021    Vertigo       Tobacco Use: Low Risk     Smoking Tobacco Use: Never    Smokeless Tobacco Use: Never     Allergies   Allergen Reactions    Niacin Rash     Current Outpatient Medications   Medication Instructions    albuterol (PROVENTIL) 2.5 mg, Inhalation, EVERY 6 HOURS PRN    albuterol sulfate  (90 Base) MCG/ACT inhaler INHALE 2 PUFFS BY MOUTH FOUR TIMES DAILY    Calcium Carbonate-Vitamin D (CALCIUM-VITAMIN D) 600-125 MG-UNIT TABS 1 tablet, Oral, DAILY    cetirizine (ZYRTEC) 10 mg, DAILY    ergocalciferol (ERGOCALCIFEROL) 1.25 MG (23012 UT) capsule TAKE 1 CAPSULE BY MOUTH ONCE WEEKLY    FINEST NUTRITION MELATONIN 5-10 MG TBCR TAKE 1 TABLET BY MOUTH EVERY NIGHT AT BEDTIME AS NEEDED FOR INSOMNIA    Fluticasone-Umeclidin-Vilant (TRELEGY ELLIPTA) 200-62.5-25 MCG/INH AEPB 1 puff, Inhalation, DAILY    Handicap Placard MISC Does not apply    HYDROcodone-ibuprofen (VICOPROFEN) 7.5-200 MG per tablet Take by mouth.     hydrOXYzine HCl (ATARAX) 25 mg, Oral, EVERY 8 HOURS PRN    losartan (COZAAR) 25 mg, Oral, DAILY    meclizine (ANTIVERT) 25 MG tablet TAKE 1 TABLET BY MOUTH THREE TIMES DAILY AS NEEDED FOR DIZZINESS OR FOR NAUSEA    mirtazapine (REMERON) 7.5 mg, Oral, NIGHTLY    montelukast (SINGULAIR) 10 MG tablet TAKE 1 TABLET BY MOUTH DAILY AT BEDTIME    naloxone 4 MG/0.1ML LIQD nasal spray Use 1 spray intranasally, then discard. Repeat with new spray every 2 min as needed for opioid overdose symptoms, alternating nostrils.     omeprazole (PRILOSEC) 40 mg, Oral, 2 TIMES DAILY    ondansetron (ZOFRAN-ODT) 4 mg, Oral, EVERY 8 HOURS PRN    ondansetron (ZOFRAN-ODT) 4 mg, Oral, 3 TIMES DAILY PRN    pravastatin (PRAVACHOL) 20 MG tablet TAKE 1 TABLET BY MOUTH DAILY    Prolia 60 mg, SubCUTAneous    venlafaxine (EFFEXOR XR) 150 mg, Oral, DAILY    verapamil (CALAN SR) 240 mg, Oral, NIGHTLY, TAKE 1 TABLET BY MOUTH EVERY MORNING

## 2022-09-14 ENCOUNTER — TELEPHONE (OUTPATIENT)
Dept: PULMONOLOGY | Age: 77
End: 2022-09-14

## 2022-09-14 NOTE — TELEPHONE ENCOUNTER
PA for Dupixent sent to [facility] via CoverMyMeds. Key: [id number]    Your request has been approved  Request Reference Number: [id number]. DUPIXENT /2ML is approved through 03/14/2023.

## 2022-09-21 NOTE — TELEPHONE ENCOUNTER
Rx for Dupixent will be sent to [facility] as requested. TRISHA Ventura will be informed.

## 2022-09-21 NOTE — TELEPHONE ENCOUNTER
Patient says that Karyn Talbert approved the med     [phone]    Fax  [phone]          Request Reference Number: [id number]. DUPIXENT /2ML is approved through 03/14/2023.     Please send them a prescription

## 2022-09-28 ENCOUNTER — TELEPHONE (OUTPATIENT)
Dept: PULMONOLOGY | Age: 77
End: 2022-09-28

## 2022-09-29 NOTE — TELEPHONE ENCOUNTER
I CALLED AND SPOKE WITH PATIENT TO LET HER KNOW THAT THEY ARE A NURSE LINE TO Shannon TO COME OUT TO TEACH HER HOW TO GIVE HER SELF SHOT. Venu Singleton PATIENT UNDERSTOOD AND NO OTHER QUESTION. .....  DONG ACOSTA

## 2022-09-29 NOTE — TELEPHONE ENCOUNTER
Olga Winter are you wanting the RN to schedule appointment. For them or do you want her to make a return appt Patient has received her Dupixent and is calling to get us to give her the 1st 2 shote 1st 2 shot . Salvador lyn

## 2022-11-18 ENCOUNTER — TELEPHONE (OUTPATIENT)
Dept: FAMILY MEDICINE CLINIC | Facility: CLINIC | Age: 77
End: 2022-11-18

## 2022-11-18 DIAGNOSIS — N81.9 FEMALE GENITAL PROLAPSE, UNSPECIFIED TYPE: Primary | ICD-10-CM

## 2022-11-18 NOTE — TELEPHONE ENCOUNTER
Patient called and would like a referral to a gyn, she is having feeling her uterus is pushing. out She said she  doesn't need an appt with you.  Please advise

## 2022-12-05 ENCOUNTER — OFFICE VISIT (OUTPATIENT)
Dept: GYNECOLOGY | Age: 77
End: 2022-12-05
Payer: MEDICARE

## 2022-12-05 VITALS
WEIGHT: 125 LBS | HEIGHT: 62 IN | BODY MASS INDEX: 23 KG/M2 | DIASTOLIC BLOOD PRESSURE: 102 MMHG | SYSTOLIC BLOOD PRESSURE: 140 MMHG

## 2022-12-05 DIAGNOSIS — N81.10 PELVIC ORGAN PROLAPSE QUANTIFICATION STAGE 3 CYSTOCELE: ICD-10-CM

## 2022-12-05 DIAGNOSIS — Z12.4 ENCOUNTER FOR PAPANICOLAOU SMEAR FOR CERVICAL CANCER SCREENING: Primary | ICD-10-CM

## 2022-12-05 PROCEDURE — 99203 OFFICE O/P NEW LOW 30 MIN: CPT | Performed by: OBSTETRICS & GYNECOLOGY

## 2022-12-05 PROCEDURE — 3074F SYST BP LT 130 MM HG: CPT | Performed by: OBSTETRICS & GYNECOLOGY

## 2022-12-05 PROCEDURE — 3078F DIAST BP <80 MM HG: CPT | Performed by: OBSTETRICS & GYNECOLOGY

## 2022-12-05 PROCEDURE — 1123F ACP DISCUSS/DSCN MKR DOCD: CPT | Performed by: OBSTETRICS & GYNECOLOGY

## 2022-12-05 NOTE — PROGRESS NOTES
TIM Lemos is a 69 y.o. female seen for questionable bladder prolapse. Pt states she can feel a \"ball\" in the vaginal area. The patient is  3 para 4 with vaginal delivery of twins and 2 other infants. She developed some respiratory problems during the spring with a chronic cough and had some pelvic pressure at that time. More recently she has been able to palpate a mass at the introitus. Past Medical History, Past Surgical History, Family history, Social History, and Medications were all reviewed with the patient today and updated as necessary.      Current Outpatient Medications   Medication Sig    dupilumab (DUPIXENT) 300 MG/2ML SOSY injection Initial dose 600mg si injections SQ on day 1 then Subsequent Dose: 300mg si injection SQ every 2 weeks  starting on day 15    Fluticasone-Umeclidin-Vilant (TRELEGY ELLIPTA) 200-62.5-25 MCG/INH AEPB Inhale 1 puff into the lungs daily    hydrOXYzine HCl (ATARAX) 25 MG tablet Take 1 tablet by mouth every 8 hours as needed for Anxiety    pravastatin (PRAVACHOL) 20 MG tablet TAKE 1 TABLET BY MOUTH DAILY    montelukast (SINGULAIR) 10 MG tablet TAKE 1 TABLET BY MOUTH DAILY AT BEDTIME    verapamil (CALAN SR) 240 MG extended release tablet Take 1 tablet by mouth nightly TAKE 1 TABLET BY MOUTH EVERY MORNING    venlafaxine (EFFEXOR XR) 150 MG extended release capsule Take 1 capsule by mouth daily    omeprazole (PRILOSEC) 40 MG delayed release capsule Take 1 capsule by mouth 2 times daily    mirtazapine (REMERON) 7.5 MG tablet Take 1 tablet by mouth nightly    losartan (COZAAR) 25 MG tablet Take 1 tablet by mouth daily    ondansetron (ZOFRAN-ODT) 4 MG disintegrating tablet Take 1 tablet by mouth 3 times daily as needed for Nausea or Vomiting    Handicap Placard MISC by Does not apply route    albuterol sulfate  (90 Base) MCG/ACT inhaler INHALE 2 PUFFS BY MOUTH FOUR TIMES DAILY    albuterol (PROVENTIL) (2.5 MG/3ML) 0.083% nebulizer solution Inhale 2.5 mg into the lungs every 6 hours as needed    Calcium Carbonate-Vitamin D (CALCIUM-VITAMIN D) 600-125 MG-UNIT TABS Take 1 tablet by mouth daily    denosumab (PROLIA) 60 MG/ML SOSY SC injection Inject 60 mg into the skin    meclizine (ANTIVERT) 25 MG tablet TAKE 1 TABLET BY MOUTH THREE TIMES DAILY AS NEEDED FOR DIZZINESS OR FOR NAUSEA    ondansetron (ZOFRAN-ODT) 4 MG disintegrating tablet Take 4 mg by mouth every 8 hours as needed    FINEST NUTRITION MELATONIN 5-10 MG TBCR TAKE 1 TABLET BY MOUTH EVERY NIGHT AT BEDTIME AS NEEDED FOR INSOMNIA (Patient not taking: No sig reported)    cetirizine (ZYRTEC) 10 MG tablet Take 10 mg by mouth daily (Patient not taking: No sig reported)    ergocalciferol (ERGOCALCIFEROL) 1.25 MG (53234 UT) capsule TAKE 1 CAPSULE BY MOUTH ONCE WEEKLY (Patient not taking: No sig reported)    HYDROcodone-ibuprofen (VICOPROFEN) 7.5-200 MG per tablet Take by mouth. (Patient not taking: No sig reported)    naloxone 4 MG/0.1ML LIQD nasal spray Use 1 spray intranasally, then discard. Repeat with new spray every 2 min as needed for opioid overdose symptoms, alternating nostrils. (Patient not taking: No sig reported)     No current facility-administered medications for this visit.      Allergies   Allergen Reactions    Niacin Rash     Past Medical History:   Diagnosis Date    Anemia 2005 approx    2 units transfused    Arthritis     hands, hips    B12 deficiency     Cancer (H[facility])     hx of melanoma 1986 R arm, and carcinoma x 2 forehead    Colitis, indeterminate 11/17/2021    COVID-19 vaccine series completed 02/2021    PT TO BRING CARD DOS    Depression with anxiety 8/16/2016    Elevated troponin 11/20/2021    Essential hypertension with goal blood pressure less than 140/90     controlled with med    Gastrointestinal disorder     hiatel hernia    Hyperlipidemia, unspecified 8/16/2016    Menopause     @ 51    Mild intermittent asthma 8/16/2016    Osteoarthritis of multiple joints 8/16/2016 Osteoporosis 10/25/2016    Pulmonary emphysema ([address] 2016    daily inhalers    Rectal bleed     Streptococcal bacteremia 2021    Vertigo      Past Surgical History:   Procedure Laterality Date    COLONOSCOPY N/A 2021    COLONOSCOPY/BMI 23 performed by Keila Obregon MD at [facility]; HI RISK IND  2021         HERNIA REPAIR  2010    helped reflex    TUBAL LIGATION  1972     Family History   Problem Relation Age of Onset    Stroke Mother         intracerebral aneurysm    Hypertension Maternal Grandmother     Breast Cancer Daughter 28    Diabetes Maternal Aunt     Diabetes Maternal Uncle     No Known Problems Father         didn't have contact with father      Social History     Tobacco Use    Smoking status: Never    Smokeless tobacco: Never    Tobacco comments:     Quit smokinnd hand smoke x 33 yrs   Substance Use Topics    Alcohol use: Never       Social History     Substance and Sexual Activity   Sexual Activity Not on file     OB History   No obstetric history on file. ROS:    Review of Systems  General: Not Present- Chills, Fever, Fatigue, Insomnia, Hot flashes/Night sweats, Weight gain  Skin: Not Present- Bruising, Change in Wart/Mole, Excessive Sweating, Itching, Nail Changes, New Lesions, Rash, Skin Color Changes and Ulcer. HEENT: Not Present- Headache, Blurred Vision, Double Vision, Glaucoma, Visual Disturbances, Hearing Loss, Ringing in the Ears, Vertigo, Nose Bleed, Bleeding Gums, Hoarseness and Sore Throat. Neck: Not Present- Neck Pain and Neck Swelling. Respiratory: Not Present- Cough, Difficulty Breathing and Difficulty Breathing on Exertion. Breast: Not Present- Breast Mass, Breast Pain, Breast Swelling, Nipple Discharge, Nipple Pain, Recent Breast Size Changes and Skin Changes.   Cardiovascular: Not Present- Abnormal Blood Pressure, Chest Pain, Edema, Fainting / Blacking Out, Palpitations, Shortness of Breath and Swelling of Extremities. Gastrointestinal: Not Present- Abdominal Pain, Abdominal Swelling, Bloating, Change in Bowel Habits, Constipation, Diarrhea, Difficulty Swallowing, Gets full quickly at meals, Nausea, Rectal Bleeding and Vomiting. Female Genitourinary: Not Present- Dysmenorrhea, Dyspareunia, Decreased libido, Excessive Menstrual Bleeding, Menstrual Irregularities, Pelvic Pain, Urinary Complaints, Vaginal Discharge, Vaginal itching/burning, Vaginal odor  Musculoskeletal: Not Present- Joint Pain and Muscle Pain. Neurological: Not Present- Dizziness, Fainting, Headaches and Seizures. Psychiatric: Not Present- Anxiety, Depression, Mood changes and Panic Attacks. Endocrine: Not Present- Appetite Changes, Cold Intolerance, Excessive Thirst, Excessive Urination and Heat Intolerance. Hematology: Not Present- Abnormal Bleeding, Easy Bruising and Enlarged Lymph Nodes. PHYSICAL EXAM:    BP (!) 140/102   Ht 5' 2\" (1.575 m)   Wt 125 lb (56.7 kg)   BMI 22.86 kg/m²   BUS is normal, urethra is normal, examination of the speculum reveals cervix uterus benign. Pap smear is obtained. With Valsalva maneuver the bladder presents at the introitus. Medical problems and test results were reviewed with the patient today. ASSESSMENT and PLAN    1. Encounter for Papanicolaou smear for cervical cancer screening  -     PAP LB, Reflex HPV ASCUS  2. Pelvic organ prolapse quantification stage 3 cystocele     We discussed a TVH anterior repair and mid urethral sling with the patient. Also a trial of a pessary was discussed. She is not sure that she wants to do anything at all but I will send her to Dr. Lexi Ackerman for second opinion and treatment if the patient desires.       Time:  I spent  30 minutes in preparing to see patient (including chart review and preparation), obtaining and/or reviewing additional medical history, performing a physical exam and evaluation, documenting clinical information in the electronic health record, independently interpreting results, communicating results to patient, family or caregiver, and/or coordinating care. No follow-ups on file.        West Gallardo MD

## 2022-12-08 LAB
CYTOLOGIST CVX/VAG CYTO: NORMAL
CYTOLOGY CVX/VAG DOC THIN PREP: NORMAL
HPV REFLEX: NORMAL
Lab: NORMAL
PATH REPORT.FINAL DX SPEC: NORMAL
STAT OF ADQ CVX/VAG CYTO-IMP: NORMAL

## 2022-12-09 PROBLEM — E43 SEVERE PROTEIN-CALORIE MALNUTRITION (HCC): Status: RESOLVED | Noted: 2021-11-18 | Resolved: 2022-12-09

## 2022-12-09 PROBLEM — D62 ACUTE BLOOD LOSS ANEMIA: Status: RESOLVED | Noted: 2021-11-17 | Resolved: 2022-12-09

## 2022-12-09 PROBLEM — E83.39 HYPOPHOSPHATEMIA: Status: RESOLVED | Noted: 2022-06-18 | Resolved: 2022-12-09

## 2023-01-26 ENCOUNTER — OFFICE VISIT (OUTPATIENT)
Dept: UROGYNECOLOGY | Age: 78
End: 2023-01-26
Payer: MEDICARE

## 2023-01-26 ENCOUNTER — TELEPHONE (OUTPATIENT)
Dept: PULMONOLOGY | Age: 78
End: 2023-01-26

## 2023-01-26 VITALS — BODY MASS INDEX: 22.93 KG/M2 | HEIGHT: 62 IN | WEIGHT: 124.6 LBS

## 2023-01-26 DIAGNOSIS — N81.3 UTEROVAGINAL PROLAPSE, COMPLETE: ICD-10-CM

## 2023-01-26 DIAGNOSIS — Z13.89 SCREENING FOR GENITOURINARY CONDITION: Primary | ICD-10-CM

## 2023-01-26 LAB
BILIRUBIN, URINE, POC: NORMAL
BLOOD URINE, POC: NORMAL
GLUCOSE URINE, POC: NEGATIVE
KETONES, URINE, POC: NEGATIVE
LEUKOCYTE ESTERASE, URINE, POC: NEGATIVE
NITRITE, URINE, POC: NEGATIVE
PH, URINE, POC: 5.5 (ref 4.6–8)
PROTEIN,URINE, POC: NORMAL
SPECIFIC GRAVITY, URINE, POC: 1.03 (ref 1–1.03)
URINALYSIS CLARITY, POC: CLEAR
URINALYSIS COLOR, POC: YELLOW
UROBILINOGEN, POC: NORMAL

## 2023-01-26 PROCEDURE — 1123F ACP DISCUSS/DSCN MKR DOCD: CPT | Performed by: OBSTETRICS & GYNECOLOGY

## 2023-01-26 PROCEDURE — 99204 OFFICE O/P NEW MOD 45 MIN: CPT | Performed by: OBSTETRICS & GYNECOLOGY

## 2023-01-26 PROCEDURE — 51701 INSERT BLADDER CATHETER: CPT | Performed by: OBSTETRICS & GYNECOLOGY

## 2023-01-26 PROCEDURE — 81003 URINALYSIS AUTO W/O SCOPE: CPT | Performed by: OBSTETRICS & GYNECOLOGY

## 2023-01-26 NOTE — PROGRESS NOTES
[facility] UROGYNECOLOGY  [address]  Dept: [phone]        PCP:  Mary Grace Schmitt MD    1/26/2023        HPI:  I am being asked to see this patient in consultation by Dr. Edward Adair   for New Patient  . Ms. Cesar Redd has been experiencing prolapse for a few months. The prolapse does not cause her pain and/or pressure. She does not have to splint to complete urination, a BM, or for comfort. Nothing makes her prolapse symptoms worse. Nothing makes her prolapse symptoms better. She has not tried a pessary, she has not tried physical therapy, she has not  had surgery for her POP. Ms. Cesar Redd  has been leaking urine for a few months. She leaks urine at times. She does leak urine with cough, laugh, sneeze and activity. She does not leak urine with urgency. She does not use pads. She leaks several times per day but only when she is sick/coughing. When she leaks she leaks small amounts. She has not tried PT, she has not tried medication. She has not had procedures/surgery for this in the past.     She voids 3 times during the day. She voids 1 times over night. She has 7 BM per week, and does at times strain. She drinks 2 caffeine drinks beverages per day. She uses 0 artificial sweeteners per day. She drinks 0 alcoholic beverages per week. She has pelvic surgery in the past. BTL 50 yrs ago  Her last PAP: 2020/2021  No components found for: 082875  Her last Colonoscopy: 2020  Her last Mammogram: 2020    She does not have a history of DM. No results found for: LABA1C  No results found for: EAG    She does not have a history of sleep apnea. Tobacco: No    Sexual History: has sex with males. Not currently    Notes were reviewed from the referring provider Dr Edward Adair.       Results for orders placed or performed in visit on 01/26/23   AMB POC URINALYSIS DIP STICK AUTO W/O MICRO   Result Value Ref Range    Color (UA POC) Yellow Clarity (UA POC) Clear     Glucose, Urine, POC Negative Negative    Bilirubin, Urine, POC Small Negative    KETONES, Urine, POC Negative Negative    Specific Gravity, Urine, POC 1.030 1.001 - 1.035    Blood (UA POC) Small Negative    pH, Urine, POC 5.5 4.6 - 8.0    Protein, Urine, POC Trace Negative    Urobilinogen, POC 0.2 mg/dL     Nitrite, Urine, POC Negative Negative    Leukocyte Esterase, Urine, POC Negative Negative       Ht 5' 2\" (1.575 m)   Wt 124 lb 9.6 oz (56.5 kg)   BMI 22.79 kg/m²     PVR by straight catheterization: 10 ml    Physical Exam  Vitals reviewed. Exam conducted with a chaperone present. Constitutional:       General: She is not in acute distress. Appearance: Normal appearance. She is normal weight. HENT:      Head: Normocephalic and atraumatic. Pulmonary:      Effort: Pulmonary effort is normal. No respiratory distress. Abdominal:      General: There is no distension. Palpations: There is no mass. Tenderness: There is no abdominal tenderness. There is no guarding or rebound. Hernia: No hernia is present. Musculoskeletal:      Cervical back: Normal range of motion. Skin:     General: Skin is warm and dry. Neurological:      Mental Status: She is alert and oriented to person, place, and time. Psychiatric:         Mood and Affect: Mood normal.         Behavior: Behavior normal.         Thought Content:  Thought content normal.         Judgment: Judgment normal.        Female Genitourinary   Vulva:    Normal. No lesions  Bartholin's Gland:  Bilateral , Normal, nontender  Skenes Gland:  Bilateral, Normal, nontender   Clitoris:  Normal.   Introitus:    Normal.   Urethral Meatus:  Normal appearing, normal size, no lesions, no prolapse  Urethra:  No masses, no tenderness  Vagina:  No atrophy, no discharge, no lesions  Cervix:  No lesions, no discharge  Uterus:  No tenderness, normal mobility   Adnexa:   No masses palpated, no tenderness  Bladder:  No tenderness, no masses palpated  Perineum:  Normal, no lesions    Rectal   Anorectal Exam: No hemorrhoids and no masses or lesions of the perineum        POP-Q: (Pelvic Organ Prolapse - Quantification Exam):  Pelvic Organ Prolapse Quantification 1/26/2023   Anterior Wall (Aa) + 2   Anterior Wall (Ba) + 2   Cervix or Cuff (C) - 1   Genital Haitus (gh) 3   Perineal Body (pb) 1   Total Vaginal Length (tvl) 6   Posterior Wall (Ap) - 3   Posterior Wall (Bp) - 3   Posterior Fornix (D) - 2            Pelvic floor muscles: Tender Spasm     R. Puborectalis: NO 0 /5    L. Puborectalis: NO 0 /5    R. Pubococcyg NO 0 /5    L. Pubococcyg NO 0 /5    R. Ileococcyg: NO 0 /5    L. Ileococcyg: NO 0 /5    R. Obturator Int: NO 0 /5    L. Obturator Int: NO 0 /5    R. Coccygeus: NO 0 /5    L. Coccygeus: NO 0 /5      Pelvic floor contractions: 4/5    Supine Stress Test of FRENCH: negative          1. Screening for genitourinary condition  -     AMB POC URINALYSIS DIP STICK AUTO W/O MICRO  2. Uterovaginal prolapse, complete  Assessment & Plan:  We discussed the epidemiology, pathogenesis and etiology of pelvic organ prolapse. We discussed the potential risk factors which include genetics and environmental factors, such as childbirth, aging, menopause, straining, and previous surgery. I offered her management options which included nothing, pessary, and surgery. She is interested in: watchful waiting. She will call if her symptoms get worse. Orders:  -     INSERT,NON-INDWELLING BLADDER CATHETER     No follow-ups on file.             Lai Pena, DO

## 2023-01-26 NOTE — ASSESSMENT & PLAN NOTE
We discussed the epidemiology, pathogenesis and etiology of pelvic organ prolapse. We discussed the potential risk factors which include genetics and environmental factors, such as childbirth, aging, menopause, straining, and previous surgery. I offered her management options which included nothing, pessary, and surgery. She is interested in: watchful waiting. She will call if her symptoms get worse.

## 2023-01-26 NOTE — TELEPHONE ENCOUNTER
TRIAGE CALL      Complaint: Coughing /Wheezing  Cough: yes  Productive:  yes  Bloody Sputum:  no  Increased SOB/Wheezing:  yes  Duration: 6 days  Fever/Chills: no  OTC Meds tried: Coricidin/mucinex

## 2023-01-27 DIAGNOSIS — J45.50 SEVERE PERSISTENT ASTHMA WITHOUT COMPLICATION: ICD-10-CM

## 2023-01-27 DIAGNOSIS — J45.909 UNCOMPLICATED ASTHMA, UNSPECIFIED ASTHMA SEVERITY, UNSPECIFIED WHETHER PERSISTENT: Primary | ICD-10-CM

## 2023-01-27 DIAGNOSIS — R05.3 CHRONIC COUGH: ICD-10-CM

## 2023-01-27 RX ORDER — PREDNISONE 10 MG/1
TABLET ORAL
Qty: 30 TABLET | Refills: 0 | Status: SHIPPED | OUTPATIENT
Start: 2023-01-27

## 2023-01-27 RX ORDER — AZITHROMYCIN 250 MG/1
TABLET, FILM COATED ORAL
Qty: 6 TABLET | Refills: 0 | Status: SHIPPED | OUTPATIENT
Start: 2023-01-27

## 2023-01-27 NOTE — TELEPHONE ENCOUNTER
LOV 9/7/2023 PHONG Alberto NP, asthma Trelegy 200, absolute eosinophil count of 600  Dupixant  Singulair    I have spoken with patient. She reports starting to feel bad on Friday. Worsened on Wed. No fever. Coughing up green secretions. No chills. Headache reported since Thurs or Wed that resolves with Tylenol. She is taking Coricidin flu and Mucinex. Wheezing. She has started Dupixant. She is taking Trelegy. She is taking Albuterol in am only will increase to QID until back to baseline. She is taking Singulair. Reports reflux is under control. She reports that she has no known COVID/flu exposure. She confirms that she has developed pneumonia twice. She is asking for antibiotic and steroid. Dr Sherie Morris please advise. She was last given Doxy and 20 mg Prednisone x 5 days.

## 2023-01-27 NOTE — TELEPHONE ENCOUNTER
Gurpreet Starks MD  You 1 hour ago (12:13 PM)     CS  Prednisone 40 taper, zpack, if not better needs to submit sputum for respiratory culture    Notified patient and prescriptions sent as ordered by Dr Chucky Murray. Patient will call back if not soon improved.

## 2023-02-20 PROBLEM — J41.1 MUCOPURULENT CHRONIC BRONCHITIS (HCC): Status: ACTIVE | Noted: 2023-02-20

## 2023-02-20 PROBLEM — F13.20 SEDATIVE, HYPNOTIC OR ANXIOLYTIC DEPENDENCE, UNCOMPLICATED (HCC): Status: ACTIVE | Noted: 2023-02-20

## 2023-02-20 NOTE — PROGRESS NOTES
Kimi Camarena is a 69 y.o. female who presents today for the following:  Chief Complaint   Patient presents with    Hypertension       Allergies   Allergen Reactions    Niacin Rash       Current Outpatient Medications   Medication Sig Dispense Refill    predniSONE (DELTASONE) 10 MG tablet Take 4 tabs x 3 days then 3 tabs x 3 days then 2 tabs x 3 days then 1 tab x 3 days then stop.  30 tablet 0    azithromycin (ZITHROMAX) 250 MG tablet TAKE 2 TABS ON DAY 1, THEN 1 TAB A DAY FOR 4 DAYS 6 tablet 0    dupilumab (DUPIXENT) 300 MG/2ML SOSY injection Initial dose 600mg si injections SQ on day 1 then Subsequent Dose: 300mg si injection SQ every 2 weeks  starting on day 15 1 each 11    Fluticasone-Umeclidin-Vilant (TRELEGY ELLIPTA) 200-62.5-25 MCG/INH AEPB Inhale 1 puff into the lungs daily 1 each 11    hydrOXYzine HCl (ATARAX) 25 MG tablet Take 1 tablet by mouth every 8 hours as needed for Anxiety 90 tablet 1    pravastatin (PRAVACHOL) 20 MG tablet TAKE 1 TABLET BY MOUTH DAILY 90 tablet 2    montelukast (SINGULAIR) 10 MG tablet TAKE 1 TABLET BY MOUTH DAILY AT BEDTIME 90 tablet 2    verapamil (CALAN SR) 240 MG extended release tablet Take 1 tablet by mouth nightly TAKE 1 TABLET BY MOUTH EVERY MORNING 30 tablet 2    venlafaxine (EFFEXOR XR) 150 MG extended release capsule Take 1 capsule by mouth daily 90 capsule 2    omeprazole (PRILOSEC) 40 MG delayed release capsule Take 1 capsule by mouth 2 times daily 180 capsule 2    mirtazapine (REMERON) 7.5 MG tablet Take 1 tablet by mouth nightly 90 tablet 2    losartan (COZAAR) 25 MG tablet Take 1 tablet by mouth daily 90 tablet 2    FINEST NUTRITION MELATONIN 5-10 MG TBCR TAKE 1 TABLET BY MOUTH EVERY NIGHT AT BEDTIME AS NEEDED FOR INSOMNIA 90 tablet 2    ondansetron (ZOFRAN-ODT) 4 MG disintegrating tablet Take 1 tablet by mouth 3 times daily as needed for Nausea or Vomiting 21 tablet 1    Handicap Placard MISC by Does not apply route 1 each 0    albuterol sulfate  (90 Base) MCG/ACT inhaler INHALE 2 PUFFS BY MOUTH FOUR TIMES DAILY 1 each 11    albuterol (PROVENTIL) (2.5 MG/3ML) 0.083% nebulizer solution Inhale 2.5 mg into the lungs every 6 hours as needed      Calcium Carbonate-Vitamin D (CALCIUM-VITAMIN D) 600-125 MG-UNIT TABS Take 1 tablet by mouth daily      cetirizine (ZYRTEC) 10 MG tablet Take 10 mg by mouth daily      denosumab (PROLIA) 60 MG/ML SOSY SC injection Inject 60 mg into the skin      ergocalciferol (ERGOCALCIFEROL) 1.25 MG (96923 UT) capsule TAKE 1 CAPSULE BY MOUTH ONCE WEEKLY      HYDROcodone-ibuprofen (VICOPROFEN) 7.5-200 MG per tablet Take by mouth.      meclizine (ANTIVERT) 25 MG tablet TAKE 1 TABLET BY MOUTH THREE TIMES DAILY AS NEEDED FOR DIZZINESS OR FOR NAUSEA      naloxone 4 MG/0.1ML LIQD nasal spray Use 1 spray intranasally, then discard. Repeat with new spray every 2 min as needed for opioid overdose symptoms, alternating nostrils. ondansetron (ZOFRAN-ODT) 4 MG disintegrating tablet Take 4 mg by mouth every 8 hours as needed       No current facility-administered medications for this visit.        Past Medical History:   Diagnosis Date    Anemia 2005 approx    2 units transfused    Arthritis     hands, hips    B12 deficiency     Cancer (HCC)     hx of melanoma 1986 R arm, and carcinoma x 2 forehead    Colitis, indeterminate 11/17/2021    COVID-19 vaccine series completed 02/2021    PT TO BRING CARD DOS    Depression with anxiety 8/16/2016    Elevated troponin 11/20/2021    Essential hypertension with goal blood pressure less than 140/90     controlled with med    Gastrointestinal disorder     hiatel hernia    Hyperlipidemia, unspecified 8/16/2016    Menopause     @ 51    Mild intermittent asthma 8/16/2016    Osteoarthritis of multiple joints 8/16/2016    Osteoporosis 10/25/2016    Pulmonary emphysema ([facility] [address] 08/16/2016    daily inhalers    Rectal bleed     Streptococcal bacteremia 11/18/2021    Vertigo        Past Surgical History: Procedure Laterality Date    COLONOSCOPY N/A 2021    COLONOSCOPY/BMI 23 performed by Ervin Siddiqui MD at [facility]; HI RISK IND  2021         HERNIA REPAIR  2010    helped reflex    TUBAL LIGATION  1972       Social History     Tobacco Use    Smoking status: Never    Smokeless tobacco: Never    Tobacco comments:     Quit smokinnd hand smoke x 33 yrs   Substance Use Topics    Alcohol use: Never        Family History   Problem Relation Age of Onset    Stroke Mother         intracerebral aneurysm    Hypertension Maternal Grandmother     Breast Cancer Daughter 28    Diabetes Maternal Aunt     Diabetes Maternal Uncle     No Known Problems Father         didn't have contact with father       Is a 50-year-old female here today for hospital follow up. Patient was admitted to [facility] for sepsis secondary to pneumonia. She has completed course of antibiotic. She reports feeling overall better but continues to have decreased appetite and feel weak. Also continues to have headaches. There was also concern during admission for possible GI bleed. She denies any further black or bloody stool. She was also noted to have blood in urine. She has not noticed blood in her urine. Patient has a past medical history of osteoporosis with multiple vertebral fractures, mood disorder, hypertension, chronic pain, hiatal hernia with gastroparesis. Patient was also admitted to the hospital last year for septic shock secondary to colitis and urinary tract infection. Patient was subsequently found to have streptococcal bacteremia. She has followed up outpatient with GI regarding colitis. She continues on venlafaxine for anxiety and remeron to help her sleep at night. She feels venlafaxine worked better when she took 75 mg bid. She would like to switch back. She has prior history of benzodiazepine dependence.  reports patient continues to be anxious.   She was started on hydroxyzine at last visit. It is helping with anxiety and panic but wants to know if she can take 50 mg at once. Patient was placed on steroids for 5 days and antibiotic for COPD exacerbation. Reports helped but did not fully resolve. Reports persistent productive cough. Denies chest pain or shortness of breath. She feels she needs another round. Review of Systems   Constitutional:  Negative for appetite change, fatigue and fever. Respiratory:  Positive for cough. Negative for chest tightness, shortness of breath and wheezing. Cardiovascular:  Negative for chest pain and palpitations. Gastrointestinal:  Negative for abdominal pain. Neurological:  Negative for dizziness and headaches. Psychiatric/Behavioral:  Negative for sleep disturbance. The patient is nervous/anxious. /78   Pulse (!) 106   Ht 5' (1.524 m)   Wt 127 lb (57.6 kg)   SpO2 97%   BMI 24.80 kg/m²     Physical Exam  Constitutional:       Appearance: Normal appearance. She is not ill-appearing. Eyes:      General: No scleral icterus. Cardiovascular:      Rate and Rhythm: Normal rate and regular rhythm. Heart sounds: Normal heart sounds. No murmur heard. Pulmonary:      Effort: No respiratory distress. Breath sounds: No wheezing. Comments: Decreased breath sounds through out all lung fields. Musculoskeletal:      Cervical back: Neck supple. Lymphadenopathy:      Cervical: No cervical adenopathy. Neurological:      General: No focal deficit present. Mental Status: She is oriented to person, place, and time. Cranial Nerves: No cranial nerve deficit. Psychiatric:         Mood and Affect: Mood normal.         Behavior: Behavior normal.        1. Stage [facility]  Assessment & Plan:  Advised to avoid NSAIDs, maintain healthy blood pressure, importance of blood sugar control, and adequate fluid intake to preserve renal function.   All medications reviewed and appropriately dose for chronic kidney disease. Orders:  -     CBC with Auto Differential; Future  -     Vitamin D 25 Hydroxy; Future  2. Mucopurulent chronic bronchitis ([facility] [address]  Assessment & Plan:  Recently treated for exacerbation. Symptoms improved but not back to baseline. Will give longer course of low dose of steroid along with another course of azithromycin. Orders:  -     predniSONE (DELTASONE) 10 MG tablet; Take 2 tabs PO for 3 days, then 1 tab PO for 4 days, Disp-10 tablet, R-0Normal  -     azithromycin (ZITHROMAX) 250 MG tablet; Take 1 tablet by mouth See Admin Instructions for 5 days 500mg on day 1 followed by 250mg on days 2 - 5, Disp-6 tablet, R-0Normal  3. Sedative, hypnotic or anxiolytic dependence, uncomplicated ([facility] [address]  Assessment & Plan:   No longer on benzodiazepine. Current medications helping with symptoms. 4. Unspecified mood (affective) disorder (HCC)  Switched venlafaxine to 75 mg BID instead of 150 mg daily. 5. Primary hypertension  Assessment & Plan:   Blood pressure is well controlled today. No changes made. Orders:  -     CBC with Auto Differential; Future  -     Comprehensive Metabolic Panel; Future  -     losartan (COZAAR) 25 MG tablet; Take 1 tablet by mouth daily, Disp-90 tablet, R-2Normal  -     verapamil (CALAN SR) 240 MG extended release tablet; Take 1 tablet by mouth nightly TAKE 1 TABLET BY MOUTH EVERY MORNING, Disp-30 tablet, R-2Normal  6. B12 deficiency  -     Vitamin B12; Future  7. Other iron deficiency anemias  -     Ferritin; Future  8. Depression with anxiety  -     mirtazapine (REMERON) 7.5 MG tablet; Take 1 tablet by mouth nightly, Disp-90 tablet, R-2Normal  -     venlafaxine (EFFEXOR) 75 MG tablet; Take 1 tablet by mouth 2 times daily, Disp-180 tablet, R-3Normal  9. Gastroesophageal reflux disease with esophagitis, unspecified whether hemorrhage  Assessment & Plan:   Symptoms controlled with high dose PPI.   Orders:  -     omeprazole (PRILOSEC) 40 MG delayed release capsule; Take 1 capsule by mouth 2 times daily, Disp-180 capsule, R-2Normal  10. Chronic nausea  -     ondansetron (ZOFRAN-ODT) 4 MG disintegrating tablet; Take 1 tablet by mouth 3 times daily as needed for Nausea or Vomiting, Disp-21 tablet, R-1Normal  11. Panic attack  Advised can take up to 50 mg if needed for panic.  -     hydrOXYzine HCl (ATARAX) 25 MG tablet; 1-2 tabs po TID prn anxiety, Disp-180 tablet, R-1Normal  12. Hyperlipidemia, unspecified hyperlipidemia type  Assessment & Plan:   Patient is taking statin without side effect. Discussed benefits of statin in prevention of coronary arterty and cerebrovascular disease. Last LDL was 84  Orders:  -     pravastatin (PRAVACHOL) 20 MG tablet; TAKE 1 TABLET BY MOUTH DAILY, Disp-90 tablet, R-2Normal  13. Severe persistent asthma without complication  Assessment & Plan:   Established with pulmonary. No wheezing on exam today. Orders:  -     montelukast (SINGULAIR) 10 MG tablet; TAKE 1 TABLET BY MOUTH DAILY AT BEDTIME, Disp-90 tablet, R-2Normal     Patient informed, we will call with blood work results within one week. If you have not heard regarding results in over a week, please contact office. You can also review results on [employer].            Mary Beth Roberts MD

## 2023-02-21 ENCOUNTER — OFFICE VISIT (OUTPATIENT)
Dept: FAMILY MEDICINE CLINIC | Facility: CLINIC | Age: 78
End: 2023-02-21
Payer: MEDICARE

## 2023-02-21 VITALS
BODY MASS INDEX: 24.94 KG/M2 | WEIGHT: 127 LBS | DIASTOLIC BLOOD PRESSURE: 78 MMHG | OXYGEN SATURATION: 97 % | HEIGHT: 60 IN | HEART RATE: 106 BPM | SYSTOLIC BLOOD PRESSURE: 126 MMHG

## 2023-02-21 DIAGNOSIS — K21.00 GASTROESOPHAGEAL REFLUX DISEASE WITH ESOPHAGITIS, UNSPECIFIED WHETHER HEMORRHAGE: ICD-10-CM

## 2023-02-21 DIAGNOSIS — J41.1 MUCOPURULENT CHRONIC BRONCHITIS (HCC): ICD-10-CM

## 2023-02-21 DIAGNOSIS — R11.0 CHRONIC NAUSEA: ICD-10-CM

## 2023-02-21 DIAGNOSIS — E78.5 HYPERLIPIDEMIA, UNSPECIFIED HYPERLIPIDEMIA TYPE: ICD-10-CM

## 2023-02-21 DIAGNOSIS — E53.8 B12 DEFICIENCY: ICD-10-CM

## 2023-02-21 DIAGNOSIS — F41.0 PANIC ATTACK: ICD-10-CM

## 2023-02-21 DIAGNOSIS — F41.8 DEPRESSION WITH ANXIETY: ICD-10-CM

## 2023-02-21 DIAGNOSIS — I10 PRIMARY HYPERTENSION: ICD-10-CM

## 2023-02-21 DIAGNOSIS — F13.20 SEDATIVE, HYPNOTIC OR ANXIOLYTIC DEPENDENCE, UNCOMPLICATED (HCC): ICD-10-CM

## 2023-02-21 DIAGNOSIS — N18.31 STAGE 3A CHRONIC KIDNEY DISEASE (HCC): Primary | ICD-10-CM

## 2023-02-21 DIAGNOSIS — D50.8 OTHER IRON DEFICIENCY ANEMIAS: ICD-10-CM

## 2023-02-21 DIAGNOSIS — F39 UNSPECIFIED MOOD (AFFECTIVE) DISORDER (HCC): ICD-10-CM

## 2023-02-21 DIAGNOSIS — J45.50 SEVERE PERSISTENT ASTHMA WITHOUT COMPLICATION: ICD-10-CM

## 2023-02-21 LAB
25(OH)D3 SERPL-MCNC: 18 NG/ML (ref 30–100)
ALBUMIN SERPL-MCNC: 3.1 G/DL (ref 3.2–4.6)
ALBUMIN/GLOB SERPL: 0.9 (ref 0.4–1.6)
ALP SERPL-CCNC: 79 U/L (ref 50–136)
ALT SERPL-CCNC: 34 U/L (ref 12–65)
ANION GAP SERPL CALC-SCNC: 3 MMOL/L (ref 2–11)
AST SERPL-CCNC: 27 U/L (ref 15–37)
BASOPHILS # BLD: 0.1 K/UL (ref 0–0.2)
BASOPHILS NFR BLD: 1 % (ref 0–2)
BILIRUB SERPL-MCNC: 0.2 MG/DL (ref 0.2–1.1)
BUN SERPL-MCNC: 15 MG/DL (ref 8–23)
CALCIUM SERPL-MCNC: 9.3 MG/DL (ref 8.3–10.4)
CHLORIDE SERPL-SCNC: 111 MMOL/L (ref 101–110)
CO2 SERPL-SCNC: 29 MMOL/L (ref 21–32)
CREAT SERPL-MCNC: 0.8 MG/DL (ref 0.6–1)
DIFFERENTIAL METHOD BLD: ABNORMAL
EOSINOPHIL # BLD: 0.8 K/UL (ref 0–0.8)
EOSINOPHIL NFR BLD: 13 % (ref 0.5–7.8)
ERYTHROCYTE [DISTWIDTH] IN BLOOD BY AUTOMATED COUNT: 13 % (ref 11.9–14.6)
FERRITIN SERPL-MCNC: 400 NG/ML (ref 8–388)
GLOBULIN SER CALC-MCNC: 3.4 G/DL (ref 2.8–4.5)
GLUCOSE SERPL-MCNC: 53 MG/DL (ref 65–100)
HCT VFR BLD AUTO: 44.4 % (ref 35.8–46.3)
HGB BLD-MCNC: 14.2 G/DL (ref 11.7–15.4)
IMM GRANULOCYTES # BLD AUTO: 0 K/UL (ref 0–0.5)
IMM GRANULOCYTES NFR BLD AUTO: 0 % (ref 0–5)
LYMPHOCYTES # BLD: 2.3 K/UL (ref 0.5–4.6)
LYMPHOCYTES NFR BLD: 37 % (ref 13–44)
MCH RBC QN AUTO: 29 PG (ref 26.1–32.9)
MCHC RBC AUTO-ENTMCNC: 32 G/DL (ref 31.4–35)
MCV RBC AUTO: 90.6 FL (ref 82–102)
MONOCYTES # BLD: 0.7 K/UL (ref 0.1–1.3)
MONOCYTES NFR BLD: 11 % (ref 4–12)
NEUTS SEG # BLD: 2.4 K/UL (ref 1.7–8.2)
NEUTS SEG NFR BLD: 38 % (ref 43–78)
NRBC # BLD: 0 K/UL (ref 0–0.2)
PLATELET # BLD AUTO: 207 K/UL (ref 150–450)
PMV BLD AUTO: 9.8 FL (ref 9.4–12.3)
POTASSIUM SERPL-SCNC: 4.2 MMOL/L (ref 3.5–5.1)
PROT SERPL-MCNC: 6.5 G/DL (ref 6.3–8.2)
RBC # BLD AUTO: 4.9 M/UL (ref 4.05–5.2)
SODIUM SERPL-SCNC: 143 MMOL/L (ref 133–143)
VIT B12 SERPL-MCNC: 513 PG/ML (ref 193–986)
WBC # BLD AUTO: 6.2 K/UL (ref 4.3–11.1)

## 2023-02-21 PROCEDURE — 99214 OFFICE O/P EST MOD 30 MIN: CPT | Performed by: FAMILY MEDICINE

## 2023-02-21 PROCEDURE — 3078F DIAST BP <80 MM HG: CPT | Performed by: FAMILY MEDICINE

## 2023-02-21 PROCEDURE — 1123F ACP DISCUSS/DSCN MKR DOCD: CPT | Performed by: FAMILY MEDICINE

## 2023-02-21 PROCEDURE — 3074F SYST BP LT 130 MM HG: CPT | Performed by: FAMILY MEDICINE

## 2023-02-21 RX ORDER — PREDNISONE 10 MG/1
TABLET ORAL
Qty: 10 TABLET | Refills: 0 | Status: SHIPPED | OUTPATIENT
Start: 2023-02-21

## 2023-02-21 RX ORDER — PRAVASTATIN SODIUM 20 MG
TABLET ORAL
Qty: 90 TABLET | Refills: 2 | Status: SHIPPED | OUTPATIENT
Start: 2023-02-21

## 2023-02-21 RX ORDER — HYDROXYZINE HYDROCHLORIDE 25 MG/1
TABLET, FILM COATED ORAL
Qty: 180 TABLET | Refills: 1 | Status: SHIPPED | OUTPATIENT
Start: 2023-02-21

## 2023-02-21 RX ORDER — MIRTAZAPINE 7.5 MG/1
7.5 TABLET, FILM COATED ORAL NIGHTLY
Qty: 90 TABLET | Refills: 2 | Status: SHIPPED | OUTPATIENT
Start: 2023-02-21

## 2023-02-21 RX ORDER — OMEPRAZOLE 40 MG/1
40 CAPSULE, DELAYED RELEASE ORAL 2 TIMES DAILY
Qty: 180 CAPSULE | Refills: 2 | Status: SHIPPED | OUTPATIENT
Start: 2023-02-21

## 2023-02-21 RX ORDER — MONTELUKAST SODIUM 10 MG/1
TABLET ORAL
Qty: 90 TABLET | Refills: 2 | Status: SHIPPED | OUTPATIENT
Start: 2023-02-21

## 2023-02-21 RX ORDER — VENLAFAXINE 75 MG/1
75 TABLET ORAL 2 TIMES DAILY
Qty: 180 TABLET | Refills: 3 | Status: SHIPPED | OUTPATIENT
Start: 2023-02-21

## 2023-02-21 RX ORDER — ONDANSETRON 4 MG/1
4 TABLET, ORALLY DISINTEGRATING ORAL 3 TIMES DAILY PRN
Qty: 21 TABLET | Refills: 1 | Status: SHIPPED | OUTPATIENT
Start: 2023-02-21

## 2023-02-21 RX ORDER — AZITHROMYCIN 250 MG/1
250 TABLET, FILM COATED ORAL SEE ADMIN INSTRUCTIONS
Qty: 6 TABLET | Refills: 0 | Status: SHIPPED | OUTPATIENT
Start: 2023-02-21 | End: 2023-02-26

## 2023-02-21 RX ORDER — VERAPAMIL HYDROCHLORIDE 240 MG/1
240 TABLET, FILM COATED, EXTENDED RELEASE ORAL NIGHTLY
Qty: 30 TABLET | Refills: 2 | Status: SHIPPED | OUTPATIENT
Start: 2023-02-21

## 2023-02-21 RX ORDER — LOSARTAN POTASSIUM 25 MG/1
25 TABLET ORAL DAILY
Qty: 90 TABLET | Refills: 2 | Status: SHIPPED | OUTPATIENT
Start: 2023-02-21

## 2023-02-21 SDOH — ECONOMIC STABILITY: INCOME INSECURITY: HOW HARD IS IT FOR YOU TO PAY FOR THE VERY BASICS LIKE FOOD, HOUSING, MEDICAL CARE, AND HEATING?: NOT HARD AT ALL

## 2023-02-21 SDOH — ECONOMIC STABILITY: FOOD INSECURITY: WITHIN THE PAST 12 MONTHS, THE FOOD YOU BOUGHT JUST DIDN'T LAST AND YOU DIDN'T HAVE MONEY TO GET MORE.: NEVER TRUE

## 2023-02-21 SDOH — ECONOMIC STABILITY: HOUSING INSECURITY
IN THE LAST 12 MONTHS, WAS THERE A TIME WHEN YOU DID NOT HAVE A STEADY PLACE TO SLEEP OR SLEPT IN A SHELTER (INCLUDING NOW)?: NO

## 2023-02-21 SDOH — ECONOMIC STABILITY: FOOD INSECURITY: WITHIN THE PAST 12 MONTHS, YOU WORRIED THAT YOUR FOOD WOULD RUN OUT BEFORE YOU GOT MONEY TO BUY MORE.: NEVER TRUE

## 2023-02-21 ASSESSMENT — ENCOUNTER SYMPTOMS
SHORTNESS OF BREATH: 0
CHEST TIGHTNESS: 0
ABDOMINAL PAIN: 0
WHEEZING: 0
COUGH: 1

## 2023-02-21 ASSESSMENT — PATIENT HEALTH QUESTIONNAIRE - PHQ9
2. FEELING DOWN, DEPRESSED OR HOPELESS: 0
SUM OF ALL RESPONSES TO PHQ QUESTIONS 1-9: 0
SUM OF ALL RESPONSES TO PHQ9 QUESTIONS 1 & 2: 0
SUM OF ALL RESPONSES TO PHQ QUESTIONS 1-9: 0
1. LITTLE INTEREST OR PLEASURE IN DOING THINGS: 0

## 2023-02-21 NOTE — ASSESSMENT & PLAN NOTE
Patient is taking statin without side effect. Discussed benefits of statin in prevention of coronary arterty and cerebrovascular disease.   Last LDL was 84

## 2023-02-21 NOTE — ASSESSMENT & PLAN NOTE
Recently treated for exacerbation. Symptoms improved but not back to baseline. Will give longer course of low dose of steroid along with another course of azithromycin.

## 2023-03-13 ENCOUNTER — OFFICE VISIT (OUTPATIENT)
Dept: PULMONOLOGY | Age: 78
End: 2023-03-13
Payer: MEDICARE

## 2023-03-13 VITALS
TEMPERATURE: 96.8 F | RESPIRATION RATE: 15 BRPM | BODY MASS INDEX: 23.63 KG/M2 | HEART RATE: 91 BPM | OXYGEN SATURATION: 94 % | WEIGHT: 128.4 LBS | SYSTOLIC BLOOD PRESSURE: 120 MMHG | HEIGHT: 62 IN | DIASTOLIC BLOOD PRESSURE: 76 MMHG

## 2023-03-13 DIAGNOSIS — J45.50 SEVERE PERSISTENT ASTHMA WITHOUT COMPLICATION: Primary | ICD-10-CM

## 2023-03-13 LAB
EXPIRATORY TIME: NORMAL
FEF 25-75% %PRED-PRE: NORMAL
FEF 25-75% PRED: NORMAL
FEF 25-75%-PRE: NORMAL
FEV1 %PRED-PRE: NORMAL
FEV1 PRED: NORMAL
FEV1/FVC %PRED-PRE: NORMAL
FEV1/FVC PRED: NORMAL
FEV1/FVC: NORMAL
FEV1: NORMAL
FVC %PRED-PRE: NORMAL
FVC PRED: NORMAL
FVC: NORMAL
PEF %PRED-PRE: NORMAL
PEF PRED: NORMAL
PEF-PRE: NORMAL

## 2023-03-13 PROCEDURE — 3074F SYST BP LT 130 MM HG: CPT | Performed by: NURSE PRACTITIONER

## 2023-03-13 PROCEDURE — 1123F ACP DISCUSS/DSCN MKR DOCD: CPT | Performed by: NURSE PRACTITIONER

## 2023-03-13 PROCEDURE — 99214 OFFICE O/P EST MOD 30 MIN: CPT | Performed by: NURSE PRACTITIONER

## 2023-03-13 PROCEDURE — 3078F DIAST BP <80 MM HG: CPT | Performed by: NURSE PRACTITIONER

## 2023-03-13 RX ORDER — ALBUTEROL SULFATE 90 UG/1
AEROSOL, METERED RESPIRATORY (INHALATION)
Qty: 3 EACH | Refills: 3 | Status: SHIPPED | OUTPATIENT
Start: 2023-03-13

## 2023-03-13 NOTE — PROGRESS NOTES
Patient Name:  Juliana Membreno                             YOB: 1945  MRN: [id number]                                              Office Visit 3/13/2023    ASSESSMENT AND PLAN:  (Medical Decision Making)    Anette Boyd was seen today for asthma. Diagnoses and all orders for this visit:    Severe persistent asthma without complication  -- Currently on Trelegy 200 and Dupixent started in 10/2022 and has started noticing a difference. She did require prednisone dosing in January, but none since. --Last CBC shows an absolute eosinophil count of 600. Last IgE as of March 2022 was 800. CXR clear. --Spirometry today still shows obstruction, but much better than prior    Environmental and seasonal allergies  --Continue Singulair daily. Chronic cough  --improved. States minimal mucous and chest congestion. Gastro-esophageal reflux disease with esophagitis, without bleeding  --Continue omeprazole. Patient does have a small diaphragmatic hernia noted on CT completed in November 2021. Other orders  -     Fluticasone-Umeclidin-Vilant (Luann Castro) 482-73.8-77 MCG/INH AEPB; Inhale 1 puff into the lungs daily    No orders of the defined types were placed in this encounter. No orders of the defined types were placed in this encounter. PANKAJ Gurrola - CNP    Collaborating physician is Junaid Muller MD    _________________________________________________________________________    HISTORY OF PRESENT ILLNESS:    Ms. Noemi George is a 69 y.o. female who is seen at [facility] today for  Follow-up and Asthma    Ms. Noemi George is a pleasant 77year old female, PMH asthma, anemia, HTN, HLD, and B12 deficiency here today for follow up for asthma and cough. She was in the hospital in June 2022 for pneumonia and sepsis. Patient treated with vancomycin and Rocephin. Cultures were all negative.   She did not have a bronchoscopy, although eosinophils noted on CBC while there were up to 24. Today she reports she is feeling better. She has been using her rescue inhaler prior to her Trelegy and has noticed improvement. She has been doing the [address] since October and states she is starting to notice a difference with that as well. She is not nearly as congested and has not needed any more prednisone since January 2023. Continues on Singulair 10 mg.  Her family mentions her symptoms have much improved since starting the [address]. She denies fevers, chills, nausea, or vomiting. She denies any recent sick contacts. IgE in March 2022 showed 806. The patient has been approved to try [facility] in the past however either cost or having to give herself an injection has been a problem. She has been doing well on Dupixent. REVIEW OF SYSTEMS: 10 point review of systems is negative except as reported in HPI. PHYSICAL EXAM: Body mass index is 23.48 kg/m². Vitals:    03/13/23 0951   BP: 120/76   Pulse: 91   Resp: 15   Temp: 96.8 °F (36 °C)   SpO2: 94%   Weight: 128 lb 6.4 oz (58.2 kg)   Height: 5' 2\" (1.575 m)         General:   Alert, cooperative, no distress, appears stated age. Eyes:   Conjunctivae/corneas clear. PERRL        Mouth/Throat:  Lips, mucosa, and tongue normal. Teeth and gums normal.        Lungs:   Mild rhonchi in the right middle lung, otherwise clear on room air. Heart:   Regular rate and rhythm, S1, S2 normal, no murmur, click, rub or gallop. Abdomen:    Soft, non-tender. Extremities:  Extremities normal, atraumatic, no cyanosis or edema.      Skin:  Skin color normal. No rashes or lesions     Neurologic:  A&Ox3     DIAGNOSTIC TESTS:                                                                                    LABS:   Lab Results   Component Value Date/Time    WBC 6.2 02/21/2023 10:17 AM    HGB 14.2 02/21/2023 10:17 AM    HCT 44.4 02/21/2023 10:17 AM     02/21/2023 10:17 AM    TSH 2.610 07/22/2020 09:53 AM     Imaging: I performed an independent interpretation of the patient's images. CXR:     XR CHEST STANDARD TWO VW 09/07/2022    Narrative  TWO-VIEW CHEST:    CLINICAL HISTORY: Severe, persistent asthma with exacerbation. COMPARISON:  CHEST radiographs of 8/2/2022 and CTA torso of November 17, 2021. FINDINGS: PA and lateral chest images demonstrate hyperinflation with no acute  pneumonic infiltrate or significant pleural fluid collection. The heart size is  within normal limits without evidence of congestive heart failure or  pneumothorax. Compression deformities at T10 and T12 are again noted. The bony  thorax otherwise appears intact on these views. Impression  NO ACUTE CARDIOPULMONARY DISEASE IDENTIFIED. CT Chest: 11/2021      Findings:    CHEST   Lungs:  Dependent atelectasis. A bronchus in the anterior RIGHT upper lobe is   filled with soft tissue density. Mediastinum:  Stable, nonenlarged, lymph nodes since 2018   Thyroid Gland:  Heterogeneous       ABDOMEN   Gallbladder:  Large calcified gallstone   Liver:  Unremarkable. Spleen:  Unremarkable   Pancreas:  Unremarkable   Kidneys:  Symmetric nephrograms. Calcification in the periphery of the LEFT   kidney is an unusual location for a kidney stone. Perhaps this represents   sequela of previous focal pyelonephritis. Adrenal Glands:  Unremarkable   Esophagus: Contrast filled and distended consistent with obstruction at the   level of the GE junction. Stomach:  Large hiatal hernia   Small intestine:  Unremarkable   Colon: Thickened wall in the redundant descending and sigmoid colon. There are   a few, scattered, air-filled diverticula. No abscess.        PELVIS   Bladder:  Decompressed by Zavaleta catheter     Uterus/Adnexa:  Unremarkable       MUSCULOSKELETAL:  T12, wedge-shaped, vertebral compression fracture has been   present since at least 2020 but appears to have progressed in the interval.       VASCULAR   Thoracic Aorta: Atherosclerotic disease, patent   Great Arteries:  Atherosclerotic disease, patent. Central veins: RIGHT internal jugular vein catheter. Abdominal Aorta: Atherosclerotic disease, patent   Iliac Arteries:  Atherosclerotic disease, patent   Femoral Arteries:  Patent       Celiac Artery:  Mild atherosclerotic disease, patent. Patent splenic artery   with calcification and a bifurcation point, no aneurysm identified. Patent   hepatic artery. Superior mesenteric Artery:  Patent   Inferior mesenteric Artery:  Patent       Renal Arteries:  Grade stenosis of the RIGHT renal artery secondary to calcified   plaque at the origin. High-grade stenosis of the LEFT renal artery secondary to   calcified plaque at the origin. Impression   Atherosclerotic disease. Patent mesenteric arteries. Descending colon/sigmoid colitis. No abscess. Contrast filled and distended esophagus to the level of the hiatal hernia at the   GE junction. Recommend further evaluation with endoscopy as there may well be   an obstructing mass or stricture in this area. Gallstone. Terminal RIGHT upper lobe bronchus is filled with soft tissue density. While   this most likely represents mucus plugging, an endobronchial lesion cannot be   excluded. Recommend follow-up CT scan of the chest in 3 months if an interval   bronchoscopy has not been performed. T12 vertebral compression fracture has progressed since 9/28/2020. Does this   patient have focal tenderness? Further evaluation with MRI is suggested. Nuclear Medicine: No results found for this or any previous visit from the past 3650 days. PFTs:     Date 12/2017 3/2023     FVC    1.76-65% 1.87-74%     FEV1    1.05-51% 1.38-73%     FEV1/FVC    60% 74%     FEF 25-75%    0.45-26% 0.98-65%     Bronchodilator Response          TLC          RV          DLCO            FeNO: No results found for this or any previous visit.   FeNO and Likelihood of Eosinophilic Asthma   Unlikely Intermediate Likely   <25 ppb 25-50 ppb >50ppb   Exercise Oximetry:  Echo:   TRANSTHORACIC ECHOCARDIOGRAM (TTE) COMPLETE (CONTRAST/BUBBLE/3D PRN) 11/19/2021    Narrative  This is a summary report. The complete report is available in the patient's medical record. If you cannot access the medical record, please contact the sending organization for a detailed fax or copy. · LV: Estimated LVEF is 55 - 60%. Normal cavity size, wall thickness and systolic function (ejection fraction normal).  Wall motion: normal.    [facility] Reference Info:                                                                                                                Exposure History:  Second Hand Smoke Exposure: Yes  Birds: No  Asbestos: No  TB: No  Hot Tubs/Humidifier: No  Organic/Inorganic Dusts: No  Molds: No  Occupation/Hobbies:   Past Medical History:   Diagnosis Date    Anemia 2005 approx    2 units transfused    Arthritis     hands, hips    B12 deficiency     Cancer (HCC)     hx of melanoma 1986 R arm, and carcinoma x 2 forehead    Colitis, indeterminate 11/17/2021    COVID-19 vaccine series completed 02/2021    PT TO BRING CARD DOS    Depression with anxiety 8/16/2016    Elevated troponin 11/20/2021    Essential hypertension with goal blood pressure less than 140/90     controlled with med    Gastrointestinal disorder     hiatel hernia    Hyperlipidemia, unspecified 8/16/2016    Menopause     @ 51    Mild intermittent asthma 8/16/2016    Osteoarthritis of multiple joints 8/16/2016    Osteoporosis 10/25/2016    Pulmonary emphysema ([facility] [address] 08/16/2016    daily inhalers    Rectal bleed     Streptococcal bacteremia 11/18/2021    Vertigo       Tobacco Use: Low Risk     Smoking Tobacco Use: Never    Smokeless Tobacco Use: Never    Passive Exposure: Not on file     Allergies   Allergen Reactions    Niacin Rash     Current Outpatient Medications   Medication Instructions    albuterol (PROVENTIL) 2.5 mg, Inhalation, EVERY 6 HOURS PRN    albuterol sulfate  (90 Base) MCG/ACT inhaler INHALE 2 PUFFS BY MOUTH FOUR TIMES DAILY    Calcium Carbonate-Vitamin D (CALCIUM-VITAMIN D) 600-125 MG-UNIT TABS 1 tablet, Oral, DAILY    cetirizine (ZYRTEC) 10 mg, Oral, DAILY    dupilumab (DUPIXENT) 300 MG/2ML SOSY injection Initial dose 600mg si injections SQ on day 1 then Subsequent Dose: 300mg si injection SQ every 2 weeks  starting on day 15    FINEST NUTRITION MELATONIN 5-10 MG TBCR TAKE 1 TABLET BY MOUTH EVERY NIGHT AT BEDTIME AS NEEDED FOR INSOMNIA    Fluticasone-Umeclidin-Vilant (TRELEGY ELLIPTA) 200-62.5-25 MCG/INH AEPB 1 puff, Inhalation, DAILY    Handicap Placard MISC Does not apply    hydrOXYzine HCl (ATARAX) 25 MG tablet 1-2 tabs po TID prn anxiety    losartan (COZAAR) 25 mg, Oral, DAILY    meclizine (ANTIVERT) 25 MG tablet TAKE 1 TABLET BY MOUTH THREE TIMES DAILY AS NEEDED FOR DIZZINESS OR FOR NAUSEA    mirtazapine (REMERON) 7.5 mg, Oral, NIGHTLY    montelukast (SINGULAIR) 10 MG tablet TAKE 1 TABLET BY MOUTH DAILY AT BEDTIME    naloxone 4 MG/0.1ML LIQD nasal spray Use 1 spray intranasally, then discard. Repeat with new spray every 2 min as needed for opioid overdose symptoms, alternating nostrils.     omeprazole (PRILOSEC) 40 mg, Oral, 2 TIMES DAILY    ondansetron (ZOFRAN-ODT) 4 mg, EVERY 8 HOURS PRN    ondansetron (ZOFRAN-ODT) 4 mg, Oral, 3 TIMES DAILY PRN    pravastatin (PRAVACHOL) 20 MG tablet TAKE 1 TABLET BY MOUTH DAILY    predniSONE (DELTASONE) 10 MG tablet Take 2 tabs PO for 3 days, then 1 tab PO for 4 days    Prolia 60 mg, SubCUTAneous    venlafaxine (EFFEXOR) 75 mg, Oral, 2 TIMES DAILY    verapamil (CALAN SR) 240 mg, Oral, NIGHTLY, TAKE 1 TABLET BY MOUTH EVERY MORNING

## 2023-04-23 DIAGNOSIS — F41.0 PANIC ATTACK: ICD-10-CM

## 2023-04-24 RX ORDER — HYDROXYZINE HYDROCHLORIDE 25 MG/1
TABLET, FILM COATED ORAL
Qty: 180 TABLET | Refills: 1 | OUTPATIENT
Start: 2023-04-24

## 2023-07-16 DIAGNOSIS — F41.0 PANIC ATTACK: ICD-10-CM

## 2023-07-17 RX ORDER — HYDROXYZINE HYDROCHLORIDE 25 MG/1
TABLET, FILM COATED ORAL
Qty: 180 TABLET | Refills: 1 | OUTPATIENT
Start: 2023-07-17

## 2023-07-21 ENCOUNTER — OFFICE VISIT (OUTPATIENT)
Dept: FAMILY MEDICINE CLINIC | Facility: CLINIC | Age: 78
End: 2023-07-21

## 2023-07-21 ENCOUNTER — TELEPHONE (OUTPATIENT)
Dept: FAMILY MEDICINE CLINIC | Facility: CLINIC | Age: 78
End: 2023-07-21

## 2023-07-21 VITALS
SYSTOLIC BLOOD PRESSURE: 172 MMHG | HEIGHT: 62 IN | DIASTOLIC BLOOD PRESSURE: 112 MMHG | WEIGHT: 129.4 LBS | BODY MASS INDEX: 23.81 KG/M2 | HEART RATE: 93 BPM | OXYGEN SATURATION: 98 % | TEMPERATURE: 98 F

## 2023-07-21 DIAGNOSIS — E78.5 HYPERLIPIDEMIA, UNSPECIFIED HYPERLIPIDEMIA TYPE: ICD-10-CM

## 2023-07-21 DIAGNOSIS — M81.0 AGE RELATED OSTEOPOROSIS, UNSPECIFIED PATHOLOGICAL FRACTURE PRESENCE: ICD-10-CM

## 2023-07-21 DIAGNOSIS — F41.8 DEPRESSION WITH ANXIETY: ICD-10-CM

## 2023-07-21 DIAGNOSIS — I10 PRIMARY HYPERTENSION: ICD-10-CM

## 2023-07-21 DIAGNOSIS — Z00.00 MEDICARE ANNUAL WELLNESS VISIT, SUBSEQUENT: Primary | ICD-10-CM

## 2023-07-21 DIAGNOSIS — E55.9 VITAMIN D DEFICIENCY: ICD-10-CM

## 2023-07-21 DIAGNOSIS — F41.0 PANIC ATTACK: ICD-10-CM

## 2023-07-21 DIAGNOSIS — F39 MOOD DISORDER (HCC): ICD-10-CM

## 2023-07-21 DIAGNOSIS — K21.00 GASTROESOPHAGEAL REFLUX DISEASE WITH ESOPHAGITIS, UNSPECIFIED WHETHER HEMORRHAGE: ICD-10-CM

## 2023-07-21 DIAGNOSIS — H61.23 BILATERAL IMPACTED CERUMEN: ICD-10-CM

## 2023-07-21 LAB
25(OH)D3 SERPL-MCNC: 30.7 NG/ML (ref 30–100)
ALBUMIN SERPL-MCNC: 3.5 G/DL (ref 3.2–4.6)
ALBUMIN/GLOB SERPL: 1 (ref 0.4–1.6)
ALP SERPL-CCNC: 67 U/L (ref 50–136)
ALT SERPL-CCNC: 33 U/L (ref 12–65)
ANION GAP SERPL CALC-SCNC: 6 MMOL/L (ref 2–11)
AST SERPL-CCNC: 29 U/L (ref 15–37)
BILIRUB SERPL-MCNC: 0.3 MG/DL (ref 0.2–1.1)
BUN SERPL-MCNC: 12 MG/DL (ref 8–23)
CALCIUM SERPL-MCNC: 9.9 MG/DL (ref 8.3–10.4)
CHLORIDE SERPL-SCNC: 113 MMOL/L (ref 101–110)
CHOLEST SERPL-MCNC: 186 MG/DL
CO2 SERPL-SCNC: 26 MMOL/L (ref 21–32)
CREAT SERPL-MCNC: 0.9 MG/DL (ref 0.6–1)
GLOBULIN SER CALC-MCNC: 3.6 G/DL (ref 2.8–4.5)
GLUCOSE SERPL-MCNC: 89 MG/DL (ref 65–100)
HDLC SERPL-MCNC: 59 MG/DL (ref 40–60)
HDLC SERPL: 3.2
LDLC SERPL CALC-MCNC: 88.6 MG/DL
POTASSIUM SERPL-SCNC: 4.4 MMOL/L (ref 3.5–5.1)
PROT SERPL-MCNC: 7.1 G/DL (ref 6.3–8.2)
SODIUM SERPL-SCNC: 145 MMOL/L (ref 133–143)
TRIGL SERPL-MCNC: 192 MG/DL (ref 35–150)
VLDLC SERPL CALC-MCNC: 38.4 MG/DL (ref 6–23)

## 2023-07-21 RX ORDER — PRAVASTATIN SODIUM 20 MG
TABLET ORAL
Qty: 90 TABLET | Refills: 2 | Status: SHIPPED | OUTPATIENT
Start: 2023-07-21

## 2023-07-21 RX ORDER — LOSARTAN POTASSIUM 25 MG/1
25 TABLET ORAL 2 TIMES DAILY
Qty: 180 TABLET | Refills: 2 | Status: SHIPPED | OUTPATIENT
Start: 2023-07-21

## 2023-07-21 RX ORDER — MIRTAZAPINE 7.5 MG/1
7.5 TABLET, FILM COATED ORAL NIGHTLY
Qty: 90 TABLET | Refills: 2 | Status: SHIPPED | OUTPATIENT
Start: 2023-07-21

## 2023-07-21 RX ORDER — HYDROXYZINE HYDROCHLORIDE 25 MG/1
TABLET, FILM COATED ORAL
Qty: 180 TABLET | Refills: 1 | Status: SHIPPED | OUTPATIENT
Start: 2023-07-21

## 2023-07-21 RX ORDER — OMEPRAZOLE 40 MG/1
40 CAPSULE, DELAYED RELEASE ORAL 2 TIMES DAILY
Qty: 180 CAPSULE | Refills: 2 | Status: SHIPPED | OUTPATIENT
Start: 2023-07-21

## 2023-07-21 RX ORDER — VENLAFAXINE HYDROCHLORIDE 75 MG/1
75 CAPSULE, EXTENDED RELEASE ORAL 2 TIMES DAILY
Qty: 180 CAPSULE | Refills: 2 | Status: SHIPPED | OUTPATIENT
Start: 2023-07-21

## 2023-07-21 RX ORDER — VERAPAMIL HYDROCHLORIDE 240 MG/1
240 TABLET, FILM COATED, EXTENDED RELEASE ORAL NIGHTLY
Qty: 30 TABLET | Refills: 2 | Status: SHIPPED | OUTPATIENT
Start: 2023-07-21

## 2023-07-21 ASSESSMENT — PATIENT HEALTH QUESTIONNAIRE - PHQ9
SUM OF ALL RESPONSES TO PHQ QUESTIONS 1-9: 0
2. FEELING DOWN, DEPRESSED OR HOPELESS: 0
6. FEELING BAD ABOUT YOURSELF - OR THAT YOU ARE A FAILURE OR HAVE LET YOURSELF OR YOUR FAMILY DOWN: 0
SUM OF ALL RESPONSES TO PHQ9 QUESTIONS 1 & 2: 0
1. LITTLE INTEREST OR PLEASURE IN DOING THINGS: 0
SUM OF ALL RESPONSES TO PHQ QUESTIONS 1-9: 0
10. IF YOU CHECKED OFF ANY PROBLEMS, HOW DIFFICULT HAVE THESE PROBLEMS MADE IT FOR YOU TO DO YOUR WORK, TAKE CARE OF THINGS AT HOME, OR GET ALONG WITH OTHER PEOPLE: 0
8. MOVING OR SPEAKING SO SLOWLY THAT OTHER PEOPLE COULD HAVE NOTICED. OR THE OPPOSITE, BEING SO FIGETY OR RESTLESS THAT YOU HAVE BEEN MOVING AROUND A LOT MORE THAN USUAL: 0
SUM OF ALL RESPONSES TO PHQ QUESTIONS 1-9: 0
3. TROUBLE FALLING OR STAYING ASLEEP: 0
SUM OF ALL RESPONSES TO PHQ QUESTIONS 1-9: 0
4. FEELING TIRED OR HAVING LITTLE ENERGY: 0
5. POOR APPETITE OR OVEREATING: 0
9. THOUGHTS THAT YOU WOULD BE BETTER OFF DEAD, OR OF HURTING YOURSELF: 0
7. TROUBLE CONCENTRATING ON THINGS, SUCH AS READING THE NEWSPAPER OR WATCHING TELEVISION: 0

## 2023-07-21 NOTE — PROGRESS NOTES
Carbonate-Vitamin D (CALCIUM-VITAMIN D) 600-125 MG-UNIT TABS Take 1 tablet by mouth daily  Ar Automatic Reconciliation   cetirizine (ZYRTEC) 10 MG tablet Take 10 mg by mouth daily  Ar Automatic Reconciliation   denosumab (PROLIA) 60 MG/ML SOSY SC injection Inject 60 mg into the skin  Ar Automatic Reconciliation   meclizine (ANTIVERT) 25 MG tablet TAKE 1 TABLET BY MOUTH THREE TIMES DAILY AS NEEDED FOR DIZZINESS OR FOR NAUSEA  Ar Automatic Reconciliation   naloxone 4 MG/0.1ML LIQD nasal spray Use 1 spray intranasally, then discard. Repeat with new spray every 2 min as needed for opioid overdose symptoms, alternating nostrils. Patient not taking: Reported on 3/13/2023  Ar Automatic Reconciliation   ondansetron (ZOFRAN-ODT) 4 MG disintegrating tablet Take 4 mg by mouth every 8 hours as needed  Patient not taking: Reported on 3/13/2023  Ar Automatic Reconciliation       CareTeam (Including outside providers/suppliers regularly involved in providing care):   Patient Care Team:  Leo Baig MD as PCP - Iram Holt MD as PCP - Empaneled Provider  Orlin Maynard MD as Referring Physician  Garnette Alpers, APRN - CNP (Pulmonary Disease)     Reviewed and updated this visit:  Mary Ellen Ayala is a 69 y.o. female who presents today for the following:    Chief Complaint   Patient presents with    hypertension         Is a 78-year-old female here today for routine follow up. She has the following chronic diseases. -COPD/asthma: estbalished with pulmonary. Has history of admission for pneumonia and sepsis. Pulmonary recently started patient on dupexient which has helped greatly with breathing. Continues to cough times. She has not concerns today regarding breathing  -hypertension: reports compliance with medications. Not checking blood pressure at home  -anxiety/depression: taking medications as prescribed. Requests to switch back to effexor capsules.   She has difficulty

## 2023-07-21 NOTE — PATIENT INSTRUCTIONS

## 2023-07-22 ASSESSMENT — ENCOUNTER SYMPTOMS
NAUSEA: 0
CONSTIPATION: 0
SHORTNESS OF BREATH: 0
CHEST TIGHTNESS: 0
COUGH: 0
ABDOMINAL PAIN: 0
DIARRHEA: 0

## 2023-07-22 NOTE — ASSESSMENT & PLAN NOTE
Blood pressure is elevated today. Increased losartan. Advised to keep blood pressure log. Return in two weeks for BP check and nurse visit.

## 2023-07-22 NOTE — ASSESSMENT & PLAN NOTE
Doing well on current medications. Switched venlafaxine back to capsule to help with difficulty swallowing.

## 2023-07-26 RX ORDER — DUPILUMAB 300 MG/2ML
INJECTION, SOLUTION SUBCUTANEOUS
Qty: 4 ML | OUTPATIENT
Start: 2023-07-26

## 2023-07-28 ENCOUNTER — TELEPHONE (OUTPATIENT)
Dept: PULMONOLOGY | Age: 78
End: 2023-07-28

## 2023-08-02 NOTE — TELEPHONE ENCOUNTER
Tried to contact patient regarding on her Dupixent refill but no answert. LVM to call me back and I give the specialty pharmacy number to call.  Khushi COREA

## 2023-08-03 ENCOUNTER — NURSE ONLY (OUTPATIENT)
Dept: FAMILY MEDICINE CLINIC | Facility: CLINIC | Age: 78
End: 2023-08-03

## 2023-08-03 VITALS — HEART RATE: 90 BPM | SYSTOLIC BLOOD PRESSURE: 122 MMHG | OXYGEN SATURATION: 95 % | DIASTOLIC BLOOD PRESSURE: 86 MMHG

## 2023-08-03 DIAGNOSIS — I10 PRIMARY HYPERTENSION: Primary | ICD-10-CM

## 2023-08-03 DIAGNOSIS — I10 PRIMARY HYPERTENSION: ICD-10-CM

## 2023-08-03 LAB
ANION GAP SERPL CALC-SCNC: 4 MMOL/L (ref 2–11)
BUN SERPL-MCNC: 13 MG/DL (ref 8–23)
CALCIUM SERPL-MCNC: 10.1 MG/DL (ref 8.3–10.4)
CHLORIDE SERPL-SCNC: 108 MMOL/L (ref 101–110)
CO2 SERPL-SCNC: 29 MMOL/L (ref 21–32)
CREAT SERPL-MCNC: 1.1 MG/DL (ref 0.6–1)
GLUCOSE SERPL-MCNC: 106 MG/DL (ref 65–100)
POTASSIUM SERPL-SCNC: 3.9 MMOL/L (ref 3.5–5.1)
SODIUM SERPL-SCNC: 141 MMOL/L (ref 133–143)

## 2023-08-03 NOTE — PROGRESS NOTES
Patient presented to [facility] this morning for a BP check after her Losartan was increased from 25mg once daily to BID at her last office visit. Patient confirms that she has made this dose increase. Reports feeling well w/ no adverse effects. She did provide a picture of her most recent blood pressure readings, as follows:   7/23 - 107/72  7/24 - 122/89  7/25 - 113/85  7/26 - 124/90  7/27 - 145/127  7/28 - 124/89  7/29 - 129/98  7/30 - 146/112  7/31 - 106/70  Patient did not bring her home BP monitor with her to compare readings. Informed patient that I would discuss readings w/ Dr. Cheikh Steve & will call her back if any additional changes need to be made. Pt is doing well on Concerta 82XZ.

## 2023-08-21 NOTE — TELEPHONE ENCOUNTER
Patient called refill line requesting Dupixent  send in to [facility]. ..  DONG ACOSTA

## 2023-08-22 NOTE — TELEPHONE ENCOUNTER
PA for Dupixent sent via [employer]: Celi regarding your request  This medication or product was previously approved on [id number] from 2022-09-14 to 2023-12-31. [address] and informed them of the above authorization. Also called in a verbal refill on her [address], script has been pended for Lanette's signature.

## 2023-08-22 NOTE — TELEPHONE ENCOUNTER
Called and spoke with [facility] for pt meds Dupixent. They need new prescription and prior authorization. Fax number [phone].  Virgie COREA

## 2023-08-29 ENCOUNTER — TELEPHONE (OUTPATIENT)
Dept: FAMILY MEDICINE CLINIC | Facility: CLINIC | Age: 78
End: 2023-08-29

## 2023-08-29 NOTE — TELEPHONE ENCOUNTER
Returned patient's call, who states her  dropped off forms ~1 week ago for Dr. Ruby Duron to fill out for her daughter's job. States her daughter brings patient & her  to & from dr visits, so her job needs forms completed. Folder w/ forms placed on Dr. Montes De Oca Stagers desk.

## 2023-08-29 NOTE — TELEPHONE ENCOUNTER
Pt states that she is wanting a statement written out from Dr. Damion Adame saying that she needs her child for transportation.

## 2023-09-09 DIAGNOSIS — F41.0 PANIC ATTACK: ICD-10-CM

## 2023-09-21 RX ORDER — HYDROXYZINE HYDROCHLORIDE 25 MG/1
TABLET, FILM COATED ORAL
Qty: 180 TABLET | Refills: 1 | OUTPATIENT
Start: 2023-09-21

## 2023-09-21 NOTE — TELEPHONE ENCOUNTER
Patient last seen 7/21/23. Hydroxyzine 23mg 1-2 tabs TID PRN verified in that office note. Medication last sent in on that date for 180 tabs w/ 1 RF. Patient will have enough until her next appt w/ Dr. Dain Crenshaw in October. RX refused.

## 2023-09-22 ENCOUNTER — OFFICE VISIT (OUTPATIENT)
Dept: PULMONOLOGY | Age: 78
End: 2023-09-22
Payer: MEDICARE

## 2023-09-22 VITALS
DIASTOLIC BLOOD PRESSURE: 82 MMHG | WEIGHT: 127 LBS | SYSTOLIC BLOOD PRESSURE: 132 MMHG | HEART RATE: 97 BPM | OXYGEN SATURATION: 98 % | TEMPERATURE: 97 F | HEIGHT: 62 IN | BODY MASS INDEX: 23.37 KG/M2 | RESPIRATION RATE: 18 BRPM

## 2023-09-22 DIAGNOSIS — J45.50 SEVERE PERSISTENT ASTHMA WITHOUT COMPLICATION: ICD-10-CM

## 2023-09-22 DIAGNOSIS — J30.89 ENVIRONMENTAL AND SEASONAL ALLERGIES: Primary | ICD-10-CM

## 2023-09-22 DIAGNOSIS — K21.00 GASTRO-ESOPHAGEAL REFLUX DISEASE WITH ESOPHAGITIS, WITHOUT BLEEDING: ICD-10-CM

## 2023-09-22 DIAGNOSIS — R05.3 CHRONIC COUGH: ICD-10-CM

## 2023-09-22 PROCEDURE — 99214 OFFICE O/P EST MOD 30 MIN: CPT | Performed by: NURSE PRACTITIONER

## 2023-09-22 PROCEDURE — 1123F ACP DISCUSS/DSCN MKR DOCD: CPT | Performed by: NURSE PRACTITIONER

## 2023-09-22 PROCEDURE — 3079F DIAST BP 80-89 MM HG: CPT | Performed by: NURSE PRACTITIONER

## 2023-09-22 PROCEDURE — 3075F SYST BP GE 130 - 139MM HG: CPT | Performed by: NURSE PRACTITIONER

## 2023-09-22 RX ORDER — FLUTICASONE FUROATE, UMECLIDINIUM BROMIDE AND VILANTEROL TRIFENATATE 200; 62.5; 25 UG/1; UG/1; UG/1
1 POWDER RESPIRATORY (INHALATION) DAILY
Qty: 1 EACH | Refills: 11 | Status: SHIPPED | OUTPATIENT
Start: 2023-09-22

## 2023-09-22 RX ORDER — MONTELUKAST SODIUM 10 MG/1
TABLET ORAL
Qty: 90 TABLET | Refills: 3 | Status: SHIPPED | OUTPATIENT
Start: 2023-09-22

## 2023-09-22 NOTE — PROGRESS NOTES
Patient Name:  Emeli Villalba                             YOB: 1945  MRN: [id number]                                              Office Visit 9/22/2023    ASSESSMENT AND PLAN:  (Medical Decision Making)    Eduarda Augustine was seen today for asthma. Diagnoses and all orders for this visit:    Severe persistent asthma without complication  -- Currently on Trelegy 200 and Dupixent started in 10/2022 and has started noticing a difference. She did require prednisone dosing in January, but none since. --Last CBC shows an absolute eosinophil count of 600. Last IgE as of March 2022 was 800. CXR clear. --Spirometry improved since starting dupixent. Will repeat again at next visit. -- Currently having some mucus in her throat. Can add Mucinex as needed or use her albuterol inhaler more frequently if unable to produce. -- November December seems to be the season in which she usually was most sick, so we will see how she does. Will not alter medications for now to ensure as healthy as possible going into this season. Environmental and seasonal allergies  --Continue Singulair daily. Chronic cough  --improved. States minimal mucous and chest congestion. Gastro-esophageal reflux disease with esophagitis, without bleeding  -- With increased symptoms, suggested that she take Pepcid every night prior to bed    Other orders  -     Fluticasone-Umeclidin-Vilant (John Ka) 200-62.5-25 MCG/INH AEPB; Inhale 1 puff into the lungs daily    No orders of the defined types were placed in this encounter. No orders of the defined types were placed in this encounter.         PANKAJ Mcelroy - CNP    Collaborating physician is Yo Sal MD    _________________________________________________________________________    HISTORY OF PRESENT ILLNESS:    Ms. Sean Rodriguez is a 69 y.o. female who is seen at [facility] today for  Follow-up and Asthma      Ms. Sean Rodriguez is a

## 2023-09-25 DIAGNOSIS — F41.8 DEPRESSION WITH ANXIETY: ICD-10-CM

## 2023-09-25 RX ORDER — MIRTAZAPINE 7.5 MG/1
7.5 TABLET, FILM COATED ORAL
Qty: 90 TABLET | Refills: 2 | OUTPATIENT
Start: 2023-09-25

## 2023-12-27 DIAGNOSIS — F41.0 PANIC ATTACK: ICD-10-CM

## 2023-12-27 RX ORDER — HYDROXYZINE HYDROCHLORIDE 25 MG/1
TABLET, FILM COATED ORAL
Qty: 180 TABLET | Refills: 1 | OUTPATIENT
Start: 2023-12-27

## 2023-12-29 ENCOUNTER — TELEPHONE (OUTPATIENT)
Dept: FAMILY MEDICINE CLINIC | Facility: CLINIC | Age: 78
End: 2023-12-29

## 2023-12-29 DIAGNOSIS — F41.0 PANIC ATTACK: ICD-10-CM

## 2023-12-29 NOTE — TELEPHONE ENCOUNTER
Pt needs a refill on Hydroxyzine 25 mg sent into the [facility] in [place].  Please call pt with any questions at [phone], thank you!

## 2024-01-01 RX ORDER — HYDROXYZINE HYDROCHLORIDE 25 MG/1
TABLET, FILM COATED ORAL
Qty: 180 TABLET | Refills: 1 | Status: SHIPPED | OUTPATIENT
Start: 2024-01-01

## 2024-01-01 NOTE — TELEPHONE ENCOUNTER
Gudelia was seen today for med refill clerical.    Diagnoses and all orders for this visit:    Panic attack  -     hydrOXYzine HCl (ATARAX) 25 MG tablet; 1-2 tabs po TID prn anxiety

## 2024-01-08 ENCOUNTER — TELEPHONE (OUTPATIENT)
Dept: PULMONOLOGY | Age: 79
End: 2024-01-08

## 2024-01-08 NOTE — TELEPHONE ENCOUNTER
LOV 9/22/23 with JANINE Alberto-- asthma, environmental and seasonal allergies, GERD, chronic cough, anemia, HTN, HLD, B12 deficiency      Allergies: Niacin    12/19: Triage call for fle sx; given tamiflu and prednisone taper for wheezing    Pt calling to report cough and congestion for about 2 weeks now. State that she does have some sputum that is clear. No wheezing but is SOB. Having chills. Started after she finished the prednisone taper. Has taken Coricidin. On Singulair, Trelegy, Dupixent, albuterol.

## 2024-01-08 NOTE — TELEPHONE ENCOUNTER
TRIAGE CALL      Complaint: cough/congestion/  Cough: yes  Productive:  a little/clear  Bloody Sputum:  no  Increased SOB/Wheezing:  sob  Duration: 2 wks   Fever/Chills: chills  OTC Meds tried: cordiciden    Ran out of prednisone feels she needs some more

## 2024-01-09 RX ORDER — PREDNISONE 20 MG/1
20 TABLET ORAL DAILY
Qty: 5 TABLET | Refills: 0 | Status: SHIPPED | OUTPATIENT
Start: 2024-01-09 | End: 2024-01-14

## 2024-01-09 NOTE — TELEPHONE ENCOUNTER
Reviewed with pt. Prednisone 20mg x5 days sent in. Pt needs to have OV if not better after this prednisone dose.

## 2024-03-05 ENCOUNTER — OFFICE VISIT (OUTPATIENT)
Dept: FAMILY MEDICINE CLINIC | Facility: CLINIC | Age: 79
End: 2024-03-05
Payer: MEDICARE

## 2024-03-05 VITALS
HEART RATE: 97 BPM | TEMPERATURE: 98.4 F | WEIGHT: 126 LBS | SYSTOLIC BLOOD PRESSURE: 128 MMHG | DIASTOLIC BLOOD PRESSURE: 82 MMHG | BODY MASS INDEX: 23.19 KG/M2 | OXYGEN SATURATION: 99 % | HEIGHT: 62 IN

## 2024-03-05 DIAGNOSIS — F13.20 SEDATIVE, HYPNOTIC OR ANXIOLYTIC DEPENDENCE, UNCOMPLICATED (HCC): ICD-10-CM

## 2024-03-05 DIAGNOSIS — E78.5 HYPERLIPIDEMIA, UNSPECIFIED HYPERLIPIDEMIA TYPE: ICD-10-CM

## 2024-03-05 DIAGNOSIS — F39 MOOD DISORDER (HCC): ICD-10-CM

## 2024-03-05 DIAGNOSIS — I10 PRIMARY HYPERTENSION: Primary | ICD-10-CM

## 2024-03-05 DIAGNOSIS — Z78.0 POST-MENOPAUSAL: ICD-10-CM

## 2024-03-05 DIAGNOSIS — F41.8 DEPRESSION WITH ANXIETY: ICD-10-CM

## 2024-03-05 DIAGNOSIS — M81.0 AGE RELATED OSTEOPOROSIS, UNSPECIFIED PATHOLOGICAL FRACTURE PRESENCE: ICD-10-CM

## 2024-03-05 DIAGNOSIS — N18.31 STAGE 3A CHRONIC KIDNEY DISEASE (HCC): ICD-10-CM

## 2024-03-05 DIAGNOSIS — J45.50 SEVERE PERSISTENT ASTHMA WITHOUT COMPLICATION: ICD-10-CM

## 2024-03-05 DIAGNOSIS — R11.0 CHRONIC NAUSEA: ICD-10-CM

## 2024-03-05 DIAGNOSIS — J41.1 MUCOPURULENT CHRONIC BRONCHITIS (HCC): ICD-10-CM

## 2024-03-05 DIAGNOSIS — K21.00 GASTROESOPHAGEAL REFLUX DISEASE WITH ESOPHAGITIS, UNSPECIFIED WHETHER HEMORRHAGE: ICD-10-CM

## 2024-03-05 PROCEDURE — 1123F ACP DISCUSS/DSCN MKR DOCD: CPT | Performed by: FAMILY MEDICINE

## 2024-03-05 PROCEDURE — 99214 OFFICE O/P EST MOD 30 MIN: CPT | Performed by: FAMILY MEDICINE

## 2024-03-05 PROCEDURE — 3074F SYST BP LT 130 MM HG: CPT | Performed by: FAMILY MEDICINE

## 2024-03-05 PROCEDURE — 3079F DIAST BP 80-89 MM HG: CPT | Performed by: FAMILY MEDICINE

## 2024-03-05 RX ORDER — VERAPAMIL HYDROCHLORIDE 240 MG/1
240 TABLET, FILM COATED, EXTENDED RELEASE ORAL NIGHTLY
Qty: 30 TABLET | Refills: 2 | Status: SHIPPED | OUTPATIENT
Start: 2024-03-05

## 2024-03-05 RX ORDER — VENLAFAXINE HYDROCHLORIDE 75 MG/1
75 CAPSULE, EXTENDED RELEASE ORAL 2 TIMES DAILY
Qty: 180 CAPSULE | Refills: 2 | Status: SHIPPED | OUTPATIENT
Start: 2024-03-05

## 2024-03-05 RX ORDER — MONTELUKAST SODIUM 10 MG/1
TABLET ORAL
Qty: 90 TABLET | Refills: 3 | Status: SHIPPED | OUTPATIENT
Start: 2024-03-05

## 2024-03-05 RX ORDER — LOSARTAN POTASSIUM 25 MG/1
25 TABLET ORAL 2 TIMES DAILY
Qty: 180 TABLET | Refills: 2 | Status: SHIPPED | OUTPATIENT
Start: 2024-03-05

## 2024-03-05 RX ORDER — PRAVASTATIN SODIUM 20 MG
TABLET ORAL
Qty: 90 TABLET | Refills: 2 | Status: SHIPPED | OUTPATIENT
Start: 2024-03-05

## 2024-03-05 RX ORDER — ONDANSETRON 4 MG/1
4 TABLET, ORALLY DISINTEGRATING ORAL 3 TIMES DAILY PRN
Qty: 21 TABLET | Refills: 1 | Status: SHIPPED | OUTPATIENT
Start: 2024-03-05

## 2024-03-05 RX ORDER — OMEPRAZOLE 40 MG/1
40 CAPSULE, DELAYED RELEASE ORAL 2 TIMES DAILY
Qty: 180 CAPSULE | Refills: 2 | Status: SHIPPED | OUTPATIENT
Start: 2024-03-05

## 2024-03-05 RX ORDER — MIRTAZAPINE 7.5 MG/1
7.5 TABLET, FILM COATED ORAL NIGHTLY
Qty: 90 TABLET | Refills: 2 | Status: SHIPPED | OUTPATIENT
Start: 2024-03-05

## 2024-03-05 SDOH — ECONOMIC STABILITY: FOOD INSECURITY: WITHIN THE PAST 12 MONTHS, YOU WORRIED THAT YOUR FOOD WOULD RUN OUT BEFORE YOU GOT MONEY TO BUY MORE.: NEVER TRUE

## 2024-03-05 SDOH — ECONOMIC STABILITY: FOOD INSECURITY: WITHIN THE PAST 12 MONTHS, THE FOOD YOU BOUGHT JUST DIDN'T LAST AND YOU DIDN'T HAVE MONEY TO GET MORE.: NEVER TRUE

## 2024-03-05 SDOH — ECONOMIC STABILITY: INCOME INSECURITY: HOW HARD IS IT FOR YOU TO PAY FOR THE VERY BASICS LIKE FOOD, HOUSING, MEDICAL CARE, AND HEATING?: NOT HARD AT ALL

## 2024-03-05 ASSESSMENT — PATIENT HEALTH QUESTIONNAIRE - PHQ9
2. FEELING DOWN, DEPRESSED OR HOPELESS: 0
SUM OF ALL RESPONSES TO PHQ9 QUESTIONS 1 & 2: 0
8. MOVING OR SPEAKING SO SLOWLY THAT OTHER PEOPLE COULD HAVE NOTICED. OR THE OPPOSITE, BEING SO FIGETY OR RESTLESS THAT YOU HAVE BEEN MOVING AROUND A LOT MORE THAN USUAL: 0
4. FEELING TIRED OR HAVING LITTLE ENERGY: 0
10. IF YOU CHECKED OFF ANY PROBLEMS, HOW DIFFICULT HAVE THESE PROBLEMS MADE IT FOR YOU TO DO YOUR WORK, TAKE CARE OF THINGS AT HOME, OR GET ALONG WITH OTHER PEOPLE: 0
3. TROUBLE FALLING OR STAYING ASLEEP: 0
5. POOR APPETITE OR OVEREATING: 0
SUM OF ALL RESPONSES TO PHQ QUESTIONS 1-9: 0
9. THOUGHTS THAT YOU WOULD BE BETTER OFF DEAD, OR OF HURTING YOURSELF: 0
6. FEELING BAD ABOUT YOURSELF - OR THAT YOU ARE A FAILURE OR HAVE LET YOURSELF OR YOUR FAMILY DOWN: 0
SUM OF ALL RESPONSES TO PHQ QUESTIONS 1-9: 0
1. LITTLE INTEREST OR PLEASURE IN DOING THINGS: 0
7. TROUBLE CONCENTRATING ON THINGS, SUCH AS READING THE NEWSPAPER OR WATCHING TELEVISION: 0

## 2024-03-05 NOTE — PROGRESS NOTES
Gudelia Ng is a 78 y.o. female who presents today for the following:  Chief Complaint   Patient presents with    Hypertension     6 month follow up     Referral - General     Needs referral to ortho - previous was w/ [facility] & is now out of network w/ insurance       Allergies   Allergen Reactions    Niacin Rash       Current Outpatient Medications   Medication Sig Dispense Refill    hydrOXYzine HCl (ATARAX) 25 MG tablet 1-2 tabs po TID prn anxiety 180 tablet 1    montelukast (SINGULAIR) 10 MG tablet TAKE 1 TABLET BY MOUTH DAILY AT BEDTIME 90 tablet 3    fluticasone-umeclidin-vilant (TRELEGY ELLIPTA) 200-62.5-25 MCG/ACT AEPB inhaler Inhale 1 puff into the lungs daily 1 each 11    dupilumab (DUPIXENT) 300 MG/2ML SOSY injection Inject 2 mLs into the skin every 14 days 1 each 11    venlafaxine (EFFEXOR XR) 75 MG extended release capsule Take 1 capsule by mouth in the morning and at bedtime 180 capsule 2    losartan (COZAAR) 25 MG tablet Take 1 tablet by mouth in the morning and at bedtime 180 tablet 2    mirtazapine (REMERON) 7.5 MG tablet Take 1 tablet by mouth nightly 90 tablet 2    omeprazole (PRILOSEC) 40 MG delayed release capsule Take 1 capsule by mouth 2 times daily 180 capsule 2    pravastatin (PRAVACHOL) 20 MG tablet TAKE 1 TABLET BY MOUTH DAILY 90 tablet 2    verapamil (CALAN SR) 240 MG extended release tablet Take 1 tablet by mouth nightly TAKE 1 TABLET BY MOUTH EVERY MORNING 30 tablet 2    albuterol sulfate HFA (PROVENTIL;VENTOLIN;PROAIR) 108 (90 Base) MCG/ACT inhaler INHALE 2 PUFFS BY MOUTH FOUR TIMES DAILY 3 each 3    ondansetron (ZOFRAN-ODT) 4 MG disintegrating tablet Take 1 tablet by mouth 3 times daily as needed for Nausea or Vomiting 21 tablet 1    FINEST NUTRITION MELATONIN 5-10 MG TBCR TAKE 1 TABLET BY MOUTH EVERY NIGHT AT BEDTIME AS NEEDED FOR INSOMNIA 90 tablet 2    albuterol (PROVENTIL) (2.5 MG/3ML) 0.083% nebulizer solution Inhale 3 mLs into the lungs every 6 hours as needed

## 2024-03-06 ASSESSMENT — ENCOUNTER SYMPTOMS
NAUSEA: 0
ROS SKIN COMMENTS: CALLUS ON FEET
ABDOMINAL PAIN: 0
DIARRHEA: 0
SHORTNESS OF BREATH: 0
COUGH: 0
CHEST TIGHTNESS: 0
CONSTIPATION: 0

## 2024-03-06 NOTE — ASSESSMENT & PLAN NOTE
Advised to avoid NSAIDs, maintain healthy blood pressure, importance of blood sugar control, and adequate fluid intake to preserve renal function.  All medications reviewed and appropriately dose for chronic kidney disease.

## 2024-03-06 NOTE — ASSESSMENT & PLAN NOTE
Blood pressure is at goal for age.  Encouraged continued medication compliance, DASH or Mediterranean diet and daily physical activity.

## 2024-03-07 DIAGNOSIS — I10 PRIMARY HYPERTENSION: ICD-10-CM

## 2024-03-07 DIAGNOSIS — N18.31 STAGE 3A CHRONIC KIDNEY DISEASE (HCC): ICD-10-CM

## 2024-03-07 LAB
25(OH)D3 SERPL-MCNC: 25.9 NG/ML (ref 30–100)
ALBUMIN SERPL-MCNC: 3.6 G/DL (ref 3.2–4.6)
ALBUMIN/GLOB SERPL: 1.2 (ref 0.4–1.6)
ALP SERPL-CCNC: 86 U/L (ref 50–136)
ALT SERPL-CCNC: 24 U/L (ref 12–65)
ANION GAP SERPL CALC-SCNC: 4 MMOL/L (ref 2–11)
AST SERPL-CCNC: 24 U/L (ref 15–37)
BASOPHILS # BLD: 0.1 K/UL (ref 0–0.2)
BASOPHILS NFR BLD: 1 % (ref 0–2)
BILIRUB SERPL-MCNC: 0.5 MG/DL (ref 0.2–1.1)
BUN SERPL-MCNC: 15 MG/DL (ref 8–23)
CALCIUM SERPL-MCNC: 10.1 MG/DL (ref 8.3–10.4)
CHLORIDE SERPL-SCNC: 108 MMOL/L (ref 103–113)
CO2 SERPL-SCNC: 29 MMOL/L (ref 21–32)
CREAT SERPL-MCNC: 1.2 MG/DL (ref 0.6–1)
DIFFERENTIAL METHOD BLD: ABNORMAL
EOSINOPHIL # BLD: 0.8 K/UL (ref 0–0.8)
EOSINOPHIL NFR BLD: 12 % (ref 0.5–7.8)
ERYTHROCYTE [DISTWIDTH] IN BLOOD BY AUTOMATED COUNT: 13.7 % (ref 11.9–14.6)
GLOBULIN SER CALC-MCNC: 2.9 G/DL (ref 2.8–4.5)
GLUCOSE SERPL-MCNC: 126 MG/DL (ref 65–100)
HCT VFR BLD AUTO: 43.4 % (ref 35.8–46.3)
HGB BLD-MCNC: 13.9 G/DL (ref 11.7–15.4)
IMM GRANULOCYTES # BLD AUTO: 0 K/UL (ref 0–0.5)
IMM GRANULOCYTES NFR BLD AUTO: 0 % (ref 0–5)
LYMPHOCYTES # BLD: 2.6 K/UL (ref 0.5–4.6)
LYMPHOCYTES NFR BLD: 38 % (ref 13–44)
MCH RBC QN AUTO: 28.2 PG (ref 26.1–32.9)
MCHC RBC AUTO-ENTMCNC: 32 G/DL (ref 31.4–35)
MCV RBC AUTO: 88 FL (ref 82–102)
MONOCYTES # BLD: 0.6 K/UL (ref 0.1–1.3)
MONOCYTES NFR BLD: 9 % (ref 4–12)
NEUTS SEG # BLD: 2.6 K/UL (ref 1.7–8.2)
NEUTS SEG NFR BLD: 40 % (ref 43–78)
NRBC # BLD: 0 K/UL (ref 0–0.2)
PLATELET # BLD AUTO: 201 K/UL (ref 150–450)
PMV BLD AUTO: 9.7 FL (ref 9.4–12.3)
POTASSIUM SERPL-SCNC: 4.6 MMOL/L (ref 3.5–5.1)
PROT SERPL-MCNC: 6.5 G/DL (ref 6.3–8.2)
RBC # BLD AUTO: 4.93 M/UL (ref 4.05–5.2)
SODIUM SERPL-SCNC: 141 MMOL/L (ref 136–146)
TSH W FREE THYROID IF ABNORMAL: 3.03 UIU/ML (ref 0.36–3.74)
WBC # BLD AUTO: 6.7 K/UL (ref 4.3–11.1)

## 2024-04-03 ENCOUNTER — TELEPHONE (OUTPATIENT)
Dept: FAMILY MEDICINE CLINIC | Facility: CLINIC | Age: 79
End: 2024-04-03

## 2024-04-03 NOTE — TELEPHONE ENCOUNTER
Patient called and cannot see [place] [facility]  anymore, they do not take her insurance and she would like to see if you recommend anyone in [facility] to get in with to see for her back. Please advise

## 2024-04-04 ENCOUNTER — TELEPHONE (OUTPATIENT)
Dept: FAMILY MEDICINE CLINIC | Facility: CLINIC | Age: 79
End: 2024-04-04

## 2024-04-04 DIAGNOSIS — G89.29 CHRONIC MIDLINE LOW BACK PAIN, UNSPECIFIED WHETHER SCIATICA PRESENT: Primary | ICD-10-CM

## 2024-04-04 DIAGNOSIS — M54.50 CHRONIC MIDLINE LOW BACK PAIN, UNSPECIFIED WHETHER SCIATICA PRESENT: Primary | ICD-10-CM

## 2024-04-04 NOTE — TELEPHONE ENCOUNTER
Patient called back and states she will keep the appointment in June with rheumatologist but would like to be seen by  Dr. Shabbir Encarnacion her disk are out in back and he is at [address] [facility] phone number [phone] she has seen him in the past but needs a referral.Please advise

## 2024-04-19 DIAGNOSIS — F39 MOOD DISORDER (HCC): ICD-10-CM

## 2024-04-19 RX ORDER — VENLAFAXINE HYDROCHLORIDE 75 MG/1
75 CAPSULE, EXTENDED RELEASE ORAL 2 TIMES DAILY
Qty: 180 CAPSULE | Refills: 2 | OUTPATIENT
Start: 2024-04-19

## 2024-05-21 ENCOUNTER — OFFICE VISIT (OUTPATIENT)
Dept: PULMONOLOGY | Age: 79
End: 2024-05-21
Payer: MEDICARE

## 2024-05-21 VITALS
DIASTOLIC BLOOD PRESSURE: 84 MMHG | WEIGHT: 114 LBS | HEIGHT: 62 IN | BODY MASS INDEX: 20.98 KG/M2 | RESPIRATION RATE: 14 BRPM | SYSTOLIC BLOOD PRESSURE: 120 MMHG | HEART RATE: 103 BPM | OXYGEN SATURATION: 96 %

## 2024-05-21 DIAGNOSIS — J30.89 ENVIRONMENTAL AND SEASONAL ALLERGIES: Primary | ICD-10-CM

## 2024-05-21 DIAGNOSIS — J45.50 SEVERE PERSISTENT ASTHMA WITHOUT COMPLICATION: ICD-10-CM

## 2024-05-21 DIAGNOSIS — K21.00 GASTRO-ESOPHAGEAL REFLUX DISEASE WITH ESOPHAGITIS, WITHOUT BLEEDING: ICD-10-CM

## 2024-05-21 PROCEDURE — 3079F DIAST BP 80-89 MM HG: CPT | Performed by: NURSE PRACTITIONER

## 2024-05-21 PROCEDURE — 1123F ACP DISCUSS/DSCN MKR DOCD: CPT | Performed by: NURSE PRACTITIONER

## 2024-05-21 PROCEDURE — 99214 OFFICE O/P EST MOD 30 MIN: CPT | Performed by: NURSE PRACTITIONER

## 2024-05-21 PROCEDURE — 3074F SYST BP LT 130 MM HG: CPT | Performed by: NURSE PRACTITIONER

## 2024-05-21 RX ORDER — ALBUTEROL SULFATE 90 UG/1
AEROSOL, METERED RESPIRATORY (INHALATION)
Qty: 3 EACH | Refills: 3 | Status: SHIPPED | OUTPATIENT
Start: 2024-05-21

## 2024-05-21 RX ORDER — FLUTICASONE FUROATE, UMECLIDINIUM BROMIDE AND VILANTEROL TRIFENATATE 200; 62.5; 25 UG/1; UG/1; UG/1
1 POWDER RESPIRATORY (INHALATION) DAILY
Qty: 1 EACH | Refills: 11 | Status: SHIPPED | OUTPATIENT
Start: 2024-05-21

## 2024-05-21 NOTE — PROGRESS NOTES
Patient Name:  Gudelia Ng                             YOB: 1945  MRN: [id number]                                              Office Visit 5/21/2024    ASSESSMENT AND PLAN:  (Medical Decision Making)    Gudelia was seen today for asthma.    Diagnoses and all orders for this visit:    Severe persistent asthma without complication  -- Currently on Trelegy 200 and Dupixent started in 10/2022 and doing well.    --Last CBC shows an absolute eosinophil count of 600.  Last IgE as of March 2022 was 800. CXR clear.    --Spirometry improved since starting dupixent.    --refilled all medications.      Environmental and seasonal allergies  --Continue Singulair daily.    --States minimal mucous and chest congestion.   --some PND, encouraged nasal saline at this point.     Gastro-esophageal reflux disease with esophagitis, without bleeding  -- With increased symptoms, suggested that she take Pepcid every night prior to bed    Other orders    Orders Placed This Encounter   Medications    fluticasone-umeclidin-vilant (TRELEGY ELLIPTA) 200-62.5-25 MCG/ACT AEPB inhaler     Sig: Inhale 1 puff into the lungs daily     Dispense:  1 each     Refill:  11    albuterol sulfate HFA (PROVENTIL;VENTOLIN;PROAIR) 108 (90 Base) MCG/ACT inhaler     Sig: INHALE 2 PUFFS BY MOUTH FOUR TIMES DAILY     Dispense:  3 each     Refill:  3    dupilumab (DUPIXENT) 300 MG/2ML SOSY injection     Sig: Inject 2 mLs into the skin every 14 days     Dispense:  1 each     Refill:  11       No orders of the defined types were placed in this encounter.      Follow-up and Dispositions    Return in about 1 year (around 5/21/2025) for roxanna, Lanette.       Lanette Alberto, APRN - CNP    Collaborating physician is Jayson Adkins MD  _________________________________________________________________________    HISTORY OF PRESENT ILLNESS:    Ms. Gudelia Ng is a 78 y.o. female who is seen at [facility] today for  No chief complaint on

## 2024-06-11 ENCOUNTER — HOSPITAL ENCOUNTER (OUTPATIENT)
Dept: MAMMOGRAPHY | Age: 79
Discharge: HOME | End: 2024-06-14
Attending: FAMILY MEDICINE
Payer: MEDICARE

## 2024-06-11 DIAGNOSIS — Z78.0 POST-MENOPAUSAL: ICD-10-CM

## 2024-06-11 PROCEDURE — 77080 DXA BONE DENSITY AXIAL: CPT

## 2024-06-19 ENCOUNTER — TELEPHONE (OUTPATIENT)
Dept: PULMONOLOGY | Age: 79
End: 2024-06-19

## 2024-06-19 NOTE — TELEPHONE ENCOUNTER
PA for Dupixent sent to [facility] via [facility] Key: [id number]    Your request has been approved  PA Case: [id number], Status: Approved, Coverage Starts on: 1/1/2024 12:00:00 AM, Coverage Ends on: 12/31/2024 12:00:00 AM.

## 2024-06-28 ENCOUNTER — TELEPHONE (OUTPATIENT)
Dept: FAMILY MEDICINE CLINIC | Facility: CLINIC | Age: 79
End: 2024-06-28

## 2024-06-28 DIAGNOSIS — I10 PRIMARY HYPERTENSION: ICD-10-CM

## 2024-06-28 RX ORDER — VERAPAMIL HYDROCHLORIDE 240 MG/1
240 TABLET, FILM COATED, EXTENDED RELEASE ORAL DAILY
Qty: 90 TABLET | Refills: 2 | Status: SHIPPED | OUTPATIENT
Start: 2024-06-28

## 2024-06-28 RX ORDER — LOSARTAN POTASSIUM 25 MG/1
25 TABLET ORAL 2 TIMES DAILY
Qty: 180 TABLET | Refills: 2 | Status: SHIPPED | OUTPATIENT
Start: 2024-06-28

## 2024-06-28 RX ORDER — VERAPAMIL HYDROCHLORIDE 240 MG/1
240 TABLET, FILM COATED, EXTENDED RELEASE ORAL NIGHTLY
Qty: 30 TABLET | Refills: 2 | Status: SHIPPED | OUTPATIENT
Start: 2024-06-28 | End: 2024-06-28 | Stop reason: SDUPTHER

## 2024-06-28 NOTE — TELEPHONE ENCOUNTER
1. Primary hypertension  -     verapamil (CALAN SR) 240 MG extended release tablet; Take 1 tablet by mouth daily, Disp-90 tablet, R-2Normal

## 2024-06-28 NOTE — TELEPHONE ENCOUNTER
Pt called and needs a refill on verapamil (CALAN SR) 240 MG extended release tablet, losartan (COZAAR) 25 MG tablet  and the pharmacy that the pt uses is [id number] - [place], [place] - [address] - P [phone] - F [phone]       Last appt:3/5/2024  Upcoming appt:9/3/2024

## 2024-11-12 ENCOUNTER — OFFICE VISIT (OUTPATIENT)
Dept: FAMILY MEDICINE CLINIC | Facility: CLINIC | Age: 79
End: 2024-11-12

## 2024-11-12 VITALS
WEIGHT: 114 LBS | HEART RATE: 74 BPM | DIASTOLIC BLOOD PRESSURE: 80 MMHG | BODY MASS INDEX: 20.85 KG/M2 | TEMPERATURE: 97.9 F | OXYGEN SATURATION: 94 % | SYSTOLIC BLOOD PRESSURE: 130 MMHG

## 2024-11-12 DIAGNOSIS — I10 PRIMARY HYPERTENSION: ICD-10-CM

## 2024-11-12 DIAGNOSIS — E53.8 B12 DEFICIENCY: ICD-10-CM

## 2024-11-12 DIAGNOSIS — M81.0 AGE RELATED OSTEOPOROSIS, UNSPECIFIED PATHOLOGICAL FRACTURE PRESENCE: ICD-10-CM

## 2024-11-12 DIAGNOSIS — Z23 FLU VACCINE NEED: Primary | ICD-10-CM

## 2024-11-12 DIAGNOSIS — J45.50 SEVERE PERSISTENT ASTHMA WITHOUT COMPLICATION: ICD-10-CM

## 2024-11-12 DIAGNOSIS — F39 MOOD DISORDER (HCC): ICD-10-CM

## 2024-11-12 DIAGNOSIS — K21.00 GASTROESOPHAGEAL REFLUX DISEASE WITH ESOPHAGITIS, UNSPECIFIED WHETHER HEMORRHAGE: ICD-10-CM

## 2024-11-12 DIAGNOSIS — R11.0 CHRONIC NAUSEA: ICD-10-CM

## 2024-11-12 DIAGNOSIS — D50.8 OTHER IRON DEFICIENCY ANEMIAS: ICD-10-CM

## 2024-11-12 DIAGNOSIS — E78.5 HYPERLIPIDEMIA, UNSPECIFIED HYPERLIPIDEMIA TYPE: ICD-10-CM

## 2024-11-12 DIAGNOSIS — Z00.00 MEDICARE ANNUAL WELLNESS VISIT, SUBSEQUENT: ICD-10-CM

## 2024-11-12 LAB
25(OH)D3 SERPL-MCNC: 22.5 NG/ML (ref 30–100)
ALBUMIN SERPL-MCNC: 3.4 G/DL (ref 3.2–4.6)
ALBUMIN/GLOB SERPL: 1.1 (ref 1–1.9)
ALP SERPL-CCNC: 139 U/L (ref 35–104)
ALT SERPL-CCNC: 18 U/L (ref 8–45)
ANION GAP SERPL CALC-SCNC: 8 MMOL/L (ref 7–16)
AST SERPL-CCNC: 28 U/L (ref 15–37)
BASOPHILS # BLD: 0.1 K/UL (ref 0–0.2)
BASOPHILS NFR BLD: 1 % (ref 0–2)
BILIRUB SERPL-MCNC: 0.3 MG/DL (ref 0–1.2)
BUN SERPL-MCNC: 14 MG/DL (ref 8–23)
CALCIUM SERPL-MCNC: 10.1 MG/DL (ref 8.8–10.2)
CHLORIDE SERPL-SCNC: 105 MMOL/L (ref 98–107)
CHOLEST SERPL-MCNC: 213 MG/DL (ref 0–200)
CO2 SERPL-SCNC: 28 MMOL/L (ref 20–29)
CREAT SERPL-MCNC: 0.88 MG/DL (ref 0.6–1.1)
DIFFERENTIAL METHOD BLD: ABNORMAL
EOSINOPHIL # BLD: 1.3 K/UL (ref 0–0.8)
EOSINOPHIL NFR BLD: 19 % (ref 0.5–7.8)
ERYTHROCYTE [DISTWIDTH] IN BLOOD BY AUTOMATED COUNT: 13.4 % (ref 11.9–14.6)
GLOBULIN SER CALC-MCNC: 3 G/DL (ref 2.3–3.5)
GLUCOSE SERPL-MCNC: 95 MG/DL (ref 70–99)
HCT VFR BLD AUTO: 45.9 % (ref 35.8–46.3)
HDLC SERPL-MCNC: 65 MG/DL (ref 40–60)
HDLC SERPL: 3.3 (ref 0–5)
HGB BLD-MCNC: 14.5 G/DL (ref 11.7–15.4)
IMM GRANULOCYTES # BLD AUTO: 0 K/UL (ref 0–0.5)
IMM GRANULOCYTES NFR BLD AUTO: 0 % (ref 0–5)
IRON SATN MFR SERPL: 29 % (ref 20–50)
IRON SERPL-MCNC: 91 UG/DL (ref 35–100)
LDLC SERPL CALC-MCNC: 127 MG/DL (ref 0–100)
LYMPHOCYTES # BLD: 2.2 K/UL (ref 0.5–4.6)
LYMPHOCYTES NFR BLD: 32 % (ref 13–44)
MCH RBC QN AUTO: 28.5 PG (ref 26.1–32.9)
MCHC RBC AUTO-ENTMCNC: 31.6 G/DL (ref 31.4–35)
MCV RBC AUTO: 90.4 FL (ref 82–102)
MONOCYTES # BLD: 0.7 K/UL (ref 0.1–1.3)
MONOCYTES NFR BLD: 10 % (ref 4–12)
NEUTS SEG # BLD: 2.6 K/UL (ref 1.7–8.2)
NEUTS SEG NFR BLD: 38 % (ref 43–78)
NRBC # BLD: 0 K/UL (ref 0–0.2)
PLATELET # BLD AUTO: 193 K/UL (ref 150–450)
PMV BLD AUTO: 10.6 FL (ref 9.4–12.3)
POTASSIUM SERPL-SCNC: 4.3 MMOL/L (ref 3.5–5.1)
PROT SERPL-MCNC: 6.4 G/DL (ref 6.3–8.2)
RBC # BLD AUTO: 5.08 M/UL (ref 4.05–5.2)
SODIUM SERPL-SCNC: 141 MMOL/L (ref 136–145)
TIBC SERPL-MCNC: 318 UG/DL (ref 240–450)
TRIGL SERPL-MCNC: 108 MG/DL (ref 0–150)
TSH W FREE THYROID IF ABNORMAL: 3.29 UIU/ML (ref 0.27–4.2)
UIBC SERPL-MCNC: 227 UG/DL (ref 112–347)
VIT B12 SERPL-MCNC: 321 PG/ML (ref 193–986)
VLDLC SERPL CALC-MCNC: 22 MG/DL (ref 6–23)
WBC # BLD AUTO: 6.8 K/UL (ref 4.3–11.1)

## 2024-11-12 RX ORDER — LOSARTAN POTASSIUM 25 MG/1
25 TABLET ORAL 2 TIMES DAILY
Qty: 180 TABLET | Refills: 2 | Status: SHIPPED | OUTPATIENT
Start: 2024-11-12

## 2024-11-12 RX ORDER — AMITRIPTYLINE HYDROCHLORIDE 10 MG/1
10 TABLET ORAL NIGHTLY
COMMUNITY
Start: 2024-10-14

## 2024-11-12 RX ORDER — VERAPAMIL HYDROCHLORIDE 240 MG/1
240 TABLET, FILM COATED, EXTENDED RELEASE ORAL DAILY
Qty: 90 TABLET | Refills: 2 | Status: SHIPPED | OUTPATIENT
Start: 2024-11-12

## 2024-11-12 RX ORDER — MONTELUKAST SODIUM 10 MG/1
TABLET ORAL
Qty: 90 TABLET | Refills: 3 | Status: SHIPPED | OUTPATIENT
Start: 2024-11-12

## 2024-11-12 RX ORDER — HYDROCODONE BITARTRATE AND ACETAMINOPHEN 10; 325 MG/1; MG/1
1 TABLET ORAL EVERY 8 HOURS PRN
COMMUNITY
Start: 2024-08-20

## 2024-11-12 RX ORDER — LIDOCAINE 50 MG/G
1 PATCH TOPICAL DAILY
COMMUNITY
Start: 2024-04-05

## 2024-11-12 RX ORDER — ONDANSETRON 4 MG/1
4 TABLET, ORALLY DISINTEGRATING ORAL 3 TIMES DAILY PRN
Qty: 20 TABLET | Refills: 1 | Status: SHIPPED | OUTPATIENT
Start: 2024-11-12

## 2024-11-12 RX ORDER — PRAVASTATIN SODIUM 20 MG
TABLET ORAL
Qty: 90 TABLET | Refills: 2 | Status: SHIPPED | OUTPATIENT
Start: 2024-11-12 | End: 2024-11-13

## 2024-11-12 RX ORDER — VENLAFAXINE HYDROCHLORIDE 75 MG/1
75 CAPSULE, EXTENDED RELEASE ORAL 2 TIMES DAILY
Qty: 180 CAPSULE | Refills: 2 | Status: SHIPPED | OUTPATIENT
Start: 2024-11-12

## 2024-11-12 RX ORDER — OMEPRAZOLE 40 MG/1
40 CAPSULE, DELAYED RELEASE ORAL 2 TIMES DAILY
Qty: 180 CAPSULE | Refills: 2 | Status: SHIPPED | OUTPATIENT
Start: 2024-11-12

## 2024-11-12 ASSESSMENT — PATIENT HEALTH QUESTIONNAIRE - PHQ9
3. TROUBLE FALLING OR STAYING ASLEEP: NOT AT ALL
5. POOR APPETITE OR OVEREATING: NOT AT ALL
SUM OF ALL RESPONSES TO PHQ QUESTIONS 1-9: 0
7. TROUBLE CONCENTRATING ON THINGS, SUCH AS READING THE NEWSPAPER OR WATCHING TELEVISION: NOT AT ALL
1. LITTLE INTEREST OR PLEASURE IN DOING THINGS: NOT AT ALL
SUM OF ALL RESPONSES TO PHQ QUESTIONS 1-9: 0
10. IF YOU CHECKED OFF ANY PROBLEMS, HOW DIFFICULT HAVE THESE PROBLEMS MADE IT FOR YOU TO DO YOUR WORK, TAKE CARE OF THINGS AT HOME, OR GET ALONG WITH OTHER PEOPLE: NOT DIFFICULT AT ALL
SUM OF ALL RESPONSES TO PHQ9 QUESTIONS 1 & 2: 0
SUM OF ALL RESPONSES TO PHQ QUESTIONS 1-9: 0
9. THOUGHTS THAT YOU WOULD BE BETTER OFF DEAD, OR OF HURTING YOURSELF: NOT AT ALL
2. FEELING DOWN, DEPRESSED OR HOPELESS: NOT AT ALL
4. FEELING TIRED OR HAVING LITTLE ENERGY: NOT AT ALL
8. MOVING OR SPEAKING SO SLOWLY THAT OTHER PEOPLE COULD HAVE NOTICED. OR THE OPPOSITE, BEING SO FIGETY OR RESTLESS THAT YOU HAVE BEEN MOVING AROUND A LOT MORE THAN USUAL: NOT AT ALL
SUM OF ALL RESPONSES TO PHQ QUESTIONS 1-9: 0
6. FEELING BAD ABOUT YOURSELF - OR THAT YOU ARE A FAILURE OR HAVE LET YOURSELF OR YOUR FAMILY DOWN: NOT AT ALL

## 2024-11-12 NOTE — PROGRESS NOTES
osteoporosis, unspecified pathological fracture presence  Assessment & Plan:  Previously received prolia.  Stopped calcium and vitamin d supplement.  Needs new osteoporosis specialist.  Orders:  -     Vitamin D 25 Hydroxy; Future  5. B12 deficiency  Assessment & Plan:   Follow up on level  Orders:  -     Vitamin B12; Future  6. Other iron deficiency anemias  Assessment & Plan:   Follow up on lab  Orders:  -     Iron and TIBC; Future  7. Severe persistent asthma without complication  Assessment & Plan:   Established with pulmonary.  No wheezing on exam today.  Improvement since starting dupixent.  Recommend COVID booster and rsv vaccine at pharmacy  Orders:  -     montelukast (SINGULAIR) 10 MG tablet; TAKE 1 TABLET BY MOUTH DAILY AT BEDTIME, Disp-90 tablet, R-3Normal  8. Gastroesophageal reflux disease with esophagitis, unspecified whether hemorrhage  Assessment & Plan:   Symptoms controlled with high dose PPI.  Orders:  -     omeprazole (PRILOSEC) 40 MG delayed release capsule; Take 1 capsule by mouth 2 times daily, Disp-180 capsule, R-2Normal  9. Hyperlipidemia, unspecified hyperlipidemia type  Assessment & Plan:   Patient is taking statin without side effect.  Discussed benefits of statin in prevention of coronary artery and cerebrovascular disease.  Last LDL was 84  Orders:  -     pravastatin (PRAVACHOL) 20 MG tablet; TAKE 1 TABLET BY MOUTH DAILY, Disp-90 tablet, R-2Normal  10. Mood disorder (HCC)  Assessment & Plan:   Doing well on current medications.  Switched venlafaxine back to capsule to help with difficulty swallowing.  Stop mirtazapine since amitriptyline added by pain management.   Orders:  -     venlafaxine (EFFEXOR XR) 75 MG extended release capsule; Take 1 capsule by mouth in the morning and at bedtime, Disp-180 capsule, R-2Normal  11. Chronic nausea  -     ondansetron (ZOFRAN-ODT) 4 MG disintegrating tablet; Take 1 tablet by mouth 3 times daily as needed for Nausea or Vomiting, Disp-20 tablet,

## 2024-11-12 NOTE — PATIENT INSTRUCTIONS
Learning About Being Active as an Older Adult  Why is being active important as you get older?     Being active is one of the best things you can do for your health. And it's never too late to start. Being active--or getting active, if you aren't already--has definite benefits. It can:  Give you more energy,  Keep your mind sharp.  Improve balance to reduce your risk of falls.  Help you manage chronic illness with fewer medicines.  No matter how old you are, how fit you are, or what health problems you have, there is a form of activity that will work for you. And the more physical activity you can do, the better your overall health will be.  What kinds of activity can help you stay healthy?  Being more active will make your daily activities easier. Physical activity includes planned exercise and things you do in daily life. There are four types of activity:  Aerobic.  Doing aerobic activity makes your heart and lungs strong.  Includes walking, dancing, and gardening.  Aim for at least 2½ hours spread throughout the week.  It improves your energy and can help you sleep better.  Muscle-strengthening.  This type of activity can help maintain muscle and strengthen bones.  Includes climbing stairs, using resistance bands, and lifting or carrying heavy loads.  Aim for at least twice a week.  It can help protect the knees and other joints.  Stretching.  Stretching gives you better range of motion in joints and muscles.  Includes upper arm stretches, calf stretches, and gentle yoga.  Aim for at least twice a week, preferably after your muscles are warmed up from other activities.  It can help you function better in daily life.  Balancing.  This helps you stay coordinated and have good posture.  Includes heel-to-toe walking, mina chi, and certain types of yoga.  Aim for at least 3 days a week.  It can reduce your risk of falling.  Even if you have a hard time meeting the recommendations, it's better to be more active

## 2024-11-13 PROBLEM — R79.89 ELEVATED TROPONIN: Status: RESOLVED | Noted: 2021-11-20 | Resolved: 2024-11-13

## 2024-11-13 PROBLEM — F13.20 SEDATIVE, HYPNOTIC OR ANXIOLYTIC DEPENDENCE, UNCOMPLICATED (HCC): Status: RESOLVED | Noted: 2023-02-20 | Resolved: 2024-11-13

## 2024-11-13 RX ORDER — OYSTER SHELL CALCIUM WITH VITAMIN D 500; 200 MG/1; [IU]/1
1 TABLET, FILM COATED ORAL 2 TIMES DAILY
Qty: 180 TABLET | Refills: 3 | Status: SHIPPED | OUTPATIENT
Start: 2024-11-13

## 2024-11-13 RX ORDER — PRAVASTATIN SODIUM 40 MG
TABLET ORAL
Qty: 90 TABLET | Refills: 3 | Status: SHIPPED | OUTPATIENT
Start: 2024-11-13

## 2024-11-13 ASSESSMENT — ENCOUNTER SYMPTOMS
ABDOMINAL PAIN: 0
CONSTIPATION: 0
DIARRHEA: 0
COUGH: 1
NAUSEA: 0
CHEST TIGHTNESS: 0
SHORTNESS OF BREATH: 0

## 2024-11-13 NOTE — ASSESSMENT & PLAN NOTE
Patient is taking statin without side effect.  Discussed benefits of statin in prevention of coronary artery and cerebrovascular disease.  Last LDL was 84

## 2024-11-13 NOTE — ASSESSMENT & PLAN NOTE
Previously received prolia.  Stopped calcium and vitamin d supplement.  Needs new osteoporosis specialist.

## 2024-11-13 NOTE — ASSESSMENT & PLAN NOTE
Doing well on current medications.  Switched venlafaxine back to capsule to help with difficulty swallowing.  Stop mirtazapine since amitriptyline added by pain management.

## 2024-11-14 DIAGNOSIS — I10 PRIMARY HYPERTENSION: ICD-10-CM

## 2024-11-14 RX ORDER — VERAPAMIL HYDROCHLORIDE 240 MG/1
240 TABLET, FILM COATED, EXTENDED RELEASE ORAL EVERY MORNING
Qty: 30 TABLET | OUTPATIENT
Start: 2024-11-14

## 2024-11-26 ENCOUNTER — TELEPHONE (OUTPATIENT)
Dept: PULMONOLOGY | Age: 79
End: 2024-11-26

## 2024-11-26 NOTE — TELEPHONE ENCOUNTER
TRIAGE CALL      Complaint: asthma flare up  Cough: yes  Productive:  yellow yes  Bloody Sputum:  no  Increased SOB/Wheezing:  yes both  Duration: last friday   Fever/Chills: chills/ and low grade fever  OTC Meds tried: linda

## 2024-11-27 RX ORDER — PREDNISONE 20 MG/1
20 TABLET ORAL DAILY
Qty: 15 TABLET | Refills: 0 | Status: SHIPPED | OUTPATIENT
Start: 2024-11-27

## 2024-11-27 RX ORDER — DOXYCYCLINE HYCLATE 100 MG
100 TABLET ORAL 2 TIMES DAILY
Qty: 20 TABLET | Refills: 0 | Status: SHIPPED | OUTPATIENT
Start: 2024-11-27 | End: 2024-12-07

## 2024-11-27 NOTE — TELEPHONE ENCOUNTER
Last seen: 5/21/24  Hx: severe asthma, environmental & seasonal allergies, GERD, anemia, HTN, HLD, B12 deficient    Patient reporting asthma flare up w/ coughing productive of yellow sputum, sob & wheezing, chills & low grade fever; symptoms since Friday.  Contacted patient to review symptoms, reports strong desire to reduce or eliminate coughing due a crushed vertebrae in her spine. Asking for steroid & antibiotic course that lasts 10 days to be called in.

## 2025-02-21 ENCOUNTER — TELEPHONE (OUTPATIENT)
Dept: PULMONOLOGY | Age: 80
End: 2025-02-21

## 2025-02-21 NOTE — TELEPHONE ENCOUNTER
Called patient and left VM letting her know it was time to schedule her next appointment and to call the office when she is ready to schedule     Return in about 1 year   (around 5/21/2025) for asthmaLanette.

## 2025-05-19 ENCOUNTER — TELEPHONE (OUTPATIENT)
Dept: PULMONOLOGY | Age: 80
End: 2025-05-19

## 2025-05-19 NOTE — TELEPHONE ENCOUNTER
Patient needs a Chronic Condition verification sent to [facility]   for the Dupixent     [phone]

## 2025-05-22 NOTE — TELEPHONE ENCOUNTER
Called [facility], they are going to fax the Chronic Condition Form.    Form has been faxed back to [facility].

## 2025-07-06 NOTE — PROGRESS NOTES
Gudelia Ng is a 79 y.o. female who presents today for the following:  Chief Complaint   Patient presents with    Other     Palpitations, lightheadedness on occasion       Allergies   Allergen Reactions    Niacin Rash       Current Outpatient Medications   Medication Sig Dispense Refill    oxyCODONE HCl (OXY-IR) 10 MG immediate release tablet TAKE 1 TABLET BY MOUTH EVERY 6 HOURS AS NEEDED FOR PAIN. MUST LAST 30 DAYS.      predniSONE (DELTASONE) 20 MG tablet Take 1 tablet by mouth daily 40mg x 3 days; 30mg x 3 days; 20mg x 3 days; 10mg x 3 days 15 tablet 0    pravastatin (PRAVACHOL) 40 MG tablet TAKE 1 TABLET BY MOUTH DAILY 90 tablet 3    calcium-vitamin D (OSCAL-500) 500-5 MG-MCG TABS per tablet Take 1 tablet by mouth 2 times daily 180 tablet 3    amitriptyline (ELAVIL) 10 MG tablet Take 1 tablet by mouth nightly at bedtime.      losartan (COZAAR) 25 MG tablet Take 1 tablet by mouth in the morning and at bedtime 180 tablet 2    montelukast (SINGULAIR) 10 MG tablet TAKE 1 TABLET BY MOUTH DAILY AT BEDTIME 90 tablet 3    omeprazole (PRILOSEC) 40 MG delayed release capsule Take 1 capsule by mouth 2 times daily 180 capsule 2    venlafaxine (EFFEXOR XR) 75 MG extended release capsule Take 1 capsule by mouth in the morning and at bedtime 180 capsule 2    verapamil (CALAN SR) 240 MG extended release tablet Take 1 tablet by mouth daily 90 tablet 2    ondansetron (ZOFRAN-ODT) 4 MG disintegrating tablet Take 1 tablet by mouth 3 times daily as needed for Nausea or Vomiting 20 tablet 1    fluticasone-umeclidin-vilant (TRELEGY ELLIPTA) 200-62.5-25 MCG/ACT AEPB inhaler Inhale 1 puff into the lungs daily 1 each 11    albuterol sulfate HFA (PROVENTIL;VENTOLIN;PROAIR) 108 (90 Base) MCG/ACT inhaler INHALE 2 PUFFS BY MOUTH FOUR TIMES DAILY 3 each 3    dupilumab (DUPIXENT) 300 MG/2ML SOSY injection Inject 2 mLs into the skin every 14 days 1 each 11    albuterol (PROVENTIL) (2.5 MG/3ML) 0.083% nebulizer solution Inhale 3 mLs

## 2025-07-07 ENCOUNTER — OFFICE VISIT (OUTPATIENT)
Dept: FAMILY MEDICINE CLINIC | Facility: CLINIC | Age: 80
End: 2025-07-07

## 2025-07-07 VITALS
OXYGEN SATURATION: 97 % | HEART RATE: 100 BPM | DIASTOLIC BLOOD PRESSURE: 88 MMHG | TEMPERATURE: 98.1 F | BODY MASS INDEX: 19.6 KG/M2 | HEIGHT: 62 IN | WEIGHT: 106.5 LBS | SYSTOLIC BLOOD PRESSURE: 130 MMHG | RESPIRATION RATE: 16 BRPM

## 2025-07-07 DIAGNOSIS — J45.50 SEVERE PERSISTENT ASTHMA WITHOUT COMPLICATION (HCC): ICD-10-CM

## 2025-07-07 DIAGNOSIS — R00.2 HEART PALPITATIONS: ICD-10-CM

## 2025-07-07 DIAGNOSIS — E78.2 MIXED HYPERLIPIDEMIA: ICD-10-CM

## 2025-07-07 DIAGNOSIS — M81.0 AGE RELATED OSTEOPOROSIS, UNSPECIFIED PATHOLOGICAL FRACTURE PRESENCE: ICD-10-CM

## 2025-07-07 DIAGNOSIS — D50.8 OTHER IRON DEFICIENCY ANEMIAS: ICD-10-CM

## 2025-07-07 DIAGNOSIS — I10 PRIMARY HYPERTENSION: Primary | ICD-10-CM

## 2025-07-07 DIAGNOSIS — N18.31 STAGE 3A CHRONIC KIDNEY DISEASE (HCC): ICD-10-CM

## 2025-07-07 DIAGNOSIS — I10 PRIMARY HYPERTENSION: ICD-10-CM

## 2025-07-07 DIAGNOSIS — R06.09 DYSPNEA ON EXERTION: ICD-10-CM

## 2025-07-07 DIAGNOSIS — E53.8 B12 DEFICIENCY: ICD-10-CM

## 2025-07-07 LAB
25(OH)D3 SERPL-MCNC: 27.1 NG/ML (ref 30–100)
ALBUMIN SERPL-MCNC: 3 G/DL (ref 3.2–4.6)
ALBUMIN/GLOB SERPL: 0.8 (ref 1–1.9)
ALP SERPL-CCNC: 165 U/L (ref 35–104)
ALT SERPL-CCNC: 20 U/L (ref 8–45)
ANION GAP SERPL CALC-SCNC: 11 MMOL/L (ref 7–16)
AST SERPL-CCNC: 31 U/L (ref 15–37)
BASOPHILS # BLD: 0.05 K/UL (ref 0–0.2)
BASOPHILS NFR BLD: 0.7 % (ref 0–2)
BILIRUB SERPL-MCNC: 0.4 MG/DL (ref 0–1.2)
BUN SERPL-MCNC: 10 MG/DL (ref 8–23)
CALCIUM SERPL-MCNC: 10.1 MG/DL (ref 8.8–10.2)
CHLORIDE SERPL-SCNC: 103 MMOL/L (ref 98–107)
CHOLEST SERPL-MCNC: 161 MG/DL (ref 0–200)
CO2 SERPL-SCNC: 26 MMOL/L (ref 20–29)
CREAT SERPL-MCNC: 0.9 MG/DL (ref 0.6–1.1)
DIFFERENTIAL METHOD BLD: NORMAL
EOSINOPHIL # BLD: 0.31 K/UL (ref 0–0.8)
EOSINOPHIL NFR BLD: 4.1 % (ref 0.5–7.8)
ERYTHROCYTE [DISTWIDTH] IN BLOOD BY AUTOMATED COUNT: 13.2 % (ref 11.9–14.6)
GLOBULIN SER CALC-MCNC: 3.9 G/DL (ref 2.3–3.5)
GLUCOSE SERPL-MCNC: 93 MG/DL (ref 70–99)
HCT VFR BLD AUTO: 44.1 % (ref 35.8–46.3)
HDLC SERPL-MCNC: 45 MG/DL (ref 40–60)
HDLC SERPL: 3.6 (ref 0–5)
HGB BLD-MCNC: 14.3 G/DL (ref 11.7–15.4)
IMM GRANULOCYTES # BLD AUTO: 0.11 K/UL (ref 0–0.5)
IMM GRANULOCYTES NFR BLD AUTO: 1.4 % (ref 0–5)
IRON SATN MFR SERPL: 20 % (ref 20–50)
IRON SERPL-MCNC: 56 UG/DL (ref 35–100)
LDLC SERPL CALC-MCNC: 92 MG/DL (ref 0–100)
LYMPHOCYTES # BLD: 2.19 K/UL (ref 0.5–4.6)
LYMPHOCYTES NFR BLD: 28.7 % (ref 13–44)
MCH RBC QN AUTO: 28.5 PG (ref 26.1–32.9)
MCHC RBC AUTO-ENTMCNC: 32.4 G/DL (ref 31.4–35)
MCV RBC AUTO: 87.8 FL (ref 82–102)
MONOCYTES # BLD: 0.74 K/UL (ref 0.1–1.3)
MONOCYTES NFR BLD: 9.7 % (ref 4–12)
NEUTS SEG # BLD: 4.24 K/UL (ref 1.7–8.2)
NEUTS SEG NFR BLD: 55.4 % (ref 43–78)
NRBC # BLD: 0 K/UL (ref 0–0.2)
PLATELET # BLD AUTO: 362 K/UL (ref 150–450)
PMV BLD AUTO: 9.9 FL (ref 9.4–12.3)
POTASSIUM SERPL-SCNC: 4.7 MMOL/L (ref 3.5–5.1)
PROT SERPL-MCNC: 6.8 G/DL (ref 6.3–8.2)
RBC # BLD AUTO: 5.02 M/UL (ref 4.05–5.2)
SODIUM SERPL-SCNC: 140 MMOL/L (ref 136–145)
TIBC SERPL-MCNC: 275 UG/DL (ref 240–450)
TRIGL SERPL-MCNC: 120 MG/DL (ref 0–150)
TSH W FREE THYROID IF ABNORMAL: 2.03 UIU/ML (ref 0.27–4.2)
UIBC SERPL-MCNC: 219 UG/DL (ref 112–347)
VIT B12 SERPL-MCNC: 645 PG/ML (ref 193–986)
VLDLC SERPL CALC-MCNC: 24 MG/DL (ref 6–23)
WBC # BLD AUTO: 7.6 K/UL (ref 4.3–11.1)

## 2025-07-07 RX ORDER — OXYCODONE HYDROCHLORIDE 10 MG/1
TABLET ORAL
COMMUNITY
Start: 2025-05-10

## 2025-07-07 SDOH — ECONOMIC STABILITY: FOOD INSECURITY: WITHIN THE PAST 12 MONTHS, THE FOOD YOU BOUGHT JUST DIDN'T LAST AND YOU DIDN'T HAVE MONEY TO GET MORE.: NEVER TRUE

## 2025-07-07 SDOH — ECONOMIC STABILITY: FOOD INSECURITY: WITHIN THE PAST 12 MONTHS, YOU WORRIED THAT YOUR FOOD WOULD RUN OUT BEFORE YOU GOT MONEY TO BUY MORE.: NEVER TRUE

## 2025-07-07 ASSESSMENT — PATIENT HEALTH QUESTIONNAIRE - PHQ9
5. POOR APPETITE OR OVEREATING: NOT AT ALL
9. THOUGHTS THAT YOU WOULD BE BETTER OFF DEAD, OR OF HURTING YOURSELF: NOT AT ALL
6. FEELING BAD ABOUT YOURSELF - OR THAT YOU ARE A FAILURE OR HAVE LET YOURSELF OR YOUR FAMILY DOWN: NOT AT ALL
4. FEELING TIRED OR HAVING LITTLE ENERGY: NOT AT ALL
SUM OF ALL RESPONSES TO PHQ QUESTIONS 1-9: 0
SUM OF ALL RESPONSES TO PHQ QUESTIONS 1-9: 0
2. FEELING DOWN, DEPRESSED OR HOPELESS: NOT AT ALL
7. TROUBLE CONCENTRATING ON THINGS, SUCH AS READING THE NEWSPAPER OR WATCHING TELEVISION: NOT AT ALL
8. MOVING OR SPEAKING SO SLOWLY THAT OTHER PEOPLE COULD HAVE NOTICED. OR THE OPPOSITE, BEING SO FIGETY OR RESTLESS THAT YOU HAVE BEEN MOVING AROUND A LOT MORE THAN USUAL: NOT AT ALL
10. IF YOU CHECKED OFF ANY PROBLEMS, HOW DIFFICULT HAVE THESE PROBLEMS MADE IT FOR YOU TO DO YOUR WORK, TAKE CARE OF THINGS AT HOME, OR GET ALONG WITH OTHER PEOPLE: NOT DIFFICULT AT ALL
SUM OF ALL RESPONSES TO PHQ QUESTIONS 1-9: 0
3. TROUBLE FALLING OR STAYING ASLEEP: NOT AT ALL
SUM OF ALL RESPONSES TO PHQ QUESTIONS 1-9: 0
1. LITTLE INTEREST OR PLEASURE IN DOING THINGS: NOT AT ALL

## 2025-07-08 ENCOUNTER — RESULTS FOLLOW-UP (OUTPATIENT)
Dept: FAMILY MEDICINE CLINIC | Facility: CLINIC | Age: 80
End: 2025-07-08

## 2025-07-08 DIAGNOSIS — R74.8 ELEVATED ALKALINE PHOSPHATASE LEVEL: Primary | ICD-10-CM

## 2025-07-08 ASSESSMENT — ENCOUNTER SYMPTOMS
NAUSEA: 0
SHORTNESS OF BREATH: 1
DIARRHEA: 0
COUGH: 0
BACK PAIN: 1
CONSTIPATION: 0
CHEST TIGHTNESS: 0
ABDOMINAL PAIN: 0

## 2025-07-10 ENCOUNTER — HOSPITAL ENCOUNTER (OUTPATIENT)
Dept: GENERAL RADIOLOGY | Age: 80
Discharge: HOME | End: 2025-07-12
Payer: MEDICARE

## 2025-07-10 DIAGNOSIS — R06.09 DYSPNEA ON EXERTION: ICD-10-CM

## 2025-07-10 PROCEDURE — 71046 X-RAY EXAM CHEST 2 VIEWS: CPT

## 2025-07-17 ENCOUNTER — OFFICE VISIT (OUTPATIENT)
Dept: PULMONOLOGY | Age: 80
End: 2025-07-17
Payer: MEDICARE

## 2025-07-17 VITALS
SYSTOLIC BLOOD PRESSURE: 100 MMHG | OXYGEN SATURATION: 95 % | HEART RATE: 58 BPM | WEIGHT: 106.7 LBS | DIASTOLIC BLOOD PRESSURE: 60 MMHG | BODY MASS INDEX: 19.64 KG/M2 | RESPIRATION RATE: 16 BRPM | TEMPERATURE: 98.2 F | HEIGHT: 62 IN

## 2025-07-17 DIAGNOSIS — K21.00 GASTRO-ESOPHAGEAL REFLUX DISEASE WITH ESOPHAGITIS, WITHOUT BLEEDING: ICD-10-CM

## 2025-07-17 DIAGNOSIS — J30.89 ENVIRONMENTAL AND SEASONAL ALLERGIES: ICD-10-CM

## 2025-07-17 DIAGNOSIS — J45.50 SEVERE PERSISTENT ASTHMA WITHOUT COMPLICATION (HCC): Primary | ICD-10-CM

## 2025-07-17 PROCEDURE — 1123F ACP DISCUSS/DSCN MKR DOCD: CPT | Performed by: INTERNAL MEDICINE

## 2025-07-17 PROCEDURE — 3078F DIAST BP <80 MM HG: CPT | Performed by: INTERNAL MEDICINE

## 2025-07-17 PROCEDURE — G8427 DOCREV CUR MEDS BY ELIG CLIN: HCPCS | Performed by: INTERNAL MEDICINE

## 2025-07-17 PROCEDURE — 1036F TOBACCO NON-USER: CPT | Performed by: INTERNAL MEDICINE

## 2025-07-17 PROCEDURE — 1126F AMNT PAIN NOTED NONE PRSNT: CPT | Performed by: INTERNAL MEDICINE

## 2025-07-17 PROCEDURE — G2211 COMPLEX E/M VISIT ADD ON: HCPCS | Performed by: INTERNAL MEDICINE

## 2025-07-17 PROCEDURE — G8420 CALC BMI NORM PARAMETERS: HCPCS | Performed by: INTERNAL MEDICINE

## 2025-07-17 PROCEDURE — 1159F MED LIST DOCD IN RCRD: CPT | Performed by: INTERNAL MEDICINE

## 2025-07-17 PROCEDURE — 99214 OFFICE O/P EST MOD 30 MIN: CPT | Performed by: INTERNAL MEDICINE

## 2025-07-17 PROCEDURE — G8399 PT W/DXA RESULTS DOCUMENT: HCPCS | Performed by: INTERNAL MEDICINE

## 2025-07-17 PROCEDURE — 1090F PRES/ABSN URINE INCON ASSESS: CPT | Performed by: INTERNAL MEDICINE

## 2025-07-17 PROCEDURE — 3074F SYST BP LT 130 MM HG: CPT | Performed by: INTERNAL MEDICINE

## 2025-07-17 RX ORDER — PREDNISONE 20 MG/1
20 TABLET ORAL DAILY
Qty: 15 TABLET | Refills: 0 | Status: SHIPPED | OUTPATIENT
Start: 2025-07-17

## 2025-07-17 RX ORDER — FLUTICASONE FUROATE, UMECLIDINIUM BROMIDE AND VILANTEROL TRIFENATATE 200; 62.5; 25 UG/1; UG/1; UG/1
1 POWDER RESPIRATORY (INHALATION) DAILY
Qty: 1 EACH | Refills: 11 | Status: SHIPPED | OUTPATIENT
Start: 2025-07-17

## 2025-07-17 RX ORDER — MONTELUKAST SODIUM 10 MG/1
TABLET ORAL
Qty: 90 TABLET | Refills: 3 | Status: SHIPPED | OUTPATIENT
Start: 2025-07-17

## 2025-07-17 RX ORDER — ALBUTEROL SULFATE 90 UG/1
INHALANT RESPIRATORY (INHALATION)
Qty: 3 EACH | Refills: 3 | Status: SHIPPED | OUTPATIENT
Start: 2025-07-17

## 2025-07-17 NOTE — PROGRESS NOTES
Name:  Gudelia Ng  YOB: 1945   MRN: [id number]      Office Visit: 7/17/2025       Assessment & Plan (Medical Decision Making)    Impression: 80 y.o. female with severe persistent asthma on Trelegy 200, Dupixent.      ICD-10-CM    1. Severe persistent asthma without complication ([facility])  J45.50 albuterol sulfate HFA (PROVENTIL;VENTOLIN;PROAIR) 108 (90 Base) MCG/ACT inhaler     dupilumab (DUPIXENT) 300 MG/2ML SOSY injection     fluticasone-umeclidin-vilant (TRELEGY ELLIPTA) 200-62.5-25 MCG/ACT AEPB inhaler     montelukast (SINGULAIR) 10 MG tablet     predniSONE (DELTASONE) 20 MG tablet      2. Environmental and seasonal allergies  J30.89       3. Gastro-esophageal reflux disease with esophagitis, without bleeding  K21.00          Assessment & Plan  1. Severe persistent asthma: Stable. On Trelegy 200 and Dupixent since 10/20/2022, doing well. Uses albuterol in mornings before Trelegy, no additional doses needed despite recent symptoms. Flare-up in 01/2025, medication called in, recovered. Recent dry cough and mild shortness of breath since 07/14/2025, no wheezing or significant phlegm. Lung x-ray normal 2 weeks ago.  - Prescribe steroids to use if condition worsens or wheezing occurs  - Continue albuterol as needed for chest congestion  - Refill Trelegy, albuterol, and Dupixent  - Complete and fax insurance paperwork    2. Allergies: better on Dupixent, continue singulair    3. GERD: continue prilosec    Follow-up in 6 months.     No orders of the defined types were placed in this encounter.  Follow-up and Dispositions    Return in about 6 months (around 1/17/2026).       Kaycee Adkins MD    No specialty comments available.  No problem-specific Assessment & Plan notes found for this encounter.      The patient (or guardian, if applicable) and other individuals in attendance with the patient were advised that Artificial Intelligence will be utilized during this visit to record, process the

## 2025-07-21 ENCOUNTER — TELEPHONE (OUTPATIENT)
Dept: PULMONOLOGY | Age: 80
End: 2025-07-21

## 2025-07-21 NOTE — TELEPHONE ENCOUNTER
Pt was seen last week and got a Rx for steroid and now she is coughing worse with phlegm coming up. She was wondering if there could be a Rx for an antibiotic called into the [address]

## 2025-07-22 ENCOUNTER — HOSPITAL ENCOUNTER (OUTPATIENT)
Dept: ULTRASOUND IMAGING | Age: 80
Discharge: HOME | End: 2025-07-24
Payer: MEDICARE

## 2025-07-22 DIAGNOSIS — R74.8 ELEVATED ALKALINE PHOSPHATASE LEVEL: ICD-10-CM

## 2025-07-22 PROCEDURE — 76705 ECHO EXAM OF ABDOMEN: CPT

## 2025-07-22 NOTE — TELEPHONE ENCOUNTER
Last seen: 7/17/25  Hx: severe asthma, environmental & seasonal allergies, GERD     Office notes for last week indicate Rx for steroid to use IF condition worsens or wheezing occurs.   Patient call reporting use of steroid Rx but continued worsening of symptoms w/ productive cough, asking for antibiotic.  Contacted patient to review symptoms, cough is productive of green sputum; last dose of dupixent was last night and administers every 14 days; using albuterol inhaler once daily, but since increased congestion has been using in pm as well; took 2 mucinex tabs this morning; some fever last Friday, not measured but had chills.   Started the steroids Friday, dose decreased yesterday to 1.5 tabs.  Asking for antibiotic course as well.

## 2025-07-23 ENCOUNTER — RESULTS FOLLOW-UP (OUTPATIENT)
Dept: FAMILY MEDICINE CLINIC | Facility: CLINIC | Age: 80
End: 2025-07-23

## 2025-07-23 RX ORDER — DOXYCYCLINE HYCLATE 100 MG
100 TABLET ORAL 2 TIMES DAILY
Qty: 14 TABLET | Refills: 0 | Status: SHIPPED | OUTPATIENT
Start: 2025-07-23 | End: 2025-07-30

## 2025-08-01 ENCOUNTER — TELEPHONE (OUTPATIENT)
Age: 80
End: 2025-08-01

## 2025-08-01 NOTE — TELEPHONE ENCOUNTER
Patient is having sharp pain in her left arm above the elbow. Does not radiate, Last a second or two. . Does not hurt with touching or moving arm. Feels lightheaded while in store. Patient wanted to see if there is an earlier appointment. Appointment rescheduled for SA on 8-25-25 9 am. Patient informed to go to the ER if this continues. Patient voiced understanding and thanked me//anatoly

## 2025-08-01 NOTE — TELEPHONE ENCOUNTER
Pt is calling saying yesterday and today she has been having pain in her ARM  She has apt in September but thinks she may need to be seen sooner ,Please call pt  at [phone]

## 2025-08-25 ENCOUNTER — OFFICE VISIT (OUTPATIENT)
Age: 80
End: 2025-08-25
Payer: MEDICARE

## 2025-08-25 VITALS
BODY MASS INDEX: 20.8 KG/M2 | WEIGHT: 113 LBS | HEART RATE: 84 BPM | DIASTOLIC BLOOD PRESSURE: 72 MMHG | SYSTOLIC BLOOD PRESSURE: 118 MMHG | HEIGHT: 62 IN

## 2025-08-25 DIAGNOSIS — I10 ESSENTIAL HYPERTENSION: ICD-10-CM

## 2025-08-25 DIAGNOSIS — R07.89 CHEST DISCOMFORT: ICD-10-CM

## 2025-08-25 DIAGNOSIS — J45.50 SEVERE PERSISTENT ASTHMA WITHOUT COMPLICATION (HCC): ICD-10-CM

## 2025-08-25 DIAGNOSIS — E78.5 DYSLIPIDEMIA: ICD-10-CM

## 2025-08-25 DIAGNOSIS — R00.2 PALPITATIONS: Primary | ICD-10-CM

## 2025-08-25 PROCEDURE — 1036F TOBACCO NON-USER: CPT | Performed by: INTERNAL MEDICINE

## 2025-08-25 PROCEDURE — 1160F RVW MEDS BY RX/DR IN RCRD: CPT | Performed by: INTERNAL MEDICINE

## 2025-08-25 PROCEDURE — 3078F DIAST BP <80 MM HG: CPT | Performed by: INTERNAL MEDICINE

## 2025-08-25 PROCEDURE — 1090F PRES/ABSN URINE INCON ASSESS: CPT | Performed by: INTERNAL MEDICINE

## 2025-08-25 PROCEDURE — 1159F MED LIST DOCD IN RCRD: CPT | Performed by: INTERNAL MEDICINE

## 2025-08-25 PROCEDURE — 1126F AMNT PAIN NOTED NONE PRSNT: CPT | Performed by: INTERNAL MEDICINE

## 2025-08-25 PROCEDURE — G8420 CALC BMI NORM PARAMETERS: HCPCS | Performed by: INTERNAL MEDICINE

## 2025-08-25 PROCEDURE — 99204 OFFICE O/P NEW MOD 45 MIN: CPT | Performed by: INTERNAL MEDICINE

## 2025-08-25 PROCEDURE — G8399 PT W/DXA RESULTS DOCUMENT: HCPCS | Performed by: INTERNAL MEDICINE

## 2025-08-25 PROCEDURE — G8427 DOCREV CUR MEDS BY ELIG CLIN: HCPCS | Performed by: INTERNAL MEDICINE

## 2025-08-25 PROCEDURE — 1123F ACP DISCUSS/DSCN MKR DOCD: CPT | Performed by: INTERNAL MEDICINE

## 2025-08-25 PROCEDURE — 3074F SYST BP LT 130 MM HG: CPT | Performed by: INTERNAL MEDICINE

## 2025-08-25 ASSESSMENT — ENCOUNTER SYMPTOMS
VOMITING: 0
SHORTNESS OF BREATH: 1
DIARRHEA: 0
NAUSEA: 0

## 2025-08-28 ENCOUNTER — PATIENT MESSAGE (OUTPATIENT)
Dept: PULMONOLOGY | Age: 80
End: 2025-08-28

## (undated) DEVICE — FORCEPS BX L240CM JAW DIA2.8MM L CAP W/ NDL MIC MESH TOOTH

## (undated) DEVICE — CANNULA NSL ORAL AD FOR CAPNOFLEX CO2 O2 AIRLFE

## (undated) DEVICE — CONNECTOR TBNG OD5-7MM O2 END DISP

## (undated) DEVICE — BLOCK BITE AD 60FR W/ VELC STRP ADDRESSES MOST PT AND

## (undated) DEVICE — KENDALL RADIOLUCENT FOAM MONITORING ELECTRODE RECTANGULAR SHAPE: Brand: KENDALL

## (undated) DEVICE — NDL PRT INJ NSAF BLNT 18GX1.5 --

## (undated) DEVICE — CONTAINER PREFIL FRMLN 40ML --

## (undated) DEVICE — SYR 5ML 1/5 GRAD LL NSAF LF --

## (undated) DEVICE — SYR 3ML LL TIP 1/10ML GRAD --

## (undated) DEVICE — MASK O2 O2 LN L7FT CO2 LN L10FT FEM BG 750ML M CONC ADPT